# Patient Record
Sex: MALE | Race: WHITE | Employment: FULL TIME | URBAN - METROPOLITAN AREA
[De-identification: names, ages, dates, MRNs, and addresses within clinical notes are randomized per-mention and may not be internally consistent; named-entity substitution may affect disease eponyms.]

---

## 2017-02-16 DIAGNOSIS — F41.9 ANXIETY: Primary | ICD-10-CM

## 2017-07-27 ENCOUNTER — HOSPITAL ENCOUNTER (OUTPATIENT)
Facility: CLINIC | Age: 62
Setting detail: OBSERVATION
Discharge: HOME | End: 2017-07-29
Attending: EMERGENCY MEDICINE | Admitting: HOSPITALIST
Payer: COMMERCIAL

## 2017-07-27 ENCOUNTER — APPOINTMENT (OUTPATIENT)
Dept: CT IMAGING | Facility: CLINIC | Age: 62
End: 2017-07-27
Attending: EMERGENCY MEDICINE
Payer: COMMERCIAL

## 2017-07-27 ENCOUNTER — OFFICE VISIT (OUTPATIENT)
Dept: URGENT CARE | Facility: URGENT CARE | Age: 62
End: 2017-07-27
Payer: COMMERCIAL

## 2017-07-27 VITALS
OXYGEN SATURATION: 97 % | TEMPERATURE: 97.4 F | SYSTOLIC BLOOD PRESSURE: 155 MMHG | DIASTOLIC BLOOD PRESSURE: 105 MMHG | HEART RATE: 74 BPM

## 2017-07-27 DIAGNOSIS — R10.31 RLQ ABDOMINAL PAIN: Primary | ICD-10-CM

## 2017-07-27 DIAGNOSIS — K56.0 PARALYTIC ILEUS (H): ICD-10-CM

## 2017-07-27 PROBLEM — K56.609 SBO (SMALL BOWEL OBSTRUCTION) (H): Status: ACTIVE | Noted: 2017-07-27

## 2017-07-27 LAB
ALBUMIN SERPL-MCNC: 4.1 G/DL (ref 3.4–5)
ALBUMIN UR-MCNC: NEGATIVE MG/DL
ALP SERPL-CCNC: 69 U/L (ref 40–150)
ALT SERPL W P-5'-P-CCNC: 26 U/L (ref 0–70)
ANION GAP SERPL CALCULATED.3IONS-SCNC: 9 MMOL/L (ref 3–14)
APPEARANCE UR: CLEAR
AST SERPL W P-5'-P-CCNC: 19 U/L (ref 0–45)
BASOPHILS # BLD AUTO: 0 10E9/L (ref 0–0.2)
BASOPHILS NFR BLD AUTO: 0.1 %
BILIRUB SERPL-MCNC: 0.5 MG/DL (ref 0.2–1.3)
BILIRUB UR QL STRIP: NEGATIVE
BUN SERPL-MCNC: 16 MG/DL (ref 7–30)
CALCIUM SERPL-MCNC: 9.2 MG/DL (ref 8.5–10.1)
CHLORIDE SERPL-SCNC: 108 MMOL/L (ref 94–109)
CO2 SERPL-SCNC: 25 MMOL/L (ref 20–32)
COLOR UR AUTO: YELLOW
CREAT SERPL-MCNC: 1.01 MG/DL (ref 0.66–1.25)
DIFFERENTIAL METHOD BLD: NORMAL
EOSINOPHIL # BLD AUTO: 0.1 10E9/L (ref 0–0.7)
EOSINOPHIL NFR BLD AUTO: 1.1 %
ERYTHROCYTE [DISTWIDTH] IN BLOOD BY AUTOMATED COUNT: 13 % (ref 10–15)
GFR SERPL CREATININE-BSD FRML MDRD: 75 ML/MIN/1.7M2
GLUCOSE SERPL-MCNC: 104 MG/DL (ref 70–99)
GLUCOSE UR STRIP-MCNC: NEGATIVE MG/DL
HCT VFR BLD AUTO: 43.4 % (ref 40–53)
HGB BLD-MCNC: 15.2 G/DL (ref 13.3–17.7)
HGB UR QL STRIP: NEGATIVE
IMM GRANULOCYTES # BLD: 0 10E9/L (ref 0–0.4)
IMM GRANULOCYTES NFR BLD: 0.1 %
KETONES UR STRIP-MCNC: 15 MG/DL
LEUKOCYTE ESTERASE UR QL STRIP: NEGATIVE
LYMPHOCYTES # BLD AUTO: 1.5 10E9/L (ref 0.8–5.3)
LYMPHOCYTES NFR BLD AUTO: 18.5 %
MCH RBC QN AUTO: 31.8 PG (ref 26.5–33)
MCHC RBC AUTO-ENTMCNC: 35 G/DL (ref 31.5–36.5)
MCV RBC AUTO: 91 FL (ref 78–100)
MONOCYTES # BLD AUTO: 0.5 10E9/L (ref 0–1.3)
MONOCYTES NFR BLD AUTO: 6.1 %
NEUTROPHILS # BLD AUTO: 5.9 10E9/L (ref 1.6–8.3)
NEUTROPHILS NFR BLD AUTO: 74.1 %
NITRATE UR QL: NEGATIVE
NRBC # BLD AUTO: 0 10*3/UL
NRBC BLD AUTO-RTO: 0 /100
PH UR STRIP: 7 PH (ref 5–7)
PLATELET # BLD AUTO: 209 10E9/L (ref 150–450)
POTASSIUM SERPL-SCNC: 4 MMOL/L (ref 3.4–5.3)
PROT SERPL-MCNC: 7.3 G/DL (ref 6.8–8.8)
RBC # BLD AUTO: 4.78 10E12/L (ref 4.4–5.9)
SODIUM SERPL-SCNC: 142 MMOL/L (ref 133–144)
SP GR UR STRIP: 1.02 (ref 1–1.03)
URN SPEC COLLECT METH UR: ABNORMAL
UROBILINOGEN UR STRIP-ACNC: 0.2 EU/DL (ref 0.2–1)
WBC # BLD AUTO: 7.9 10E9/L (ref 4–11)

## 2017-07-27 PROCEDURE — 96375 TX/PRO/DX INJ NEW DRUG ADDON: CPT

## 2017-07-27 PROCEDURE — 99285 EMERGENCY DEPT VISIT HI MDM: CPT | Mod: 25

## 2017-07-27 PROCEDURE — 25000128 H RX IP 250 OP 636: Performed by: EMERGENCY MEDICINE

## 2017-07-27 PROCEDURE — 99207 ZZC OFFICE-HOSPITAL ADMIT: CPT

## 2017-07-27 PROCEDURE — 74176 CT ABD & PELVIS W/O CONTRAST: CPT

## 2017-07-27 PROCEDURE — 99220 ZZC INITIAL OBSERVATION CARE,LEVL III: CPT | Performed by: HOSPITALIST

## 2017-07-27 PROCEDURE — 81003 URINALYSIS AUTO W/O SCOPE: CPT | Performed by: EMERGENCY MEDICINE

## 2017-07-27 PROCEDURE — 83690 ASSAY OF LIPASE: CPT | Performed by: EMERGENCY MEDICINE

## 2017-07-27 PROCEDURE — 96361 HYDRATE IV INFUSION ADD-ON: CPT

## 2017-07-27 PROCEDURE — 80053 COMPREHEN METABOLIC PANEL: CPT | Performed by: EMERGENCY MEDICINE

## 2017-07-27 PROCEDURE — 85025 COMPLETE CBC W/AUTO DIFF WBC: CPT | Performed by: EMERGENCY MEDICINE

## 2017-07-27 PROCEDURE — 96374 THER/PROPH/DIAG INJ IV PUSH: CPT

## 2017-07-27 RX ORDER — PROCHLORPERAZINE 25 MG
25 SUPPOSITORY, RECTAL RECTAL EVERY 12 HOURS PRN
Status: DISCONTINUED | OUTPATIENT
Start: 2017-07-27 | End: 2017-07-29 | Stop reason: HOSPADM

## 2017-07-27 RX ORDER — ONDANSETRON 2 MG/ML
4 INJECTION INTRAMUSCULAR; INTRAVENOUS EVERY 6 HOURS PRN
Status: DISCONTINUED | OUTPATIENT
Start: 2017-07-27 | End: 2017-07-29 | Stop reason: HOSPADM

## 2017-07-27 RX ORDER — SODIUM CHLORIDE, SODIUM LACTATE, POTASSIUM CHLORIDE, CALCIUM CHLORIDE 600; 310; 30; 20 MG/100ML; MG/100ML; MG/100ML; MG/100ML
1000 INJECTION, SOLUTION INTRAVENOUS CONTINUOUS
Status: DISCONTINUED | OUTPATIENT
Start: 2017-07-27 | End: 2017-07-28

## 2017-07-27 RX ORDER — PROCHLORPERAZINE MALEATE 5 MG
5-10 TABLET ORAL EVERY 6 HOURS PRN
Status: DISCONTINUED | OUTPATIENT
Start: 2017-07-27 | End: 2017-07-29 | Stop reason: HOSPADM

## 2017-07-27 RX ORDER — NALOXONE HYDROCHLORIDE 0.4 MG/ML
.1-.4 INJECTION, SOLUTION INTRAMUSCULAR; INTRAVENOUS; SUBCUTANEOUS
Status: DISCONTINUED | OUTPATIENT
Start: 2017-07-27 | End: 2017-07-29 | Stop reason: HOSPADM

## 2017-07-27 RX ORDER — HYDROMORPHONE HYDROCHLORIDE 1 MG/ML
.3-.5 INJECTION, SOLUTION INTRAMUSCULAR; INTRAVENOUS; SUBCUTANEOUS
Status: DISCONTINUED | OUTPATIENT
Start: 2017-07-27 | End: 2017-07-29 | Stop reason: HOSPADM

## 2017-07-27 RX ORDER — ONDANSETRON 2 MG/ML
4 INJECTION INTRAMUSCULAR; INTRAVENOUS ONCE
Status: DISCONTINUED | OUTPATIENT
Start: 2017-07-27 | End: 2017-07-27

## 2017-07-27 RX ORDER — HYDROMORPHONE HYDROCHLORIDE 1 MG/ML
0.5 INJECTION, SOLUTION INTRAMUSCULAR; INTRAVENOUS; SUBCUTANEOUS
Status: DISCONTINUED | OUTPATIENT
Start: 2017-07-27 | End: 2017-07-27

## 2017-07-27 RX ORDER — BISACODYL 10 MG
10 SUPPOSITORY, RECTAL RECTAL DAILY PRN
Status: DISCONTINUED | OUTPATIENT
Start: 2017-07-28 | End: 2017-07-29 | Stop reason: HOSPADM

## 2017-07-27 RX ORDER — LABETALOL HYDROCHLORIDE 5 MG/ML
10 INJECTION, SOLUTION INTRAVENOUS
Status: DISCONTINUED | OUTPATIENT
Start: 2017-07-27 | End: 2017-07-29 | Stop reason: HOSPADM

## 2017-07-27 RX ORDER — ACETAMINOPHEN 650 MG/1
650 SUPPOSITORY RECTAL EVERY 4 HOURS PRN
Status: DISCONTINUED | OUTPATIENT
Start: 2017-07-27 | End: 2017-07-29 | Stop reason: HOSPADM

## 2017-07-27 RX ORDER — ONDANSETRON 2 MG/ML
4 INJECTION INTRAMUSCULAR; INTRAVENOUS ONCE
Status: COMPLETED | OUTPATIENT
Start: 2017-07-27 | End: 2017-07-27

## 2017-07-27 RX ORDER — HYDRALAZINE HYDROCHLORIDE 20 MG/ML
10 INJECTION INTRAMUSCULAR; INTRAVENOUS EVERY 4 HOURS PRN
Status: DISCONTINUED | OUTPATIENT
Start: 2017-07-27 | End: 2017-07-29 | Stop reason: HOSPADM

## 2017-07-27 RX ORDER — ONDANSETRON 4 MG/1
4 TABLET, ORALLY DISINTEGRATING ORAL EVERY 6 HOURS PRN
Status: DISCONTINUED | OUTPATIENT
Start: 2017-07-27 | End: 2017-07-29 | Stop reason: HOSPADM

## 2017-07-27 RX ORDER — ACETAMINOPHEN 325 MG/1
650 TABLET ORAL EVERY 4 HOURS PRN
Status: DISCONTINUED | OUTPATIENT
Start: 2017-07-27 | End: 2017-07-29 | Stop reason: HOSPADM

## 2017-07-27 RX ADMIN — SODIUM CHLORIDE 1000 ML: 9 INJECTION, SOLUTION INTRAVENOUS at 22:06

## 2017-07-27 RX ADMIN — HYDROMORPHONE HYDROCHLORIDE 0.5 MG: 1 INJECTION, SOLUTION INTRAMUSCULAR; INTRAVENOUS; SUBCUTANEOUS at 22:23

## 2017-07-27 RX ADMIN — ONDANSETRON 4 MG: 2 SOLUTION INTRAMUSCULAR; INTRAVENOUS at 22:07

## 2017-07-27 RX ADMIN — HYDROMORPHONE HYDROCHLORIDE 0.5 MG: 1 INJECTION, SOLUTION INTRAMUSCULAR; INTRAVENOUS; SUBCUTANEOUS at 22:05

## 2017-07-27 ASSESSMENT — ENCOUNTER SYMPTOMS
DYSURIA: 0
ABDOMINAL PAIN: 1
FREQUENCY: 1

## 2017-07-27 NOTE — MR AVS SNAPSHOT
After Visit Summary   7/27/2017    [address]    MRN: [id number]           Patient Information     Date Of Birth          1955        Visit Information        Provider Department      7/27/2017 6:45 PM CS URGENT CARE [facility]        Today's Diagnoses     RLQ abdominal pain    -  1       Follow-ups after your visit        Who to contact     If you have questions or need follow up information about today's clinic visit or your schedule please contact [facility] URGENT CARE directly at [phone].  Normal or non-critical lab and imaging results will be communicated to you by [employer], letter or phone within 4 business days after the clinic has received the results. If you do not hear from us within 7 days, please contact the clinic through [employer] or phone. If you have a critical or abnormal lab result, we will notify you by phone as soon as possible.  Submit refill requests through [employer] or call your pharmacy and they will forward the refill request to us. Please allow 3 business days for your refill to be completed.          Additional Information About Your Visit        MyChart Information     [employer] gives you secure access to your electronic health record. If you see a primary care provider, you can also send messages to your care team and make appointments. If you have questions, please call your primary care clinic.  If you do not have a primary care provider, please call [phone] and they will assist you.        Care EveryWhere ID     This is your Care EveryWhere ID. This could be used by other organizations to access your [place] medical records  [id number]        Your Vitals Were     Pulse Temperature Pulse Oximetry             74 97.4  F (36.3  C) 97%          Blood Pressure from Last 3 Encounters:   07/27/17 (!) 155/105   11/29/16 130/80   10/26/16 130/82    Weight from Last 3 Encounters:   11/29/16 171 lb (77.6 kg)   10/26/16 165 lb  6.4 oz (75 kg)   07/21/16 174 lb (78.9 kg)              Today, you had the following     No orders found for display       Primary Care Provider Office Phone # Fax #    Manolo Ambriz [phone]       [facility] [address] [address]        Equal Access to Services     [facility]: Hadii aad ku hadasho Soomaali, waaxda luqadaha, qaybta kaalmada adeegyada, waxay idiin hayaan adeeg kharash la'aan . So [facility] [phone].    ATENCIÓN: Si habla español, tiene a becerra disposición servicios gratuitos de asistencia lingüística. Solitarioame al [phone].    We comply with applicable federal civil rights laws and [place] laws. We do not discriminate on the basis of race, color, national origin, age, disability sex, sexual orientation or gender identity.            Thank you!     Thank you for choosing [facility] URGENT CARE  for your care. Our goal is always to provide you with excellent care. Hearing back from our patients is one way we can continue to improve our services. Please take a few minutes to complete the written survey that you may receive in the mail after your visit with us. Thank you!             Your Updated Medication List - Protect others around you: Learn how to safely use, store and throw away your medicines at www.disposemymeds.org.          This list is accurate as of: 7/27/17  7:17 PM.  Always use your most recent med list.                   Brand Name Dispense Instructions for use Diagnosis    amphetamine-dextroamphetamine 10 MG per 24 hr capsule    ADDERALL XR    30 capsule    Take 1 capsule (10 mg) by mouth daily    Attention-deficit hyperactivity disorder, other type       ASPIRIN      Take 325 mg by mouth as needed On hold preop        Fish Oil 500 MG Caps      Take 1 capsule by mouth daily        magnesium 250 MG tablet     30 tablet    Take 1 tablet by mouth 2 times daily        MULTIVITAMIN TABS   OR      1 TABLET EVETRY DAY         SAW PALMETTO EXTRACT PO           Simethicone 180 MG Caps      Take by mouth as needed        Valerian Root 100 MG Caps     840    2 TABLETS AT BEDTIME AS NEEDED        VITAMIN A PO      Take by mouth daily

## 2017-07-27 NOTE — NURSING NOTE
"Chief Complaint   Patient presents with     Urgent Care     Abdominal Pain     Stomach pain in RLQ that started today at 4pm. Patient states that he feels bloated, nausea and was unable to make a BM. Patient was unable to eat dinner. Pain rating at 5-6/10.        Initial BP (!) 155/105 (BP Location: Right arm, Cuff Size: Adult Regular)  Pulse 74  Temp 97.4  F (36.3  C)  SpO2 97% Estimated body mass index is 24.36 kg/(m^2) as calculated from the following:    Height as of 11/29/16: 5' 10.25\" (1.784 m).    Weight as of 11/29/16: 171 lb (77.6 kg).  Medication Reconciliation: complete.  WINSOME Woodson      "

## 2017-07-27 NOTE — IP AVS SNAPSHOT
MRN:[id number]                      After Visit Summary   7/27/2017    [address]    MRN: [id number]           Thank you!     Thank you for choosing [facility] for your care. Our goal is always to provide you with excellent care. Hearing back from our patients is one way we can continue to improve our services. Please take a few minutes to complete the written survey that you may receive in the mail after you visit with us. Thank you!        Patient Information     Date Of Birth          1955        Designated Caregiver       Most Recent Value    Caregiver    Will someone help with your care after discharge? yes    Name of designated caregiver -- [faisal]    Phone number of caregiver -- [[phone]]      About your hospital stay     You were [facility] on:  July 27, 2017 [facility]    You were discharged on:  July 29, 2017        Reason for your hospital stay       Observation care for small bowel obstruction.                  Who to Call     For medical emergencies, please call 911.  For non-urgent questions about your medical care, please call your primary care provider or clinic, [phone]          Attending Provider     Provider Specialty    Chuy Salmeron MD Emergency Medicine    [facility]       Primary Care Provider Office Phone # Fax #    Jose Luis Slava Nielson [phone]      After Care Instructions     Activity       Your activity upon discharge: activity as tolerated            Diet       Follow this diet upon discharge: Advance to a regular diet as tolerated                  Follow-up Appointments     Follow-up and recommended labs and tests        Follow up with primary care provider within 7 days for hospital follow- up.  Recommend follow up with Urology as well.  [facility] clinic number is [phone].                  Your next 10 appointments already scheduled      "Aug 03, 2017  3:30 PM CDT   Office Visit with Jose Luis Nielson MD   [facility]    [address]  [facility] [address] [phone]           Bring a current list of meds and any records pertaining to this visit. For Physicals, please bring immunization records and any forms needing to be filled out. Please arrive 10 minutes early to complete paperwork.              Pending Results     No orders found from 7/25/2017 to 7/28/2017.            Statement of Approval     Ordered          07/29/17 0901  I have reviewed and agree with all the recommendations and orders detailed in this document.  EFFECTIVE NOW     Approved and electronically signed by:  Zainab Can PA             Admission Information     Date & Time Provider Department Dept. Phone    7/27/2017 Aj Steen MD [facility] [phone]      Your Vitals Were     Blood Pressure Temperature Respirations Height Weight Pulse Oximetry    141/88 (BP Location: Left arm) 96.3  F (35.7  C) (Oral) 16 1.803 m (5' 11\") 78.4 kg (172 lb 12.8 oz) 97%    BMI (Body Mass Index)                   24.1 kg/m2           MyChart Information     [employer] gives you secure access to your electronic health record. If you see a primary care provider, you can also send messages to your care team and make appointments. If you have questions, please call your primary care clinic.  If you do not have a primary care provider, please call [phone] and they will assist you.        Care EveryWhere ID     This is your Care EveryWhere ID. This could be used by other organizations to access your [place] medical records  [id number]        Equal Access to Services     [facility] : Hadii reyes Smith, waeloise pandaadaha, qaybta kaalmada henrry, terri avila. So [facility] [phone].    ATENCIÓN: Si habla español, tiene a becerra disposición servicios gratuitos de " asistencia lingüística. Chu al [phone].    We comply with applicable federal civil rights laws and [place] laws. We do not discriminate on the basis of race, color, national origin, age, disability sex, sexual orientation or gender identity.               Review of your medicines      CONTINUE these medicines which have NOT CHANGED        Dose / Directions    DAILY HERBS PROSTATE PO   Indication:  [employer] prostate formula- saw palmetto blend.   Notes to Patient:  Resume at discharge        Dose:  1 tablet   Take 1 tablet by mouth daily   Refills:  0       Fish Oil 500 MG Caps   Notes to Patient:  Resume at discharge          Dose:  1 capsule   Take 1 capsule by mouth daily   Refills:  0       magnesium 250 MG tablet   Notes to Patient:  Take as needed        Dose:  1 tablet   Take 1 tablet by mouth nightly as needed (sleep)   Refills:  0       MULTIVITAMIN TABS   OR   Notes to Patient:  Resume at discharge          1 TABLET EVETRY DAY   Refills:  0                Protect others around you: Learn how to safely use, store and throw away your medicines at www.disposemymeds.org.             Medication List: This is a list of all your medications and when to take them. Check marks below indicate your daily home schedule. Keep this list as a reference.      Medications           Morning Afternoon Evening Bedtime As Needed    DAILY HERBS PROSTATE PO   Take 1 tablet by mouth daily   Notes to Patient:  Resume at discharge                                   Fish Oil 500 MG Caps   Take 1 capsule by mouth daily   Notes to Patient:  Resume at discharge                                     magnesium 250 MG tablet   Take 1 tablet by mouth nightly as needed (sleep)   Notes to Patient:  Take as needed                                   MULTIVITAMIN TABS   OR   1 TABLET EVETRY DAY   Notes to Patient:  Resume at discharge

## 2017-07-27 NOTE — IP AVS SNAPSHOT
[facility]    [address] [place] [address]    Phone:  [phone]                                       After Visit Summary   7/27/2017    [address]    MRN: [id number]           After Visit Summary Signature Page     I have received my discharge instructions, and my questions have been answered. I have discussed any challenges I see with this plan with the nurse or doctor.    ..........................................................................................................................................  Patient/Patient Representative Signature      ..........................................................................................................................................  Patient Representative Print Name and Relationship to Patient    ..................................................               ................................................  Date                                            Time    ..........................................................................................................................................  Reviewed by Signature/Title    ...................................................              ..............................................  Date                                                            Time

## 2017-07-28 LAB
ALBUMIN SERPL-MCNC: 3.2 G/DL (ref 3.4–5)
ALP SERPL-CCNC: 55 U/L (ref 40–150)
ALT SERPL W P-5'-P-CCNC: 22 U/L (ref 0–70)
ANION GAP SERPL CALCULATED.3IONS-SCNC: 8 MMOL/L (ref 3–14)
AST SERPL W P-5'-P-CCNC: 16 U/L (ref 0–45)
BASOPHILS # BLD AUTO: 0 10E9/L (ref 0–0.2)
BASOPHILS NFR BLD AUTO: 0.1 %
BILIRUB SERPL-MCNC: 0.7 MG/DL (ref 0.2–1.3)
BUN SERPL-MCNC: 11 MG/DL (ref 7–30)
CALCIUM SERPL-MCNC: 8.3 MG/DL (ref 8.5–10.1)
CHLORIDE SERPL-SCNC: 109 MMOL/L (ref 94–109)
CO2 SERPL-SCNC: 27 MMOL/L (ref 20–32)
CREAT SERPL-MCNC: 0.91 MG/DL (ref 0.66–1.25)
DIFFERENTIAL METHOD BLD: NORMAL
EOSINOPHIL # BLD AUTO: 0.1 10E9/L (ref 0–0.7)
EOSINOPHIL NFR BLD AUTO: 1.3 %
ERYTHROCYTE [DISTWIDTH] IN BLOOD BY AUTOMATED COUNT: 13.1 % (ref 10–15)
GFR SERPL CREATININE-BSD FRML MDRD: 85 ML/MIN/1.7M2
GLUCOSE SERPL-MCNC: 88 MG/DL (ref 70–99)
HCT VFR BLD AUTO: 41 % (ref 40–53)
HGB BLD-MCNC: 14.3 G/DL (ref 13.3–17.7)
IMM GRANULOCYTES # BLD: 0 10E9/L (ref 0–0.4)
IMM GRANULOCYTES NFR BLD: 0.1 %
LIPASE SERPL-CCNC: 189 U/L (ref 73–393)
LYMPHOCYTES # BLD AUTO: 1.9 10E9/L (ref 0.8–5.3)
LYMPHOCYTES NFR BLD AUTO: 26.9 %
MCH RBC QN AUTO: 31.9 PG (ref 26.5–33)
MCHC RBC AUTO-ENTMCNC: 34.9 G/DL (ref 31.5–36.5)
MCV RBC AUTO: 92 FL (ref 78–100)
MONOCYTES # BLD AUTO: 0.7 10E9/L (ref 0–1.3)
MONOCYTES NFR BLD AUTO: 9.6 %
NEUTROPHILS # BLD AUTO: 4.3 10E9/L (ref 1.6–8.3)
NEUTROPHILS NFR BLD AUTO: 62 %
NRBC # BLD AUTO: 0 10*3/UL
NRBC BLD AUTO-RTO: 0 /100
PLATELET # BLD AUTO: 190 10E9/L (ref 150–450)
POTASSIUM SERPL-SCNC: 3.9 MMOL/L (ref 3.4–5.3)
PROT SERPL-MCNC: 5.9 G/DL (ref 6.8–8.8)
RBC # BLD AUTO: 4.48 10E12/L (ref 4.4–5.9)
SODIUM SERPL-SCNC: 144 MMOL/L (ref 133–144)
WBC # BLD AUTO: 7 10E9/L (ref 4–11)

## 2017-07-28 PROCEDURE — 36415 COLL VENOUS BLD VENIPUNCTURE: CPT | Performed by: HOSPITALIST

## 2017-07-28 PROCEDURE — 80053 COMPREHEN METABOLIC PANEL: CPT | Performed by: HOSPITALIST

## 2017-07-28 PROCEDURE — 85025 COMPLETE CBC W/AUTO DIFF WBC: CPT | Performed by: HOSPITALIST

## 2017-07-28 PROCEDURE — 96375 TX/PRO/DX INJ NEW DRUG ADDON: CPT

## 2017-07-28 PROCEDURE — 99225 ZZC SUBSEQUENT OBSERVATION CARE,LEVEL II: CPT | Performed by: PHYSICIAN ASSISTANT

## 2017-07-28 PROCEDURE — G0378 HOSPITAL OBSERVATION PER HR: HCPCS

## 2017-07-28 PROCEDURE — 96361 HYDRATE IV INFUSION ADD-ON: CPT

## 2017-07-28 PROCEDURE — 25000128 H RX IP 250 OP 636: Performed by: PHYSICIAN ASSISTANT

## 2017-07-28 PROCEDURE — 25000128 H RX IP 250 OP 636: Performed by: HOSPITALIST

## 2017-07-28 PROCEDURE — 25000125 ZZHC RX 250: Performed by: HOSPITALIST

## 2017-07-28 RX ORDER — MULTIVITAMIN WITH IRON
1 TABLET ORAL
COMMUNITY

## 2017-07-28 RX ORDER — SODIUM CHLORIDE, SODIUM LACTATE, POTASSIUM CHLORIDE, CALCIUM CHLORIDE 600; 310; 30; 20 MG/100ML; MG/100ML; MG/100ML; MG/100ML
1000 INJECTION, SOLUTION INTRAVENOUS CONTINUOUS
Status: DISCONTINUED | OUTPATIENT
Start: 2017-07-28 | End: 2017-07-29 | Stop reason: HOSPADM

## 2017-07-28 RX ADMIN — SODIUM CHLORIDE, POTASSIUM CHLORIDE, SODIUM LACTATE AND CALCIUM CHLORIDE 1000 ML: 600; 310; 30; 20 INJECTION, SOLUTION INTRAVENOUS at 00:12

## 2017-07-28 RX ADMIN — PANTOPRAZOLE SODIUM 40 MG: 40 INJECTION, POWDER, FOR SOLUTION INTRAVENOUS at 06:35

## 2017-07-28 RX ADMIN — SODIUM CHLORIDE, POTASSIUM CHLORIDE, SODIUM LACTATE AND CALCIUM CHLORIDE 1000 ML: 600; 310; 30; 20 INJECTION, SOLUTION INTRAVENOUS at 22:53

## 2017-07-28 RX ADMIN — SODIUM CHLORIDE, POTASSIUM CHLORIDE, SODIUM LACTATE AND CALCIUM CHLORIDE 1000 ML: 600; 310; 30; 20 INJECTION, SOLUTION INTRAVENOUS at 12:47

## 2017-07-28 RX ADMIN — SODIUM CHLORIDE, POTASSIUM CHLORIDE, SODIUM LACTATE AND CALCIUM CHLORIDE 1000 ML: 600; 310; 30; 20 INJECTION, SOLUTION INTRAVENOUS at 06:21

## 2017-07-28 NOTE — PROGRESS NOTES
"[facility] Urgent Care Progress Note        Alanna Quezada MD, MPH  07/27/2017        History:      A pleasant 62 year old year old male is seen for abdominal pain since 4 pm today. He refers to nausea but no vomiting or diarrhea. No melena , hematuria or hematochezia. No flank pain is referred. He describes his abdominal pain like \" post op pain after donating a kidney to my daughter\". No cough,chest pain or dyspnea is referred. No fever or chills.         Assessment and Plan:      RLQ abdominal pain:  The patient is directed to [facility] ER for further evaluation. Patient agrees w this plan. He is stable upon departure from the urgent care.                   Physical Exam:      BP (!) 155/105 (BP Location: Right arm, Cuff Size: Adult Regular)  Pulse 74  Temp 97.4  F (36.3  C)  SpO2 97%     Constitutional: Patient is in mild to moderate distress due to abdominal pain.The patient is pleasant and cooperative.   HEENT: Head:  Head is atraumatic, normocephalic.    Eyes: Pupils are equal, round and reactive to light and accomodation.  Sclera is non-icteric. No conjunctival injection, or exudate noted. Extraocular motion is intact. Visual acuity is intact bilaterally.  Ears:  External acoustic canals are patent and clear.  There is no erythema and bulging( exudate)  of the ( R/L ) tympanic membrane(s ).   Nose:  No Nasal congestion w/o drainage or mucosal ulceration is noted.  Throat:  Oral mucosa is moist.  No oral lesions are noted.  No posterior pharyngeal hyperemia or exudate noted.     Neck Supple.  There is no cervical lymphadenopathy.  No nuchal rigidity noted.  There is no meningismus.     Cardiovascular: Heart is regular to rate and rhythm.  No murmur is noted.     Chest. Chest Symmetrical, no soft tissues, swelling, or tenderness upon palpation   Lungs: Clear in the anterior and posterior pulmonary fields.   Abdomen: Right lower quadrant abdominal pain w guarding. Sluggish bowel sounds.    Back No flank " tenderness is noted.   Extremeties No edema, no calf tenderness.   Neuro: No focal deficit.   Skin No petechiae or purpura is noted.  There is no rash.   Mood Normal              Medications:        PRN Meds:          Data:      All new lab and imaging data was reviewed.   Results for orders placed or performed during the hospital encounter of 12/01/16   US Testicular & Scrotum w Doppler Ltd    Narrative    EXAMINATION: US TESTICULAR AND SCROTAL, 12/1/2016 3:47 PM     COMPARISON: None available    HISTORY: Enlarged left testicle. Urology several years ago, at that  time this was felt to represent a hydrocele although ultrasound was  not performed.    TECHNIQUE: The scrotum was scanned in standard fashion with  specialized ultrasound transducer(s) using both grey scale and limited  color Doppler techniques.    Findings:  Within the superior aspect of the left testicle, there is a large  anechoic cystic focus with posterior acoustic enhancement which  measures up to 2.9 cm. The testes otherwise demonstrate normal and  symmetric echotexture and vascularity. No evidence of a solid lesion.   The right testicle measures 4.7 x 2.7 x 3.2 cm for a volume of 21.3  mL, and the left measures 5.2 x 3.4 x 3.1 cm, for a volume of 28.7 mL.   There is no evidence of torsion.    Small right and moderate left hydrocele. No varicocele appreciated.   Both the right and left epididymis are within normal limits.      Impression    Impression:   1.  Large benign appearing intratesticular cyst on the left.  2.  Moderate left and small right hydroceles.    I have personally reviewed the examination and initial interpretation  and I agree with the findings.    ANDREW FORBES MD

## 2017-07-28 NOTE — H&P
[facility] [place]    History and Physical  Hospitalist       Date of Admission:  7/27/2017  Date of Service (when I saw the patient): 07/27/17    Assessment & Plan   Govind Way is a 62 year old male kidney donor with solitary kidney with PMH significant for hypertension, hyperlipidemia, BPH, history of inguinal hernia repair and ventral hernia repair was sent to ER from urgent care for evaluation of pain abdomen.      Possible early small bowel obstruction: Acute onset pain abdomen, nausea.  Noncontrast CT abdomen and pelvis without obvious obstruction but possibly early obstruction versus ileus.  Patient feels comfortable after getting Dilaudid and Zofran in the ER.  He will be placed in observation.   - N.p.o., IV fluid, antiemetics, pain management with IV Dilaudid, discussed with patient to minimize pain medication.  - Hold off on NG for now, currently feels better, NG/surgery consult if symptoms worsen.  - Dulcolax suppository in am if no worsening pain/vomiting.  - will start diet once pain, n/v improves in am.  - follow am labs, check lipase.     Benign essential hypertension  Hyperlipidemia  - Not on medications  - BP elevated likely due to pain, PRN labetalol/hydralazine ordered   - Monitor.     DVT Prophylaxis: encourage ambulation     Solitary kidney: patient is a kidney donor to his daughter.     GI PPX- PPI     Enlarged prostate with symptoms of BPH: monitor for retention.    DVT Prophylaxis: encourage ambulation    Code Status: Full Code    Disposition: Expected discharge: once bowel function resumes, likely 1-2 days. Registered to observation status, if bowel symptoms, will change to inpatient.  Above plan was discussed with patient.    Aj Steen MD  Hospitalist  Pager: [phone]    Primary Care Physician   Manolo Ambriz    Chief Complaint   Abdominal pain- started earlier today     History is obtained from the patient, discussion with ED physician and chart  review.       History of Present Illness   Govind Way is a 62 year old male kidney donor with solitary kidney with PMH significant for hypertension, hyperlipidemia, BPH, history of inguinal hernia repair and ventral hernia repair was sent to ER from urgent care for evaluation of pain abdomen.     According to the patient started around 4 PM today after work.  Patient had snacks just before that while he was at work and also had a bowel movement, but then while he was driving from work, he started feeling gassy and felt abdominal cramps which was generalized but mostly in the lower abdomen. It progressed to become waxing and waning more severe abdominal pain worse over the right lower quadrant, 5 to 9 /10.  There was associated nausea.  Vomited once in the ER after getting hydromorphone IV.     He was expecting it to get better, so went to his parents, mowed the lawn, but since the pain was worsening, went to urgent care clinic from where he was sent to ER for further evaluation.     In the ER, patient was evaluated by Dr. Salmeron, he was uncomfortable due to pain, was nauseous and after getting IV Dilaudid vomited×1.  Then he received zofran IV and is feeling better now. Routine lab workup including CBC, CMP was unremarkable.  CT abdomen and pelvis without contrast was done,  revealed few mildly prominent fluid-filled loops of small bowel in the lower abdomen but no definite transition point, findings suggestive of ileus versus early small bowel obstruction.  Patient is being placed in observation for further management.      Past Medical History    I have reviewed this patient's medical history and updated it with pertinent information as needed.  Past Medical History:   Diagnosis Date     Adjustment disorder      Dysthymic disorder      Enlarged prostate      Hepatitis      Irregular heart beat     intermittant palpitations     Palpitations     intermittent     PONV (postoperative nausea and vomiting)       Positive PPD      Pure hypercholesterolemia      Renal disease     single R kidney;donated left     Screening examination for pulmonary tuberculosis     positive mantoux     Unspecified essential hypertension        Past Surgical History   I have reviewed this patient's surgical history and updated it with pertinent information as needed.  Past Surgical History:   Procedure Laterality Date     C ECG MONITOR/ 24 HRS, ORIG ECG, W VIS SUPERIMPOS SCAN, COMPLETE      um holter monitor     C REMV KIDNEY/URETER,SAME INCIS  05    Nephrouretectomy/LEFT KIDNEY DONATED TO DAUGHTER/[facility]     HC REMOVAL OF TONSILS,<13 Y/O  age 7     HC VASECTOMY UNILAT/BILAT W POSTOP SEMEN  age 41     HERNIORRHAPHY INGUINAL  2011    Procedure:HERNIORRHAPHY INGUINAL; Surgeon:JULIA SIERRA; Location: OR     LAPAROSCOPIC HERNIORRHAPHY INGUINAL Right 2015    Procedure: LAPAROSCOPIC HERNIORRHAPHY INGUINAL;  Surgeon: García Ibarra MD;  Location:  OR     LAPAROSCOPIC HERNIORRHAPHY VENTRAL  2011    Procedure:LAPAROSCOPIC HERNIORRHAPHY VENTRAL; Laparoscopic Ventral Hernia Repair with Mesh, Open Left Inguinal Hernia Repair with Mesh; Surgeon:JULIA SIERRA; Location: OR     LASIK CUSTOMVUE BILATERAL  2011    Procedure:LASIK CUSTOMVUE BILATERAL; BILATERAL CUSTOMVUE LASIK WITH INTRALASE; Surgeon:POP SIMON; Location:[facility]       Prior to Admission Medications   Prior to Admission Medications   Prescriptions Last Dose Informant Patient Reported? Taking?   ASPIRIN   Yes No   Sig: Take 325 mg by mouth as needed On hold preop   MULTIVITAMIN TABS   OR   No No   Si TABLET EV DAY   Omega-3 Fatty Acids (FISH OIL) 500 MG CAPS   Yes No   Sig: Take 1 capsule by mouth daily    Saw Palmetto, Serenoa repens, (SAW PALMETTO EXTRACT PO)   Yes No   Simethicone 180 MG CAPS   Yes No   Sig: Take by mouth as needed   VALERIAN ROOT 100 MG OR CAPS   No No   Si TABLETS AT  BEDTIME AS NEEDED   Patient not taking: Reported on 7/27/2017   VITAMIN A PO   Yes No   Sig: Take by mouth daily   amphetamine-dextroamphetamine (ADDERALL XR) 10 MG per capsule   No No   Sig: Take 1 capsule (10 mg) by mouth daily   Patient not taking: Reported on 7/27/2017   magnesium 250 MG tablet   Yes No   Sig: Take 1 tablet by mouth 2 times daily      Facility-Administered Medications: None     Allergies   Allergies   Allergen Reactions     Ambien [Zolpidem Tartrate]      irritability     Codeine Sulfate Itching       Social History   I have reviewed this patient's social history and updated it with pertinent information as needed. Govind Way  reports that he quit smoking about 35 years ago. He has never used smokeless tobacco. He reports that he drinks alcohol. He reports that he does not use illicit drugs.    Family History   I have reviewed this patient's family history and updated it with pertinent information if needed.   Family History   Problem Relation Age of Onset     DIABETES Mother      ADULT ONSET     Hypertension Mother      Coronary Artery Disease Mother      Also father and grandparent.  Father had coronary bypass and aortic valve replacement surgery     Heart Surgery Mother      Aortic valve     CANCER Other      prostate cancer     Thyroid Disease Other      Hypertension Father      Eye Disorder No family hx of      NONE KNOWN     Hyperlipidemia No family hx of      CEREBROVASCULAR DISEASE No family hx of      Breast Cancer No family hx of      Colon Cancer No family hx of      Other Cancer No family hx of      Depression No family hx of      Anxiety Disorder No family hx of      MENTAL ILLNESS No family hx of      Substance Abuse No family hx of      Anesthesia Reaction No family hx of      Asthma No family hx of      OSTEOPOROSIS No family hx of      Genetic Disorder No family hx of      Obesity No family hx of      Unknown/Adopted No family hx of      Skin Cancer       Mother and  father, grandfather     Arthritis       Mom, daughter, grandparents     Prostate Cancer Other      Grandfather     Other - See Comments       Grandfather with kidney stone     KIDNEY DISEASE Daughter      Lupus Daughter      Causing end-stage renal disease       Review of Systems   All 10 point systems reviewed and is negative other than noted in the HPI or here.    Physical Exam   Temp: 98.4  F (36.9  C) Temp src: Oral BP: 139/74   Heart Rate: 70 Resp: 14 SpO2: 95 % O2 Device: None (Room air)    Vital Signs with Ranges  Temp:  [97.4  F (36.3  C)-98.4  F (36.9  C)] 98.4  F (36.9  C)  Pulse:  [74] 74  Heart Rate:  [70-71] 70  Resp:  [14-16] 14  BP: (139-155)/() 139/74  SpO2:  [95 %-98 %] 95 %  0 lbs 0 oz    Constitutional: Alert, awake, he appears comfortable now.  HEENT: PERRLA, EOMI. Mucosa is moist.  Respiratory: Bilateral equal air entry, No wheezing or crackles. Normal work of breathing. On room air.  Cardiovascular: S1S2 regular, No murmur, no tachycardia.  GI: Abdomen is non distended, soft in upper quadrants but firm/garding in lower quadrants, no peritonism. Has upper midline small scar from ventral hernia repair.  Bowel tones present but sluggish.  Lymph/Hematologic: No lymphadenopathy.  Skin: No rash.   Musculoskeletal: No edema  Neurologic: Alert and awake, follows verbal commands appropriately, CN 2-12 intact, motor strength symmetrical.   Psychiatric: Calm, co-operative, appropriate.    Data   Data reviewed today:  I personally reviewed  I personally reviewed CT abdomen pelvis report- possible early SBO vs ileus, report attached.    Recent Labs  Lab 07/27/17 1940   WBC 7.9   HGB 15.2   MCV 91         POTASSIUM 4.0   CHLORIDE 108   CO2 25   BUN 16   CR 1.01   ANIONGAP 9   BINDU 9.2   *   ALBUMIN 4.1   PROTTOTAL 7.3   BILITOTAL 0.5   ALKPHOS 69   ALT 26   AST 19       Recent Results (from the past 24 hour(s))   Abd/pelvis CT no contrast - Stone Protocol    Narrative    CT ABDOMEN  PELVIS WITHOUT CONTRAST   7/27/2017 9:49 PM     HISTORY: Abdominal pain, greater on the right.     TECHNIQUE: Volumetric helical sections were acquired from the lung  bases through the ischial tuberosities without IV contrast. Coronal  images were also reconstructed. Radiation dose for this scan was  reduced using automated exposure control, adjustment of the mA and/or  kV according to patient size, or iterative reconstruction technique.    COMPARISON: CT of the abdomen and pelvis performed 3/14/2005.    FINDINGS: Solitary right kidney. Few tiny pancreatic calcifications  could be related to chronic pancreatitis. The liver, gallbladder,  spleen, adrenal glands, pancreas, and right kidney have otherwise  unremarkable noncontrast appearances. No urinary calculi or  hydronephrosis. There are several mildly dilated loops of distal small  bowel in the lower abdomen, with no definite transition point  identified. Unremarkable appendix. No free fluid in the pelvis. The  lung bases are clear. Mild degenerative changes in the lower lumbar  spine.      Impression    IMPRESSION:   1. A few mildly prominent fluid-filled loops of small bowel in the  lower abdomen, with no definite transition point. Findings suggest  ileus, however an early small bowel obstruction cannot entirely be  excluded. Clinical correlation and follow-up are recommended.  2. Solitary right kidney.  3. A few tiny pancreatic calcifications may be related to chronic  pancreatitis.    MARK ÁLVAREZ MD

## 2017-07-28 NOTE — PLAN OF CARE
"Problem: Discharge Planning  Goal: Discharge Planning (Adult, OB, Behavioral, Peds)  Outcome: Improving  PRIMARY DIAGNOSIS: \"GENERIC\" NURSING  OUTPATIENT/OBSERVATION GOALS TO BE MET BEFORE DISCHARGE:  1. ADLs back to baseline: Yes      2. Activity and level of assistance: Ambulating independently.      3. Pain status: Only some discomfort, has started to pass flatus      4. Return to near baseline physical activity: Yes          Discharge Planner Nurse   Safe discharge environment identified: Yes  Barriers to discharge: No       Entered by: Margarita Bolivar 07/28/2017 9:58 AM     Please review provider order for any additional goals.   Nurse to notify provider when observation goals have been met and patient is ready for discharge.      "

## 2017-07-28 NOTE — PHARMACY-ADMISSION MEDICATION HISTORY
Admission medication history interview status for the 7/27/2017  admission is complete. See EPIC admission navigator for prior to admission medications     Medication history source reliability:Good    Actions taken by pharmacist (provider contacted, etc): Face to face interview with patient.      Additional medication history information not noted on PTA med list :None    Medication reconciliation/reorder completed by provider prior to medication history? No    Time spent in this activity: 10 minutes    Prior to Admission medications    Medication Sig Last Dose Taking? Auth Provider   magnesium 250 MG tablet Take 1 tablet by mouth nightly as needed (sleep) Past Week at Unknown time Yes Unknown, Entered By History   Misc Natural Products (DAILY HERBS PROSTATE PO) Take 1 tablet by mouth daily 7/27/2017 at am Yes Unknown, Entered By History   Omega-3 Fatty Acids (FISH OIL) 500 MG CAPS Take 1 capsule by mouth daily  7/27/2017 at am Yes Reported, Patient   MULTIVITAMIN TABS   OR 1 TABLET EVETRY DAY 7/27/2017 at am Yes

## 2017-07-28 NOTE — PLAN OF CARE
Problem: Goal Outcome Summary  Goal: Goal Outcome Summary  Outcome: No Change  A&O vss.  Independent .NPO at this time  no c/o pain eyes close resting comfortably through out the shift.  plan to have diagnostic tests in this morning . D/c ending at this time

## 2017-07-28 NOTE — PROGRESS NOTES
"[facility]  ED Nurse Handoff Report    ED Chief complaint: Abdominal Pain (seen at , RLQ pain sudden onset, started at 1600)      ED Diagnosis:   Final diagnoses:   Paralytic ileus (H)       Code Status: Full Code    Allergies:   Allergies   Allergen Reactions     Ambien [Zolpidem Tartrate]      irritability     Codeine Sulfate Itching       Activity level - Baseline/Home:  Independent    Activity Level - Current:   Independent     Needed?: No    Isolation: No  Infection: Not Applicable    Bariatric?: No    Vital Signs:   Vitals:    07/27/17 1933 07/27/17 2215 07/27/17 2220   BP: 153/88  (!) 149/91   Resp: 16  14   Temp: 98.4  F (36.9  C)     TempSrc: Oral     SpO2: 97% 98% 98%   Height: 1.803 m (5' 11\")         Cardiac Rhythm: ,        Pain level: 0-10 Pain Scale: 3    Is this patient confused?: No    Patient Report: Initial Complaint: Right lower quadrant abdominal pain  Focused Assessment: Acute onset RLQ abdominal pain at 1600.  CT shows paralytic ileus.  Plan to admit overnight.  Pain controlled after 0.5 mg dilaudid x2.    Tests Performed: CT, blood work, UA  Abnormal Results: CT, blood work and UA unremarkable  Treatments provided: 1 L NS bolus, dialudid x2, zofran x2    Family Comments: Not present    OBS brochure/video discussed/provided to patient: Yes    ED Medications:   Medications   ondansetron (ZOFRAN) injection 4 mg (not administered)   HYDROmorphone (PF) (DILAUDID) injection 0.5 mg (0.5 mg Intravenous Given 7/27/17 2223)   ondansetron (ZOFRAN) injection 4 mg (4 mg Intravenous Given 7/27/17 2207)   0.9% sodium chloride BOLUS (0 mLs Intravenous Stopped 7/27/17 2248)       Drips infusing?:  No      ED NURSE PHONE NUMBER: [phone]         "

## 2017-07-28 NOTE — PLAN OF CARE
Problem: Goal Outcome Summary  Goal: Goal Outcome Summary  Outcome: Improving  VSS. A/O. Up independently. Tolerating full liquids without increased pain/nausea. Passing flatus. Has not needed any pain medication since last night.

## 2017-07-28 NOTE — PROGRESS NOTES
[facility]    Hospitalist Progress Note    Date of Service (when I saw the patient): 07/28/2017    Assessment & Plan   Govind Way is a 62 year old male kidney donor with solitary kidney with PMH significant for hypertension, hyperlipidemia, BPH, history of inguinal hernia repair and ventral hernia repair was sent to ER from urgent care for evaluation of pain abdomen.      Likely Small bowel obstruction: Acute onset pain abdomen, nausea.  Noncontrast CT abdomen and pelvis shows evidence for possible early obstruction versus ileus.  Patient feels improved after getting Dilaudid and Zofran in the ER.  He was placed in observation.   - Patient seems to be improving.  Abdominal pain is nearly gone.  No further nausea or vomiting.  - Advance diet as tolerated.  He had full liquids for lunch and tolerated.  - Repeat lab work this morning is unremarkable.  - Continue IV fluids   - Hold off on NG for now, currently feels better, NG/surgery consult if symptoms worsen.  - If patient continues to improve and he is tolerating a diet without any further symptoms he can likely discharge later today.  The patient continues to have symptoms then he will maintain an observation overnight.     Benign essential hypertension  Hyperlipidemia  - Not on medications  - BP elevated likely due to pain, PRN labetalol/hydralazine ordered   - Monitor.     Solitary kidney: patient is a kidney donor to his daughter.  - Renal function stable.     GI PPX- PPI     Enlarged prostate with symptoms of BPH:   - Monitor for retention.  - Patient was encouraged to follow up closely with a urologist.  The number for the clinic, [facility], was provided.     Code Status: Full Code     Disposition: Expected discharge: Later today versus tomorrow pending return of bowel function and resolution of symptoms.    Robin Winchester    Interval History   Patient is feeling improved overall.  His abdominal pain seems to be getting  better.  He denies any nausea or vomiting currently.  No fever, chills, chest pain, shortness of breath.  He describes feeling like he needs to have a BM but is unable to.  He is passing gas.  He is tolerating full liquid diet.    -Data reviewed today: I reviewed all new labs and imaging results over the last 24 hours.     Physical Exam   Temp: 95.1  F (35.1  C) Temp src: Oral BP: 167/90   Heart Rate: 64 Resp: 16 SpO2: 98 % O2 Device: None (Room air)    Vitals:    07/27/17 2344   Weight: 79 kg (174 lb 1.6 oz)     Vital Signs with Ranges  Temp:  [95.1  F (35.1  C)-98.4  F (36.9  C)] 95.1  F (35.1  C)  Pulse:  [74] 74  Heart Rate:  [62-71] 64  Resp:  [14-16] 16  BP: (139-167)/() 167/90  SpO2:  [95 %-98 %] 98 %  I/O last 3 completed shifts:  In: -   Out: 825 [Urine:825]    Constitutional: Alert, oriented to person, place, date, situation.  Cooperative, lying in bed in NAD.   Respiratory:  Lungs CTAB.  No crackles, wheezes, or rhonchi, no labored breathing.  Cardiovascular:  Heart RRR, no MRG, no edema.  GI:  Abdomen soft, mildly tender, mildly distended.  Normoactive BS  Skin/Integumen:  Warm, dry, non-diaphoretic.  MSK: CMS x4 intact.    Medications     lactated ringers 1,000 mL (07/28/17 0621)       pantoprazole  40 mg Intravenous QAM AC       Data     Recent Labs  Lab 07/28/17  0617 07/27/17  1940   WBC 7.0 7.9   HGB 14.3 15.2   MCV 92 91    209    142   POTASSIUM 3.9 4.0   CHLORIDE 109 108   CO2 27 25   BUN 11 16   CR 0.91 1.01   ANIONGAP 8 9   BINDU 8.3* 9.2   GLC 88 104*   ALBUMIN 3.2* 4.1   PROTTOTAL 5.9* 7.3   BILITOTAL 0.7 0.5   ALKPHOS 55 69   ALT 22 26   AST 16 19   LIPASE  --  189       Recent Results (from the past 24 hour(s))   Abd/pelvis CT no contrast - Stone Protocol    Narrative    CT ABDOMEN PELVIS WITHOUT CONTRAST   7/27/2017 9:49 PM     HISTORY: Abdominal pain, greater on the right.     TECHNIQUE: Volumetric helical sections were acquired from the lung  bases through the ischial  tuberosities without IV contrast. Coronal  images were also reconstructed. Radiation dose for this scan was  reduced using automated exposure control, adjustment of the mA and/or  kV according to patient size, or iterative reconstruction technique.    COMPARISON: CT of the abdomen and pelvis performed 3/14/2005.    FINDINGS: Solitary right kidney. Few tiny pancreatic calcifications  could be related to chronic pancreatitis. The liver, gallbladder,  spleen, adrenal glands, pancreas, and right kidney have otherwise  unremarkable noncontrast appearances. No urinary calculi or  hydronephrosis. There are several mildly dilated loops of distal small  bowel in the lower abdomen, with no definite transition point  identified. Unremarkable appendix. No free fluid in the pelvis. The  lung bases are clear. Mild degenerative changes in the lower lumbar  spine.      Impression    IMPRESSION:   1. A few mildly prominent fluid-filled loops of small bowel in the  lower abdomen, with no definite transition point. Findings suggest  ileus, however an early small bowel obstruction cannot entirely be  excluded. Clinical correlation and follow-up are recommended.  2. Solitary right kidney.  3. A few tiny pancreatic calcifications may be related to chronic  pancreatitis.    MARK ÁLVAREZ MD

## 2017-07-28 NOTE — PLAN OF CARE
"Problem: Discharge Planning  Goal: Discharge Planning (Adult, OB, Behavioral, Peds)  Outcome: No Change  PRIMARY DIAGNOSIS: \"GENERIC\" NURSING  OUTPATIENT/OBSERVATION GOALS TO BE MET BEFORE DISCHARGE:  1. ADLs back to baseline: Yes      2. Activity and level of assistance: Ambulating independently.yes      3. Pain status: Pain free.      4. Return to near baseline physical activity: Yes          Discharge Planner Nurse   Safe discharge environment identified: Yes  Barriers to discharge: No       Entered by: Karoline Orourke 07/28/2017 1:49 AM   Pt NPO at this time VSS no c/o pain eyes close resting comfortably.     Please review provider order for any additional goals.   Nurse to notify provider when observation goals have been met and patient is ready for discharge.      "

## 2017-07-28 NOTE — ED PROVIDER NOTES
History     Chief Complaint:  Abdominal Pain    HPI   Govind Way is a 62 year old male who presents to the emergency department today for evaluation of abdominal pain. The patient states that he ate some shrimp fajitas last night, 07/26/2017, and brought the rest for lunch today which wasn't part of his normal diet. The patient  states that he had a normal bowel movement before leaving work. The patient states that he was at [employer] when he began to feel gas pain but could not pass gas at 1600 when he sat down to have a bowel movement and he has not passed gas since. The patient states that he has a bloated feeling, pain that comes and goes, and pressure towards his right flank. He states the pain makes him sick to his stomach and that he has had normal urination since coming here to the emergency department. He states that he has an enlarged prostate causing him to have urgency. The patient states that he donated a kidney and had an incisional hernia postoperatively.     Allergies:  Ambien  Codeine Sulfate     Medications:    Saw Palmetto extract  Adderall  Simethicone  Aspirin  Valerian root    Past Medical History:    Adjustment disorder  Dysrhythmic disorder  Enlarge prostate  Hepatitis  Irregular heart beat  Palpitations  PONV  Positive ppd  Pure hypercholesteremia  Renal disease  Screening Examination for pulmonary tuberculosis  Unspecified essential hypertension    Past Surgical History:    C ECG monitor/24 hours,orig ECG, w vis superimpose scan  C remv kidney/ureter, same incis  Removal of tonsils  Vasectomy bilateral  laparoscopic herniorrhaphy inguinal  laparoscopic herniorrhaphy ventral  lasik custom tan bilateral    Family History:    Mother- diabetes, hypertension, CAD, heart surgery  Father-hypertension  Daughter- kidney disease, lupus    Social History:  Smoking Status: Former smoker  Smokeless Tobacco: Never Use  Alcohol Use: Positive  Marital Status:  Legally  [3]    Review of  "Systems   Gastrointestinal: Positive for abdominal pain.   Genitourinary: Positive for frequency. Negative for dysuria. Flank pain: Right.   All other systems reviewed and are negative.    Physical Exam     Vitals:  Patient Vitals for the past 24 hrs:   BP Temp Temp src Heart Rate Resp SpO2 Height Weight   07/27/17 2344 167/90 97  F (36.1  C) Oral 62 16 96 % 1.803 m (5' 11\") 79 kg (174 lb 1.6 oz)   07/27/17 2320 139/74 - - - - 95 % - -   07/27/17 2220 (!) 149/91 - - 70 14 98 % - -   07/27/17 2215 - - - - - 98 % - -   07/27/17 1933 153/88 98.4  F (36.9  C) Oral 71 16 97 % 1.803 m (5' 11\") -         Physical Exam   GENERAL: well developed, pleasant  HEAD: atraumatic  EYES: pupils reactive, extraocular muscles intact, conjunctivae normal  ENT:  mucus membranes moist  NECK:  trachea midline, normal range of motion  RESPIRATORY: no tachypnea, breath sounds clear to auscultation   CVS: normal S1/S2, no murmurs, intact distal pulses  ABDOMEN: soft, nontender, nondistention, generalized abdominal slightly in right lower quadrant, no CVA tenderness  MUSCULOSKELETAL: no deformities  SKIN: warm and dry, no acute rashes or ulceration  NEURO: GCS 15, cranial nerves intact, alert and oriented x3  PSYCH:  Mood/affect normal    Emergency Department Course   Imaging:  Radiology findings were communicated with the patient who voiced understanding of the findings.    Abd/pelvis CT no contrast - Stone Protocol  1. A few mildly prominent fluid-filled loops of small bowel in the  lower abdomen, with no definite transition point. Findings suggest  ileus, however an early small bowel obstruction cannot entirely be  excluded. Clinical correlation and follow-up are recommended.  2. Solitary right kidney.  3. A few tiny pancreatic calcifications may be related to chronic  pancreatitis.  MARK ÁLVAREZ MD  Reading per radiology    Laboratory:  Laboratory findings were communicated with the patient who voiced understanding of the " findings.    UA reflex to microscopic: Ketone 15(A)  CBC: WBC 7.9, HGB 15.2,   CMP: Glucose 104 (H),  o/w WNL (Creatinine 1.01)  Lipase: 189     Interventions:  2223 Dilaudid IV 0.5 mg  2207 Zofran IV 4 mg  2205 Dilaudid 0.5 mg IV   2223 Dilaudid 0.5 mg IV     Emergency Department Course:  Nursing notes and vitals reviewed.   performed an exam of the patient as documented above.   IV was inserted and blood was drawn for laboratory testing, results above.  The patient was sent for a Abd/pelvis CT no contrast while in the emergency department, results above. I discussed the treatment plan with the patient. They expressed understanding of this plan and consented to admission. I discussed the patient with Dr. Steen, who will admit the patient to a monitored bed for further evaluation and treatment.  I personally reviewed the lab and imaging results with the patient and answered all related questions prior to admission for observation.  Impression & Plan      Medical Decision Making:  Govind Way is a 62 year old male who presents to the emergency department today for evaluation of abdominal pain. I considered differential including ileus, bowel obstruction, hernia, appendicitis, amongst others. CT shows findings as above. Patient feels improved now. I discussed treatment options and settled on admission given not knowing if this is going to improve or worsen and him noting that it was pretty terrible when it was at its max.     Diagnosis:    ICD-10-CM    1. Paralytic ileus (H) K56.0 Lipase     Lipase     CANCELED: Lipase     Disposition:   Admission for observation    Scribe Disclosure:  I, Homer Brown, am serving as a scribe at 10:41 PM on 7/27/2017 to document services personally performed by Chuy Salmeron MD, based on my observations and the provider's statements to me.        EMERGENCY DEPARTMENT       Chuy Salmeron MD  07/28/17 0106

## 2017-07-28 NOTE — PROGRESS NOTES
[facility]    History and Physical  Hospitalist       Date of Admission:  7/27/2017  Date of Service (when I saw the patient): 07/27/17    Assessment & Plan   Govind Way is a 62 year old male kidney donor with solitary kidney with PMH significant for hypertension, hyperlipidemia, BPH, history of inguinal hernia repair and ventral hernia repair was sent to ER from urgent care for evaluation of pain abdomen.     Possible early small bowel obstruction: Acute onset pain abdomen, nausea.  Noncontrast CT abdomen and pelvis without obvious obstruction but possibly early obstruction versus ileus.  Patient feels comfortable after getting Dilaudid and Zofran in the ER.  He will be placed in observation.   - N.p.o., IV fluid, antiemetics, pain management with IV Dilaudid, discussed with patient to minimize pain medication.  - Hold off on NG for now, currently feels better, NG/surgery consult if symptoms worsen.  - Dulcolax suppository in am if no worsening pain/vomiting.  - will start diet once pain, n/v improves.  - follow am labs, check lipase.    Benign essential hypertension  Hyperlipidemia  - Not on medications  - BP elevated likely due to pain, PRN labetalol/hydralazine ordered   - Monitor.    DVT Prophylaxis: encourage ambulation    Solitary kidney: patient is a kidney donor to his daughter.    GI PPX- PPI    Enlarged prostate with symptoms of BPH: monitor for retention.    Code Status: Full Code    Disposition: Expected discharge: once bowel function resumes, likely 1-2 days. Registered to observation status, if bowel symptoms, will change to inpatient.  Above plan was discussed with patient.    Aj Steen MD  Hospitalist  Pager: [phone]    Primary Care Physician   Manolo Ambriz    Chief Complaint   Abdominal pain- started earlier today    History is obtained from the patient, discussion with ED physician and chart review.    History of Present Illness   Govind Way is a  62 year old male kidney donor with solitary kidney with PMH significant for hypertension, hyperlipidemia, BPH, history of inguinal hernia repair and ventral hernia repair was sent to ER from urgent care for evaluation of pain abdomen.    According to the patient started around 4 PM today after work.  Patient had snacks just before that while he was at work and also had a bowel movement, but then while he was driving from work, he started feeling gassy and felt abdominal cramps which was generalized but mostly in the lower abdomen. It progressed to become waxing and waning more severe abdominal pain worse over the right lower quadrant, 5 to 9 /10.  There was associated nausea.  Vomited once in the ER after getting hydromorphone IV.    He was expecting it to get better, so went to his parents, mowed the lawn, but since the pain was worsening, went to urgent care clinic from where he was sent to ER for further evaluation.    In the ER, patient was evaluated by Dr. Salmeron, he was uncomfortable due to pain, was nauseous and after getting IV Dilaudid vomited×1.  Then he received zofran IV and is feeling better now. Routine lab workup including CBC, CMP was unremarkable.  CT abdomen and pelvis without contrast was done,  revealed few mildly prominent fluid-filled loops of small bowel in the lower abdomen but no definite transition point, findings suggestive of ileus versus early small bowel obstruction.  Patient is being placed in observation for further management.    Past Medical History    I have reviewed this patient's medical history and updated it with pertinent information as needed.  Past Medical History:   Diagnosis Date     Adjustment disorder      Dysthymic disorder      Enlarged prostate      Hepatitis      Irregular heart beat     intermittant palpitations     Palpitations     intermittent     PONV (postoperative nausea and vomiting)      Positive PPD      Pure hypercholesterolemia 1/00     Renal disease      single R kidney;donated left     Screening examination for pulmonary tuberculosis     positive mantoux     Unspecified essential hypertension        Past Surgical History   I have reviewed this patient's surgical history and updated it with pertinent information as needed.  Past Surgical History:   Procedure Laterality Date     C ECG MONITOR/ 24 HRS, ORIG ECG, W VIS SUPERIMPOS SCAN, COMPLETE      um holter monitor     C REMV KIDNEY/URETER,SAME INCIS  05    Nephrouretectomy/LEFT KIDNEY DONATED TO DAUGHTER/[facility]     HC REMOVAL OF TONSILS,<13 Y/O  age 7     HC VASECTOMY UNILAT/BILAT W POSTOP SEMEN  age 41     HERNIORRHAPHY INGUINAL  2011    Procedure:HERNIORRHAPHY INGUINAL; Surgeon:JULIA SIERRA; Location: OR     LAPAROSCOPIC HERNIORRHAPHY INGUINAL Right 2015    Procedure: LAPAROSCOPIC HERNIORRHAPHY INGUINAL;  Surgeon: García Ibarra MD;  Location:  OR     LAPAROSCOPIC HERNIORRHAPHY VENTRAL  2011    Procedure:LAPAROSCOPIC HERNIORRHAPHY VENTRAL; Laparoscopic Ventral Hernia Repair with Mesh, Open Left Inguinal Hernia Repair with Mesh; Surgeon:JULIA SIERRA; Location: OR     LASIK CUSTOMVUE BILATERAL  2011    Procedure:LASIK CUSTOMVUE BILATERAL; BILATERAL CUSTOMVUE LASIK WITH INTRALASE; Surgeon:POP SIMON; Location:[facility]       Prior to Admission Medications   Prior to Admission Medications   Prescriptions Last Dose Informant Patient Reported? Taking?   ASPIRIN   Yes No   Sig: Take 325 mg by mouth as needed On hold preop   MULTIVITAMIN TABS   OR   No No   Si TABLET EVETRY DAY   Omega-3 Fatty Acids (FISH OIL) 500 MG CAPS   Yes No   Sig: Take 1 capsule by mouth daily    Saw Palmetto, Serenoa repens, (SAW PALMETTO EXTRACT PO)   Yes No   Simethicone 180 MG CAPS   Yes No   Sig: Take by mouth as needed   VALERIAN ROOT 100 MG OR CAPS   No No   Si TABLETS AT BEDTIME AS NEEDED   Patient not taking: Reported on 2017   VITAMIN A  PO   Yes No   Sig: Take by mouth daily   amphetamine-dextroamphetamine (ADDERALL XR) 10 MG per capsule   No No   Sig: Take 1 capsule (10 mg) by mouth daily   Patient not taking: Reported on 7/27/2017   magnesium 250 MG tablet   Yes No   Sig: Take 1 tablet by mouth 2 times daily      Facility-Administered Medications: None     Allergies   Allergies   Allergen Reactions     Ambien [Zolpidem Tartrate]      irritability     Codeine Sulfate Itching       Social History   I have reviewed this patient's social history and updated it with pertinent information as needed. Govind OSWALD Seamus  reports that he quit smoking about 35 years ago. He has never used smokeless tobacco. He reports that he drinks alcohol. He reports that he does not use illicit drugs.    Family History   I have reviewed this patient's family history and updated it with pertinent information if needed.   Family History   Problem Relation Age of Onset     DIABETES Mother      ADULT ONSET     Hypertension Mother      Coronary Artery Disease Mother      Also father and grandparent.  Father had coronary bypass and aortic valve replacement surgery     Heart Surgery Mother      Aortic valve     CANCER Other      prostate cancer     Thyroid Disease Other      Hypertension Father      Eye Disorder No family hx of      NONE KNOWN     Hyperlipidemia No family hx of      CEREBROVASCULAR DISEASE No family hx of      Breast Cancer No family hx of      Colon Cancer No family hx of      Other Cancer No family hx of      Depression No family hx of      Anxiety Disorder No family hx of      MENTAL ILLNESS No family hx of      Substance Abuse No family hx of      Anesthesia Reaction No family hx of      Asthma No family hx of      OSTEOPOROSIS No family hx of      Genetic Disorder No family hx of      Obesity No family hx of      Unknown/Adopted No family hx of      Skin Cancer       Mother and father, grandfather     Arthritis       Mom, daughter, grandparents      Prostate Cancer Other      Grandfather     Other - See Comments       Grandfather with kidney stone     KIDNEY DISEASE Daughter      Lupus Daughter      Causing end-stage renal disease       Review of Systems   All 10 point systems reviewed and is negative other than noted in the HPI or here.    Physical Exam   Temp: 98.4  F (36.9  C) Temp src: Oral BP: (!) 149/91   Heart Rate: 70 Resp: 14 SpO2: 98 % O2 Device: None (Room air)    Vital Signs with Ranges  Temp:  [97.4  F (36.3  C)-98.4  F (36.9  C)] 98.4  F (36.9  C)  Pulse:  [74] 74  Heart Rate:  [70-71] 70  Resp:  [14-16] 14  BP: (149-155)/() 149/91  SpO2:  [97 %-98 %] 98 %  0 lbs 0 oz    Constitutional: Alert, awake, he appears comfortable now.  HEENT: PERRLA, EOMI. Mucosa is moist.  Respiratory: Bilateral equal air entry, No wheezing or crackles. Normal work of breathing. On room air.  Cardiovascular: S1S2 regular, No murmur, no tachycardia.  GI: Abdomen is non distended, soft in upper quadrants but firm/garding in lower quadrants, no peritonism. Has upper midline small scar from ventral hernia repair.  Bowel tones present but sluggish.  Lymph/Hematologic: No lymphadenopathy.  Skin: No rash.   Musculoskeletal: No edema  Neurologic: Alert and awake, follows verbal commands appropriately, CN 2-12 intact, motor strength symmetrical.   Psychiatric: Calm, co-operative, appropriate.    Data   Data reviewed today:  I personally reviewed CT abdomen pelvis report- possible early SBO vs ileus, report attached.    Recent Labs  Lab 07/27/17  1940   WBC 7.9   HGB 15.2   MCV 91         POTASSIUM 4.0   CHLORIDE 108   CO2 25   BUN 16   CR 1.01   ANIONGAP 9   BINDU 9.2   *   ALBUMIN 4.1   PROTTOTAL 7.3   BILITOTAL 0.5   ALKPHOS 69   ALT 26   AST 19       Recent Results (from the past 24 hour(s))   Abd/pelvis CT no contrast - Stone Protocol    Narrative    CT ABDOMEN PELVIS WITHOUT CONTRAST   7/27/2017 9:49 PM     HISTORY: Abdominal pain, greater on the  right.     TECHNIQUE: Volumetric helical sections were acquired from the lung  bases through the ischial tuberosities without IV contrast. Coronal  images were also reconstructed. Radiation dose for this scan was  reduced using automated exposure control, adjustment of the mA and/or  kV according to patient size, or iterative reconstruction technique.    COMPARISON: CT of the abdomen and pelvis performed 3/14/2005.    FINDINGS: Solitary right kidney. Few tiny pancreatic calcifications  could be related to chronic pancreatitis. The liver, gallbladder,  spleen, adrenal glands, pancreas, and right kidney have otherwise  unremarkable noncontrast appearances. No urinary calculi or  hydronephrosis. There are several mildly dilated loops of distal small  bowel in the lower abdomen, with no definite transition point  identified. Unremarkable appendix. No free fluid in the pelvis. The  lung bases are clear. Mild degenerative changes in the lower lumbar  spine.      Impression    IMPRESSION:   1. A few mildly prominent fluid-filled loops of small bowel in the  lower abdomen, with no definite transition point. Findings suggest  ileus, however an early small bowel obstruction cannot entirely be  excluded. Clinical correlation and follow-up are recommended.  2. Solitary right kidney.  3. A few tiny pancreatic calcifications may be related to chronic  pancreatitis.    MARK ÁLVAREZ MD

## 2017-07-28 NOTE — PROGRESS NOTES
D: Pt's PCP retired and pt has not seen  provider since 2/17.  Pt's PCP clinic is  [facility]. Pt lives in [place], but Works at [employer].  [facility] clinic kept as Primary clinic. Post hosp PCP appt made on pt behalf for next Thrusday afternoon.  Appt reflected on AVS.   A: Assist w/ post hosp OP clinic f/u and establish w/ PCP.     Amber Espinosan Care Transitions Nurse,  RN, BSN, [facility]  Cell Phone # [phone]

## 2017-07-28 NOTE — PLAN OF CARE
"Problem: Discharge Planning  Goal: Discharge Planning (Adult, OB, Behavioral, Peds)  PRIMARY DIAGNOSIS: \"GENERIC\" NURSING  OUTPATIENT/OBSERVATION GOALS TO BE MET BEFORE DISCHARGE:  1. ADLs back to baseline: Yes      2. Activity and level of assistance: Ambulating independently.      3. Pain status: Improved-controlled with oral pain medications.      4. Return to near baseline physical activity: Yes          Discharge Planner Nurse   Safe discharge environment identified: Yes  Barriers to discharge: no        Entered by: Karoline Orourke 07/28/2017 4:39 AM     Please review provider order for any additional goals.   Nurse to notify provider when observation goals have been met and patient is ready for discharge.      "

## 2017-07-28 NOTE — PLAN OF CARE
"Problem: Discharge Planning  Goal: Discharge Planning (Adult, OB, Behavioral, Peds)  Outcome: Improving  PRIMARY DIAGNOSIS: \"GENERIC\" NURSING  OUTPATIENT/OBSERVATION GOALS TO BE MET BEFORE DISCHARGE:  1. ADLs back to baseline: Yes      2. Activity and level of assistance: Ambulating independently.      3. Pain status: Only some discomfort, has started to pass flatus      4. Return to near baseline physical activity: Yes          Discharge Planner Nurse   Safe discharge environment identified: Yes  Barriers to discharge: No       Entered by: Margarita Bolivar 07/28/2017 12:21 PM     Please review provider order for any additional goals.   Nurse to notify provider when observation goals have been met and patient is ready for discharge.      "

## 2017-07-29 VITALS
WEIGHT: 172.8 LBS | DIASTOLIC BLOOD PRESSURE: 88 MMHG | HEIGHT: 71 IN | BODY MASS INDEX: 24.19 KG/M2 | SYSTOLIC BLOOD PRESSURE: 141 MMHG | TEMPERATURE: 96.3 F | OXYGEN SATURATION: 97 % | RESPIRATION RATE: 16 BRPM

## 2017-07-29 PROCEDURE — 96361 HYDRATE IV INFUSION ADD-ON: CPT

## 2017-07-29 PROCEDURE — 25000125 ZZHC RX 250: Performed by: HOSPITALIST

## 2017-07-29 PROCEDURE — G0378 HOSPITAL OBSERVATION PER HR: HCPCS

## 2017-07-29 PROCEDURE — 99217 ZZC OBSERVATION CARE DISCHARGE: CPT | Performed by: PHYSICIAN ASSISTANT

## 2017-07-29 PROCEDURE — 99207 ZZC MOONLIGHTING INDICATOR: CPT | Performed by: PHYSICIAN ASSISTANT

## 2017-07-29 PROCEDURE — 96376 TX/PRO/DX INJ SAME DRUG ADON: CPT

## 2017-07-29 RX ADMIN — PANTOPRAZOLE SODIUM 40 MG: 40 INJECTION, POWDER, FOR SOLUTION INTRAVENOUS at 06:48

## 2017-07-29 NOTE — PLAN OF CARE
Problem: Discharge Planning  Goal: Discharge Planning (Adult, OB, Behavioral, Peds)  Outcome: Adequate for Discharge Date Met:  07/29/17  Pt a/o x 4. VSS. Denies pain or nausea. Tolerated regular diet. Discharge instructions given, all questions answered. All belongings sent with pt. Pt walked self to ER entrance for ride.

## 2017-07-29 NOTE — PLAN OF CARE
Problem: Discharge Planning  Goal: Discharge Planning (Adult, OB, Behavioral, Peds)  Outcome: No Change  Discharge goals:  diagnostic tests and consults completed and resulted - goal met  vital signs normal or at patient baseline - goal met  bowel obstruction resolves and able to tolerate diet. - goal met

## 2017-07-29 NOTE — PLAN OF CARE
Problem: Discharge Planning  Goal: Discharge Planning (Adult, OB, Behavioral, Peds)  Outcome: Improving    Observation goals     - diagnostic tests and consults completed and resulted - Met  - vital signs normal or at patient baseline - Met  - bowel obstruction resolves and able to tolerate diet- Partially met  Nurse to notify provider when observation goals have been met and patient is ready for discharge

## 2017-07-29 NOTE — PLAN OF CARE
Problem: Goal Outcome Summary  Goal: Goal Outcome Summary  Outcome: Improving  Care Plan Summary Note: Pt A&Ox4 , VSS, afebrile. Denies pain, nausea or vomiting. Occasional/ minimal ABD pain. No Bm this shift.  Ducolax supp in Am if no pain or vomiting. Regular diet, IVF infusing, indep amb hallway x2. Continue to monitor.

## 2017-07-29 NOTE — PLAN OF CARE
Problem: Discharge Planning  Goal: Discharge Planning (Adult, OB, Behavioral, Peds)  Outcome: No Change  PRIOR TO DISCHARGE     Comments:   - diagnostic tests and consults completed and resulted: Goal met   - vital signs normal or at patient baseline: Goal met   - bowel obstruction resolves and able to tolerate diet.: eloy reg diet Goal met

## 2017-07-29 NOTE — DISCHARGE SUMMARY
[facility] Medicine  Observation Discharge Summary    Govind Way MRN# [id number]   YOB: 1955 Age: 62 year old     Date of Admission:  7/27/2017  Date of Discharge:  7/29/2017  Admitting Physician:  Aj Steen MD  Discharge Physician:  Zainab Can PA-C;  Annmarie Jim MD  Discharging Service:  Hospitalist     Primary Provider: Jose Luis Nielson          Admission Diagnosis:   Abdominal pain          Discharge Diagnoses:   1. Probable small bowel obstruction, responded to conservative management.   2. Solitary kidney (kidney donor).  3. Hypertension.  4. Dyslipidemia.   5. Urinary retention.              Hospital Course:   Govind Way is a delightful 62 year old male with history of abdominal surgeries, hypertension, single kidney (donor to daughter) and recent issues with urinary retention who presented to [facility] on 7/27/2017 with abdominal pain.  Admitted to the Observation Unit for further monitoring.  Symptoms responded to slow diet advancement and ambulation.  Patient required no pain medications for > 24 hours prior to discharge and was able to tolerate a full diet.  Recommended he follow-up with Urology regarding is ongoing issues with urinary retention and urgency, particularly given family history of prostate cancer.           Condition on Discharge:   Discharge condition: Fair   Discharge to: Discharged to home   Code status on discharge: Full Code          Discharge Medications:      Review of your medicines      CONTINUE these medicines which have NOT CHANGED       Dose / Directions    DAILY HERBS PROSTATE PO   Indication:  [employer] prostate formula- saw palmetto blend.   Notes to Patient:  Resume at discharge        Dose:  1 tablet   Take 1 tablet by mouth daily   Refills:  0       Fish Oil 500 MG Caps   Notes to Patient:  Resume at discharge          Dose:  1 capsule   Take 1 capsule by mouth daily   Refills:  0       magnesium 250 MG tablet    Notes to Patient:  Take as needed        Dose:  1 tablet   Take 1 tablet by mouth nightly as needed (sleep)   Refills:  0       MULTIVITAMIN TABS   OR   Notes to Patient:  Resume at discharge          1 TABLET EVETRY DAY   Refills:  0                   Physical Exam:   Temp:  [96.3  F (35.7  C)-96.6  F (35.9  C)] 96.3  F (35.7  C)  Heart Rate:  [54-61] 54  Resp:  [16-18] 16  BP: (111-141)/(74-89) 141/88  SpO2:  [97 %] 97 %    General Appearance:  Pleasant, cooperative, alert. Well developed, well nourished.   HEENT: Normocephalic, atraumatic.  Extra occular mm intact.  Sclera clear.  Mucous membranes moist.    Lungs: Clear to auscultation bilaterally with no wheeze or crackle. Good air exchange.   Heart: Regular rate and rhythm.  No appreciated murmur.     Abdomen: +bowel tones, Soft, nontender non distended.   Musculoskeletal:  Moving x 4 spontaneously with CMS intact x4.    Neuro: Alert and oriented x3.  CN II-XII grossly intact and symmetric. Nonfocal exam.          Imaging:   No results found for this or any previous visit (from the past 24 hour(s)).             Labs:   Recent Labs   Lab Test  07/28/17   0617  07/27/17   1940  06/25/15   1724  07/31/14 1746 06/18/13   0750   NA  144  142  139  141  141   POTASSIUM  3.9  4.0  3.9  3.8  4.0   CHLORIDE  109  108  107  107  105   CO2  27  25  24  25  26   BUN  11  16  21  19  24   CR  0.91  1.01  1.10  1.13  1.10     Recent Labs   Lab Test  07/28/17 0617 07/27/17 1940 07/31/14   1746 06/18/13   0750  11/06/12   1649   WBC  7.0  7.9  6.4  6.5  8.1   HGB  14.3  15.2  14.0  14.5  15.0   PLT  190  209  228  229  251             Procedures:   None.          Pending Results:   None           Discharge Instructions and Follow-Up:   Discharge diet: Regular   Discharge activity: Activity as tolerated   Discharge follow-up: Follow up with primary care provider in 7-10 days     This case was discussed with Dr Jim of the hospitalist service who agrees with  discharge at this time.      Total discharge planning time > 30 minutes.    As dictated by MANA GIORDANO MS, PA-C

## 2017-07-29 NOTE — PLAN OF CARE
Problem: Goal Outcome Summary  Goal: Goal Outcome Summary  Outcome: No Change  VSS. Denies abd pain. No N/V. BS present. Passing gas. Balbina reg diet. Declined suppository this am. Voiding and ambulating

## 2017-07-31 ENCOUNTER — TELEPHONE (OUTPATIENT)
Dept: FAMILY MEDICINE | Facility: CLINIC | Age: 62
End: 2017-07-31

## 2017-07-31 NOTE — TELEPHONE ENCOUNTER
ED / Discharge Outreach Protocol    Patient Contact    Attempt # 1    Was call answered?  No.  Left message on voicemail with information to call me back.    Ashli CHRISTOPHER RN     Discharge Instructions and Follow-Up:   Discharge diet: Regular   Discharge activity: Activity as tolerated   Discharge follow-up: Follow up with primary care provider in 7-10 days

## 2017-07-31 NOTE — TELEPHONE ENCOUNTER
[facility] F/U 0729/2017 DX:Paralytic Ileus ED/IP 1/0    [phone] (home) [phone] (work)

## 2017-08-01 NOTE — TELEPHONE ENCOUNTER
The patient called back but he is at work and he will call back around noon  He said he did try to return the call yesterday but the wait time was to long

## 2017-08-01 NOTE — TELEPHONE ENCOUNTER
Reason for call:  Other   Patient called regarding (reason for call): returning a call to Triage but hasn't been able to speak with anyone and it's too hard to get a hold of him at work so he'll just see Dr. Nielson on Thurs 8/3.   Additional comments: N/A      Phone number to reach patient:  Cell number on file:    Telephone Information:   Mobile [phone]       Best Time:  With any further questions.     Can we leave a detailed message on this number?  YES

## 2017-08-01 NOTE — TELEPHONE ENCOUNTER
ED/Discharge Protocol  Patient has upcoming appt with JHONATAN Arciniega f/u then as noted below.  Ashli CHRISTOPHER RN

## 2017-08-17 ENCOUNTER — PRE VISIT (OUTPATIENT)
Dept: UROLOGY | Facility: CLINIC | Age: 62
End: 2017-08-17

## 2017-08-21 ENCOUNTER — OFFICE VISIT (OUTPATIENT)
Dept: FAMILY MEDICINE | Facility: CLINIC | Age: 62
End: 2017-08-21
Payer: COMMERCIAL

## 2017-08-21 VITALS
BODY MASS INDEX: 23.94 KG/M2 | DIASTOLIC BLOOD PRESSURE: 80 MMHG | HEIGHT: 71 IN | RESPIRATION RATE: 14 BRPM | OXYGEN SATURATION: 97 % | SYSTOLIC BLOOD PRESSURE: 126 MMHG | WEIGHT: 171 LBS | HEART RATE: 88 BPM | TEMPERATURE: 98.2 F

## 2017-08-21 DIAGNOSIS — K56.609 SMALL BOWEL OBSTRUCTION (H): Primary | ICD-10-CM

## 2017-08-21 DIAGNOSIS — F43.23 ADJUSTMENT DISORDER WITH MIXED ANXIETY AND DEPRESSED MOOD: ICD-10-CM

## 2017-08-21 DIAGNOSIS — N40.1 BENIGN NON-NODULAR PROSTATIC HYPERPLASIA WITH LOWER URINARY TRACT SYMPTOMS: ICD-10-CM

## 2017-08-21 PROCEDURE — 99214 OFFICE O/P EST MOD 30 MIN: CPT | Performed by: FAMILY MEDICINE

## 2017-08-21 ASSESSMENT — PATIENT HEALTH QUESTIONNAIRE - PHQ9: SUM OF ALL RESPONSES TO PHQ QUESTIONS 1-9: 4

## 2017-08-21 NOTE — PROGRESS NOTES
SUBJECTIVE:   Govind Way is a 62 year old male who presents to clinic today for the following health issues:    Hospital Follow-up Visit:    Hospital/Nursing Home/IP Rehab Facility: [facility]  Date of Admission: 7-27-17  Date of Discharge: 7-29-17  Reason(s) for Admission: abdominal pain            Problems taking medications regularly:  None       Medication changes since discharge: None       Problems adhering to non-medication therapy:  None    Summary of hospitalization:  [facility] discharge summary reviewed  Diagnostic Tests/Treatments reviewed.  Follow up needed: here    Other Healthcare Providers Involved in Patient s Care:         None  Update since discharge: improved.     Post Discharge Medication Reconciliation: discharge medications reconciled, continue medications without change.  Plan of care communicated with patient     Coding guidelines for this visit:  Type of Medical   Decision Making Face-to-Face Visit       within 7 Days of discharge Face-to-Face Visit        within 14 days of discharge   Moderate Complexity [id number]   High Complexity [phone] [id number]          Overall patient is feeling back to normal after his hospitalization for small bowel obstruction  However he has been having more problems with urination and has a follow-up appointment with urology  Also has noted more stress recently he has a disabled daughter and some other things going on plus these medical issues  He be interested in going back to see a therapist again but is not sure if he is going to do that now  Also some concerns about social alcohol use and maybe that is excessive  His partner and friends drink frequently and sometimes heavily  He feels some social pressure and pain often pour more alcohol for him and he really can handle    ROS: As per HPI.  Constitutional: + fatigue  Psych: + high stress level  Neuro: no new headaches, dizziness, numbness, or tingling    I have reviewed and  updated the patient's past medical history in the EMR. Current problems are:    Patient Active Problem List   Diagnosis     Adjustment disorder with mixed anxiety and depressed mood     Abdominal hernia     Hypertension goal BP (blood pressure) < 140/90     CARDIOVASCULAR SCREENING; LDL GOAL LESS THAN 160     History of hyperthyroidism     Right inguinal hernia     Irritable bowel syndrome     Donor of kidney for transplant     Hernia     Testis disorder     Screening for prostate cancer     Other hydrocele     Hallux rigidus, right foot     Advance care planning     Tendinopathy of right gluteus medius     Attention-deficit hyperactivity disorder, other type     Benign non-nodular prostatic hyperplasia with lower urinary tract symptoms     Anxiety     SBO (small bowel obstruction) (H)     Past Surgical History:   Procedure Laterality Date     C ECG MONITOR/ 24 HRS, ORIG ECG, W VIS SUPERIMPOS SCAN, COMPLETE  1/00    um holter monitor     C REMV KIDNEY/URETER,SAME INCIS  6/30/05    Nephrouretectomy/LEFT KIDNEY DONATED TO DAUGHTER/[facility]     HC REMOVAL OF TONSILS,<11 Y/O  age 7     HC VASECTOMY UNILAT/BILAT W POSTOP SEMEN  age 41     HERNIORRHAPHY INGUINAL  12/23/2011    Procedure:HERNIORRHAPHY INGUINAL; Surgeon:JULIA SIERRA; Location: OR     LAPAROSCOPIC HERNIORRHAPHY INGUINAL Right 6/29/2015    Procedure: LAPAROSCOPIC HERNIORRHAPHY INGUINAL;  Surgeon: García Ibarra MD;  Location:  OR     LAPAROSCOPIC HERNIORRHAPHY VENTRAL  12/23/2011    Procedure:LAPAROSCOPIC HERNIORRHAPHY VENTRAL; Laparoscopic Ventral Hernia Repair with Mesh, Open Left Inguinal Hernia Repair with Mesh; Surgeon:JULIA SIERRA; Location: OR     LASIK CUSTOMVUE BILATERAL  11/11/2011    Procedure:LASIK CUSTOMVUE BILATERAL; BILATERAL CUSTOMVUE LASIK WITH INTRALASE; Surgeon:POP SIMON; Location[facility]       Social History   Substance Use Topics     Smoking status: Former Smoker     Quit date:  1/1/1982     Smokeless tobacco: Never Used      Comment: NO 2ND HAND SMOKE     Alcohol use 0.0 oz/week     0 Standard drinks or equivalent per week      Comment: 5/wk     Family History   Problem Relation Age of Onset     DIABETES Mother      ADULT ONSET     Hypertension Mother      Coronary Artery Disease Mother      Also father and grandparent.  Father had coronary bypass and aortic valve replacement surgery     Heart Surgery Mother      Aortic valve     CANCER Other      prostate cancer     Thyroid Disease Other      Hypertension Father      Eye Disorder No family hx of      NONE KNOWN     Hyperlipidemia No family hx of      CEREBROVASCULAR DISEASE No family hx of      Breast Cancer No family hx of      Colon Cancer No family hx of      Other Cancer No family hx of      Depression No family hx of      Anxiety Disorder No family hx of      MENTAL ILLNESS No family hx of      Substance Abuse No family hx of      Anesthesia Reaction No family hx of      Asthma No family hx of      OSTEOPOROSIS No family hx of      Genetic Disorder No family hx of      Obesity No family hx of      Unknown/Adopted No family hx of      Skin Cancer       Mother and father, grandfather     Arthritis       Mom, daughter, grandparents     Prostate Cancer Other      Grandfather     Other - See Comments       Grandfather with kidney stone     KIDNEY DISEASE Daughter      Lupus Daughter      Causing end-stage renal disease         Allergies, reviewed:     Allergies   Allergen Reactions     Ambien [Zolpidem Tartrate]      irritability     Codeine Sulfate Itching       Current Outpatient Prescriptions   Medication Sig Dispense Refill     magnesium 250 MG tablet Take 1 tablet by mouth nightly as needed (sleep)       Misc Natural Products (DAILY HERBS PROSTATE PO) Take 1 tablet by mouth daily       Omega-3 Fatty Acids (FISH OIL) 500 MG CAPS Take 1 capsule by mouth daily        MULTIVITAMIN TABS   OR 1 TABLET EVETRY DAY  0       Objective:  BP  "126/80  Pulse 88  Temp 98.2  F (36.8  C) (Oral)  Resp 14  Ht 5' 11\" (1.803 m)  Wt 171 lb (77.6 kg)  SpO2 97%  BMI 23.85 kg/m2  General Appearance: Pleasant, alert, WN/WD in no acute respiratory distress..  Psychiatric Exam: Alert - appropriate, normal affect    ASSESSMENT/PLAN:    ICD-10-CM    1. Small bowel obstruction (H) K56.69    2. Adjustment disorder with mixed anxiety and depressed mood F43.23 MENTAL HEALTH REFERRAL   3. Benign non-nodular prostatic hyperplasia with lower urinary tract symptoms N40.1      Small bowel obstruction resolved  Recent exacerbation of adjustment disorder with anxiety and depressed mood  Long-term issues with anxiety and attention deficit disorder  Patient's no longer taking medication for ADD because he didn't like the side effects  We talked about moderation and drinking and I recommend that he uses the excuse of a medical reason to not drink excessively if he has trouble with peer pressure  Follow-up with urology for BPH    Follow up: Return as needed if symptoms fail to improve    Jose Luis Nielson MD MPH    "

## 2017-08-21 NOTE — MR AVS SNAPSHOT
After Visit Summary   8/21/2017    Govind Way    MRN: [id number]           Patient Information     Date Of Birth          1955        Visit Information        Provider Department      8/21/2017 4:00 PM Jose Luis Nielson MD [facility]        Today's Diagnoses     Small bowel obstruction (H)    -  1    Adjustment disorder with mixed anxiety and depressed mood        Benign non-nodular prostatic hyperplasia with lower urinary tract symptoms           Follow-ups after your visit        Additional Services     MENTAL HEALTH REFERRAL       Your provider has referred you to: [facility]: [facility] - Counseling (Individual/Couples/Family) - [facility] [phone]   [email or web address]   *Patient will be contacted by [facility]'s scheduling partner, [facility]), to schedule an appointment.  Patients may also call [facility] to schedule.    All scheduling is subject to the client's specific insurance plan & benefits, provider/location availability, and provider clinical specialities.  Please arrive 15 minutes early for your first appointment and bring your completed paperwork.    Please be aware that coverage of these services is subject to the terms and limitations of your health insurance plan.  Call member services at your health plan with any benefit or coverage questions.                  Follow-up notes from your care team     Return if symptoms worsen or fail to improve.      Your next 10 appointments already scheduled     Aug 29, 2017  3:00 PM CDT   (Arrive by 2:45 PM)   Return Visit with Manolo Cardenas MD   [facility] ([facility])    [address] [place] [id number] [phone]              Who to contact     If you have questions or need follow up information about today's  "clinic visit or your schedule please contact [facility] directly at [phone].  Normal or non-critical lab and imaging results will be communicated to you by MyChart, letter or phone within 4 business days after the clinic has received the results. If you do not hear from us within 7 days, please contact the clinic through [employer] or phone. If you have a critical or abnormal lab result, we will notify you by phone as soon as possible.  Submit refill requests through [employer] or call your pharmacy and they will forward the refill request to us. Please allow 3 business days for your refill to be completed.          Additional Information About Your Visit        [employer] Information     [employer] gives you secure access to your electronic health record. If you see a primary care provider, you can also send messages to your care team and make appointments. If you have questions, please call your primary care clinic.  If you do not have a primary care provider, please call [phone] and they will assist you.        Care EveryWhere ID     This is your Care EveryWhere ID. This could be used by other organizations to access your [place] medical records  [id number]        Your Vitals Were     Pulse Temperature Respirations Height Pulse Oximetry BMI (Body Mass Index)    88 98.2  F (36.8  C) (Oral) 14 5' 11\" (1.803 m) 97% 23.85 kg/m2       Blood Pressure from Last 3 Encounters:   08/21/17 126/80   07/29/17 141/88   07/27/17 (!) 155/105    Weight from Last 3 Encounters:   08/21/17 171 lb (77.6 kg)   07/28/17 172 lb 12.8 oz (78.4 kg)   11/29/16 171 lb (77.6 kg)              We Performed the Following     MENTAL HEALTH REFERRAL        Primary Care Provider Office Phone # Fax #    Jose Luis Slava Nielson [phone] [address] [facility] [address]        Equal Access to Services     SHIVA VELA AH: Hadii reyes perezo Sokaya, waaxda luqadaha, qaybta kaalterri tafoya " joni hancockkatey dennison'aan ah. So [facility] [phone].    ATENCIÓN: Si habla faustina, tiene a becerra disposición servicios gratuitos de asistencia lingüística. Chu al [phone].    We comply with applicable federal civil rights laws and [place] laws. We do not discriminate on the basis of race, color, national origin, age, disability sex, sexual orientation or gender identity.            Thank you!     Thank you for choosing [facility]  for your care. Our goal is always to provide you with excellent care. Hearing back from our patients is one way we can continue to improve our services. Please take a few minutes to complete the written survey that you may receive in the mail after your visit with us. Thank you!             Your Updated Medication List - Protect others around you: Learn how to safely use, store and throw away your medicines at www.disposemymeds.org.          This list is accurate as of: 8/21/17  4:28 PM.  Always use your most recent med list.                   Brand Name Dispense Instructions for use Diagnosis    DAILY HERBS PROSTATE PO      Take 1 tablet by mouth daily        Fish Oil 500 MG Caps      Take 1 capsule by mouth daily        magnesium 250 MG tablet      Take 1 tablet by mouth nightly as needed (sleep)        MULTIVITAMIN TABS   OR      1 TABLET EVETRY DAY

## 2017-08-21 NOTE — NURSING NOTE
"Chief Complaint   Patient presents with     Hospital F/U     [facility] 7/27 to 7/29 for abdominal pain-poss intest. blockage       Initial /80  Pulse 88  Temp 98.2  F (36.8  C) (Oral)  Resp 14  Ht 5' 11\" (1.803 m)  Wt 171 lb (77.6 kg)  SpO2 97%  BMI 23.85 kg/m2 Estimated body mass index is 23.85 kg/(m^2) as calculated from the following:    Height as of this encounter: 5' 11\" (1.803 m).    Weight as of this encounter: 171 lb (77.6 kg).  BP completed using cuff size: regular    Health Maintenance that is potentially due pending provider review:  Health Maintenance Due   Topic Date Due     PHQ-9 Q6 MONTHS  06/14/2017     DEPRESSION ACTION PLAN Q1 YR  07/20/2017         PHQ/ACT/WAGNER--Gave pt questionnare and  DAP ordered  "

## 2017-08-28 ENCOUNTER — FCC EXTENDED DOCUMENTATION (OUTPATIENT)
Dept: PSYCHOLOGY | Facility: CLINIC | Age: 62
End: 2017-08-28

## 2017-08-28 ENCOUNTER — OFFICE VISIT (OUTPATIENT)
Dept: PSYCHOLOGY | Facility: CLINIC | Age: 62
End: 2017-08-28
Attending: FAMILY MEDICINE
Payer: COMMERCIAL

## 2017-08-28 DIAGNOSIS — F90.8 ATTENTION-DEFICIT HYPERACTIVITY DISORDER, OTHER TYPE: ICD-10-CM

## 2017-08-28 DIAGNOSIS — F43.23 ADJUSTMENT DISORDER WITH MIXED ANXIETY AND DEPRESSED MOOD: Primary | ICD-10-CM

## 2017-08-28 PROCEDURE — 90834 PSYTX W PT 45 MINUTES: CPT | Performed by: SOCIAL WORKER

## 2017-08-28 ASSESSMENT — PATIENT HEALTH QUESTIONNAIRE - PHQ9
5. POOR APPETITE OR OVEREATING: SEVERAL DAYS
SUM OF ALL RESPONSES TO PHQ QUESTIONS 1-9: 7

## 2017-08-28 ASSESSMENT — ANXIETY QUESTIONNAIRES
2. NOT BEING ABLE TO STOP OR CONTROL WORRYING: NEARLY EVERY DAY
6. BECOMING EASILY ANNOYED OR IRRITABLE: NEARLY EVERY DAY
5. BEING SO RESTLESS THAT IT IS HARD TO SIT STILL: SEVERAL DAYS
1. FEELING NERVOUS, ANXIOUS, OR ON EDGE: SEVERAL DAYS
3. WORRYING TOO MUCH ABOUT DIFFERENT THINGS: NEARLY EVERY DAY
7. FEELING AFRAID AS IF SOMETHING AWFUL MIGHT HAPPEN: SEVERAL DAYS
GAD7 TOTAL SCORE: 13

## 2017-08-28 NOTE — PROGRESS NOTES
"                 Progress Note - Initial Session    Client Name:  Govind Way Date: 8/28/17         Service Type: Individual      Session Start Time: 9 am  Session End Time: 9:52 am      Session Length: 38 - 52      Session #: 1     Attendees: Client attended alone         Diagnostic Assessment in progress.  Unable to complete documentation at the conclusion of the first session due to partial completion of structured diagnostic interview.      Mental Status Assessment:  Appearance:   Appropriate   Eye Contact:   Good   Psychomotor Behavior: Hyperactive   Attitude:   Cooperative   Orientation:   All  Speech   Rate / Production: Hyperverbal    Volume:  Normal   Mood:    Anxious  Elevated   Affect:    Appropriate   Thought Content:  Clear   Thought Form:  Coherent  Logical   Insight:    Fair       Safety Issues and Plan for Safety and Risk Management:  Client denies current fears or concerns for personal safety.  Client denies current or recent suicidal ideation or behaviors.  Client denies current or recent homicidal ideation or behaviors.  Client denies current or recent self injurious behavior or ideation.  Client denies other safety concerns.  A safety and risk management plan has not been developed at this time, however client was given the after-hours number / 911 should there be a change in any of these risk factors.  Client reports there are no firearms in the house.      Diagnostic Criteria:  A) A persistent pattern of inattention and/or hyperactivity-impulsivity that interferes with functioning or development, as characterized by (1) Inattention and/or (2) Hyperactivity and Impulsivity  - Often fidgets with or taps hands or feet or squirms in seat  - Often leaves seat in situations when remaining seated is expected  - Is often \"on the go,\" acting as if \"driven by a motor\"  - Often talks excessively  - Often has difficulty waiting his or her turn  - Often interrupts or intrudes on others  C) Several " inattentive or hyperactive-impulsive symptoms are present in two or more settings  D) There is clear evidence that the symptoms interfere with, or reduce the quality of, social academic, or occupational functioning  A. The development of emotional or behavioral symptoms in response to an identifiable stressor(s) occurring within 3 months of the onset of the stressor(s)  B. These symptoms or behaviors are clinically significant, as evidenced by one or both of the following:       - Marked distress that is out of proportion to the severity/intensity of the stressor (with consideration for external context & culture)       - Significant impairment in social, occupational, or other important areas of functioning  C. The stress-related disturbance does not meet criteria for another disorder & is not not an exacerbation of another mental disorder  D. The symptoms do not represent normal bereavement  E. Once the stressor or its consequences have terminated, the symptoms do not persist for more than an additional 6 months       * Adjustment Disorder with Mixed Anxiety and Depressed Mood: The predominant manifestation is a combination of depression and anxiety        DSM5 Diagnoses: (Sustained by DSM5 Criteria Listed Above)  Diagnoses: Attention-Deficit/Hyperactivity Disorder  314.01 (F90.1) Predominantly hyperactive / impulsive presentation Severity: Mild  Adjustment Disorders  309.24 (F43.22) With anxiety  Psychosocial & Contextual Factors: Client is working full-time and splitting his time between taking care of his aging parents and disabled daughter, managing his relationship with his partner and taking care of his medical needs. He reports experiencing stress around work with the number of changes in management, current renovations and types of personalities.  WHODAS 2.0 (12 item)            This questionnaire asks about difficulties due to health conditions. Health conditions  include  disease or illnesses, other  health problems that may be short or long lasting,  injuries, mental health or emotional problems, and problems with alcohol or drugs.                     Think back over the past 30 days and answer these questions, thinking about how much  difficulty you had doing the following activities. For each question, please [place] only  one response.    S1 Standing for long periods such as 30 minutes? None =         1   S2 Taking care of household responsibilities? Mild =           2   S3 Learning a new task, for example, learning how to get to a new place? Mild =           2   S4 How much of a problem do you have joining community activities (for example, festivals, Worship or other activities) in the same way as anyone else can? Mild =           2   S5 How much have you been emotionally affected by your health problems? Moderate =   3     In the past 30 days, how much difficulty did you have in:   S6 Concentrating on doing something for ten minutes? Mild =           2   S7 Walking a long distance such as a kilometer (or equivalent)? None =         1   S8 Washing your whole body? None =         1   S9 Getting dressed? None =         1   S10 Dealing with people you do not know? Mild =           2   S11 Maintaining a friendship? Mild =           2   S12 Your day to day work? Moderate =   3     H1 Overall, in the past 30 days, how many days were these difficulties present? Record number of days 4   H2 In the past 30 days, for how many days were you totally unable to carry out your usual activities or work because of any health condition? Record number of days  0   H3 In the past 30 days, not counting the days that you were totally unable, for how many days did you cut back or reduce your usual activities or work because of any health condition? Record number of days 2       Collateral Reports Completed:  Not Applicable      PLAN: (Homework, other):  Client stated that he may follow up for ongoing services with [facility].  Second DA appointment scheduled for client on 9/7/17.      MIKE Bolton [facility], 8/28/17    Note reviewed and clinical supervision by MIKE Snowden [facility] 8/29/2017

## 2017-08-28 NOTE — MR AVS SNAPSHOT
MRN:[id number]                      After Visit Summary   8/28/2017    Govind Way    MRN: [id number]           Visit Information        Provider Department      8/28/2017 9:00 AM Xander Salas LGSW [facility]      Your next 10 appointments already scheduled     Sep 07, 2017  3:30 PM CDT   Return Visit with PAULINA Bolton   [facility]    [facility]  [address]  [facility] [id number] [phone]              MyChart Information     [employer] gives you secure access to your electronic health record. If you see a primary care provider, you can also send messages to your care team and make appointments. If you have questions, please call your primary care clinic.  If you do not have a primary care provider, please call [phone] and they will assist you.        Care EveryWhere ID     This is your Care EveryWhere ID. This could be used by other organizations to access your [place] medical records  [id number]        Equal Access to Services     SHIVA VELA : Hadii reyes andersen hadasho Soomaali, waaxda luqadaha, qaybta kaalmada adeegyada, terri avila. So [facility] [phone].    ATENCIÓN: Si habla español, tiene a becerra disposición servicios gratuitos de asistencia lingüística. Llame al [phone].    We comply with applicable federal civil rights laws and [place] laws. We do not discriminate on the basis of race, color, national origin, age, disability sex, sexual orientation or gender identity.

## 2017-08-29 ENCOUNTER — OFFICE VISIT (OUTPATIENT)
Dept: UROLOGY | Facility: CLINIC | Age: 62
End: 2017-08-29

## 2017-08-29 ENCOUNTER — ALLIED HEALTH/NURSE VISIT (OUTPATIENT)
Dept: UROLOGY | Facility: CLINIC | Age: 62
End: 2017-08-29

## 2017-08-29 VITALS
SYSTOLIC BLOOD PRESSURE: 128 MMHG | DIASTOLIC BLOOD PRESSURE: 102 MMHG | HEART RATE: 89 BPM | HEIGHT: 71 IN | BODY MASS INDEX: 23.94 KG/M2 | WEIGHT: 171 LBS

## 2017-08-29 DIAGNOSIS — N40.1 BENIGN PROSTATIC HYPERPLASIA WITH INCOMPLETE BLADDER EMPTYING: Primary | ICD-10-CM

## 2017-08-29 DIAGNOSIS — R39.14 BENIGN PROSTATIC HYPERPLASIA WITH INCOMPLETE BLADDER EMPTYING: Primary | ICD-10-CM

## 2017-08-29 DIAGNOSIS — N40.0 BPH (BENIGN PROSTATIC HYPERPLASIA): Primary | ICD-10-CM

## 2017-08-29 ASSESSMENT — ANXIETY QUESTIONNAIRES: GAD7 TOTAL SCORE: 13

## 2017-08-29 ASSESSMENT — PAIN SCALES - GENERAL: PAINLEVEL: NO PAIN (0)
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
## 2017-08-29 NOTE — NURSING NOTE
Pre Op Teaching Flowsheet       Pre and Post op Patient Education  Relevant Diagnosis:  stone  Teaching Topic:  Pre and post op teaching  Person Involved in teaching: Govind Way    Motivation Level:  Asks Questions: Yes  Eager to Learn:  Yes  Cooperative: Yes  Receptive (willing/able to accept information):  Yes  Patient demonstrates understanding of the following:  Date and time of surgery:  TBD  Location of surgery:  TBD  History and Physical and any other testing necessary prior to surgery: Yes  Required time line for completion of History and Physical and any pre-op testing: Yes    NPO Guidelines: Nothing to eat 8hrs prior, Nothing to drink 2hrs prior    Patient demonstrates understanding of the following:  Pre-op bowel prep:  Yes  Pre-op showering/scrub information with PCMX Soap: Yes  Medications to take the day of surgery:  Per PCP  Blood thinner medications discussed and when to stop (if applicable):  Yes  Diabetes medication management (if applicable):  N/A  Discussed pain control after surgery: pain medications  Infection Prevention: Patient demonstrates understanding of the following:  Patient instructed on hand hygiene:  Yes  Surgical procedure site care taught: NA  Signs and symptoms of infection taught:  Yes  Wound care will be taught at the time of discharge.  Central venous catheter care will be taught at the time of discharge (if applicable).    Post-op follow-up:  Discussed how to contact the hospital, nurse, and clinic scheduling staff if necessary.    Instructional materials used/given/mailed:  [facility] Booklet, post op teaching sheet, Map, Soap, and arrival/location information.    Surgical instructions packet given to patient in office:  Yes.

## 2017-08-29 NOTE — MR AVS SNAPSHOT
After Visit Summary   8/29/2017    Govind Way    MRN: [id number]           Patient Information     Date Of Birth          1955        Visit Information        Provider Department      8/29/2017 4:00 PM Nurse, [facility] and [facility]        Today's Diagnoses     BPH (benign prostatic hyperplasia)    -  1       Follow-ups after your visit        Your next 10 appointments already scheduled     Sep 07, 2017  3:30 PM CDT   Return Visit with PAULINA Bolton   [facility] ([facility]    [facility]  [address]  [facility] [id number] [phone]              Future tests that were ordered for you today     Open Future Orders        Priority Expected Expires Ordered    Routine UA with micro reflex to culture Routine  8/29/2018 8/29/2017            Who to contact     Please call your clinic at [phone] to:    Ask questions about your health    Make or cancel appointments    Discuss your medicines    Learn about your test results    Speak to your doctor   If you have compliments or concerns about an experience at your clinic, or if you wish to file a complaint, please contact [facility] Patient Relations at [phone] or email us at [email or web address]         Additional Information About Your Visit        My[employer]-Origin Information     [employer] gives you secure access to your electronic health record. If you see a primary care provider, you can also send messages to your care team and make appointments. If you have questions, please call your primary care clinic.  If you do not have a primary care provider, please call [phone] and they will assist you.      Ne[employer] is an electronic gateway that provides easy, online access to your medical records. With [employer], you can request a clinic appointment, read your test results, renew a  prescription or communicate with your care team.     To access your existing account, please contact your [facility] or call [phone] for assistance.        Care EveryWhere ID     This is your Care EveryWhere ID. This could be used by other organizations to access your [place] medical records  [id number]         Blood Pressure from Last 3 Encounters:   08/29/17 (!) 128/102   08/21/17 126/80   07/29/17 141/88    Weight from Last 3 Encounters:   08/29/17 77.6 kg (171 lb)   08/21/17 77.6 kg (171 lb)   07/28/17 78.4 kg (172 lb 12.8 oz)              Today, you had the following     No orders found for display       Primary Care Provider Office Phone # Fax #    Jose Luis Slava Nielson [phone] [address] [facility] [address]        Equal Access to Services     MIKIE VELA : Hadii aad ku hadasho Soomaali, waaxda luqadaha, qaybta kaalmada adeegyada, waxay idiin hayaan olivia ace . So [facility] [phone].    ATENCIÓN: Si habla español, tiene a becerra disposición servicios gratuitos de asistencia lingüística. Chu al [phone].    We comply with applicable federal civil rights laws and [place] laws. We do not discriminate on the basis of race, color, national origin, age, disability sex, sexual orientation or gender identity.            Thank you!     Thank you for choosing [facility] AND [facility] PROSTATE [facility] UROLOGIC CANCERS  for your care. Our goal is always to provide you with excellent care. Hearing back from our patients is one way we can continue to improve our services. Please take a few minutes to complete the written survey that you may receive in the mail after your visit with us. Thank you!             Your Updated Medication List - Protect others around you: Learn how to safely use, store and throw away your medicines at www.disposemymeds.org.          This list is accurate as of: 8/29/17  4:09 PM.  Always use your most  recent med list.                   Brand Name Dispense Instructions for use Diagnosis    DAILY HERBS PROSTATE PO      Take 1 tablet by mouth daily        Fish Oil 500 MG Caps      Take 1 capsule by mouth daily        magnesium 250 MG tablet      Take 1 tablet by mouth nightly as needed (sleep)        MULTIVITAMIN TABS   OR      1 TABLET EVETRY DAY

## 2017-08-29 NOTE — LETTER
8/29/2017       RE: Govind [address]  [address]    [facility] [address]     Dear Colleague,    Thank you for referring your patient, Govind Way, to the [facility] UROLOGY AND INST FOR PROSTATE AND UROLOGIC CANCERS at [facility]. Please see a copy of my visit note below.          Chief Complaint:   BPH/Urinary retention          Consult or Referral:   Mr. Govind Way is a 62 year old male seen in consultation from Dr. Steen.         History of Present Illness:   Govind Way is a very pleasant 62 year old male who presents with a history of bothersome lower urinary tract symptoms. He was recently seen at [facility] on 7/27/2017 with abdominal pain found to be related to ileus. He also endorsed bothersome LUTS at the time.  He states his urinary symptoms have been bothering him since his kidney surgery in 2005 after which he went into urinary retention. He has tried several medications including flomax and saw palmetta but did not find relief with any of them. Notes that his stream is sluggish. Feels pain along with urgency when vioding. Frequency (voids every 2 hours), nocturia every 2-3hrs per night. Does endorse occasional urinary leakage though this is currently not very bothersome, small volume.  He does have a family hx of prostate cancer on both mother's side and father's side.     From a medical perspective he has had several abdominal surgeries, hypertension, single kidney (donor to daughter).. He has a history of vasectomy 20+ years ago and has had painless left testicular swellin [facility] epast 3+ years.  He saw Dr. Haji for this 3 years ago.  Recent US by his PMD revealed a large cyst in left testicle. It appeared benign.         Past Medical History:     Past Medical History:   Diagnosis Date     Adjustment disorder      Dysthymic disorder      Enlarged prostate      Hepatitis      Irregular heart beat     intermittant palpitations      Palpitations     intermittent     PONV (postoperative nausea and vomiting)      Positive PPD      Pure hypercholesterolemia 1/00     Renal disease     single R kidney;donated left     Screening examination for pulmonary tuberculosis     positive mantoux     Unspecified essential hypertension 1/00            Past Surgical History:     Past Surgical History:   Procedure Laterality Date     C ECG MONITOR/ 24 HRS, ORIG ECG, W VIS SUPERIMPOS SCAN, COMPLETE  1/00    um holter monitor     C REMV KIDNEY/URETER,SAME INCIS  6/30/05    Nephrouretectomy/LEFT KIDNEY DONATED TO DAUGHTER/[facility]     HC REMOVAL OF TONSILS,<13 Y/O  age 7     HC VASECTOMY UNILAT/BILAT W POSTOP SEMEN  age 41     HERNIORRHAPHY INGUINAL  12/23/2011    Procedure:HERNIORRHAPHY INGUINAL; Surgeon:JULIA SIERRA; Location: OR     LAPAROSCOPIC HERNIORRHAPHY INGUINAL Right 6/29/2015    Procedure: LAPAROSCOPIC HERNIORRHAPHY INGUINAL;  Surgeon: García Ibarra MD;  Location:  OR     LAPAROSCOPIC HERNIORRHAPHY VENTRAL  12/23/2011    Procedure:LAPAROSCOPIC HERNIORRHAPHY VENTRAL; Laparoscopic Ventral Hernia Repair with Mesh, Open Left Inguinal Hernia Repair with Mesh; Surgeon:JULIA SIERRA; Location: OR     LASIK CUSTOMVUE BILATERAL  11/11/2011    Procedure:LASIK CUSTOMVUE BILATERAL; BILATERAL CUSTOMVUE LASIK WITH INTRALASE; Surgeon:POP SIMON; Location:[facility]          Medications     Current Outpatient Prescriptions   Medication     magnesium 250 MG tablet     Misc Natural Products (DAILY HERBS PROSTATE PO)     Omega-3 Fatty Acids (FISH OIL) 500 MG CAPS     MULTIVITAMIN TABS   OR     No current facility-administered medications for this visit.             Family History:     Family History   Problem Relation Age of Onset     DIABETES Mother      ADULT ONSET     Hypertension Mother      Coronary Artery Disease Mother      Also father and grandparent.  Father had coronary bypass and aortic valve replacement surgery      Heart Surgery Mother      Aortic valve     CANCER Other      prostate cancer     Thyroid Disease Other      Hypertension Father      Eye Disorder No family hx of      NONE KNOWN     Hyperlipidemia No family hx of      CEREBROVASCULAR DISEASE No family hx of      Breast Cancer No family hx of      Colon Cancer No family hx of      Other Cancer No family hx of      Depression No family hx of      Anxiety Disorder No family hx of      MENTAL ILLNESS No family hx of      Substance Abuse No family hx of      Anesthesia Reaction No family hx of      Asthma No family hx of      OSTEOPOROSIS No family hx of      Genetic Disorder No family hx of      Obesity No family hx of      Unknown/Adopted No family hx of      Skin Cancer       Mother and father, grandfather     Arthritis       Mom, daughter, grandparents     Prostate Cancer Other      Grandfather     Other - See Comments       Grandfather with kidney stone     KIDNEY DISEASE Daughter      Lupus Daughter      Causing end-stage renal disease            Social History:     Social History     Social History     Marital status: Legally      Spouse name: N/A     Number of children: N/A     Years of education: N/A     Occupational History     Not on file.     Social History Main Topics     Smoking status: Former Smoker     Quit date: 1/1/1982     Smokeless tobacco: Never Used      Comment: NO 2ND HAND SMOKE     Alcohol use 0.0 oz/week     0 Standard drinks or equivalent per week      Comment: 5/wk     Drug use: No     Sexual activity: Yes     Partners: Male     Other Topics Concern     Parent/Sibling W/ Cabg, Mi Or Angioplasty Before 65f 55m? No     Social History Narrative    , two children.          Allergies:   Ambien [zolpidem tartrate] and Codeine sulfate       Review of Systems:  From intake questionnaire   Negative 14 system review except as noted on HPI, nurse's note.         Physical Exam:   Patient is a 62 year old  male   Vitals: Blood pressure  "(!) 128/102, pulse 89, height 1.803 m (5' 11\"), weight 77.6 kg (171 lb).  General Appearance Adult: Alert, no acute distress, oriented  HENT: throat/mouth:normal, good dentition  Neck: No adenopathy,masses or thyromegaly  Lungs: no respiratory distress, or pursed lip breathing  Heart: No obvious jugular venous distension present  Abdomen: soft, nontender, no organomegaly or masses, Body mass index is 23.85 kg/(m^2).  Lymphatics: No cervical or supraclavicular adenopathy  Musculoskeltal: extremities normal, no peripheral edema  Skin: no suspicious lesions or rashes  Neuro: Alert, oriented, speech and mentation normal  Psych: affect and mood normal  Gait: Normal  : Bilateral descended testicles. Left testicle is tense and fluid-filled. Prostate feels smooth and normal and is slightly enlarged (approx 50g)      CYSTOSCOPY  After obtaining informed consent, the patient was prepped and draped in the standard sterile fashion.  The 15 Greek flexible cystoscope was inserted through the urethral meatus.      The anterior urethra was: normal without stricture.    The external sphincter was  appropriately coapted.   The prostatic urethra demonstrated trilobar hypertrophy.    The bladder neck was slightly occlusive.  The bladder was unremarkable for tumors, erythema or stones.    The ureteral orifices  were identified on each side in orthotopic position with efflux of clear urine.   There were moderate trabeculations.    On retroflexion there was the usual bladder neck hyperemia.    There was a medium sized intravesical lobe of the prostate.      The patient tolerated the procedure well without complication.       Uroflow and Post-Void Residual by Bladder Scan     Peak Flow 8.2 mL/s  by Robin Williamson LPN  at 08/29/17 1500      Average Flow 4.8 mL/s  by Robin Williamson LPN  at 08/29/17 1500      Voided (mL) 265 mL  by Robin Williamson LPN  at 08/29/17 1500      Residual Volume by Ultrasound 348 mL        Repeat PVR " after post cysto void was 180cc        Labs and Pathology:    I personally reviewed all applicable laboratory data and went over findings with patient  Significant for:    CBC RESULTS:  Recent Labs   Lab Test  07/28/17   0617 07/27/17   1940  07/31/14   1746  06/18/13   0750   WBC  7.0  7.9  6.4  6.5   HGB  14.3  15.2  14.0  14.5   PLT  190  209  228  229        BMP RESULTS:  Recent Labs   Lab Test  07/28/17   0617 07/27/17   1940  06/25/15   1724  07/31/14   1746   NA  144  142  139  141   POTASSIUM  3.9  4.0  3.9  3.8   CHLORIDE  109  108  107  107   CO2  27  25  24  25   ANIONGAP  8  9  8  9   GLC  88  104*  71  77   BUN  11  16  21  19   CR  0.91  1.01  1.10  1.13   GFRESTIMATED  85  75  69  67   GFRESTBLACK  >90   GFR Calc    >90   GFR Calc    83  81   BINDU  8.3*  9.2  9.1  9.0       UA RESULTS:   Recent Labs   Lab Test  07/27/17   2200  07/31/14   1844  01/10/12   1621  12/15/11   1734  12/02/10   1609   SG  1.020  1.015  1.013  1.007  1.007   URINEPH  7.0  5.5  5.0  5.0  6.5   NITRITE  Negative  Negative  Negative  Negative  Negative   RBCU   --    --   0  <1  1   WBCU   --    --   <1  <1  <1       CALCIUM RESULTS  Lab Results   Component Value Date    BINDU 8.3 07/28/2017    BINDU 9.2 07/27/2017    BINDU 9.1 06/25/2015       PSA RESULTS  PSA   Date Value Ref Range Status   11/29/2016 3.29 0 - 4 ug/L Final     Comment:     Assay Method:  Chemiluminescence using Siemens Vista analyzer   06/18/2013 2.54 0 - 4 ug/L Final     Comment:     PSA results are about 7% lower than our prior method due to a methodology   change   on August 30, 2011.   02/14/2012 1.98 0 - 4 ug/L Final     Comment:     PSA results are about 7% lower than our prior method due to a methodology   change   on August 30, 2011.   04/26/2011 2.42 0 - 4 ug/L Final   05/14/2010 1.25 0 - 4 ug/L Final   06/03/2009 1.49 0 - 4 ug/L Final   11/14/2007 0.85 0 - 4 ug/L Final   10/20/2006 0.68 0 - 4 ug/L Final   03/14/2005 0.47 0  - 4 ug/L Final   03/09/2005 0.47 0 - 4 ug/L Final         Imaging:    I personally reviewed all applicable imaging and went over findings with patient.  Significant for US 12/1/16        COMPARISON: None available     HISTORY: Enlarged left testicle. Urology several years ago, at that  time this was felt to represent a hydrocele although ultrasound was  not performed.     TECHNIQUE: The scrotum was scanned in standard fashion with  specialized ultrasound transducer(s) using both grey scale and limited  color Doppler techniques.     Findings:  Within the superior aspect of the left testicle, there is a large  anechoic cystic focus with posterior acoustic enhancement which  measures up to 2.9 cm. The testes otherwise demonstrate normal and  symmetric echotexture and vascularity. No evidence of a solid lesion.   The right testicle measures 4.7 x 2.7 x 3.2 cm for a volume of 21.3  mL, and the left measures 5.2 x 3.4 x 3.1 cm, for a volume of 28.7 mL.   There is no evidence of torsion.     Small right and moderate left hydrocele. No varicocele appreciated.   Both the right and left epididymis are within normal limits.         Impression:   1.  Large benign appearing intratesticular cyst on the left.  2.  Moderate left and small right hydroceles.     I have personally reviewed the examination and initial interpretation  and I agree with the findings.     Standardized Questionnaire:      [employer] SYMPTOM SCORE  SUM 21/35  QOL 5/6           Assessment and Plan:     Assessment: 61 yo M with BPH and severe LUTS, left testicular cyst    Plan:  After discussion of medical and surgical treatments for BPH including alpha blockers, anticholinergics, transurethral resection, laser ablation, enucleation and simple prostatectomy, Mr. Way has decided to proceed with Holmium Laser Enucleation of the Prostate (HoLEP).    We discussed the associated risks of this procedure included but not limited to the  following:  -Bleeding, potentially significant enough to require clot evacuation and blood transfusion  -Infection, for which we will plan to treat preoperatively based on targeted antibiotic therapy  -Damage to the bladder, urethra and penis including the risk of urethral stricture and bladder neck contracture  -Risk of incidentally discovered prostate cancer.  We discussed that this would not preclude him from further therapy though it could prolong recovery and potentially increase risk of complications associated with cancer treatment.  Further preoperative workup to assess for prostate cancer prior to surgery was offered but patient deferred.  -Risk of retrograde ejaculation which would be expected to occur in the majority if not all men after HoLEP  -Risk of urinary incontinence.  We discussed that in the majority of men this is a transient process that is generally self limited to the first 6-12 weeks after surgery though could take longer to resolve depending on baseline bladder instability.  -Risk of postperative urinary retention though in published series HoLEP has been found to be associated with high success rates of achieving spontaneous voiding even in men with underactive and atonic bladders.  -We will proceed with preoperative clearance with preference to minimize all anticoagulation as deemed acceptable by his primary care provider.     We discussed consider a median lobe obly approach in light of his history of urinary leakage  We will also send tumor markers to make sure no concern testicular cancer, though US appears to be benign in nature    Orders  Orders Placed This Encounter   Procedures     POST-VOID RESIDUAL BLADDER SCAN       Scribe Disclosure:   ILynn, am serving as a scribe; to document services personally performed by Manolo Cardenas MD based on data collection and the provider's statements to me.     IManolo saw and evaluated this patient and agree  with the plan as stated above     CC:  Jose Luis Nielson      Again, thank you for allowing me to participate in the care of your patient.      Sincerely,    Manolo Cardenas MD

## 2017-08-29 NOTE — NURSING NOTE
Chief Complaint   Patient presents with     RECHECK     BPH     Yanna Kang CMA    Invasive Procedure Safety Checklist:    Procedure:     Action: Complete sections and checkboxes as appropriate.    Pre-procedure:  1. Patient ID Verified with 2 identifiers (Makayla and  or MRN) : YES    2. Procedure and site verified with patient/designee (when able) : YES    3. Accurate consent documentation in medical record : YES    4. H&P (or appropriate assessment) documented in medical record : YES  H&P must be up to 30 days prior to procedure an updated within 24 hours of                 Procedure as applicable.     5. Relevant diagnostic and radiology test results appropriately labeled and displayed as applicable : YES    6. Blood products, implants, devices, and/or special equipment available for the procedure as applicable : YES    7. Procedure site(s) marked with provider initials [Exclusions: ] : YES    8. Marking not required. Reason : Yes  Procedure does not require site marking    Time Out:     Time-Out performed immediately prior to starting procedure, including verbal and active participation of all team members addressing: YES    1. Correct patient identity.  2. Confirmed that the correct side and site are marked.  3. An accurate procedure to be done.  4. Agreement on the procedure to be done.  5. Correct patient position.  6. Relevant images and results are properly labeled and appropriately displayed.  7. The need to administer antibiotics or fluids for irrigation purposes during the procedure as applicable.  8. Safety precautions based on patient history or medication use.    During Procedure: Verification of correct person, site, and procedure occurs any time the responsibility for care of the patient is transferred to another member of the care team.

## 2017-08-29 NOTE — MR AVS SNAPSHOT
After Visit Summary   8/29/2017    Govind Way    MRN: [id number]           Patient Information     Date Of Birth          1955        Visit Information        Provider Department      8/29/2017 3:00 PM Manolo Cardenas MD [facility] and [facility] for Prostate and Urologic Cancers        Today's Diagnoses     BPH (benign prostatic hyperplasia)    -  1      Care Instructions    Stop at the lab today to give urine sample.    Christina will discuss surgery with you.    It was a pleasure meeting with you today.  Thank you for allowing me and my team the privilege of caring for you today.  YOU are the reason we are here, and I truly hope we provided you with the excellent service you deserve.  Please let us know if there is anything else we can do for you so that we can be sure you are leaving completely satisfied with your care experience.       Robin Williamson LPN          Follow-ups after your visit        Your next 10 appointments already scheduled     Sep 07, 2017  3:30 PM CDT   Return Visit with PAULINA Bolton   [facility] ([facility])    [place] [facility]  [address]  [facility] [place] [facility] [id number] [phone]              Future tests that were ordered for you today     Open Future Orders        Priority Expected Expires Ordered    Routine UA with micro reflex to culture Routine  8/29/2018 8/29/2017            Who to contact     Please call your clinic at [phone] to:    Ask questions about your health    Make or cancel appointments    Discuss your medicines    Learn about your test results    Speak to your doctor   If you have compliments or concerns about an experience at your clinic, or if you wish to file a complaint, please contact [facility] Patient Relations at [phone] or email us at [email or web address]         Additional Information About Your Visit        MyChart Information      "[employer] gives you secure access to your electronic health record. If you see a primary care provider, you can also send messages to your care team and make appointments. If you have questions, please call your primary care clinic.  If you do not have a primary care provider, please call [phone] and they will assist you.      [employer] is an electronic gateway that provides easy, online access to your medical records. With [employer], you can request a clinic appointment, read your test results, renew a prescription or communicate with your care team.     To access your existing account, please contact your [facility] or call [phone] for assistance.        Care EveryWhere ID     This is your Care EveryWhere ID. This could be used by other organizations to access your [employer] medical records  [id number]        Your Vitals Were     Pulse Height BMI (Body Mass Index)             89 1.803 m (5' 11\") 23.85 kg/m2          Blood Pressure from Last 3 Encounters:   08/29/17 (!) 128/102   08/21/17 126/80   07/29/17 141/88    Weight from Last 3 Encounters:   08/29/17 77.6 kg (171 lb)   08/21/17 77.6 kg (171 lb)   07/28/17 78.4 kg (172 lb 12.8 oz)              We Performed the Following     POST-VOID RESIDUAL BLADDER SCAN        Primary Care Provider Office Phone # Fax #    Jose Luis Slava Nielson [phone] [address] [facility] [address]        Equal Access to Services     SHIVA VELA : Hadii aad ku hadasho Soomaali, waaxda luqadaha, qaybta kaalmada adeegyada, terri ace . So [facility] [phone].    ATENCIÓN: Si habla español, tiene a becerra disposición servicios gratuitos de asistencia lingüística. Llame al [phone].    We comply with applicable federal civil rights laws and [place] laws. We do not discriminate on the basis of race, color, national origin, age, disability sex, sexual orientation or gender identity.          "   Thank you!     Thank you for choosing [facility]Y AND [facility] FOR PROSTATE AND UROLOGIC CANCERS  for your care. Our goal is always to provide you with excellent care. Hearing back from our patients is one way we can continue to improve our services. Please take a few minutes to complete the written survey that you may receive in the mail after your visit with us. Thank you!             Your Updated Medication List - Protect others around you: Learn how to safely use, store and throw away your medicines at www.disposemymeds.org.          This list is accurate as of: 8/29/17  3:50 PM.  Always use your most recent med list.                   Brand Name Dispense Instructions for use Diagnosis    DAILY HERBS PROSTATE PO      Take 1 tablet by mouth daily        Fish Oil 500 MG Caps      Take 1 capsule by mouth daily        magnesium 250 MG tablet      Take 1 tablet by mouth nightly as needed (sleep)        MULTIVITAMIN TABS   OR      1 TABLET EVETRY DAY

## 2017-08-29 NOTE — PROGRESS NOTES
Chief Complaint:   BPH/Urinary retention          Consult or Referral:   Mr. Govind Way is a 62 year old male seen in consultation from Dr. Steen.         History of Present Illness:   Govind Way is a very pleasant 62 year old male who presents with a history of bothersome lower urinary tract symptoms. He was recently seen at [facility] on 7/27/2017 with abdominal pain found to be related to ileus. He also endorsed bothersome LUTS at the time.  He states his urinary symptoms have been bothering him since his kidney surgery in 2005 after which he went into urinary retention. He has tried several medications including flomax and saw palmetta but did not find relief with any of them. Notes that his stream is sluggish. Feels pain along with urgency when vioding. Frequency (voids every 2 hours), nocturia every 2-3hrs per night. Does endorse occasional urinary leakage though this is currently not very bothersome, small volume.  He does have a family hx of prostate cancer on both mother's side and father's side.     From a medical perspective he has had several abdominal surgeries, hypertension, single kidney (donor to daughter).. He has a history of vasectomy 20+ years ago and has had painless left testicular swellin [facility] epast 3+ years.  He saw Dr. Haji for this 3 years ago.  Recent US by his PMD revealed a large cyst in left testicle. It appeared benign.         Past Medical History:     Past Medical History:   Diagnosis Date     Adjustment disorder      Dysthymic disorder      Enlarged prostate      Hepatitis      Irregular heart beat     intermittant palpitations     Palpitations     intermittent     PONV (postoperative nausea and vomiting)      Positive PPD      Pure hypercholesterolemia 1/00     Renal disease     single R kidney;donated left     Screening examination for pulmonary tuberculosis     positive mantoux     Unspecified essential hypertension 1/00            Past Surgical History:      Past Surgical History:   Procedure Laterality Date     C ECG MONITOR/ 24 HRS, ORIG ECG, W VIS SUPERIMPOS SCAN, COMPLETE  1/00    um holter monitor     C REMV KIDNEY/URETER,SAME INCIS  6/30/05    Nephrouretectomy/LEFT KIDNEY DONATED TO DAUGHTER/[facility]     HC REMOVAL OF TONSILS,<13 Y/O  age 7     HC VASECTOMY UNILAT/BILAT W POSTOP SEMEN  age 41     HERNIORRHAPHY INGUINAL  12/23/2011    Procedure:HERNIORRHAPHY INGUINAL; Surgeon:JULIA SIERRA; Location: OR     LAPAROSCOPIC HERNIORRHAPHY INGUINAL Right 6/29/2015    Procedure: LAPAROSCOPIC HERNIORRHAPHY INGUINAL;  Surgeon: García Ibarra MD;  Location:  OR     LAPAROSCOPIC HERNIORRHAPHY VENTRAL  12/23/2011    Procedure:LAPAROSCOPIC HERNIORRHAPHY VENTRAL; Laparoscopic Ventral Hernia Repair with Mesh, Open Left Inguinal Hernia Repair with Mesh; Surgeon:JULIA SIERRA; Location: OR     LASIK CUSTOMVUE BILATERAL  11/11/2011    Procedure:LASIK CUSTOMVUE BILATERAL; BILATERAL CUSTOMVUE LASIK WITH INTRALASE; Surgeon:POP SIMON; Location:[facility]            Medications     Current Outpatient Prescriptions   Medication     magnesium 250 MG tablet     Misc Natural Products (DAILY HERBS PROSTATE PO)     Omega-3 Fatty Acids (FISH OIL) 500 MG CAPS     MULTIVITAMIN TABS   OR     No current facility-administered medications for this visit.             Family History:     Family History   Problem Relation Age of Onset     DIABETES Mother      ADULT ONSET     Hypertension Mother      Coronary Artery Disease Mother      Also father and grandparent.  Father had coronary bypass and aortic valve replacement surgery     Heart Surgery Mother      Aortic valve     CANCER Other      prostate cancer     Thyroid Disease Other      Hypertension Father      Eye Disorder No family hx of      NONE KNOWN     Hyperlipidemia No family hx of      CEREBROVASCULAR DISEASE No family hx of      Breast Cancer No family hx of      Colon Cancer No family hx  "of      Other Cancer No family hx of      Depression No family hx of      Anxiety Disorder No family hx of      MENTAL ILLNESS No family hx of      Substance Abuse No family hx of      Anesthesia Reaction No family hx of      Asthma No family hx of      OSTEOPOROSIS No family hx of      Genetic Disorder No family hx of      Obesity No family hx of      Unknown/Adopted No family hx of      Skin Cancer       Mother and father, grandfather     Arthritis       Mom, daughter, grandparents     Prostate Cancer Other      Grandfather     Other - See Comments       Grandfather with kidney stone     KIDNEY DISEASE Daughter      Lupus Daughter      Causing end-stage renal disease            Social History:     Social History     Social History     Marital status: Legally      Spouse name: N/A     Number of children: N/A     Years of education: N/A     Occupational History     Not on file.     Social History Main Topics     Smoking status: Former Smoker     Quit date: 1/1/1982     Smokeless tobacco: Never Used      Comment: NO 2ND HAND SMOKE     Alcohol use 0.0 oz/week     0 Standard drinks or equivalent per week      Comment: 5/wk     Drug use: No     Sexual activity: Yes     Partners: Male     Other Topics Concern     Parent/Sibling W/ Cabg, Mi Or Angioplasty Before 65f 55m? No     Social History Narrative    , two children.            Allergies:   Ambien [zolpidem tartrate] and Codeine sulfate         Review of Systems:  From intake questionnaire   Negative 14 system review except as noted on HPI, nurse's note.         Physical Exam:   Patient is a 62 year old  male   Vitals: Blood pressure (!) 128/102, pulse 89, height 1.803 m (5' 11\"), weight 77.6 kg (171 lb).  General Appearance Adult: Alert, no acute distress, oriented  HENT: throat/mouth:normal, good dentition  Neck: No adenopathy,masses or thyromegaly  Lungs: no respiratory distress, or pursed lip breathing  Heart: No obvious jugular venous distension " present  Abdomen: soft, nontender, no organomegaly or masses, Body mass index is 23.85 kg/(m^2).  Lymphatics: No cervical or supraclavicular adenopathy  Musculoskeltal: extremities normal, no peripheral edema  Skin: no suspicious lesions or rashes  Neuro: Alert, oriented, speech and mentation normal  Psych: affect and mood normal  Gait: Normal  : Bilateral descended testicles. Left testicle is tense and fluid-filled. Prostate feels smooth and normal and is slightly enlarged (approx 50g)      CYSTOSCOPY  After obtaining informed consent, the patient was prepped and draped in the standard sterile fashion.  The 15 Serbian flexible cystoscope was inserted through the urethral meatus.      The anterior urethra was: normal without stricture.    The external sphincter was  appropriately coapted.   The prostatic urethra demonstrated trilobar hypertrophy.    The bladder neck was slightly occlusive.  The bladder was unremarkable for tumors, erythema or stones.    The ureteral orifices  were identified on each side in orthotopic position with efflux of clear urine.   There were moderate trabeculations.    On retroflexion there was the usual bladder neck hyperemia.    There was a medium sized intravesical lobe of the prostate.      The patient tolerated the procedure well without complication.       Uroflow and Post-Void Residual by Bladder Scan     Peak Flow 8.2 mL/s  by Robin Williamson LPN  at 08/29/17 1500      Average Flow 4.8 mL/s  by Robin Williamson LPN  at 08/29/17 1500      Voided (mL) 265 mL  by Robin Williamson LPN  at 08/29/17 1500      Residual Volume by Ultrasound 348 mL        Repeat PVR after post cysto void was 180cc        Labs and Pathology:    I personally reviewed all applicable laboratory data and went over findings with patient  Significant for:    CBC RESULTS:  Recent Labs   Lab Test  07/28/17   0617  07/27/17   1940  07/31/14   1746  06/18/13   0750   WBC  7.0  7.9  6.4  6.5   HGB  14.3  15.2   14.0  14.5   PLT  190  209  228  229        BMP RESULTS:  Recent Labs   Lab Test  07/28/17   0617  07/27/17   1940  06/25/15   1724  07/31/14   1746   NA  144  142  139  141   POTASSIUM  3.9  4.0  3.9  3.8   CHLORIDE  109  108  107  107   CO2  27  25  24  25   ANIONGAP  8  9  8  9   GLC  88  104*  71  77   BUN  11  16  21  19   CR  0.91  1.01  1.10  1.13   GFRESTIMATED  85  75  69  67   GFRESTBLACK  >90   GFR Calc    >90   GFR Calc    83  81   BINDU  8.3*  9.2  9.1  9.0       UA RESULTS:   Recent Labs   Lab Test  07/27/17   2200  07/31/14   1844  01/10/12   1621  12/15/11   1734  12/02/10   1609   SG  1.020  1.015  1.013  1.007  1.007   URINEPH  7.0  5.5  5.0  5.0  6.5   NITRITE  Negative  Negative  Negative  Negative  Negative   RBCU   --    --   0  <1  1   WBCU   --    --   <1  <1  <1       CALCIUM RESULTS  Lab Results   Component Value Date    BINDU 8.3 07/28/2017    BINDU 9.2 07/27/2017    BINDU 9.1 06/25/2015       PSA RESULTS  PSA   Date Value Ref Range Status   11/29/2016 3.29 0 - 4 ug/L Final     Comment:     Assay Method:  Chemiluminescence using Siemens Vista analyzer   06/18/2013 2.54 0 - 4 ug/L Final     Comment:     PSA results are about 7% lower than our prior method due to a methodology   change   on August 30, 2011.   02/14/2012 1.98 0 - 4 ug/L Final     Comment:     PSA results are about 7% lower than our prior method due to a methodology   change   on August 30, 2011.   04/26/2011 2.42 0 - 4 ug/L Final   05/14/2010 1.25 0 - 4 ug/L Final   06/03/2009 1.49 0 - 4 ug/L Final   11/14/2007 0.85 0 - 4 ug/L Final   10/20/2006 0.68 0 - 4 ug/L Final   03/14/2005 0.47 0 - 4 ug/L Final   03/09/2005 0.47 0 - 4 ug/L Final         Imaging:    I personally reviewed all applicable imaging and went over findings with patient.  Significant for US 12/1/16        COMPARISON: None available     HISTORY: Enlarged left testicle. Urology several years ago, at that  time this was felt to represent a  hydrocele although ultrasound was  not performed.     TECHNIQUE: The scrotum was scanned in standard fashion with  specialized ultrasound transducer(s) using both grey scale and limited  color Doppler techniques.     Findings:  Within the superior aspect of the left testicle, there is a large  anechoic cystic focus with posterior acoustic enhancement which  measures up to 2.9 cm. The testes otherwise demonstrate normal and  symmetric echotexture and vascularity. No evidence of a solid lesion.   The right testicle measures 4.7 x 2.7 x 3.2 cm for a volume of 21.3  mL, and the left measures 5.2 x 3.4 x 3.1 cm, for a volume of 28.7 mL.   There is no evidence of torsion.     Small right and moderate left hydrocele. No varicocele appreciated.   Both the right and left epididymis are within normal limits.         Impression:   1.  Large benign appearing intratesticular cyst on the left.  2.  Moderate left and small right hydroceles.     I have personally reviewed the examination and initial interpretation  and I agree with the findings.     Standardized Questionnaire:      [employer] SYMPTOM SCORE  SUM 21/35  QOL 5/6           Assessment and Plan:     Assessment: 63 yo M with BPH and severe LUTS, left testicular cyst    Plan:  After discussion of medical and surgical treatments for BPH including alpha blockers, anticholinergics, transurethral resection, laser ablation, enucleation and simple prostatectomy, Mr. Way has decided to proceed with Holmium Laser Enucleation of the Prostate (HoLEP).    We discussed the associated risks of this procedure included but not limited to the following:  -Bleeding, potentially significant enough to require clot evacuation and blood transfusion  -Infection, for which we will plan to treat preoperatively based on targeted antibiotic therapy  -Damage to the bladder, urethra and penis including the risk of urethral stricture and bladder neck contracture  -Risk of  incidentally discovered prostate cancer.  We discussed that this would not preclude him from further therapy though it could prolong recovery and potentially increase risk of complications associated with cancer treatment.  Further preoperative workup to assess for prostate cancer prior to surgery was offered but patient deferred.  -Risk of retrograde ejaculation which would be expected to occur in the majority if not all men after HoLEP  -Risk of urinary incontinence.  We discussed that in the majority of men this is a transient process that is generally self limited to the first 6-12 weeks after surgery though could take longer to resolve depending on baseline bladder instability.  -Risk of postperative urinary retention though in published series HoLEP has been found to be associated with high success rates of achieving spontaneous voiding even in men with underactive and atonic bladders.  -We will proceed with preoperative clearance with preference to minimize all anticoagulation as deemed acceptable by his primary care provider.     We discussed consider a median lobe obly approach in light of his history of urinary leakage  We will also send tumor markers to make sure no concern testicular cancer, though US appears to be benign in nature    Orders  Orders Placed This Encounter   Procedures     POST-VOID RESIDUAL BLADDER SCAN       Scribe Disclosure:   ILynn, am serving as a scribe; to document services personally performed by Manolo Cardenas MD based on data collection and the provider's statements to me.     IManolo saw and evaluated this patient and agree with the plan as stated above     CC:  Jose Luis Nielson

## 2017-08-29 NOTE — PATIENT INSTRUCTIONS
Stop at the lab today to give urine sample.    Christina will discuss surgery with you.    It was a pleasure meeting with you today.  Thank you for allowing me and my team the privilege of caring for you today.  YOU are the reason we are here, and I truly hope we provided you with the excellent service you deserve.  Please let us know if there is anything else we can do for you so that we can be sure you are leaving completely satisfied with your care experience.       Robin Williamson LPN

## 2017-08-30 ENCOUNTER — TELEPHONE (OUTPATIENT)
Dept: UROLOGY | Facility: CLINIC | Age: 62
End: 2017-08-30

## 2017-08-30 LAB
BACTERIA SPEC CULT: NO GROWTH
Lab: NORMAL
SPECIMEN SOURCE: NORMAL

## 2017-08-30 NOTE — TELEPHONE ENCOUNTER
Patient called back. Surgery scheduled for 10/19/17 at 7:15am with a 5:45am check in. Patient is aware he will need a pre-op done. Surgery packet was given in clinic.

## 2017-08-30 NOTE — TELEPHONE ENCOUNTER
Spoke to the patients wife he is at work and wont be back till night. So she would have him call us tomorrow. Left our direct number.

## 2017-09-07 ENCOUNTER — OFFICE VISIT (OUTPATIENT)
Dept: PSYCHOLOGY | Facility: CLINIC | Age: 62
End: 2017-09-07
Payer: COMMERCIAL

## 2017-09-07 DIAGNOSIS — F90.8 ATTENTION-DEFICIT HYPERACTIVITY DISORDER, OTHER TYPE: Primary | ICD-10-CM

## 2017-09-07 DIAGNOSIS — F33.0 MAJOR DEPRESSIVE DISORDER, RECURRENT EPISODE, MILD (H): ICD-10-CM

## 2017-09-07 DIAGNOSIS — F10.10 ALCOHOL USE DISORDER, MILD, IN CONTROLLED ENVIRONMENT: ICD-10-CM

## 2017-09-07 PROCEDURE — 90791 PSYCH DIAGNOSTIC EVALUATION: CPT | Performed by: SOCIAL WORKER

## 2017-09-07 NOTE — MR AVS SNAPSHOT
MRN:[id number]                      After Visit Summary   9/7/2017    Govind Way    MRN: [id number]           Visit Information        Provider Department      9/7/2017 3:30 PM Xander Salas LGSW [facility]      Your next 10 appointments already scheduled     Sep 26, 2017 12:30 PM CDT   (Arrive by 12:15 PM)   Return Visit with Manolo Cardenas MD   [facility] and [facility] for Prostate and Urologic Cancers ([facility])    [address] [place] [address]  [address] [facility] [id number]   [phone]            Oct 03, 2017  3:30 PM CDT   Return Visit with PAULINA Bolton   [facility] ([facility])    [place] [facility]ldg  [address]  [facility] [id number] [phone]            Oct 19, 2017   Procedure with Manolo Cardenas MD   [facility] ([facility])    [address] [place] [address]  [address] [facility] [id number] [phone]           Located in the Clinics and Surgery Center at [address] [facility] [address].   parking is very convenient and highly recommended.  is a $6 flat rate fee.  Both  and self parkers should enter the main arrival plaza from [facility]; parking attendants will direct you based on your parking preference.            Nov 21, 2017  3:00 PM CST   (Arrive by 2:45 PM)   Post-Op with Manolo Cardenas MD   [facility] and [facility] for Prostate and Urologic Cancers ([facility])    [address] [place] [address]  [address] [facility] [id number] [phone]              MyChart Information     [employer] gives you secure access to your electronic health record. If you see a primary care provider, you can also send messages to your care team and make appointments. If you have questions, please call your primary care clinic.  If you do not have a primary  care provider, please call [phone] and they will assist you.        Care EveryWhere ID     This is your Care EveryWhere ID. This could be used by other organizations to access your [place] medical records  [id number]        Equal Access to Services     SHIVA AVILA: David Smith, jna pierce, lavern kaalmaria luisa sales, terri avila. So [facility] [phone].    ATENCIÓN: Si habla español, tiene a becerra disposición servicios gratuitos de asistencia lingüística. Llame al [phone].    We comply with applicable federal civil rights laws and [place] laws. We do not discriminate on the basis of race, color, national origin, age, disability sex, sexual orientation or gender identity.

## 2017-09-11 ENCOUNTER — TELEPHONE (OUTPATIENT)
Dept: UROLOGY | Facility: CLINIC | Age: 62
End: 2017-09-11

## 2017-09-11 NOTE — TELEPHONE ENCOUNTER
Patient called and has questions about his procedure and wants to speak to elin about side effects. Message left with md. Flora Horowitz LPN Staff Nurse

## 2017-09-18 ENCOUNTER — OFFICE VISIT (OUTPATIENT)
Dept: PSYCHOLOGY | Facility: CLINIC | Age: 62
End: 2017-09-18
Payer: COMMERCIAL

## 2017-09-18 DIAGNOSIS — F90.8 ATTENTION DEFICIT HYPERACTIVITY DISORDER (ADHD), OTHER TYPE: ICD-10-CM

## 2017-09-18 DIAGNOSIS — F33.0 MAJOR DEPRESSIVE DISORDER, RECURRENT EPISODE, MILD (H): Primary | ICD-10-CM

## 2017-09-18 DIAGNOSIS — F10.20 ALCOHOL USE DISORDER, MODERATE, IN CONTROLLED ENVIRONMENT (H): ICD-10-CM

## 2017-09-18 PROBLEM — F10.10 ALCOHOL USE DISORDER, MILD, IN CONTROLLED ENVIRONMENT: Status: ACTIVE | Noted: 2017-09-18

## 2017-09-18 PROCEDURE — 90834 PSYTX W PT 45 MINUTES: CPT | Performed by: SOCIAL WORKER

## 2017-09-18 ASSESSMENT — ANXIETY QUESTIONNAIRES
1. FEELING NERVOUS, ANXIOUS, OR ON EDGE: SEVERAL DAYS
GAD7 TOTAL SCORE: 8
6. BECOMING EASILY ANNOYED OR IRRITABLE: MORE THAN HALF THE DAYS
5. BEING SO RESTLESS THAT IT IS HARD TO SIT STILL: NOT AT ALL
7. FEELING AFRAID AS IF SOMETHING AWFUL MIGHT HAPPEN: NOT AT ALL
2. NOT BEING ABLE TO STOP OR CONTROL WORRYING: MORE THAN HALF THE DAYS
3. WORRYING TOO MUCH ABOUT DIFFERENT THINGS: MORE THAN HALF THE DAYS

## 2017-09-18 ASSESSMENT — PATIENT HEALTH QUESTIONNAIRE - PHQ9
5. POOR APPETITE OR OVEREATING: SEVERAL DAYS
SUM OF ALL RESPONSES TO PHQ QUESTIONS 1-9: 5
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
## 2017-09-18 NOTE — MR AVS SNAPSHOT
MRN:[id number]                      After Visit Summary   9/18/2017    Govind Way    MRN: [id number]           Visit Information        Provider Department      9/18/2017 3:30 PM Xander Salas LGSW [facility] Generic      Your next 10 appointments already scheduled     Oct 19, 2017   Procedure with Manolo Cardenas MD   [facility] ([facility])    [address] [place] [address]  [address]th [facility] [id number] [phone]           Located in the Clinics and Surgery Center at [address] [place] [address].   parking is very convenient and highly recommended.  is a $6 flat rate fee.  Both  and self parkers should enter the main arrival plaza from [facility]; parking attendants will direct you based on your parking preference.            Oct 24, 2017  3:30 PM CDT   Return Visit with PAULINA Bolton   [facility] ([facility])    [facility]  [address]  [facility] [id number] [phone]            Nov 21, 2017  3:00 PM CST   (Arrive by 2:45 PM)   Post-Op with Manolo Cardenas MD   [facility] ([facility])    [address] [place] [address]  [address] [facility] [id number] [phone]              MyChart Information     [facility] gives you secure access to your electronic health record. If you see a primary care provider, you can also send messages to your care team and make appointments. If you have questions, please call your primary care clinic.  If you do not have a primary care provider, please call [phone] and they will assist you.        Care EveryWhere ID     This is your Care EveryWhere ID. This could be used by other organizations to access your [facility] medical records  [id number]        Equal Access to Services     SHIVA INFANTE: David  reyes Smith, jan pierce, lavern sales, terri avila. So [facility] [phone].    ATENCIÓN: Si habla español, tiene a becerra disposición servicios gratuitos de asistencia lingüística. Llame al [phone].    We comply with applicable federal civil rights laws and [place] laws. We do not discriminate on the basis of race, color, national origin, age, disability, sex, sexual orientation, or gender identity.

## 2017-09-19 ENCOUNTER — PRE VISIT (OUTPATIENT)
Dept: UROLOGY | Facility: CLINIC | Age: 62
End: 2017-09-19

## 2017-09-19 ENCOUNTER — OFFICE VISIT (OUTPATIENT)
Dept: FAMILY MEDICINE | Facility: CLINIC | Age: 62
End: 2017-09-19
Payer: COMMERCIAL

## 2017-09-19 VITALS
HEIGHT: 71 IN | BODY MASS INDEX: 23.87 KG/M2 | SYSTOLIC BLOOD PRESSURE: 138 MMHG | TEMPERATURE: 97.2 F | OXYGEN SATURATION: 96 % | HEART RATE: 86 BPM | DIASTOLIC BLOOD PRESSURE: 76 MMHG | WEIGHT: 170.5 LBS

## 2017-09-19 DIAGNOSIS — R39.9 LOWER URINARY TRACT SYMPTOMS (LUTS): ICD-10-CM

## 2017-09-19 DIAGNOSIS — Z01.818 PREOP GENERAL PHYSICAL EXAM: Primary | ICD-10-CM

## 2017-09-19 PROCEDURE — 99214 OFFICE O/P EST MOD 30 MIN: CPT | Performed by: FAMILY MEDICINE

## 2017-09-19 ASSESSMENT — ANXIETY QUESTIONNAIRES: GAD7 TOTAL SCORE: 8

## 2017-09-19 NOTE — MR AVS SNAPSHOT
After Visit Summary   9/19/2017    Govind Way    MRN: [id number]           Patient Information     Date Of Birth          1955        Visit Information        Provider Department      9/19/2017 4:00 PM Jose Luis Nielson MD [facility]        Today's Diagnoses     Preop general physical exam    -  1    Lower urinary tract symptoms (LUTS)          Care Instructions      Before Your Surgery      Call your surgeon if there is any change in your health. This includes signs of a cold or flu (such as a sore throat, runny nose, cough, rash or fever).    Do not smoke, drink alcohol or take over the counter medicine (unless your surgeon or primary care doctor tells you to) for the 24 hours before and after surgery.    If you take prescribed drugs: Follow your doctor s orders about which medicines to take and which to stop until after surgery.    Eating and drinking prior to surgery: follow the instructions from your surgeon    Take a shower or bath the night before surgery. Use the soap your surgeon gave you to gently clean your skin. If you do not have soap from your surgeon, use your regular soap. Do not shave or scrub the surgery site.  Wear clean pajamas and have clean sheets on your bed.   Before Your Surgery    Call your surgeon if there is any change in your health. This includes signs of a cold or flu (such as a sore throat, runny nose, cough, rash or fever).  Do not smoke, drink alcohol or take over the counter medicine (unless your surgeon or primary care doctor tells you to) for the 24 hours before and after surgery.  If you take prescribed drugs: Follow your doctor s orders about which medicines to take and which to stop until after surgery.  Eating and drinking prior to surgery: follow the instructions from your surgeon  Take a shower or bath the night before surgery. Use the soap your surgeon gave you to gently clean your skin. If you do not have soap from your  surgeon, use your regular soap. Do not shave or scrub the surgery site.  Wear clean pajamas and have clean sheets on your bed.           Follow-ups after your visit        Additional Services     UROLOGY ADULT REFERRAL       Your provider has referred you to: [facility]: [facility] Amanda Tisha [phone]   [email or web address]    Please be aware that coverage of these services is subject to the terms and limitations of your health insurance plan.  Call member services at your health plan with any benefit or coverage questions.      Please bring the following with you to your appointment:    (1) Any X-Rays, CTs or MRIs which have been performed.  Contact the facility where they were done to arrange for  prior to your scheduled appointment.    (2) List of current medications  (3) This referral request   (4) Any documents/labs given to you for this referral                  Your next 10 appointments already scheduled     Sep 26, 2017 12:30 PM CDT   (Arrive by 12:15 PM)   Return Visit with Manolo Cardenas MD   [facility] ([facility])    [address] [place] [address]  4th [facility] [id number] [phone]            Oct 03, 2017  3:30 PM CDT   Return Visit with PAULINA Bolton   [facility] ([facility]  [address] [facility]  [facility] [id number] [phone]            Oct 19, 2017   Procedure with Manolo Cardenas MD   [facility] ([facility])    [address] [place] [address]  5th Floor  [facility] [id number] [phone]           Located in the Clinics and Surgery Center at [address] [place] [address].   parking is very convenient and highly recommended.  is a $6 flat rate fee.  Both  and self parkers should enter the main arrival plaza from [facility]; parking attendants  "will direct you based on your parking preference.            Nov 21, 2017  3:00 PM CST   (Arrive by 2:45 PM)   Post-Op with Manolo Cardenas MD   [facility] ([facility] [place])    [address] [place] [id number] [phone]              Who to contact     If you have questions or need follow up information about today's clinic visit or your schedule please contact [facility] directly at [phone].  Normal or non-critical lab and imaging results will be communicated to you by [employer], letter or phone within 4 business days after the clinic has received the results. If you do not hear from us within 7 days, please contact the clinic through [employer] or phone. If you have a critical or abnormal lab result, we will notify you by phone as soon as possible.  Submit refill requests through [employer] or call your pharmacy and they will forward the refill request to us. Please allow 3 business days for your refill to be completed.          Additional Information About Your Visit        MyChart Information     [employer] gives you secure access to your electronic health record. If you see a primary care provider, you can also send messages to your care team and make appointments. If you have questions, please call your primary care clinic.  If you do not have a primary care provider, please call [phone] and they will assist you.        Care EveryWhere ID     This is your Care EveryWhere ID. This could be used by other organizations to access your [facility] medical records  [id number]        Your Vitals Were     Pulse Temperature Height Pulse Oximetry BMI (Body Mass Index)       86 97.2  F (36.2  C) (Oral) 5' 11\" (1.803 m) 96% 23.78 kg/m2        Blood Pressure from Last 3 Encounters:   09/19/17 138/76   08/29/17 (!) 128/102   08/21/17 126/80    Weight from Last 3 Encounters:   09/19/17 170 lb 8 oz (77.3 kg) "   08/29/17 171 lb (77.6 kg)   08/21/17 171 lb (77.6 kg)              We Performed the Following     UROLOGY ADULT REFERRAL        Primary Care Provider Office Phone # Fax #    Jose Luis Slava Nielson [phone] [address] [facility] [address]        Equal Access to Services     [facility]: Hadii aad ku hadasho Soomaali, waaxda luqadaha, qaybta kaalmada adeegyada, waxay idiin hayaan adeeg kharash laSanchezaan . So [facility] [phone].    ATENCIÓN: Si habla español, tiene a becerra disposición servicios gratuitos de asistencia lingüística. Solitarioame al [phone].    We comply with applicable federal civil rights laws and [place] laws. We do not discriminate on the basis of race, color, national origin, age, disability sex, sexual orientation or gender identity.            Thank you!     Thank you for choosing [facility]  for your care. Our goal is always to provide you with excellent care. Hearing back from our patients is one way we can continue to improve our services. Please take a few minutes to complete the written survey that you may receive in the mail after your visit with us. Thank you!             Your Updated Medication List - Protect others around you: Learn how to safely use, store and throw away your medicines at www.disposemymeds.org.          This list is accurate as of: 9/19/17  4:20 PM.  Always use your most recent med list.                   Brand Name Dispense Instructions for use Diagnosis    DAILY HERBS PROSTATE PO      Take 1 tablet by mouth daily        Fish Oil 500 MG Caps      Take 1 capsule by mouth daily        magnesium 250 MG tablet      Take 1 tablet by mouth nightly as needed (sleep)        MULTIVITAMIN TABS   OR      1 TABLET EVETRY DAY

## 2017-09-19 NOTE — NURSING NOTE
"Chief Complaint   Patient presents with     Pre-Op Exam       Initial /76  Pulse 86  Temp 97.2  F (36.2  C) (Oral)  Ht 5' 11\" (1.803 m)  Wt 170 lb 8 oz (77.3 kg)  SpO2 96%  BMI 23.78 kg/m2 Estimated body mass index is 23.78 kg/(m^2) as calculated from the following:    Height as of this encounter: 5' 11\" (1.803 m).    Weight as of this encounter: 170 lb 8 oz (77.3 kg).  Medication Reconciliation: complete      Health Maintenance that is potentially due pending provider review:  NONE    n/a    LAYTON Hidalgo  "

## 2017-09-19 NOTE — PROGRESS NOTES
[facility]  [address] [place]  [facility] [address] [phone]  Dept: [phone]    PRE-OP EVALUATION:  Today's date: 2017    Govind Way (: 1955) presents for pre-operative evaluation assessment as requested by Dr. Manolo Cardenas.  He requires evaluation and anesthesia risk assessment prior to undergoing surgery/procedure for treatment of Holmium Laser Enculeation of the Prostate.  Proposed procedure: LASER HOLMIUM ENUCLEATION PROSTATE.    Date of Surgery/ Procedure: 10/19/2017  Time of Surgery/ Procedure: 7:15 AM  Hospital/Surgical Facility:  OR  Primary Physician: Jose Luis Nielson  Type of Anesthesia Anticipated: to be determined    Patient has a Health Care Directive or Living Will:  YES     1. NO - Do you have a history of heart attack, stroke, stent, bypass or surgery on an artery in the head, neck, heart or legs?  2. NO - Do you ever have any pain or discomfort in your chest?  3. NO - Do you have a history of  Heart Failure?  4. NO - Are you troubled by shortness of breath when: walking on the level, up a slight hill or at night?  5. NO - Do you currently have a cold, bronchitis or other respiratory infection?  6. NO - Do you have a cough, shortness of breath or wheezing?  7. NO - Do you sometimes get pains in the calves of your legs when you walk?  8. YES - Do you or anyone in your family have previous history of blood clots?  Daughter, not a heritable kind  9. NO - Do you or does anyone in your family have a serious bleeding problem such as prolonged bleeding following surgeries or cuts?  10. NO - Have you ever had problems with anemia or been told to take iron pills?  11. NO - Have you had any abnormal blood loss such as black, tarry or bloody stools, or abnormal vaginal bleeding?  12. NO - Have you ever had a blood transfusion?  13. NO - Have you or any of your relatives ever had problems with anesthesia?  14. NO - Do you have sleep apnea,  excessive snoring or daytime drowsiness?  15. NO - Do you have any prosthetic heart valves?  16. NO - Do you have prosthetic joints?  17. NO - Is there any chance that you may be pregnant?        HPI:                                                      Brief HPI related to upcoming procedure: Long Hx of LUTS symptoms, worsening  Discussed options with his urologist  He is thinking of getting a second opinion as well      See problem list for active medical problems.  Problems all longstanding and stable, except as noted/documented.  See ROS for pertinent symptoms related to these conditions.                                                                                                  .    MEDICAL HISTORY:                                                    Patient Active Problem List    Diagnosis Date Noted     Major depressive disorder, recurrent episode, mild (H) 09/18/2017     Priority: Medium     Alcohol use disorder, mild, in controlled environment 09/18/2017     Priority: Medium     SBO (small bowel obstruction) (H) 07/27/2017     Priority: Medium     Anxiety 02/16/2017     Priority: Medium     Benign non-nodular prostatic hyperplasia with lower urinary tract symptoms 11/29/2016     Priority: Medium     Attention-deficit hyperactivity disorder, other type 07/21/2016     Priority: Medium     Advance care planning 04/26/2016     Priority: Medium     Advance Care Planning 4/26/2016: Receipt of ACP document:  Received: Health Care Directive which was witnessed or notarized on 10/21/2015.  Document not previously scanned.  Validation form completed and sent with document to be scanned.  Code Status reflects choices in most recent ACP document.  Confirmed/documented designated decision maker(s).  Added by Barbra Sivla RN  Advance Care Planning 4/4/2016: Receipt of ACP document:  Received: Health Care Directive which was witnessed or notarized on 10/21/2015.  Document not previously scanned. Validation form  completed indicating invalid document. Copy sent to client with information and facilitation resources. Validation form sent to be scanned as notation of invalid document received. .  Confirmed/documented designated decision maker(s).  Added by Barbra Silva RN                               Tendinopathy of right gluteus medius 03/31/2016     Priority: Medium     Hallux rigidus, right foot 02/22/2016     Priority: Medium     Testis disorder 08/03/2015     Priority: Medium     Screening for prostate cancer 08/03/2015     Priority: Medium     Other hydrocele 08/03/2015     Priority: Medium     Hernia 02/23/2015     Priority: Medium     Donor of kidney for transplant 07/31/2014     Priority: Medium     Irritable bowel syndrome 10/10/2013     Priority: Medium     History of hyperthyroidism 06/17/2013     Priority: Medium     Right inguinal hernia 06/17/2013     Priority: Medium     CARDIOVASCULAR SCREENING; LDL GOAL LESS THAN 160 05/14/2013     Priority: Medium     Hypertension goal BP (blood pressure) < 140/90 06/14/2012     Priority: Medium     Abdominal hernia 01/25/2012     Priority: Medium     Surgically fixed  Problem list name updated by automated process. Provider to review       Adjustment disorder with mixed anxiety and depressed mood 04/17/2006     Priority: Medium      Past Medical History:   Diagnosis Date     Adjustment disorder      Dysthymic disorder      Enlarged prostate      Hepatitis      Irregular heart beat     intermittant palpitations     Palpitations     intermittent     PONV (postoperative nausea and vomiting)      Positive PPD      Pure hypercholesterolemia 1/00     Renal disease     single R kidney;donated left     Screening examination for pulmonary tuberculosis     positive mantoux     Unspecified essential hypertension 1/00     Past Surgical History:   Procedure Laterality Date     C ECG MONITOR/ 24 HRS, ORIG ECG, W VIS SUPERIMPOS SCAN, COMPLETE  1/00    um holter monitor     C REMV  KIDNEY/URETER,SAME INCIS  6/30/05    Nephrouretectomy/LEFT KIDNEY DONATED TO DAUGHTER/[facility]     HC REMOVAL OF TONSILS,<11 Y/O  age 7     HC VASECTOMY UNILAT/BILAT W POSTOP SEMEN  age 41     HERNIORRHAPHY INGUINAL  12/23/2011    Procedure:HERNIORRHAPHY INGUINAL; Surgeon:JULIA SIERRA; [facility]     LAPAROSCOPIC HERNIORRHAPHY INGUINAL Right 6/29/2015    Procedure: LAPAROSCOPIC HERNIORRHAPHY INGUINAL;  Surgeon: García Ibarra MD;  Location: [facility]     LAPAROSCOPIC HERNIORRHAPHY VENTRAL  12/23/2011    Procedure:LAPAROSCOPIC HERNIORRHAPHY VENTRAL; Laparoscopic Ventral Hernia Repair with Mesh, Open Left Inguinal Hernia Repair with Mesh; Surgeon:JULIA SIERRA; Location:[facility]     LASIK CUSTOMVUE BILATERAL  11/11/2011    Procedure:LASIK CUSTOMVUE BILATERAL; BILATERAL CUSTOMVUE LASIK WITH INTRALASE; Surgeon:POP SIMON; Location:[facility]     Current Outpatient Prescriptions   Medication Sig Dispense Refill     magnesium 250 MG tablet Take 1 tablet by mouth nightly as needed (sleep)       Misc Natural Products (DAILY HERBS PROSTATE PO) Take 1 tablet by mouth daily       Omega-3 Fatty Acids (FISH OIL) 500 MG CAPS Take 1 capsule by mouth daily        MULTIVITAMIN TABS   OR 1 TABLET EVETRY DAY  0     OTC products: None, except as noted above    Allergies   Allergen Reactions     Ambien [Zolpidem Tartrate]      irritability     Codeine Sulfate Itching      Latex Allergy: NO    Social History   Substance Use Topics     Smoking status: Former Smoker     Quit date: 1/1/1982     Smokeless tobacco: Never Used      Comment: NO 2ND HAND SMOKE     Alcohol use 0.0 oz/week     0 Standard drinks or equivalent per week      Comment: 5/wk     History   Drug Use No       REVIEW OF SYSTEMS:                                                    C: NEGATIVE for fever, chills, change in weight  I: NEGATIVE for worrisome rashes, moles or lesions  E: NEGATIVE for vision changes or irritation  E/M:  "NEGATIVE for ear, mouth and throat problems  R: NEGATIVE for significant cough or SOB  B: NEGATIVE for masses, tenderness or discharge  CV: NEGATIVE for chest pain, palpitations or peripheral edema  GI: NEGATIVE for nausea, abdominal pain, heartburn, or change in bowel habits  : NEGATIVE for frequency, dysuria, or hematuria  M: NEGATIVE for significant arthralgias or myalgia  N: NEGATIVE for weakness, dizziness or paresthesias  E: NEGATIVE for temperature intolerance, skin/hair changes  H: NEGATIVE for bleeding problems  P: NEGATIVE for changes in mood or affect    EXAM:                                                    Objective:  /76  Pulse 86  Temp 97.2  F (36.2  C) (Oral)  Ht 5' 11\" (1.803 m)  Wt 170 lb 8 oz (77.3 kg)  SpO2 96%  BMI 23.78 kg/m2  General Appearance: Pleasant, alert, in no acute respiratory distress.  Head Exam: Normal. Normocephalic, atraumatic. No sinus tenderness.  Eye Exam: Normal external eye, conjunctiva, lids. LYNDON.  Ear Exam: Normal auditory canals and external ears. Non-tender.  Left TM-normal. Right TM-normal.  OroPharynx Exam: Dental hygiene adequate. Normal buccal mucosa. Normal pharynx.  Neck Exam: Supple, no masses or enlarged, tender nodes.  Thyroid Exam: No nodules or enlargement or tenderness.  Chest/Respiratory Exam: Normal, comfortable, easy respirations. Chest wall normal. Lungs are clear to auscultation. No wheezing, crackles, or rhonchi.  Cardiovascular Exam: Regular rate and rhythm. No murmur, gallop, or rubs. No pedal edema.  Gastrointestinal Exam: Soft, non-tender, no masses or organomegaly.  Musculoskeletal Exam: Back is non-tender, full ROM of upper and lower extremities.  Skin: no rash, warm and dry.  Neurologic Exam: Nonfocal, no tremor. Normal gait.  Psychiatric Exam: Alert - appropriate, normal affect      DIAGNOSTICS:                                                    No labs or EKG required for low risk surgery    Recent Labs   Lab Test  07/28/17   " 0617  07/27/17   1940   HGB  14.3  15.2   PLT  190  209   NA  144  142   POTASSIUM  3.9  4.0   CR  0.91  1.01        IMPRESSION:                                                    Reason for surgery/procedure: LUTS  Diagnosis/reason for consult: Routine    The proposed surgical procedure is considered LOW risk.    REVISED CARDIAC RISK INDEX  The patient has the following serious cardiovascular risks for perioperative complications such as (MI, PE, VFib and 3  AV Block):  No serious cardiac risks  INTERPRETATION: 0 risks: Class I (very low risk - 0.4% complication rate)    The patient has the following additional risks for perioperative complications:  No identified additional risks      ICD-10-CM    1. Preop general physical exam Z01.818    2. Lower urinary tract symptoms (LUTS) R39.9 UROLOGY ADULT REFERRAL       RECOMMENDATIONS:                                                          APPROVAL GIVEN to proceed with proposed procedure, without further diagnostic evaluation       Signed Electronically by: Jose Luis Nielson MD    Copy of this evaluation report is provided to requesting physician.    [facility] Preop Guidelines

## 2017-09-28 ENCOUNTER — TELEPHONE (OUTPATIENT)
Dept: FAMILY MEDICINE | Facility: CLINIC | Age: 62
End: 2017-09-28

## 2017-09-28 DIAGNOSIS — F41.9 ANXIETY: Primary | ICD-10-CM

## 2017-09-28 DIAGNOSIS — F39 EPISODIC MOOD DISORDER (H): ICD-10-CM

## 2017-09-28 NOTE — TELEPHONE ENCOUNTER
Reason for call:  Patient reporting a symptom    Symptom or request: no sleep/urinary issues adhd    Duration (how long have symptoms been present): ongoing    Have you been treated for this before? Yes    Additional comments: pt seeking sleep rx and anxiety pt also having personal issues.    Phone Number patient can be reached at:  Home number on file [phone] (home)    Best Time:  any    Can we leave a detailed message on this number:  YES    Call taken on 9/28/2017 at 10:20 AM by Jacky Gomez

## 2017-09-28 NOTE — TELEPHONE ENCOUNTER
"JF,  Patient is having insomnia and anxiety issues.  This is causing anger issues at work - pt was told to get some help as he seems different  Will be seeing therapist Monday  Was on Buspar, Seroquel, and a couple other meds in the past.  Not on any anxiety meds within past year.  Denies suicidal thoughts - does admit though \"I could just cry\" and \"I'm shutting down\"  Doesn't want to look at or talk to anybody - works at [employer]  Stressed from work d/t remodel they are doing  Is scheduled for BPH surgery soon (10/19) - having insomnia d/t this urinary issues    Is off from work today and tomorrow - is busy, but could come in if you needed him to  No openings today, do you want to DB or trial medication?  Please advise.  Ashli CHRISTOPHER RN      "

## 2017-09-29 NOTE — TELEPHONE ENCOUNTER
"JF  Patient called back.  Routed a message to you 9/27. See message below.  States he is seeing his therapist on Tuesday 10/3  Stress/anxiety is getting worse.  He has Buspar at home that his therapist gave him in past and Seroquel he received from another provider names Dr. Lan.  Since he didn't hear from us he started to take them: Buspar 15 mg bid and Seroquel 25 mg take 1/2 to 2 tablets prn.    \"I need to sleep\"  Offered appointment with JS today.  Patient declined.  I don't feel like telling another provider my entire history.  Ok with waiting for you to address when you return next week.  Do you want to see him to discuss?  Please advise.  Thanks, Natalia Silva RN      "

## 2017-09-30 ENCOUNTER — NURSE TRIAGE (OUTPATIENT)
Dept: NURSING | Facility: CLINIC | Age: 62
End: 2017-09-30

## 2017-09-30 NOTE — TELEPHONE ENCOUNTER
Clinic Action Needed:Yes, follow up with Govind  Reason for Call: Govind returned call from clinic to discuss medication request for anxiety.  I gave him message from Dr. Nielson (see notes below) that seroquel at night is a good idea. He also is taking Buspar BID. He sees his therapist on Tuesday.  Today he planned to go for a long bike ride, states physical activity makes him feel better,  he had mentioned that he was hoping to get Valium or Ativan, something faster acting that would allow his to sleep better. He has a prostate issue that keeps him up every two hours at night, having Surgery on October 19th to correct that. He was advised to be seen in ER today if his Anxiety symptoms are not being controlled, to discuss further with a provider. Govind appears to understand directives and agrees with plan. Please return call to discuss request for Ativan or Valium, thank you.    Routed to: [facility]    Madhuri Ling, ANI  [facility]

## 2017-10-02 ENCOUNTER — TELEPHONE (OUTPATIENT)
Dept: PSYCHOLOGY | Facility: CLINIC | Age: 62
End: 2017-10-02

## 2017-10-02 PROBLEM — F10.20 ALCOHOL USE DISORDER, MODERATE, IN CONTROLLED ENVIRONMENT (H): Status: ACTIVE | Noted: 2017-09-18

## 2017-10-02 NOTE — PROGRESS NOTES
"                                             Progress Note    Client Name: Govind Way  Date: 9/18/17         Service Type: Individual      Session Start Time: 3:30 pm  Session End Time: 4:15 pm      Session Length: 45 mins     Session #: 3     Attendees: Client attended alone    Treatment Plan Last Reviewed: 9/18/17  PHQ-9 / WAGNER-7 : 5/8     DATA      Progress Since Last Session (Related to Symptoms / Goals / Homework):   Symptoms: Worsening    Homework: Completed in session - completion of goals      Episode of Care Goals: No improvement - PRECONTEMPLATION (Not seeing need for change); Intervened by educating the patient about the effects of current behavior on health.  Evoked information about reasons to continue behavior, express concern / recommendations, and explored any change talk     Current / Ongoing Stressors and Concerns:   Client expressed concerns around his upcoming surgery in October. He reports feeling overwhelmed with questions around procedure and side effects saying he's \"unable to enjoy life anymore.\" Client reports being aware that it will help with fixing his prostate, hopefully allow him to control urges to urinate, however surgery could result in dribbling for a few months along with trouble with erection--this is a big piece in his relationship and his identity. Client has appointment with urology on 9/26/17 where he hopes he could get some questions answered. Writer suggested for client to write out his thoughts/concerns along with questions prior to his urology appointment. Writer also suggested completed a 4-section pros/cons for surgery so he could conceptualize what decisions make more sense     Treatment Objective(s) Addressed in This Session:   Limit the amount of time used for thinking about upcoming surgery  Identify negative self-talk and behaviors: challenge core beliefs, myths, and actions  Improve concentration, focus, and mindfulness in daily activities "        Intervention:   Motivational Interviewing: Completing pros/cons about proceeding with surgery        ASSESSMENT: Current Emotional / Mental Status (status of significant symptoms):   Risk status (Self / Other harm or suicidal ideation)   Client denies current fears or concerns for personal safety.   Client denies current or recent suicidal ideation or behaviors.   Client denies current or recent homicidal ideation or behaviors.   Client denies current or recent self injurious behavior or ideation.   Client denies other safety concerns.   A safety and risk management plan has not been developed at this time, however client was given the after-hours number / 911 should there be a change in any of these risk factors.     Appearance:   Appropriate    Eye Contact:   Good    Psychomotor Behavior: Hyperactive    Attitude:   Cooperative    Orientation:   All   Speech    Rate / Production: Hyperverbal     Volume:  Normal    Mood:    Anxious  Depressed  Sad    Affect:    Subdued  Worrisome    Thought Content:  Perservative    Thought Form:  Coherent  Goal Directed  Logical    Insight:    Fair      Medication Review:   No current psychiatric medications prescribed     Medication Compliance:   NA     Changes in Health Issues:   Yes: fixing prostate, Associated Psychological Distress     Chemical Use Review:   Substance Use: Problem use continues with no change since last session, Patient declined discussion at this time        Tobacco Use: No current tobacco use.       Collateral Reports Completed:   Not Applicable    PLAN: (Client Tasks / Therapist Tasks / Other)  Client: Designate certain time of the week to think about side effects of surgery and also finish completing/adding to the pros/cons list of going through and not going through surgery.        MIKE Bolton [facility]                     September 18, 2017    Note reviewed and clinical supervision by MIKE Snowden [facility] 10/6/2017        ________________________________________________________________________    Treatment Plan    Client's Name: Govind Way  YOB: 1955    Date: 9/18/17    DSM-V Diagnoses: Attention-Deficit/Hyperactivity Disorder  314.01 (F90.2) Combined presentation, 296.31   (F33.0) Major Depressive Disorder, Recurrent Episode, Mild _ and With anxious distress  Substance-Related & Addictive Disorders Alcohol Use Disorder   303.90 (F10.20) Moderate In a controlled environment  Psychosocial / Contextual Factors: Client is working full-time and splitting his time between taking care of his aging parents and disabled daughter, managing his relationship with his partner and taking care of his medical needs. He reports experiencing stress around work with the number of changes in management, current renovations and different types of personalities. His first partner back in high school completed suicide--client was the last one to see him. His daughter has a disability and was in and out of the hospital for 2 years; he gave her one of his kidneys in order to save her life.   WHODAS: 22    Referral / Collaboration:  Referral to another professional/service is not indicated at this time.    Anticipated number of session or this episode of care: 12      MeasurableTreatment Goal(s) related to diagnosis / functional impairment(s)  Goal 1: Client will develop coping skills to effectively manage attention issues.    I will know I've met my goal when I am not overwhelmed with making time with everyone.      Objective #A (Client Action)    Client will identify and use 3 strategies to improve organization.  Status: New - Date: 9/18/17     Intervention(s)  Therapist will teach the client how to complete a 4-part pros and cons. In decision making.    Goal 2: Client will decrease my depressed mood.    I will know I've met my goal when I can feel happy and at ease with where I am at.      Objective #A (Client  Action)    Status: New - Date: 9/18/17     Client will Increase interest, engagement, and pleasure in doing things  Decrease frequency and intensity of feeling down, depressed, hopeless.    Intervention(s)  Therapist will teach the client how to perform a behavioral chain analysis. Identifying and understanding negative thoughts and changing route of those thoughts into positives.    Objective #B  Client will Feel less tired and more energy during the day   Improve diet, appetite, mindful eating, and / or meal planning.    Status: New - Date: 9/18/17     Intervention(s)  Therapist will assign homework Participate in mindful activity daily for at least 10 mins/day  teach distraction skills. stopping negative thoughts with mindful activity .    Objective #C  Client will improve communication with partner and family members.  Status: New - Date: 9/18/17     Intervention(s)  Therapist will role-play effective communication skills  teach about healthy boundaries. Setting rules and expectations for self and others around you.      Goal 3: Client will develop skills to manage my drinking habits.    I will know I've met my goal when I no longer feel guilty about my drinking.      Objective #A (Client Action)    Status: New - Date: 9/18/17     Client will increase understanding on the impacts of alcohol on mental and physical health.    Intervention(s)  Therapist will provide educational materials on alcohol on mental and physical health.    Objective #B  Client will develop problem-solving skills to limit number of drinks consumed..    Status: New - Date: 9/18/17     Intervention(s)  Therapist will role-play conflict management  teach rules for taking a timeout. Understanding the consequences for drinking and setting limits with self and others around drinking.      Client have not reviewed nor agreed to the above plan. Goals were set, however still need to review the objectives.      MIKE Bolton [facility]  September 18,  2017  Note reviewed and clinical supervision by MIKE Snowden [facility] 10/6/2017

## 2017-10-02 NOTE — TELEPHONE ENCOUNTER
Client stated on VM that he was having a bad week and wanted to talk. Tried getting in an earlier appointment with writer, however will see writer tomorrow, Tues 10/3/17. Writer returned call and left message.

## 2017-10-03 ENCOUNTER — OFFICE VISIT (OUTPATIENT)
Dept: PSYCHOLOGY | Facility: CLINIC | Age: 62
End: 2017-10-03
Payer: COMMERCIAL

## 2017-10-03 DIAGNOSIS — F10.20 ALCOHOL USE DISORDER, MODERATE, IN CONTROLLED ENVIRONMENT (H): ICD-10-CM

## 2017-10-03 DIAGNOSIS — F90.8 ATTENTION DEFICIT HYPERACTIVITY DISORDER (ADHD), OTHER TYPE: ICD-10-CM

## 2017-10-03 DIAGNOSIS — F33.0 MAJOR DEPRESSIVE DISORDER, RECURRENT EPISODE, MILD (H): Primary | ICD-10-CM

## 2017-10-03 DIAGNOSIS — N40.1 BENIGN NON-NODULAR PROSTATIC HYPERPLASIA WITH LOWER URINARY TRACT SYMPTOMS: Primary | ICD-10-CM

## 2017-10-03 PROCEDURE — 90834 PSYTX W PT 45 MINUTES: CPT | Performed by: SOCIAL WORKER

## 2017-10-03 RX ORDER — LORAZEPAM 1 MG/1
1 TABLET ORAL EVERY 8 HOURS PRN
Qty: 10 TABLET | Refills: 0 | Status: SHIPPED | OUTPATIENT
Start: 2017-10-03 | End: 2018-05-07

## 2017-10-03 RX ORDER — QUETIAPINE FUMARATE 50 MG/1
50 TABLET, FILM COATED ORAL
Qty: 30 TABLET | Refills: 1 | Status: SHIPPED | OUTPATIENT
Start: 2017-10-03 | End: 2018-06-08

## 2017-10-03 ASSESSMENT — ANXIETY QUESTIONNAIRES
1. FEELING NERVOUS, ANXIOUS, OR ON EDGE: MORE THAN HALF THE DAYS
2. NOT BEING ABLE TO STOP OR CONTROL WORRYING: NEARLY EVERY DAY
5. BEING SO RESTLESS THAT IT IS HARD TO SIT STILL: MORE THAN HALF THE DAYS
6. BECOMING EASILY ANNOYED OR IRRITABLE: NEARLY EVERY DAY
3. WORRYING TOO MUCH ABOUT DIFFERENT THINGS: NEARLY EVERY DAY
GAD7 TOTAL SCORE: 15
7. FEELING AFRAID AS IF SOMETHING AWFUL MIGHT HAPPEN: SEVERAL DAYS

## 2017-10-03 ASSESSMENT — PATIENT HEALTH QUESTIONNAIRE - PHQ9
SUM OF ALL RESPONSES TO PHQ QUESTIONS 1-9: 11
5. POOR APPETITE OR OVEREATING: SEVERAL DAYS

## 2017-10-03 NOTE — MR AVS SNAPSHOT
MRN:[id number]                      After Visit Summary   10/3/2017    Govind Way    MRN: [id number]           Visit Information        Provider Department      10/3/2017 3:30 PM Xander Salas LGSW [facility]      Your next 10 appointments already scheduled     Oct 19, 2017   Procedure with Manolo Cardenas MD   [facility] ([facility])    [address] [place] [address]  5th [facility] [id number] [phone]           Located in the Clinics and Surgery Center at [address] [place] [address].   parking is very convenient and highly recommended.  is a $6 flat rate fee.  Both  and self parkers should enter the main arrival plaza from [facility]; parking attendants will direct you based on your parking preference.            Oct 24, 2017  3:30 PM CDT   Return Visit with PAULINA Bolton   [facility] ([facility])    [facility]  [address]  [facility] [id number] [phone]            Nov 21, 2017  3:00 PM CST   (Arrive by 2:45 PM)   Post-Op with Manolo Cardenas MD   [facility] ([facility] [place])    [address] [place] [address]  4th [facility] [id number] [phone]              MyChart Information     [employer] gives you secure access to your electronic health record. If you see a primary care provider, you can also send messages to your care team and make appointments. If you have questions, please call your primary care clinic.  If you do not have a primary care provider, please call [phone] and they will assist you.        Care EveryWhere ID     This is your Care EveryWhere ID. This could be used by other organizations to access your [facility] medical records  [id number]        Equal Access to Services     SHIVA INFANTE: David  reyes Smith, jan peirce, lavern sales, terri avila. So [facility] [phone].    ATENCIÓN: Si habla español, tiene a becerra disposición servicios gratuitos de asistencia lingüística. Llame al [phone].    We comply with applicable federal civil rights laws and [place] laws. We do not discriminate on the basis of race, color, national origin, age, disability, sex, sexual orientation, or gender identity.

## 2017-10-03 NOTE — Clinical Note
Beto Nielson,  I am an Outpatient therapist working with client Govind Way. He was seen in my office yesterday for ongoing therapy and exhibit increase symptoms of depression and anxiety. He reports being unable to control his ADHD and is considering medication management. He recently started taking Buspar which was previously prescribed for him, however wants to know if he'll need to be off this medication before his surgery on 10/19/17.   I have advised for client to reach out to you directly. If there are any questions, please feel free to let me know.   Xander Salas [facility] Outpatient Therapist-[place]

## 2017-10-03 NOTE — TELEPHONE ENCOUNTER
Sent rx for seroquel.  Try that first.  If that does not work, a small supply of ativan to be used sparingly.  Do not take it with alcohol, don't drive after taking it, don't take it before surgery without telling anesthesia

## 2017-10-03 NOTE — TELEPHONE ENCOUNTER
JF  Pt is requesting Ativan or Valium for short term use until his surgery on the 19th   Says that his anxiety and ADHD are exacerbated by waking up frequently at night and the stress of the upcoming surgery  Explained that you recommended seroquel for anxiety but would like to try benzos for short term  OR if you really don't want to send benzo then Buspar and/or Seroquel   Pt would like to be contacted at work number.  Please advise,  Lucille Barber RN      Triage: please call back to work number

## 2017-10-04 LAB
BACTERIA SPEC CULT: NORMAL
SPECIMEN SOURCE: NORMAL

## 2017-10-04 ASSESSMENT — ANXIETY QUESTIONNAIRES: GAD7 TOTAL SCORE: 15

## 2017-10-04 NOTE — PROGRESS NOTES
"                                             Progress Note    Client Name: Govind Way  Date: 10/3/17         Service Type: Individual      Session Start Time: 3:40 pm  Session End Time: 4:30 pm      Session Length: 50 mins     Session #: 4     Attendees: Client attended alone    Treatment Plan Last Reviewed: 9/18/17  PHQ-9 / WAGNER-7 : 11/15     DATA      Progress Since Last Session (Related to Symptoms / Goals / Homework):   Symptoms: Worsening    Homework: Did not complete       Episode of Care Goals: No improvement - PRECONTEMPLATION (Not seeing need for change); Intervened by educating the patient about the effects of current behavior on health.  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 Client reported this concerns to management and however says his concerns were not addressed. He reports people around him such as his mother, manager and supervisor noticing change in his behavior as he appeared to be more tired than usual. Client describes having more anxiety than usual around the surgery, feels like his ADHD is getting a bit out of control. He was able to connect with Dr. Cardenas regarding the surgery and was informed of another procedure however not recommended by doctor; this conversation  does not appear to have decrease his anxiety. Client became tearful at times and discussed struggle around others \"not knowing what to do with him,\" often expressing feelings of shame and frustration with inability to control his thoughts. After several attempts to reach his PCP for Ativan, client went into old medication and started taking Seroquel and Buspar. Writer is recommending medication management for client at this time as mental health symptoms have " increased.      Treatment Objective(s) Addressed in This Session:   Limit the amount of time used for thinking about upcoming surgery  Identify negative self-talk and behaviors: challenge core beliefs, myths, and actions  Improve concentration, focus, and mindfulness in daily activities        Intervention:   CBT: strategies focused around identifying negative talk and practicing thought-stopping strategies        ASSESSMENT: Current Emotional / Mental Status (status of significant symptoms):   Risk status (Self / Other harm or suicidal ideation)   Client denies current fears or concerns for personal safety.   Client denies current or recent suicidal ideation or behaviors.   Client denies current or recent homicidal ideation or behaviors.   Client denies current or recent self injurious behavior or ideation.   Client denies other safety concerns.   A safety and risk management plan has not been developed at this time, however client was given the after-hours number / 911 should there be a change in any of these risk factors.     Appearance:   Appropriate    Eye Contact:   Good    Psychomotor Behavior: Hyperactive    Attitude:   Cooperative    Orientation:   All   Speech    Rate / Production: Hyperverbal     Volume:  Normal    Mood:    Anxious  Depressed  Sad    Affect:    Subdued  Worrisome    Thought Content:  Perservative    Thought Form:  Coherent  Logical    Insight:    Fair      Medication Review:   No current psychiatric medications prescribed- Client started taking Buspar previously prescribed by psychiatrist b/c anxiety was high.     Medication Compliance:   Client reports connected to triage nurse regarding the start-up of seroquel and Buspar-client reports he is taking wellbutrin regularly.     Changes in Health Issues:   Yes: fixing prostate, Associated Psychological Distress     Chemical Use Review:   Substance Use: Problem use continues with no change since last session, Patient declined discussion at  this time        Tobacco Use: No current tobacco use.       Collateral Reports Completed:   Not Applicable    PLAN: (Client Tasks / Therapist Tasks / Other)  Client: Practice thought-stopping strategies to stop continuation of negative thoughts.     Therapist: Note routed to PCP with questions from client regarding medication management for mental health. Client has surgery 10/19/17; he states wanting to know if he should take himself off the Buspar one week before surgery. Writer advised for client to connect with PCP.      MIKE Bolton [facility]                     October 3, 2017  Note reviewed and clinical supervision by MIKE Snowden [facility] 10/6/2017   ________________________________________________________________________    Treatment Plan    Client's Name: Govind Way  YOB: 1955    Date: 9/18/17    DSM-V Diagnoses: Attention-Deficit/Hyperactivity Disorder  314.01 (F90.2) Combined presentation, 296.31   (F33.0) Major Depressive Disorder, Recurrent Episode, Mild _ and With anxious distress  Substance-Related & Addictive Disorders Alcohol Use Disorder   303.90 (F10.20) Moderate In a controlled environment  Psychosocial / Contextual Factors: Client is working full-time and splitting his time between taking care of his aging parents and disabled daughter, managing his relationship with his partner and taking care of his medical needs. He reports experiencing stress around work with the number of changes in management, current renovations and different types of personalities. His first partner back in high school completed suicide--client was the last one to see him. His daughter has a disability and was in and out of the hospital for 2 years; he gave her one of his kidneys in order to save her life.   WHODAS: 22    Referral / Collaboration:  Referral to another professional/service is not indicated at this time.    Anticipated number of session or this episode of care:  12      MeasurableTreatment Goal(s) related to diagnosis / functional impairment(s)  Goal 1: Client will develop coping skills to effectively manage attention issues.    I will know I've met my goal when I am not overwhelmed with making time with everyone.      Objective #A (Client Action)    Client will identify and use 3 strategies to improve organization.  Status: New - Date: 9/18/17     Intervention(s)  Therapist will teach the client how to complete a 4-part pros and cons. In decision making.    Goal 2: Client will decrease my depressed mood.    I will know I've met my goal when I can feel happy and at ease with where I am at.      Objective #A (Client Action)    Status: New - Date: 9/18/17     Client will Increase interest, engagement, and pleasure in doing things  Decrease frequency and intensity of feeling down, depressed, hopeless.    Intervention(s)  Therapist will teach the client how to perform a behavioral chain analysis. Identifying and understanding negative thoughts and changing route of those thoughts into positives.    Objective #B  Client will Feel less tired and more energy during the day   Improve diet, appetite, mindful eating, and / or meal planning.    Status: New - Date: 9/18/17     Intervention(s)  Therapist will assign homework Participate in mindful activity daily for at least 10 mins/day  teach distraction skills. stopping negative thoughts with mindful activity .    Objective #C  Client will improve communication with partner and family members.  Status: New - Date: 9/18/17     Intervention(s)  Therapist will role-play effective communication skills  teach about healthy boundaries. Setting rules and expectations for self and others around you.      Goal 3: Client will develop skills to manage my drinking habits.    I will know I've met my goal when I no longer feel guilty about my drinking.      Objective #A (Client Action)    Status: New - Date: 9/18/17     Client will increase  understanding on the impacts of alcohol on mental and physical health.    Intervention(s)  Therapist will provide educational materials on alcohol on mental and physical health.    Objective #B  Client will develop problem-solving skills to limit number of drinks consumed..    Status: New - Date: 9/18/17     Intervention(s)  Therapist will role-play conflict management  teach rules for taking a timeout. Understanding the consequences for drinking and setting limits with self and others around drinking.      Client have not reviewed nor agreed to the above plan. Goals were set, however still need to review the objectives.      MIKE Bolton [facility]  October 3, 2017  Note [facility] and clinical supervision [facility]

## 2017-10-05 NOTE — TELEPHONE ENCOUNTER
"Called below work #; person who answered states \"Keagan is not here.\"  Called pt's cell #; left VM with female who answered with information to call clinic back  Ashli CHRISTOPHER RN    "

## 2017-10-05 NOTE — TELEPHONE ENCOUNTER
Patient informed of below message from PCP  Faxed Rx for Ativan to [facility]  Ashli CHRISTOPHER RN

## 2017-10-09 DIAGNOSIS — R30.0 DYSURIA: Primary | ICD-10-CM

## 2017-10-11 DIAGNOSIS — R30.0 DYSURIA: ICD-10-CM

## 2017-10-11 DIAGNOSIS — N40.1 BENIGN PROSTATIC HYPERPLASIA WITH INCOMPLETE BLADDER EMPTYING: ICD-10-CM

## 2017-10-11 DIAGNOSIS — R39.14 BENIGN PROSTATIC HYPERPLASIA WITH INCOMPLETE BLADDER EMPTYING: ICD-10-CM

## 2017-10-11 LAB
AFP SERPL-MCNC: 2.8 UG/L (ref 0–8)
ANION GAP SERPL CALCULATED.3IONS-SCNC: 9 MMOL/L (ref 3–14)
BUN SERPL-MCNC: 22 MG/DL (ref 7–30)
CALCIUM SERPL-MCNC: 8.7 MG/DL (ref 8.5–10.1)
CHLORIDE SERPL-SCNC: 105 MMOL/L (ref 94–109)
CO2 SERPL-SCNC: 25 MMOL/L (ref 20–32)
CREAT SERPL-MCNC: 1.17 MG/DL (ref 0.66–1.25)
ERYTHROCYTE [DISTWIDTH] IN BLOOD BY AUTOMATED COUNT: 12.7 % (ref 10–15)
GFR SERPL CREATININE-BSD FRML MDRD: 64 ML/MIN/1.7M2
GLUCOSE SERPL-MCNC: 88 MG/DL (ref 70–99)
HCT VFR BLD AUTO: 40.7 % (ref 40–53)
HGB BLD-MCNC: 13.8 G/DL (ref 13.3–17.7)
LDH SERPL L TO P-CCNC: 201 U/L (ref 85–227)
MCH RBC QN AUTO: 31.4 PG (ref 26.5–33)
MCHC RBC AUTO-ENTMCNC: 33.9 G/DL (ref 31.5–36.5)
MCV RBC AUTO: 93 FL (ref 78–100)
PLATELET # BLD AUTO: 222 10E9/L (ref 150–450)
POTASSIUM SERPL-SCNC: 3.9 MMOL/L (ref 3.4–5.3)
RBC # BLD AUTO: 4.4 10E12/L (ref 4.4–5.9)
SODIUM SERPL-SCNC: 139 MMOL/L (ref 133–144)
WBC # BLD AUTO: 8.1 10E9/L (ref 4–11)

## 2017-10-12 LAB
BACTERIA SPEC CULT: NORMAL
BACTERIA SPEC CULT: NORMAL
HCG-TM SERPL-ACNC: <3 IU/L
Lab: NORMAL
SPECIMEN SOURCE: NORMAL

## 2017-10-18 ENCOUNTER — ANESTHESIA EVENT (OUTPATIENT)
Dept: SURGERY | Facility: AMBULATORY SURGERY CENTER | Age: 62
End: 2017-10-18

## 2017-10-19 ENCOUNTER — HOSPITAL ENCOUNTER (OUTPATIENT)
Facility: AMBULATORY SURGERY CENTER | Age: 62
End: 2017-10-19
Attending: UROLOGY

## 2017-10-19 ENCOUNTER — ANESTHESIA (OUTPATIENT)
Dept: SURGERY | Facility: AMBULATORY SURGERY CENTER | Age: 62
End: 2017-10-19

## 2017-10-19 ENCOUNTER — SURGERY (OUTPATIENT)
Age: 62
End: 2017-10-19

## 2017-10-19 VITALS
SYSTOLIC BLOOD PRESSURE: 137 MMHG | HEART RATE: 52 BPM | BODY MASS INDEX: 23.87 KG/M2 | WEIGHT: 170.5 LBS | OXYGEN SATURATION: 99 % | RESPIRATION RATE: 17 BRPM | TEMPERATURE: 96.5 F | DIASTOLIC BLOOD PRESSURE: 95 MMHG | HEIGHT: 71 IN

## 2017-10-19 DIAGNOSIS — R33.9 URINARY RETENTION: Primary | ICD-10-CM

## 2017-10-19 RX ORDER — DEXAMETHASONE SODIUM PHOSPHATE 4 MG/ML
INJECTION, SOLUTION INTRA-ARTICULAR; INTRALESIONAL; INTRAMUSCULAR; INTRAVENOUS; SOFT TISSUE PRN
Status: DISCONTINUED | OUTPATIENT
Start: 2017-10-19 | End: 2017-10-19

## 2017-10-19 RX ORDER — FENTANYL CITRATE 50 UG/ML
25-50 INJECTION, SOLUTION INTRAMUSCULAR; INTRAVENOUS
Status: DISCONTINUED | OUTPATIENT
Start: 2017-10-19 | End: 2017-10-19 | Stop reason: HOSPADM

## 2017-10-19 RX ORDER — SODIUM CHLORIDE, SODIUM LACTATE, POTASSIUM CHLORIDE, CALCIUM CHLORIDE 600; 310; 30; 20 MG/100ML; MG/100ML; MG/100ML; MG/100ML
INJECTION, SOLUTION INTRAVENOUS CONTINUOUS
Status: DISCONTINUED | OUTPATIENT
Start: 2017-10-19 | End: 2017-10-20 | Stop reason: HOSPADM

## 2017-10-19 RX ORDER — LIDOCAINE HYDROCHLORIDE 20 MG/ML
INJECTION, SOLUTION INFILTRATION; PERINEURAL PRN
Status: DISCONTINUED | OUTPATIENT
Start: 2017-10-19 | End: 2017-10-19

## 2017-10-19 RX ORDER — GABAPENTIN 300 MG/1
300 CAPSULE ORAL ONCE
Status: COMPLETED | OUTPATIENT
Start: 2017-10-19 | End: 2017-10-19

## 2017-10-19 RX ORDER — ONDANSETRON 2 MG/ML
4 INJECTION INTRAMUSCULAR; INTRAVENOUS EVERY 30 MIN PRN
Status: DISCONTINUED | OUTPATIENT
Start: 2017-10-19 | End: 2017-10-20 | Stop reason: HOSPADM

## 2017-10-19 RX ORDER — ACETAMINOPHEN 325 MG/1
975 TABLET ORAL ONCE
Status: COMPLETED | OUTPATIENT
Start: 2017-10-19 | End: 2017-10-19

## 2017-10-19 RX ORDER — LIDOCAINE 40 MG/G
CREAM TOPICAL
Status: DISCONTINUED | OUTPATIENT
Start: 2017-10-19 | End: 2017-10-19 | Stop reason: HOSPADM

## 2017-10-19 RX ORDER — ACETAMINOPHEN 325 MG/1
650 TABLET ORAL ONCE
Status: DISCONTINUED | OUTPATIENT
Start: 2017-10-19 | End: 2017-10-20 | Stop reason: HOSPADM

## 2017-10-19 RX ORDER — PROPOFOL 10 MG/ML
INJECTION, EMULSION INTRAVENOUS CONTINUOUS PRN
Status: DISCONTINUED | OUTPATIENT
Start: 2017-10-19 | End: 2017-10-19

## 2017-10-19 RX ORDER — IBUPROFEN 600 MG/1
600 TABLET, FILM COATED ORAL EVERY 6 HOURS PRN
Qty: 30 TABLET | Refills: 0 | Status: SHIPPED | OUTPATIENT
Start: 2017-10-19 | End: 2018-12-19

## 2017-10-19 RX ORDER — ONDANSETRON 2 MG/ML
INJECTION INTRAMUSCULAR; INTRAVENOUS PRN
Status: DISCONTINUED | OUTPATIENT
Start: 2017-10-19 | End: 2017-10-19

## 2017-10-19 RX ORDER — PROPOFOL 10 MG/ML
INJECTION, EMULSION INTRAVENOUS PRN
Status: DISCONTINUED | OUTPATIENT
Start: 2017-10-19 | End: 2017-10-19

## 2017-10-19 RX ORDER — FENTANYL CITRATE 50 UG/ML
INJECTION, SOLUTION INTRAMUSCULAR; INTRAVENOUS PRN
Status: DISCONTINUED | OUTPATIENT
Start: 2017-10-19 | End: 2017-10-19

## 2017-10-19 RX ORDER — NALOXONE HYDROCHLORIDE 0.4 MG/ML
.1-.4 INJECTION, SOLUTION INTRAMUSCULAR; INTRAVENOUS; SUBCUTANEOUS
Status: DISCONTINUED | OUTPATIENT
Start: 2017-10-19 | End: 2017-10-20 | Stop reason: HOSPADM

## 2017-10-19 RX ORDER — MEPERIDINE HYDROCHLORIDE 25 MG/ML
12.5 INJECTION INTRAMUSCULAR; INTRAVENOUS; SUBCUTANEOUS
Status: DISCONTINUED | OUTPATIENT
Start: 2017-10-19 | End: 2017-10-20 | Stop reason: HOSPADM

## 2017-10-19 RX ORDER — SODIUM CHLORIDE, SODIUM LACTATE, POTASSIUM CHLORIDE, CALCIUM CHLORIDE 600; 310; 30; 20 MG/100ML; MG/100ML; MG/100ML; MG/100ML
INJECTION, SOLUTION INTRAVENOUS CONTINUOUS PRN
Status: DISCONTINUED | OUTPATIENT
Start: 2017-10-19 | End: 2017-10-19

## 2017-10-19 RX ORDER — TOLTERODINE TARTRATE 2 MG/1
2 TABLET, EXTENDED RELEASE ORAL 2 TIMES DAILY
Qty: 6 TABLET | Refills: 0 | Status: SHIPPED | OUTPATIENT
Start: 2017-10-19 | End: 2018-07-12

## 2017-10-19 RX ORDER — CEFAZOLIN SODIUM 1 G/3ML
1 INJECTION, POWDER, FOR SOLUTION INTRAMUSCULAR; INTRAVENOUS SEE ADMIN INSTRUCTIONS
Status: DISCONTINUED | OUTPATIENT
Start: 2017-10-19 | End: 2017-10-19 | Stop reason: HOSPADM

## 2017-10-19 RX ORDER — ACETAMINOPHEN 325 MG/1
650 TABLET ORAL EVERY 4 HOURS PRN
Qty: 100 TABLET | Refills: 0 | Status: SHIPPED | OUTPATIENT
Start: 2017-10-19 | End: 2018-12-19

## 2017-10-19 RX ORDER — SODIUM CHLORIDE, SODIUM LACTATE, POTASSIUM CHLORIDE, CALCIUM CHLORIDE 600; 310; 30; 20 MG/100ML; MG/100ML; MG/100ML; MG/100ML
INJECTION, SOLUTION INTRAVENOUS CONTINUOUS
Status: DISCONTINUED | OUTPATIENT
Start: 2017-10-19 | End: 2017-10-19 | Stop reason: HOSPADM

## 2017-10-19 RX ORDER — CIPROFLOXACIN 500 MG/1
500 TABLET, FILM COATED ORAL 2 TIMES DAILY
Qty: 14 TABLET | Refills: 0 | Status: SHIPPED | OUTPATIENT
Start: 2017-10-19 | End: 2017-11-06

## 2017-10-19 RX ORDER — IBUPROFEN 200 MG
600 TABLET ORAL ONCE
Status: DISCONTINUED | OUTPATIENT
Start: 2017-10-19 | End: 2017-10-20 | Stop reason: HOSPADM

## 2017-10-19 RX ORDER — ONDANSETRON 4 MG/1
4 TABLET, ORALLY DISINTEGRATING ORAL EVERY 30 MIN PRN
Status: DISCONTINUED | OUTPATIENT
Start: 2017-10-19 | End: 2017-10-20 | Stop reason: HOSPADM

## 2017-10-19 RX ORDER — CEFAZOLIN SODIUM 1 G/3ML
INJECTION, POWDER, FOR SOLUTION INTRAMUSCULAR; INTRAVENOUS PRN
Status: DISCONTINUED | OUTPATIENT
Start: 2017-10-19 | End: 2017-10-19

## 2017-10-19 RX ADMIN — PROPOFOL 70 MG: 10 INJECTION, EMULSION INTRAVENOUS at 07:35

## 2017-10-19 RX ADMIN — PROPOFOL 100 MCG/KG/MIN: 10 INJECTION, EMULSION INTRAVENOUS at 07:32

## 2017-10-19 RX ADMIN — ACETAMINOPHEN 975 MG: 325 TABLET ORAL at 06:40

## 2017-10-19 RX ADMIN — SODIUM CHLORIDE, SODIUM LACTATE, POTASSIUM CHLORIDE, CALCIUM CHLORIDE: 600; 310; 30; 20 INJECTION, SOLUTION INTRAVENOUS at 07:00

## 2017-10-19 RX ADMIN — PROPOFOL 200 MG: 10 INJECTION, EMULSION INTRAVENOUS at 07:32

## 2017-10-19 RX ADMIN — FENTANYL CITRATE 50 MCG: 50 INJECTION, SOLUTION INTRAMUSCULAR; INTRAVENOUS at 07:48

## 2017-10-19 RX ADMIN — ONDANSETRON 4 MG: 2 INJECTION INTRAMUSCULAR; INTRAVENOUS at 07:29

## 2017-10-19 RX ADMIN — DEXAMETHASONE SODIUM PHOSPHATE 4 MG: 4 INJECTION, SOLUTION INTRA-ARTICULAR; INTRALESIONAL; INTRAMUSCULAR; INTRAVENOUS; SOFT TISSUE at 07:29

## 2017-10-19 RX ADMIN — LIDOCAINE HYDROCHLORIDE 100 MG: 20 INJECTION, SOLUTION INFILTRATION; PERINEURAL at 07:31

## 2017-10-19 RX ADMIN — PROPOFOL 20 MG: 10 INJECTION, EMULSION INTRAVENOUS at 08:19

## 2017-10-19 RX ADMIN — GABAPENTIN 300 MG: 300 CAPSULE ORAL at 06:40

## 2017-10-19 RX ADMIN — CEFAZOLIN SODIUM 2 G: 1 INJECTION, POWDER, FOR SOLUTION INTRAMUSCULAR; INTRAVENOUS at 07:39

## 2017-10-19 RX ADMIN — SODIUM CHLORIDE, SODIUM LACTATE, POTASSIUM CHLORIDE, CALCIUM CHLORIDE: 600; 310; 30; 20 INJECTION, SOLUTION INTRAVENOUS at 06:46

## 2017-10-19 NOTE — IP AVS SNAPSHOT
MRN:[id number]                      After Visit Summary   10/19/2017    [address]    MRN: [id number]           Thank you!     Thank you for choosing [facility] for your care. Our goal is always to provide you with excellent care. Hearing back from our patients is one way we can continue to improve our services. Please take a few minutes to complete the written survey that you may receive in the mail after you visit with us. Thank you!        Patient Information     Date Of Birth          1955        About your hospital stay     You were admitted on:  October 19, 2017 [facility]    You were discharged on:  October 19, 2017       Who to Call     For medical emergencies, please call 911.  For non-urgent questions about your medical care, please call your primary care provider or clinic, [phone]  For questions related to your surgery, please call your surgery clinic        Attending Provider     Provider Specialty    Manolo Cardenas MD Urology       Primary Care Provider Office Phone # Fax #    Jose Luis Slava Nielson [phone]      After Care Instructions     Diet Instructions       Resume pre procedure diet            Discharge Instructions       Diet:  - Regular    Activity:   - No strenuous exercise for 4 weeks.   - No lifting, pushing, pulling more than 15 pounds for 4 weeks.   - Do not strain with bowel movements.   - Do not drive until you can press the brake pedal quickly and fully without pain.   - Do not operate a motor vehicle while taking narcotic pain medications.   - You are going home with a guerra catheter. Treat it as an extension of your body. Do not pull or tug on catheter.     Medications:   - Take both Tylenol (acetaminophen 625mg) and ibuprofen (600mg) as needed, alternating between these medications every 3 hours.   Do not take more than 4,000mg of Tylenol (acetaminophen/ APAP) from  all sources in any 24 hour period since this can cause liver damage.  Do not take more than 2400mg of ibuprofen in any 24 hour period since this can cause kidney damage. ]  - Detrol for bladder spasms   - Continue your antibiotic therapy for one week post-surgery  - Hold any anticoagulation medication for one week    Post-HoLEP Instructions:   - You may shower 24 hours after surgery   - Drink 2-3L of water a day to keep your urine clear to light pink in color. Some blood in your urine can be expected but should clear with reducing your activity and increasing your water consumption  - Call or return sooner than your regularly scheduled visit if you develop any of the following:  Fever (greater than 101.3F), uncontrolled pain, uncontrolled nausea or vomiting, thickening red urine, large clots or inability to urinate.      Follow-Up:   - Call your primary care provider to touch base regarding your recent procedure.     - Follow up with Dr. Carednas as scheduled for guerra catheter removal     Phone numbers:  - Monday through Friday 8am to 4:30pm: call [phone].    - Nights or weekends, call [phone] and ask the  to page the urology resident on call.   - For emergencies, always call [phone]            Encourage fluids       Encourage fluids at home to keep urine clear to light pink                  Your next 10 appointments already scheduled     Oct 20, 2017  9:30 AM CDT   (Arrive by 9:15 AM)   Return Visit with  Prostate Cancer Ctr Nurse   [facility] ([facility])    [address]  4th Floor  [facility] [address] [phone]            Oct 24, 2017  3:30 PM CDT   Return Visit with PAULINA Bolton   [facility] ([facility])    [facility]  [address]  [facility] [address]   [phone]            Nov 21, 2017  3:00 PM CST   (Arrive by 2:45 PM)   Post-Op with  Manolo Cardenas MD   [facility]rolog[facility] ([facility])    [address]  4th Floor  [facility] [address] [phone]              Further instructions from your care team       [facility]  Home Care Following Anesthesia  For 24 hours after surgery:  1. Get plenty of rest.  A responsible adult must stay with you for at least 24 hours after you leave the surgery center.  2. Do not drive or use heavy equipment.  If you have weakness or tingling, don't drive or use heavy equipment until this feeling goes away.   3. Do not drink alcohol.   4. Avoid strenuous or risky activities.  Ask for help when climbing stairs.  5. You may feel lightheaded.  IF so, sit for a few minutes before standing.  Have someone help you get up.   6. If you have nausea (feel sick to your stomach): Drink only clear liquids such as apple juice, ginger ale, broth or 7-Up.  Rest may also help.  Be sure to drink enough fluids.  Move to a regular diet as you feel able.   7. You may have a slight fever.  Call the doctor if your fever is over 100 F (37.7 C) (taken under the tongue) or lasts longer than 24 hours.  8. You may have a dry mouth, a sore throat, muscle aches or trouble sleeping. These should go away after 24 hours.  9. Do not make important or legal decisions.               Tips for taking pain medications  To get the best pain relief possible, remember these points:    Take pain medications as directed, before pain becomes severe.    Pain medication can upset your stomach: taking it with food may help.    Constipation is a common side effect of pain medication. Drink plenty of  fluids.    Eat foods high in fiber. Take a stool softener if recommended by your doctor or pharmacist.    Do not drink alcohol, drive or operate machinery while taking pain medications.    Ask about other ways to control pain, such as with heat, ice  or relaxation.    Tylenol/Acetaminophen Consumption  To help encourage the safe use of acetaminophen, the makers of TYLENOL  have lowered the maximum daily dose for single-ingredient Extra Strength TYLENOL  (acetaminophen) products sold in the [place] from 8 pills per day (4,000 mg) to 6 pills per day (3,000 mg). The dosing interval has also changed from 2 pills every 4-6 hours to 2 pills every 6 hours.    If you feel your pain relief is insufficient, you may take Tylenol/Acetaminophen in addition to your narcotic pain medication.     Be careful not to exceed 3,000 mg of Tylenol/Acetaminophen in a 24 hour period from all sources.    If you are taking extra strength Tylenol/acetaminophen (500 mg), the maximum dose is 6 tablets in 24 hours.    If you are taking regular strength acetaminophen (325 mg), the maximum dose is 9 tablets in 24 hours.    Call a doctor for any of the followin. Signs of infection (fever, growing tenderness at the surgery site, a large amount of drainage or bleeding, severe pain, foul-smelling drainage, redness, swelling).  2. It has been over 8 to 10 hours since surgery and you are still not able to urinate (pass water).  3. Headache for over 24 hours.  4. Numbness, tingling or weakness the day after surgery (if you had spinal anesthesia).  Your doctor is:  Dr. Manolo Cardenas, Prostate and Urology: [phone]                    Or dial [phone] and ask for the resident on call for:  Prostate Urology  For emergency care, call the:  [facility]:  [phone] (TTY for hearing impaired: [phone])                Pending Results     Date and Time Order Name Status Description    10/19/2017 0834 Surgical pathology exam In process             Admission Information     Date & Time Provider Department Dept. Phone    10/19/2017 Manolo Cardenas MD [facility] [phone]      Your Vitals Were     Blood Pressure Pulse Temperature Respirations  "Height Weight    136/93 59 96.8  F (36  C) (Temporal) 9 1.803 m (5' 10.98\") 77.3 kg (170 lb 8 oz)    Pulse Oximetry BMI (Body Mass Index)                96% 23.79 kg/m2          "[employer]hart Information     [employer] gives you secure access to your electronic health record. If you see a primary care provider, you can also send messages to your care team and make appointments. If you have questions, please call your primary care clinic.  If you do not have a primary care provider, please call [phone] and they will assist you.      [employer] is an electronic gateway that provides easy, online access to your medical records. With [employer], you can request a clinic appointment, read your test results, renew a prescription or communicate with your care team.     To access your existing account, please contact your [facility] or call [phone] for assistance.        Care EveryWhere ID     This is your Care EveryWhere ID. This could be used by other organizations to access your [facility] medical records  [id number]        Equal Access to Services     SHIVA VELA : Hadii reyes salazar Sokaya, wacarlosda lukittyadaha, qaybta kaalmadavid sales, terri avila. So [facility] [phone].    ATENCIÓN: Si habla español, tiene a becerra disposición servicios gratuitos de asistencia lingüística. Llame al [phone].    We comply with applicable federal civil rights laws and [place] laws. We do not discriminate on the basis of race, color, national origin, age, disability, sex, sexual orientation, or gender identity.               Review of your medicines      START taking        Dose / Directions    acetaminophen 325 MG tablet   Commonly known as:  TYLENOL   Used for:  Urinary retention        Dose:  650 mg   Take 2 tablets (650 mg) by mouth every 4 hours as needed for other (mild pain)   Quantity:  100 tablet   Refills:  0       ciprofloxacin 500 MG tablet   Commonly known as:  " CIPRO   Used for:  Urinary retention        Dose:  500 mg   Take 1 tablet (500 mg) by mouth 2 times daily   Quantity:  14 tablet   Refills:  0       ibuprofen 600 MG tablet   Commonly known as:  ADVIL/MOTRIN   Used for:  Urinary retention        Dose:  600 mg   Take 1 tablet (600 mg) by mouth every 6 hours as needed for other (For mild pain and temperature greater than 102F)   Quantity:  30 tablet   Refills:  0       tolterodine 2 MG tablet   Commonly known as:  DETROL   Used for:  Urinary retention        Dose:  2 mg   Take 1 tablet (2 mg) by mouth 2 times daily   Quantity:  6 tablet   Refills:  0         CONTINUE these medicines which have NOT CHANGED        Dose / Directions    DAILY HERBS PROSTATE PO   Indication:  [employer] prostate formula- saw palmetto blend.        Dose:  1 tablet   Take 1 tablet by mouth daily   Refills:  0       Fish Oil 500 MG Caps        Dose:  1 capsule   Take 1 capsule by mouth daily   Refills:  0       LORazepam 1 MG tablet   Commonly known as:  ATIVAN   Used for:  Anxiety, Episodic mood disorder (H)        Dose:  1 mg   Take 1 tablet (1 mg) by mouth every 8 hours as needed for anxiety   Quantity:  10 tablet   Refills:  0       magnesium 250 MG tablet        Dose:  1 tablet   Take 1 tablet by mouth nightly as needed (sleep)   Refills:  0       MULTIVITAMIN TABS   OR        1 TABLET EVETRY DAY   Refills:  0       QUEtiapine 50 MG tablet   Commonly known as:  SEROQUEL   Used for:  Episodic mood disorder (H), Anxiety        Dose:  50 mg   Take 1 tablet (50 mg) by mouth nightly as needed   Quantity:  30 tablet   Refills:  1            Where to get your medicines      These medications were sent to [address] [place] [address] [address] [place] [address]    Hours:  TRANSPLANT PHONE NUMBER [phone] Phone:  [phone]     acetaminophen 325 MG tablet    ciprofloxacin 500 MG tablet    ibuprofen 600 MG tablet     tolterodine 2 MG tablet               ANTIBIOTIC INSTRUCTION     You've Been Prescribed an Antibiotic - Now What?  Your healthcare team thinks that you or your loved one might have an infection. Some infections can be treated with antibiotics, which are powerful, life-saving drugs. Like all medications, antibiotics have side effects and should only be used when necessary. There are some important things you should know about your antibiotic treatment.      Your healthcare team may run tests before you start taking an antibiotic.    Your team may take samples (e.g., from your blood, urine or other areas) to run tests to look for bacteria. These test can be important to determine if you need an antibiotic at all and, if you do, which antibiotic will work best.      Within a few days, your healthcare team might change or even stop your antibiotic.    Your team may start you on an antibiotic while they are working to find out what is making you sick.    Your team might change your antibiotic because test results show that a different antibiotic would be better to treat your infection.    In some cases, once your team has more information, they learn that you do not need an antibiotic at all. They may find out that you don't have an infection, or that the antibiotic you're taking won't work against your infection. For example, an infection caused by a virus can't be treated with antibiotics. Staying on an antibiotic when you don't need it is more likely to be harmful than helpful.      You may experience side effects from your antibiotic.    Like all medications, antibiotics have side effects. Some of these can be serious.    Let you healthcare team know if you have any known allergies when you are admitted to the hospital.    One significant side effect of nearly all antibiotics is the risk of severe and sometimes deadly diarrhea caused by Clostridium difficile (C. Difficile). This occurs when a person takes antibiotics  because some good germs are destroyed. Antibiotic use allows C. diificile to take over, putting patients at high risk for this serious infection.    As a patient or caregiver, it is important to understand your or your loved one's antibiotic treatment. It is especially important for caregivers to speak up when patients can't speak for themselves. Here are some important questions to ask your healthcare team.    What infection is this antibiotic treating and how do you know I have that infection?    What side effects might occur from this antibiotic?    How long will I need to take this antibiotic?    Is it safe to take this antibiotic with other medications or supplements (e.g., vitamins) that I am taking?     Are there any special directions I need to know about taking this antibiotic? For example, should I take it with food?    How will I be monitored to know whether my infection is responding to the antibiotic?    What tests may help to make sure the right antibiotic is prescribed for me?      Information provided by:  www.cdc.gov/getsmart  U.S. Department of Health and Human Services  Centers for disease Control and Prevention  National Center for Emerging and Zoonotic Infectious Diseases  Division of Healthcare Quality Promotion         Protect others around you: Learn how to safely use, store and throw away your medicines at www.disposemymeds.org.             Medication List: This is a list of all your medications and when to take them. Check marks below indicate your daily home schedule. Keep this list as a reference.      Medications           Morning Afternoon Evening Bedtime As Needed    acetaminophen 325 MG tablet   Commonly known as:  TYLENOL   Take 2 tablets (650 mg) by mouth every 4 hours as needed for other (mild pain)   Last time this was given:  975 mg on 10/19/2017  6:40 AM                                ciprofloxacin 500 MG tablet   Commonly known as:  CIPRO   Take 1 tablet (500 mg) by mouth 2  times daily                                DAILY HERBS PROSTATE PO   Take 1 tablet by mouth daily                                Fish Oil 500 MG Caps   Take 1 capsule by mouth daily                                ibuprofen 600 MG tablet   Commonly known as:  ADVIL/MOTRIN   Take 1 tablet (600 mg) by mouth every 6 hours as needed for other (For mild pain and temperature greater than 102F)                                LORazepam 1 MG tablet   Commonly known as:  ATIVAN   Take 1 tablet (1 mg) by mouth every 8 hours as needed for anxiety                                magnesium 250 MG tablet   Take 1 tablet by mouth nightly as needed (sleep)                                MULTIVITAMIN TABS   OR   1 TABLET EVETRY DAY                                QUEtiapine 50 MG tablet   Commonly known as:  SEROQUEL   Take 1 tablet (50 mg) by mouth nightly as needed                                tolterodine 2 MG tablet   Commonly known as:  DETROL   Take 1 tablet (2 mg) by mouth 2 times daily

## 2017-10-19 NOTE — DISCHARGE INSTRUCTIONS
[facility]  Home Care Following Anesthesia  For 24 hours after surgery:  1. Get plenty of rest.  A responsible adult must stay with you for at least 24 hours after you leave the surgery center.  2. Do not drive or use heavy equipment.  If you have weakness or tingling, don't drive or use heavy equipment until this feeling goes away.   3. Do not drink alcohol.   4. Avoid strenuous or risky activities.  Ask for help when climbing stairs.  5. You may feel lightheaded.  IF so, sit for a few minutes before standing.  Have someone help you get up.   6. If you have nausea (feel sick to your stomach): Drink only clear liquids such as apple juice, ginger ale, broth or 7-Up.  Rest may also help.  Be sure to drink enough fluids.  Move to a regular diet as you feel able.   7. You may have a slight fever.  Call the doctor if your fever is over 100 F (37.7 C) (taken under the tongue) or lasts longer than 24 hours.  8. You may have a dry mouth, a sore throat, muscle aches or trouble sleeping. These should go away after 24 hours.  9. Do not make important or legal decisions.               Tips for taking pain medications  To get the best pain relief possible, remember these points:    Take pain medications as directed, before pain becomes severe.    Pain medication can upset your stomach: taking it with food may help.    Constipation is a common side effect of pain medication. Drink plenty of  fluids.    Eat foods high in fiber. Take a stool softener if recommended by your doctor or pharmacist.    Do not drink alcohol, drive or operate machinery while taking pain medications.    Ask about other ways to control pain, such as with heat, ice or relaxation.    Tylenol/Acetaminophen Consumption  To help encourage the safe use of acetaminophen, the makers of TYLENOL  have lowered the maximum daily dose for single-ingredient Extra Strength TYLENOL  (acetaminophen) products sold in the [place] from 8  pills per day (4,000 mg) to 6 pills per day (3,000 mg). The dosing interval has also changed from 2 pills every 4-6 hours to 2 pills every 6 hours.    If you feel your pain relief is insufficient, you may take Tylenol/Acetaminophen in addition to your narcotic pain medication.     Be careful not to exceed 3,000 mg of Tylenol/Acetaminophen in a 24 hour period from all sources.    If you are taking extra strength Tylenol/acetaminophen (500 mg), the maximum dose is 6 tablets in 24 hours.    If you are taking regular strength acetaminophen (325 mg), the maximum dose is 9 tablets in 24 hours.    Call a doctor for any of the followin. Signs of infection (fever, growing tenderness at the surgery site, a large amount of drainage or bleeding, severe pain, foul-smelling drainage, redness, swelling).  2. It has been over 8 to 10 hours since surgery and you are still not able to urinate (pass water).  3. Headache for over 24 hours.  4. Numbness, tingling or weakness the day after surgery (if you had spinal anesthesia).  Your doctor is:  Dr. Manolo Cardenas, Prostate and Urology: [phone]                    Or dial [phone] and ask for the resident on call for:  Prostate Urology  For emergency care, call the:  [facility]:  [phone] (TTY for hearing impaired: [phone])

## 2017-10-19 NOTE — IP AVS SNAPSHOT
[facility]    [address] [place] [address]    Phone:  [phone]    Fax:  [phone]                                       After Visit Summary   10/19/2017    [address]    MRN: [id number]           After Visit Summary Signature Page     I have received my discharge instructions, and my questions have been answered. I have discussed any challenges I see with this plan with the nurse or doctor.    ..........................................................................................................................................  Patient/Patient Representative Signature      ..........................................................................................................................................  Patient Representative Print Name and Relationship to Patient    ..................................................               ................................................  Date                                            Time    ..........................................................................................................................................  Reviewed by Signature/Title    ...................................................              ..............................................  Date                                                            Time

## 2017-10-19 NOTE — ANESTHESIA CARE TRANSFER NOTE
Patient: Govind Way    Procedure(s):  Holmium Laser Enculeation of the Prostate - Wound Class: II-Clean Contaminated    Diagnosis: Benign Prostatic Hyperplasia  Diagnosis Additional Information: No value filed.    Anesthesia Type:   General, ETT     Note:  Airway :Room Air  Patient transferred to:PACU  Comments: Patient awake and breathing spont. VSS. No complaints of pain or nausea. Report to RNHandoff Report: Identifed the Patient, Identified the Reponsible Provider, Reviewed the pertinent medical history, Discussed the surgical course, Reviewed Intra-OP anesthesia mangement and issues during anesthesia, Set expectations for post-procedure period and Allowed opportunity for questions and acknowledgement of understanding      Vitals: (Last set prior to Anesthesia Care Transfer)    CRNA VITALS  10/19/2017 0804 - 10/19/2017 0838      10/19/2017             Pulse: (!)  48    SpO2: 100 %    Resp Rate (observed): 9    EKG: Sinus bradycardia                Electronically Signed By: JEWELS Cartwright CRNA  October 19, 2017  8:38 AM

## 2017-10-19 NOTE — OP NOTE
Operative Report  10/19/2017    PREOPERATIVE DIAGNOSIS:  Prostatic hypertrophy with lower urinary tract symptoms  POSTOPERATIVE DIAGNOSIS: Same as above    PROCEDURE PERFORMED: Holmium laser enucleation of the prostate  ATTENDING SURGEON: Manolo Cardenas MD  RESIDENT SURGEON: Whit Helton MD    FINDINGS: Approximately 60 gm prostate with trilobar hypertrophy. Bladder with moderate trabeculation  ANESTHESIA: General.   INTRAVENOUS FLUIDS: See anesthesia records  ESTIMATED BLOOD LOSS: Less than 50 ml   SPECIMENS: Prostate adenoma  DRAINS: 22-Tamazight 3-way catheter with 30 ml in balloon     INDICATIONS FOR PROCEDURE: Govind Way is a(n) 62 year old male who was seen in consultation for BPH with obstructive voiding symptoms. He had failed optimal medical therapy. Prostate volume estimated to be 60 grams. His digital rectal exam was negative for any nodules. His most recent PSA is   PSA   Date Value Ref Range Status   11/29/2016 3.29 0 - 4 ug/L Final     Comment:     Assay Method:  Chemiluminescence using Siemens Vista analyzer   . After discussion of the risks, benefits and alternatives of the procedure, the patient agreed to proceed with the above stated procdure.      DESCRIPTION OF PROCEDURE: After obtaining informed consent, the patient was taken to the operating room and placed under general anesthesia.  He was repositioned in dorsal lithotomy making sure that the legs were positioned and padded safely.  He was then prepped and draped in standard sterile fashion.  Culture sensitive antibiotics were administered and bilateral sequential compression devices were placed.  A time out was performed confirming the appropriate patient identity and planned procedure.     The procedure was begun by generously lubricating the urethra.  We then sequentially dilated the urethra using the eva sounds from 22-28F.  The urethra was noted to be narrow distally and did require use of the lisa urethrotome in order to  place the 26F outer sheath.  The outer sheath was placed over a deflecting obturator and we then looked the scope through the posterior urethra and into the bladder.  The prostate was noted to have a subtle trilobar configuration with a steep median bar/lobe.  We inspected the bladder noting that it had moderate trabeculation.  At this point we inserted the 550 micron holmium laser through the 7F laser catheter.    We began enucleation by making a 5 o'clock incision.  We carried this incision down to the prostatic capsule from the bladder neck towards the apex just proximal to the veromontanum.  We then proceeded with a median lobe only approach as per the discussion with the patient preoperatiely due to conern about possible worsened urinary leakage of which he already experiences some.  We performed the majority of the dissection at 40 lora making sure to keep the energy at 40 lora or lower when working near the apex. After completing he 5 oclock groove we made a 7 oclock groove in a similar fashion on the opposite side of the median lobe.  In doing this we noted that there actually was a larger size median lobe than we had initially appreciated.  We then connected the groves at the apex and peeled the medain lobe off the capsule in a retrograde fashion flipping the prostatic adenoma into the bladder and transecting the bladder neck attachments.  We took care to stay well away from the ureteral orifices.  Enucleation time was roughly 20 minutes.      At this point there was a minimal amount of bleeding and approximately 1 minutes were spent identifying bleeding vessels in the fossa and coagulating them with the laser.  Once hemostasis was adequate we switched from the laser resectoscope to the 26F offset telescope with the Piranha morcellation device.  We added an extra inflow to distend the bladder.  The blades were adjusted and set at a rate of 1500 RPM.  We proceeded with morcellation making sure that we  morcellated the entirety of the adenoma.  Total morcellation time was 2 minutes.     We then switched back to the laser resectoscope one final time to ensure that no residual tissue was left in the bladder.  We also confirmed that the bladder was unharmed from morcellation and that both ureteral orifices were unharmed during the procedure.  We inspected the fossa and obtained final hemostasis.  We did  elect to perform a bladder neck incision at 6 o'clock as the prostate was not all that large and the median bar was prominent.  We used the laser to incise the bladder neck down to periprostatic fat and extended this incision distally towards the veromontanum     We looked the scope out noting that the sphincter was completely intact without evidence of thermal or mechanical injury.  The lateral lobes were not prominent and the fossa appeared open.     We lubricated the urethra again and passed a 22F 3-way guerra catheter over a catheter guide.  The urine was noted to be pink.  We filled the balloon with 30 ml of sterile water and did not place the catheter on traction.  The patient was woken from anesthesia and taken to the recovery room in stable condition.     The specimen was weighed and found to be approximately 5 g.     POSTOPERATIVE PLAN:   -We will monitor the patient post operatively on continuous bladder irrigation for signs of ongling bleeding.  If clear we will consider discharge with guerra removal as an outpatient.  If continues to have ongoing bleeding concerning for clot formation without bladder irrigation will plan to admit for observation    Manolo Cardenas

## 2017-10-19 NOTE — ANESTHESIA POSTPROCEDURE EVALUATION
Patient: Govind Way    Procedure(s):  Holmium Laser Enculeation of the Prostate - Wound Class: II-Clean Contaminated    Diagnosis:Benign Prostatic Hyperplasia  Diagnosis Additional Information: No value filed.    Anesthesia Type:  General, ETT    Note:  Anesthesia Post Evaluation    Patient location during evaluation: PACU  Patient participation: Able to fully participate in evaluation  Level of consciousness: awake and alert  Pain management: adequate  Airway patency: patent  Cardiovascular status: hemodynamically stable  Respiratory status: acceptable  Hydration status: stable  PONV: none     Anesthetic complications: None          Last vitals:  Vitals:    10/19/17 0616 10/19/17 0837   BP: (!) 143/93 128/87   Pulse: 59    Resp: 16 (!) 41   Temp: 36.5  C (97.7  F) 35.6  C (96  F)   SpO2: 95% 99%         Electronically Signed By: Greg Strickland MD  October 19, 2017  8:56 AM

## 2017-10-19 NOTE — ANESTHESIA PREPROCEDURE EVALUATION
Anesthesia Evaluation     .             ROS/MED HX    ENT/Pulmonary:  - neg pulmonary ROS     Neurologic:  - neg neurologic ROS     Cardiovascular:     (+) hypertension----. : . . . :. Irregular Heartbeat/Palpitations, .       METS/Exercise Tolerance:     Hematologic:  - neg hematologic  ROS       Musculoskeletal:  - neg musculoskeletal ROS       GI/Hepatic:     (+) hepatitis type A, liver disease,       Renal/Genitourinary:     (+) chronic renal disease, type: CRI, Pt does not require dialysis, Pt has no history of transplant,       Endo:  - neg endo ROS       Psychiatric:  - neg psychiatric ROS       Infectious Disease:  - neg infectious disease ROS       Malignancy:      - no malignancy   Other:    - neg other ROS                 Physical Exam  Normal systems: cardiovascular, pulmonary and dental    Airway   Mallampati: I  TM distance: >3 FB  Neck ROM: full    Dental     Cardiovascular   Rhythm and rate: regular and normal      Pulmonary    breath sounds clear to auscultation                    Anesthesia Plan      History & Physical Review  History and physical reviewed and following examination; no interval change.    ASA Status:  3 .    NPO Status:  > 8 hours    Plan for General and ETT with Intravenous and Propofol induction. Maintenance will be Balanced.    PONV prophylaxis:  Ondansetron (or other 5HT-3) and Dexamethasone or Solumedrol       Postoperative Care  Postoperative pain management:  IV analgesics.      Consents  Anesthetic plan, risks, benefits and alternatives discussed with:  Patient..                          .

## 2017-10-20 ENCOUNTER — ALLIED HEALTH/NURSE VISIT (OUTPATIENT)
Dept: UROLOGY | Facility: CLINIC | Age: 62
End: 2017-10-20

## 2017-10-20 ENCOUNTER — PRE VISIT (OUTPATIENT)
Dept: UROLOGY | Facility: CLINIC | Age: 62
End: 2017-10-20

## 2017-10-20 VITALS
BODY MASS INDEX: 24.42 KG/M2 | SYSTOLIC BLOOD PRESSURE: 153 MMHG | HEIGHT: 71 IN | HEART RATE: 68 BPM | DIASTOLIC BLOOD PRESSURE: 87 MMHG | WEIGHT: 174.4 LBS

## 2017-10-20 DIAGNOSIS — N40.0 BPH (BENIGN PROSTATIC HYPERPLASIA): Primary | ICD-10-CM

## 2017-10-20 LAB — COPATH REPORT: NORMAL

## 2017-10-20 ASSESSMENT — PAIN SCALES - GENERAL: PAINLEVEL: MILD PAIN (3)

## 2017-10-20 NOTE — PATIENT INSTRUCTIONS
Drink a lot of water starting now.  Call the clinic if you have not urinated by 3pm today.  If you develop retention at any time outside clinic hours, present to the ER.    It was a pleasure meeting with you today.  Thank you for allowing me and my team the privilege of caring for you today.  YOU are the reason we are here, and I truly hope we provided you with the excellent service you deserve.  Please let us know if there is anything else we can do for you so that we can be sure you are leaving completely satisfied with your care experience.       Robin Williamson LPN

## 2017-10-20 NOTE — NURSING NOTE
Govind Way comes into clinic today at the request of Dr. Cardenas Ordering Provider for TOV.    Govind Way presented today for a trial of void.   Approximately 250 mL of normal saline instilled into bladder via catheter.  Patient stated He had urge to urinate and catheter was removed with difficulty.    Was unable to remove catheter with assistance from Nahomy.  Resident Whit CHIN assisted and successfully removed.  Patient was given a urinal to measure urine output.  Patient voided approximately 300 mL of red urine with many clots and debris one day post-op.   Patient did tolerate procedure well.   Ciprofloxacin 500 mg given per protocol.  Teaching done with patient verbally as where to call or go if pain, fever, or unable to urinate post catheter removal.    Instructed to push fluids, and to call triage if he has not urinated by 3pm today.    Robin Williamson LPN  10/20/2017  11:05 AM        This service provided today was under the supervising provider of the day Dr. Thompson, who was available if needed.    Reason for visit: TOV    Robin Williamson

## 2017-10-20 NOTE — MR AVS SNAPSHOT
After Visit Summary   10/20/2017    Govind Way    MRN: [id number]           Patient Information     Date Of Birth          1955        Visit Information        Provider Department      10/20/2017 9:30 AM Nurse, [facility]        Today's Diagnoses     BPH (benign prostatic hyperplasia)    -  1      Care Instructions    Drink a lot of water starting now.  Call the clinic if you have not urinated by 3pm today.  If you develop retention at any time outside clinic hours, present to the ER.    It was a pleasure meeting with you today.  Thank you for allowing me and my team the privilege of caring for you today.  YOU are the reason we are here, and I truly hope we provided you with the excellent service you deserve.  Please let us know if there is anything else we can do for you so that we can be sure you are leaving completely satisfied with your care experience.       Robin Williamson LPN          Follow-ups after your visit        Your next 10 appointments already scheduled     Oct 24, 2017  3:30 PM CDT   Return Visit with PAULINA Bolton   [facility] ([facility]  [address] [place] [id number]   [phone]            Nov 21, 2017  3:00 PM CST   (Arrive by 2:45 PM)   Post-Op with Manolo Cardenas MD   [facility] ([facility])    [address]  4th Floor  [facility] [id number] [phone]              Who to contact     Please call your clinic at [phone] to:    Ask questions about your health    Make or cancel appointments    Discuss your medicines    Learn about your test results    Speak to your doctor   If you have compliments or concerns about an experience at your clinic, or if you wish to file a complaint, please contact [facility]  "[facility] Patient Relations at [phone] or email us at [email or web address]         Additional Information About Your Visit        GreenD[employer] Information     Noomeo gives you secure access to your electronic health record. If you see a primary care provider, you can also send messages to your care team and make appointments. If you have questions, please call your primary care clinic.  If you do not have a primary care provider, please call [phone] and they will assist you.      Noomeo is an electronic gateway that provides easy, online access to your medical records. With Noomeo, you can request a clinic appointment, read your test results, renew a prescription or communicate with your care team.     To access your existing account, please contact your [facility] or call [phone] for assistance.        Care EveryWhere ID     This is your Care EveryWhere ID. This could be used by other organizations to access your [place] medical records  [id number]        Your Vitals Were     Pulse Height BMI (Body Mass Index)             68 1.803 m (5' 11\") 24.32 kg/m2          Blood Pressure from Last 3 Encounters:   10/20/17 153/87   10/19/17 (!) 137/95   09/19/17 138/76    Weight from Last 3 Encounters:   10/20/17 79.1 kg (174 lb 6.4 oz)   10/19/17 77.3 kg (170 lb 8 oz)   09/19/17 77.3 kg (170 lb 8 oz)              Today, you had the following     No orders found for display       Primary Care Provider Office Phone # Fax #    Jose Luis Slava Nielson [phone] [address] [address]        Equal Access to Services     [facility] Center[facility]ILLIAM : Hadii reyes Smith, ashlyda lukittyadaha, qaandrea dyealterri tafoya . So [facility] [phone].    ATENCIÓN: Si habla español, tiene a becerra disposición servicios gratuitos de asistencia lingüística. Llame al [phone].    We comply with applicable " federal civil rights laws and [place] laws. We do not discriminate on the basis of race, color, national origin, age, disability, sex, sexual orientation, or gender identity.            Thank you!     Thank you for choosing [facility] AND [facility]  for your care. Our goal is always to provide you with excellent care. Hearing back from our patients is one way we can continue to improve our services. Please take a few minutes to complete the written survey that you may receive in the mail after your visit with us. Thank you!             Your Updated Medication List - Protect others around you: Learn how to safely use, store and throw away your medicines at www.disposemymeds.org.          This list is accurate as of: 10/20/17 11:10 AM.  Always use your most recent med list.                   Brand Name Dispense Instructions for use Diagnosis    acetaminophen 325 MG tablet    TYLENOL    100 tablet    Take 2 tablets (650 mg) by mouth every 4 hours as needed for other (mild pain)    Urinary retention       ciprofloxacin 500 MG tablet    CIPRO    14 tablet    Take 1 tablet (500 mg) by mouth 2 times daily    Urinary retention       DAILY HERBS PROSTATE PO      Take 1 tablet by mouth daily        Fish Oil 500 MG Caps      Take 1 capsule by mouth daily        ibuprofen 600 MG tablet    ADVIL/MOTRIN    30 tablet    Take 1 tablet (600 mg) by mouth every 6 hours as needed for other (For mild pain and temperature greater than 102F)    Urinary retention       LORazepam 1 MG tablet    ATIVAN    10 tablet    Take 1 tablet (1 mg) by mouth every 8 hours as needed for anxiety    Anxiety, Episodic mood disorder (H)       magnesium 250 MG tablet      Take 1 tablet by mouth nightly as needed (sleep)        MULTIVITAMIN TABS   OR      1 TABLET EVETRY DAY        QUEtiapine 50 MG tablet    SEROQUEL    30 tablet    Take 1 tablet (50 mg) by mouth nightly as needed    Episodic mood disorder (H), Anxiety        tolterodine 2 MG tablet    DETROL    6 tablet    Take 1 tablet (2 mg) by mouth 2 times daily    Urinary retention

## 2017-10-24 ENCOUNTER — OFFICE VISIT (OUTPATIENT)
Dept: PSYCHOLOGY | Facility: CLINIC | Age: 62
End: 2017-10-24
Payer: COMMERCIAL

## 2017-10-24 DIAGNOSIS — F90.8 ATTENTION DEFICIT HYPERACTIVITY DISORDER (ADHD), OTHER TYPE: ICD-10-CM

## 2017-10-24 DIAGNOSIS — F33.0 MAJOR DEPRESSIVE DISORDER, RECURRENT EPISODE, MILD (H): Primary | ICD-10-CM

## 2017-10-24 DIAGNOSIS — F10.20 ALCOHOL USE DISORDER, MODERATE, IN CONTROLLED ENVIRONMENT (H): ICD-10-CM

## 2017-10-24 PROCEDURE — 90834 PSYTX W PT 45 MINUTES: CPT | Performed by: SOCIAL WORKER

## 2017-10-24 ASSESSMENT — PATIENT HEALTH QUESTIONNAIRE - PHQ9
5. POOR APPETITE OR OVEREATING: SEVERAL DAYS
SUM OF ALL RESPONSES TO PHQ QUESTIONS 1-9: 14

## 2017-10-24 ASSESSMENT — ANXIETY QUESTIONNAIRES
7. FEELING AFRAID AS IF SOMETHING AWFUL MIGHT HAPPEN: SEVERAL DAYS
2. NOT BEING ABLE TO STOP OR CONTROL WORRYING: SEVERAL DAYS
1. FEELING NERVOUS, ANXIOUS, OR ON EDGE: MORE THAN HALF THE DAYS
GAD7 TOTAL SCORE: 9
6. BECOMING EASILY ANNOYED OR IRRITABLE: SEVERAL DAYS
5. BEING SO RESTLESS THAT IT IS HARD TO SIT STILL: SEVERAL DAYS
3. WORRYING TOO MUCH ABOUT DIFFERENT THINGS: MORE THAN HALF THE DAYS

## 2017-10-24 NOTE — PROGRESS NOTES
"                                             Progress Note    Client Name: Govind Way  Date: 10/24/17         Service Type: Individual      Session Start Time: 3:30 pm  Session End Time: 4:15 pm      Session Length: 45 mins     Session #: 5     Attendees: Client attended alone    Treatment Plan Last Reviewed: 9/18/17  PHQ-9 / WAGNER-7 : 14/9     DATA      Progress Since Last Session (Related to Symptoms / Goals / Homework):   Symptoms: Worsening    Homework: Did not complete       Episode of Care Goals: No improvement - PRECONTEMPLATION (Not seeing need for change); Intervened by educating the patient about the effects of current behavior on health.  Evoked information about reasons to continue behavior, express concern / recommendations, and explored any change talk     Current / Ongoing Stressors and Concerns:      Had some complications after surgery, still dealing with urination incontinence, taking medication that is making him feel drowsy. Urination is better, however frequency is still high, not being able to sleep which pushes anxiety up. Mood is high and low, Jarrod (boyfriend/partner) has been taking care of him, staying with Jarrod. FMLA is still pending, not completely resolved yet. Sometimes intrusive thoughts come in, more difficult to control those thoughts. Client feeling overwhelmed with recovering from surgery and also dealing with work, children and partner; states he wish things can slow down--feeling the thoughts are increasing where he can't control it. Writer and client discussed healthy boundaries for people in his life and client determining where that boundary should be. 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      Treatment Objective(s) Addressed in This Session:   Limit the amount of time used for thinking about upcoming surgery  Identify negative self-talk and behaviors: challenge core beliefs, " "myths, and actions  Improve concentration, focus, and mindfulness in daily activities        Intervention:   CBT: Setting healthy boundaries, scheduling \"worry time\"        ASSESSMENT: Current Emotional / Mental Status (status of significant symptoms):   Risk status (Self / Other harm or suicidal ideation)   Client denies current fears or concerns for personal safety.   Client denies current or recent suicidal ideation or behaviors.   Client denies current or recent homicidal ideation or behaviors.   Client denies current or recent self injurious behavior or ideation.   Client denies other safety concerns.   A safety and risk management plan has not been developed at this time, however client was given the after-hours number / 911 should there be a change in any of these risk factors.     Appearance:   Appropriate    Eye Contact:   Good    Psychomotor Behavior: Hyperactive    Attitude:   Cooperative    Orientation:   All   Speech    Rate / Production: Hyperverbal     Volume:  Normal    Mood:    Anxious  Depressed  Sad    Affect:    Subdued  Worrisome    Thought Content:  Perservative    Thought Form:  Coherent  Logical    Insight:    Fair      Medication Review:   No current psychiatric medications prescribed     Medication Compliance:   NA     Changes in Health Issues:   Yes: went thru surgery on prostate 10/19/17     Chemical Use Review:   Substance Use: decrease in alcohol .  Client reports frequency of use weekly.  Provided encouragement towards sobriety        Tobacco Use: No current tobacco use.       Collateral Reports Completed:   Not Applicable    PLAN: (Client Tasks / Therapist Tasks / Other)  Client: Get a planner and schedule worry time. Prioritize and determine how much time to give worry.       MIKE Bolton PHILL                     October 24, 2017  Note reviewed and clinical supervision by [facility] Snowden [facility] " 10/30/2017    ________________________________________________________________________    Treatment Plan    Client's Name: Govind Way  YOB: 1955    Date: 9/18/17    DSM-V Diagnoses: Attention-Deficit/Hyperactivity Disorder  314.01 (F90.2) Combined presentation, 296.31   (F33.0) Major Depressive Disorder, Recurrent Episode, Mild _ and With anxious distress  Substance-Related & Addictive Disorders Alcohol Use Disorder   303.90 (F10.20) Moderate  Psychosocial / Contextual Factors: Client is working full-time and splitting his time between taking care of his aging parents and disabled daughter, managing his relationship with his partner and taking care of his medical needs. He reports experiencing stress around work with the number of changes in management, current renovations and different types of personalities. His first partner back in high school completed suicide--client was the last one to see him. His daughter has a disability and was in and out of the hospital for 2 years; he gave her one of his kidneys in order to save her life.   WHODAS: 22    Referral / Collaboration:  Referral to another professional/service is not indicated at this time.    Anticipated number of session or this episode of care: 12      MeasurableTreatment Goal(s) related to diagnosis / functional impairment(s)  Goal 1: Client will develop coping skills to effectively manage attention issues.    I will know I've met my goal when I am not overwhelmed with making time with everyone.      Objective #A (Client Action)    Client will identify and use 3 strategies to improve organization.  Status: New - Date: 9/18/17     Intervention(s)  Therapist will teach the client how to complete a 4-part pros and cons. In decision making.    Goal 2: Client will decrease my depressed mood.    I will know I've met my goal when I can feel happy and at ease with where I am at.      Objective #A (Client Action)    Status: New - Date:  9/18/17     Client will Increase interest, engagement, and pleasure in doing things  Decrease frequency and intensity of feeling down, depressed, hopeless.    Intervention(s)  Therapist will teach the client how to perform a behavioral chain analysis. Identifying and understanding negative thoughts and changing route of those thoughts into positives.    Objective #B  Client will Feel less tired and more energy during the day   Improve diet, appetite, mindful eating, and / or meal planning.    Status: New - Date: 9/18/17     Intervention(s)  Therapist will assign homework Participate in mindful activity daily for at least 10 mins/day  teach distraction skills. stopping negative thoughts with mindful activity .    Objective #C  Client will improve communication with partner and family members.  Status: New - Date: 9/18/17     Intervention(s)  Therapist will role-play effective communication skills  teach about healthy boundaries. Setting rules and expectations for self and others around you.      Goal 3: Client will develop skills to manage my drinking habits.    I will know I've met my goal when I no longer feel guilty about my drinking.      Objective #A (Client Action)    Status: New - Date: 9/18/17     Client will increase understanding on the impacts of alcohol on mental and physical health.    Intervention(s)  Therapist will provide educational materials on alcohol on mental and physical health.    Objective #B  Client will develop problem-solving skills to limit number of drinks consumed..    Status: New - Date: 9/18/17     Intervention(s)  Therapist will role-play conflict management  teach rules for taking a timeout. Understanding the consequences for drinking and setting limits with self and others around drinking.      Client have not reviewed nor agreed to the above plan. Goals were set, however still need to review the objectives.      MIKE Bolton [facility]  October 3, 2017  Note reviewed and clinical  supervision by MIKE Snowden [facility] 10/30/2017

## 2017-10-24 NOTE — MR AVS SNAPSHOT
MRN:[id number]                      After Visit Summary   10/24/2017    Govind Way    MRN: [id number]           Visit Information        Provider Department      10/24/2017 3:30 PM Xander Salas LGSW [facility] Generic      Your next 10 appointments already scheduled     Nov 14, 2017  3:30 PM CST   Return Visit with Nhia PAULINA Salas   [facility] ([facility])    [facility]  [address]  [facility] [id number] [phone]            Nov 21, 2017  3:00 PM CST   (Arrive by 2:45 PM)   Post-Op with Manolo Cardenas MD   [facility] ([facility])    [address]  [address] [facility] [address] [phone]              MyChart Information     [employer] gives you secure access to your electronic health record. If you see a primary care provider, you can also send messages to your care team and make appointments. If you have questions, please call your primary care clinic.  If you do not have a primary care provider, please call [phone] and they will assist you.        Care EveryWhere ID     This is your Care EveryWhere ID. This could be used by other organizations to access your [place] medical records  [id number]        Equal Access to Services     SHIVA VELA : David salazar Sokaya, waaxda luqadaha, qaybta kaalmada adekateyyadavid, terri avila. So [facility] [phone].    ATENCIÓN: Si habla español, tiene a becerra disposición servicios gratuitos de asistencia lingüística. Llame al [phone].    We comply with applicable federal civil rights laws and [place] laws. We do not discriminate on the basis of race, color, national origin, age, disability, sex, sexual orientation, or gender identity.

## 2017-10-25 ASSESSMENT — ANXIETY QUESTIONNAIRES: GAD7 TOTAL SCORE: 9

## 2017-11-01 ENCOUNTER — PRE VISIT (OUTPATIENT)
Dept: UROLOGY | Facility: CLINIC | Age: 62
End: 2017-11-01

## 2017-11-02 ENCOUNTER — TELEPHONE (OUTPATIENT)
Dept: UROLOGY | Facility: CLINIC | Age: 62
End: 2017-11-02

## 2017-11-02 NOTE — TELEPHONE ENCOUNTER
Patient calling nurse triage stating he's having gross hematuria today.  Patient states he was doing a lot of vacuuming and lifting heavy objects the day prior.  Patient had a Holmium laser enucleation of the prostate on 10/19/17 with Dr. Cardenas.  Recommended patient push fluids and rest.  If tomorrow his urine isn't getting any lighter in color to call the nurse triage tomorrow.  Patient states understanding.    Analilia Nguyen RN

## 2017-11-04 ENCOUNTER — TELEPHONE (OUTPATIENT)
Dept: UROLOGY | Facility: CLINIC | Age: 62
End: 2017-11-04

## 2017-11-04 ENCOUNTER — TELEPHONE (OUTPATIENT)
Dept: OTHER | Facility: CLINIC | Age: 62
End: 2017-11-04

## 2017-11-04 NOTE — TELEPHONE ENCOUNTER
Telephone Encounter  Author: Flavio Haile MD    Date: 10:53 AM; 11/4/2017  Patient Name: Govind Way  MRN: [id number]    Paged by  that Mr. Govind Way called requesting to speak with urology on call.  I contacted the patient.  Briefly, he underwent a HoLEP with Dr. Cardenas on 10/19/2017.  Most recently, he has had intermittent hematuria with occasional clots.  Feels as though he is able to empty the bladder.  No straining.  Denies CP, SOB, or feeling lightheaded.  When he pushes fluids, his urine clears.  I discussed with the patient that while a phone conversation does not replace a physical exam, his signs and symptoms do not seem emergent.  Offered evaluation in the ED but the patient wishes to manage at home.  Questions solicited & answered.  Strict return precautions given, including but not limited to: fevers > 101.4, chills, an inability to keep food or fluids down, pain not controlled with oral medications, increasing hematuria, or an inability to urinate.    --    Hernandez Haile MD   Urology Resident  pgr: [phone]    10:53 AM; 11/4/2017

## 2017-11-05 NOTE — TELEPHONE ENCOUNTER
Patient contacted this provider earlier today with hematuria and occasional clots following recent HoLEP.  Contacted again this afternoon around 4 pm with urine not clearing following aggressive hydration.  Continues to have clots per urethra.  Advised patient to proceed to ED for evaluation.  Notified ED staff.    --    Hernandez Haile MD  Urology Resident  pgr: [phone]    10:16 PM, 11/4/2017

## 2017-11-06 ENCOUNTER — HOSPITAL ENCOUNTER (EMERGENCY)
Facility: CLINIC | Age: 62
Discharge: HOME | End: 2017-11-06
Attending: EMERGENCY MEDICINE | Admitting: EMERGENCY MEDICINE
Payer: COMMERCIAL

## 2017-11-06 ENCOUNTER — PRE VISIT (OUTPATIENT)
Dept: UROLOGY | Facility: CLINIC | Age: 62
End: 2017-11-06

## 2017-11-06 VITALS
SYSTOLIC BLOOD PRESSURE: 150 MMHG | BODY MASS INDEX: 24.17 KG/M2 | OXYGEN SATURATION: 98 % | WEIGHT: 173.28 LBS | HEART RATE: 76 BPM | RESPIRATION RATE: 16 BRPM | DIASTOLIC BLOOD PRESSURE: 90 MMHG | TEMPERATURE: 97.6 F

## 2017-11-06 DIAGNOSIS — R31.9 HEMATURIA, UNSPECIFIED TYPE: ICD-10-CM

## 2017-11-06 LAB
ALBUMIN SERPL-MCNC: 3.8 G/DL (ref 3.4–5)
ALBUMIN UR-MCNC: 100 MG/DL
ALP SERPL-CCNC: 71 U/L (ref 40–150)
ALT SERPL W P-5'-P-CCNC: 24 U/L (ref 0–70)
ANION GAP SERPL CALCULATED.3IONS-SCNC: 8 MMOL/L (ref 3–14)
APPEARANCE UR: ABNORMAL
AST SERPL W P-5'-P-CCNC: 20 U/L (ref 0–45)
BACTERIA #/AREA URNS HPF: ABNORMAL /HPF
BASOPHILS # BLD AUTO: 0 10E9/L (ref 0–0.2)
BASOPHILS NFR BLD AUTO: 0.1 %
BILIRUB SERPL-MCNC: 0.6 MG/DL (ref 0.2–1.3)
BILIRUB UR QL STRIP: NEGATIVE
BUN SERPL-MCNC: 14 MG/DL (ref 7–30)
CALCIUM SERPL-MCNC: 9.3 MG/DL (ref 8.5–10.1)
CHLORIDE SERPL-SCNC: 105 MMOL/L (ref 94–109)
CO2 SERPL-SCNC: 26 MMOL/L (ref 20–32)
COLOR UR AUTO: ABNORMAL
CREAT SERPL-MCNC: 0.88 MG/DL (ref 0.66–1.25)
DIFFERENTIAL METHOD BLD: NORMAL
EOSINOPHIL # BLD AUTO: 0.1 10E9/L (ref 0–0.7)
EOSINOPHIL NFR BLD AUTO: 1.3 %
ERYTHROCYTE [DISTWIDTH] IN BLOOD BY AUTOMATED COUNT: 12.9 % (ref 10–15)
GFR SERPL CREATININE-BSD FRML MDRD: 88 ML/MIN/1.7M2
GLUCOSE SERPL-MCNC: 83 MG/DL (ref 70–99)
GLUCOSE UR STRIP-MCNC: NEGATIVE MG/DL
HCT VFR BLD AUTO: 42.5 % (ref 40–53)
HGB BLD-MCNC: 14.4 G/DL (ref 13.3–17.7)
HGB UR QL STRIP: ABNORMAL
IMM GRANULOCYTES # BLD: 0 10E9/L (ref 0–0.4)
IMM GRANULOCYTES NFR BLD: 0.1 %
INR PPP: 0.93 (ref 0.86–1.14)
KETONES UR STRIP-MCNC: NEGATIVE MG/DL
LEUKOCYTE ESTERASE UR QL STRIP: ABNORMAL
LYMPHOCYTES # BLD AUTO: 1.9 10E9/L (ref 0.8–5.3)
LYMPHOCYTES NFR BLD AUTO: 26 %
MCH RBC QN AUTO: 31.4 PG (ref 26.5–33)
MCHC RBC AUTO-ENTMCNC: 33.9 G/DL (ref 31.5–36.5)
MCV RBC AUTO: 93 FL (ref 78–100)
MONOCYTES # BLD AUTO: 0.6 10E9/L (ref 0–1.3)
MONOCYTES NFR BLD AUTO: 8.6 %
MUCOUS THREADS #/AREA URNS LPF: PRESENT /LPF
NEUTROPHILS # BLD AUTO: 4.6 10E9/L (ref 1.6–8.3)
NEUTROPHILS NFR BLD AUTO: 63.9 %
NITRATE UR QL: NEGATIVE
NRBC # BLD AUTO: 0 10*3/UL
NRBC BLD AUTO-RTO: 0 /100
PH UR STRIP: 7 PH (ref 5–7)
PLATELET # BLD AUTO: 230 10E9/L (ref 150–450)
POTASSIUM SERPL-SCNC: 4 MMOL/L (ref 3.4–5.3)
PROT SERPL-MCNC: 7 G/DL (ref 6.8–8.8)
RBC # BLD AUTO: 4.59 10E12/L (ref 4.4–5.9)
RBC #/AREA URNS AUTO: >182 /HPF (ref 0–2)
SODIUM SERPL-SCNC: 139 MMOL/L (ref 133–144)
SOURCE: ABNORMAL
SP GR UR STRIP: 1 (ref 1–1.03)
UROBILINOGEN UR STRIP-MCNC: NORMAL MG/DL (ref 0–2)
WBC # BLD AUTO: 7.2 10E9/L (ref 4–11)
WBC #/AREA URNS AUTO: 11 /HPF (ref 0–2)

## 2017-11-06 PROCEDURE — 99284 EMERGENCY DEPT VISIT MOD MDM: CPT | Mod: 25 | Performed by: EMERGENCY MEDICINE

## 2017-11-06 PROCEDURE — 81001 URINALYSIS AUTO W/SCOPE: CPT | Performed by: EMERGENCY MEDICINE

## 2017-11-06 PROCEDURE — 80053 COMPREHEN METABOLIC PANEL: CPT | Performed by: EMERGENCY MEDICINE

## 2017-11-06 PROCEDURE — 87086 URINE CULTURE/COLONY COUNT: CPT | Performed by: EMERGENCY MEDICINE

## 2017-11-06 PROCEDURE — 93005 ELECTROCARDIOGRAM TRACING: CPT | Performed by: EMERGENCY MEDICINE

## 2017-11-06 PROCEDURE — 85610 PROTHROMBIN TIME: CPT | Performed by: EMERGENCY MEDICINE

## 2017-11-06 PROCEDURE — 51700 IRRIGATION OF BLADDER: CPT | Performed by: EMERGENCY MEDICINE

## 2017-11-06 PROCEDURE — 85025 COMPLETE CBC W/AUTO DIFF WBC: CPT | Performed by: EMERGENCY MEDICINE

## 2017-11-06 PROCEDURE — 99284 EMERGENCY DEPT VISIT MOD MDM: CPT | Mod: Z6 | Performed by: EMERGENCY MEDICINE

## 2017-11-06 RX ORDER — CIPROFLOXACIN 500 MG/1
500 TABLET, FILM COATED ORAL 2 TIMES DAILY
Qty: 6 TABLET | Refills: 0 | Status: SHIPPED | OUTPATIENT
Start: 2017-11-06 | End: 2017-11-09

## 2017-11-06 ASSESSMENT — ENCOUNTER SYMPTOMS
LIGHT-HEADEDNESS: 0
VOMITING: 0
HEMATURIA: 1
ABDOMINAL PAIN: 0
NAUSEA: 0

## 2017-11-06 NOTE — ED AVS SNAPSHOT
[facility], [place], Emergency Department    [address] [address]    Phone:  [phone]                                       G[address]ind [address]   MRN: [id number]    Department:  [facility], Emergency Department   Date of Visit:  11/6/2017           After Visit Summary Signature Page     I have received my discharge instructions, and my questions have been answered. I have discussed any challenges I see with this plan with the nurse or doctor.    ..........................................................................................................................................  Patient/Patient Representative Signature      ..........................................................................................................................................  Patient Representative Print Name and Relationship to Patient    ..................................................               ................................................  Date                                            Time    ..........................................................................................................................................  Reviewed by Signature/Title    ...................................................              ..............................................  Date                                                            Time

## 2017-11-06 NOTE — DISCHARGE INSTRUCTIONS
Blood in the Urine    Blood in the urine (hematuria) has many possible causes. If it occurs after an injury (such as a car accident or fall), it is most often a sign of bruising to the kidney or bladder. Common causes of blood in the urine include urinary tract infections, kidney stones, inflammation, tumors, or certain other diseases of the kidney or bladder. Menstruation can cause blood to appear in the urine sample, although it is not coming from the urinary tract.  If only a trace amount of blood is present, it will show up on the urine test, even though the urine may be yellow and not pink or red. This may occur with any of the above conditions, as well as heavy exercise or high fever. In this case, your doctor may want to repeat the urine test on another day. This will show if the blood is still present. If it is, then other tests can be done to find out the cause.  Home care  Follow these home care guidelines:    If your urine does not appear bloody (pink, brown or red) then you do not need to restrict your activity in any way.    If you can see blood in your urine, rest and avoid heavy exertion until your next exam. Do not use aspirin, blood thinners, or anti-platelet or anti-inflammatory medicines. These include ibuprofen and naproxen. These thin the blood and may increase bleeding.  Follow-up care  Follow up with your healthcare provider, or as advised. If you were injured and had blood in your urine, you should have a repeat urine test in 1 to 2 days. Contact your doctor for this test.  A radiologist will review any X-rays that were taken. You will be told of any new findings that may affect your care.  When to seek medical advice  Call your healthcare provider right away if any of these occur:    Bright red blood or blood clots in the urine (if you did not have this before)    Weakness, dizziness or fainting    New groin, abdominal, or back pain    Fever of 100.4 F (38 C) or higher, or as directed by  your healthcare provider    Repeated vomiting    Bleeding from the nose or gums or easy bruising  Date Last Reviewed: 9/1/2016 2000-2017 [employer]. [address] [place] [address], [place] [address]. All rights reserved. This information is not intended as a substitute for professional medical care. Always follow your healthcare professional's instructions.

## 2017-11-06 NOTE — ED PROVIDER NOTES
History     Chief Complaint   Patient presents with     Hematuria     HPI  Govind Way is a very pleasant 62-year-old male who is approximately 2 weeks status-post HoLEP procedure for BPH with obstructive voiding symptoms, now arriving to the emergency department for evaluation of persistent gross hematuria, passage of clot and intermittent obstructive sensation.  The patient reports that, following the procedure, he initially had resolution of hematuria; however, over the past 4-5 days this has recurred with, at times, heavy bleeding.  He states he has been in contact with Urology over the phone who recommended ED evaluation.  He denies lightheadedness or sensation of presyncope.  He reports no generalized abdominal pain, nausea or vomiting.  He does report development of malodorous urine and persistent passage of clots.  He states that at times it is hard to pass clots and he reports some symptoms of difficulty voiding.    This part of the medical record was transcribed by Bladimir Patterson Medical Scribe, from a dictation done by Bi Vaughn MD.      Past Medical History:   Diagnosis Date     Adjustment disorder      Dysthymic disorder      Enlarged prostate      Hepatitis      Irregular heart beat     intermittant palpitations     Palpitations     intermittent     PONV (postoperative nausea and vomiting)      Positive PPD      Pure hypercholesterolemia 1/00     Renal disease     single R kidney;donated left     Screening examination for pulmonary tuberculosis     positive mantoux     Unspecified essential hypertension 1/00       Past Surgical History:   Procedure Laterality Date     C ECG MONITOR/ 24 HRS, ORIG ECG, W VIS SUPERIMPOS SCAN, COMPLETE  1/00    um holter monitor     C REMV KIDNEY/URETER,SAME INCIS  6/30/05    Nephrouretectomy/LEFT KIDNEY DONATED TO DAUGHTER/[facility]     HC REMOVAL OF TONSILS,<11 Y/O  age 7     HC VASECTOMY UNILAT/BILAT W POSTOP SEMEN  age 41     HERNIORRHAPHY INGUINAL   12/23/2011    Procedure:HERNIORRHAPHY INGUINAL; Surgeon:JULIA SIERRA; Location:[facility] OR     LAPAROSCOPIC HERNIORRHAPHY INGUINAL Right 6/29/2015    Procedure: LAPAROSCOPIC HERNIORRHAPHY INGUINAL;  Surgeon: García Ibarra MD;  Location: [facility]     LAPAROSCOPIC HERNIORRHAPHY VENTRAL  12/23/2011    Procedure:LAPAROSCOPIC HERNIORRHAPHY VENTRAL; Laparoscopic Ventral Hernia Repair with Mesh, Open Left Inguinal Hernia Repair with Mesh; Surgeon:JULIA SIERRA; Location:[facility] OR     LASER HOLMIUM ENUCLEATION PROSTATE N/A 10/19/2017    Procedure: LASER HOLMIUM ENUCLEATION PROSTATE;  Holmium Laser Enculeation of the Prostate;  Surgeon: Manolo Cardenas MD;  Location:  OR     LASIK CUSTOMVUE BILATERAL  11/11/2011    Procedure:LASIK CUSTOMVUE BILATERAL; BILATERAL CUSTOMVUE LASIK WITH INTRALASE; Surgeon:POP SIMON; Location:[facility]       Family History   Problem Relation Age of Onset     DIABETES Mother      ADULT ONSET     Hypertension Mother      Coronary Artery Disease Mother      Also father and grandparent.  Father had coronary bypass and aortic valve replacement surgery     Heart Surgery Mother      Aortic valve     CANCER Other      prostate cancer     Thyroid Disease Other      Hypertension Father      Skin Cancer Other      Mother and father, grandfather     Arthritis Other      Mom, daughter, grandparents     Other - See Comments Other      Grandfather with kidney stone     Prostate Cancer Other      Grandfather     KIDNEY DISEASE Daughter      Lupus Daughter      Causing end-stage renal disease     Eye Disorder No family hx of      NONE KNOWN     Hyperlipidemia No family hx of      CEREBROVASCULAR DISEASE No family hx of      Breast Cancer No family hx of      Colon Cancer No family hx of      Other Cancer No family hx of      Depression No family hx of      Anxiety Disorder No family hx of      MENTAL ILLNESS No family hx of      Substance Abuse No family hx of       Anesthesia Reaction No family hx of      Asthma No family hx of      OSTEOPOROSIS No family hx of      Genetic Disorder No family hx of      Obesity No family hx of      Unknown/Adopted No family hx of        Social History   Substance Use Topics     Smoking status: Former Smoker     Quit date: 1/1/1982     Smokeless tobacco: Never Used      Comment: NO 2ND HAND SMOKE     Alcohol use 0.0 oz/week     0 Standard drinks or equivalent per week      Comment: 5/wk     No current facility-administered medications for this encounter.      Current Outpatient Prescriptions   Medication     acetaminophen (TYLENOL) 325 MG tablet     ibuprofen (ADVIL/MOTRIN) 600 MG tablet     ciprofloxacin (CIPRO) 500 MG tablet     tolterodine (DETROL) 2 MG tablet     QUEtiapine (SEROQUEL) 50 MG tablet     LORazepam (ATIVAN) 1 MG tablet     magnesium 250 MG tablet     Misc Natural Products (DAILY HERBS PROSTATE PO)     Omega-3 Fatty Acids (FISH OIL) 500 MG CAPS     MULTIVITAMIN TABS   OR        Allergies   Allergen Reactions     Ambien [Zolpidem Tartrate]      irritability     Codeine Sulfate Itching      I have reviewed the Medications, Allergies, Past Medical and Surgical History, and Social History in the Epic system.    Review of Systems   Gastrointestinal: Negative for abdominal pain, nausea and vomiting.   Genitourinary: Positive for hematuria.   Neurological: Negative for light-headedness.   All other systems reviewed and are negative.      Physical Exam   BP: (!) 162/94  Heart Rate: 63  Temp: 97.6  F (36.4  C)  Resp: 16  Weight: 78.6 kg (173 lb 4.5 oz)  SpO2: 94 %      Physical Exam   Constitutional: He appears well-developed and well-nourished. No distress.   HENT:   Head: Normocephalic.   Cardiovascular: Normal rate.    Pulmonary/Chest: Effort normal. No respiratory distress.   Abdominal: Soft. He exhibits no distension. There is no tenderness. There is no rebound.   Neurological: He is alert.   Skin: Skin is warm. No rash noted.    Psychiatric: He has a normal mood and affect.       ED Course     ED Course     Procedures             Labs Ordered and Resulted from Time of ED Arrival Up to the Time of Departure from the ED - No data to display    Consults  Urology: Responded (11/06/17 1211)    Assessments & Plan (with Medical Decision Making)   62-year-old male, 2 weeks status-post HoLEP procedure for BPH, now arriving with gross hematuria and, at times, obstructive uropathy.  Upon arrival he is noted to be alert, afebrile and hemodynamically stable.  He appears well and at this time has no generalized abdominal pain.  The patient has reported malodorous urine. Urinalysis has been sent for evaluation of infectious source.  As the patient has been in contact with Urology with request for ED evaluation, we will obtain baseline laboratory studies to evaluate for coagulopathy or significant anemia with urologic consultation to guide further workup emergently.    Laboratory studies reveal no significant anemia or leukocytosis.  U/A with large blood no infection.  Urology at bedside performed irrigation with notable persistent bleed with no obstruction.  Recs to ED for Cipro BID x 3 days and they will arrange close follow up.  He will call or return with development of urinary retention symptoms.    This part of the medical record was transcribed by Bladimir Patterson Medical Scribe, from a dictation done by Bi Vaughn MD.      I have reviewed the nursing notes.    I have reviewed the findings, diagnosis, plan and need for follow up with the patient.    New Prescriptions    No medications on file       Final diagnoses:   Hematuria, unspecified type       11/6/2017   [facility], [place], EMERGENCY DEPARTMENT     Bi Vaughn MD  11/06/17 4450

## 2017-11-06 NOTE — ED AVS SNAPSHOT
[facility], Emergency Department    [address] [facility] [address]    Phone:  [phone]                                       [address]   MRN: [id number]    Department:  [facility], Emergency Department   Date of Visit:  11/6/2017           Patient Information     Date Of Birth          1955        Your diagnoses for this visit were:     Hematuria, unspecified type        You were seen by Bi Vaughn MD.      Follow-up Information     Please follow up.    Contact information:    Follow up with Urology        Follow up with [facility] Emergency Department.    Specialty:  EMERGENCY MEDICINE    Why:  As needed, If symptoms worsen    Contact information:    [address] [facility] [facility] [address] [phone]    Additional information:    The [facility] is located on the corner of [address] [facility] and [address] [facility] on the [facility]. It is easily accessible from virtually any point in the [place] area, via [employer] and [employer].        Discharge Instructions         Blood in the Urine    Blood in the urine (hematuria) has many possible causes. If it occurs after an injury (such as a car accident or fall), it is most often a sign of bruising to the kidney or bladder. Common causes of blood in the urine include urinary tract infections, kidney stones, inflammation, tumors, or certain other diseases of the kidney or bladder. Menstruation can cause blood to appear in the urine sample, although it is not coming from the urinary tract.  If only a trace amount of blood is present, it will show up on the urine test, even though the urine may be yellow and not pink or red. This may occur with any of the above conditions, as well as heavy exercise or high fever. In this case, your doctor may want to repeat the urine test on another day. This will show if the blood is still present. If it is, then other tests can be done to find out the  cause.  Home care  Follow these home care guidelines:    If your urine does not appear bloody (pink, brown or red) then you do not need to restrict your activity in any way.    If you can see blood in your urine, rest and avoid heavy exertion until your next exam. Do not use aspirin, blood thinners, or anti-platelet or anti-inflammatory medicines. These include ibuprofen and naproxen. These thin the blood and may increase bleeding.  Follow-up care  Follow up with your healthcare provider, or as advised. If you were injured and had blood in your urine, you should have a repeat urine test in 1 to 2 days. Contact your doctor for this test.  A radiologist will review any X-rays that were taken. You will be told of any new findings that may affect your care.  When to seek medical advice  Call your healthcare provider right away if any of these occur:    Bright red blood or blood clots in the urine (if you did not have this before)    Weakness, dizziness or fainting    New groin, abdominal, or back pain    Fever of 100.4 F (38 C) or higher, or as directed by your healthcare provider    Repeated vomiting    Bleeding from the nose or gums or easy bruising  Date Last Reviewed: 9/1/2016 2000-2017 [employer]. [address] [place] [address]. All rights reserved. This information is not intended as a substitute for professional medical care. Always follow your healthcare professional's instructions.          Future Appointments        Provider Department Dept Phone Center    11/14/2017 3:30 PM PAULINA [facility] [phone] [facility]    11/21/2017 3:00 PM Manolo Cardenas MD [facility] Urology and [facility] [phone] [facility]      24 Hour Appointment Hotline       To make an appointment at any [facility] clinic, call [phone] ([phone]). If you don't have a family doctor or clinic, we will help you find one. [facility]  clinics are conveniently located to serve the needs of you and your family.             Review of your medicines      Our records show that you are taking the medicines listed below. If these are incorrect, please call your family doctor or clinic.        Dose / Directions Last dose taken    acetaminophen 325 MG tablet   Commonly known as:  TYLENOL   Dose:  650 mg   Quantity:  100 tablet        Take 2 tablets (650 mg) by mouth every 4 hours as needed for other (mild pain)   Refills:  0        ciprofloxacin 500 MG tablet   Commonly known as:  CIPRO   Dose:  500 mg   Quantity:  6 tablet        Take 1 tablet (500 mg) by mouth 2 times daily for 3 days   Refills:  0        DAILY HERBS PROSTATE PO   Dose:  1 tablet   Indication:  [employer] prostate formula- saw palmetto blend.        Take 1 tablet by mouth daily   Refills:  0        Fish Oil 500 MG Caps   Dose:  1 capsule        Take 1 capsule by mouth daily   Refills:  0        ibuprofen 600 MG tablet   Commonly known as:  ADVIL/MOTRIN   Dose:  600 mg   Quantity:  30 tablet        Take 1 tablet (600 mg) by mouth every 6 hours as needed for other (For mild pain and temperature greater than 102F)   Refills:  0        LORazepam 1 MG tablet   Commonly known as:  ATIVAN   Dose:  1 mg   Quantity:  10 tablet        Take 1 tablet (1 mg) by mouth every 8 hours as needed for anxiety   Refills:  0        magnesium 250 MG tablet   Dose:  1 tablet        Take 1 tablet by mouth nightly as needed (sleep)   Refills:  0        MULTIVITAMIN TABS   OR        1 TABLET EVETRY DAY   Refills:  0        QUEtiapine 50 MG tablet   Commonly known as:  SEROQUEL   Dose:  50 mg   Quantity:  30 tablet        Take 1 tablet (50 mg) by mouth nightly as needed   Refills:  1        tolterodine 2 MG tablet   Commonly known as:  DETROL   Dose:  2 mg   Quantity:  6 tablet        Take 1 tablet (2 mg) by mouth 2 times daily   Refills:  0                Prescriptions were sent or printed at these locations (1  Prescription)                   Other Prescriptions                Printed at Department/Unit printer (1 of 1)         ciprofloxacin (CIPRO) 500 MG tablet                Procedures and tests performed during your visit     CBC with platelets differential    Comprehensive metabolic panel    EKG 12-lead, tracing only    INR    UA reflex to Microscopic and Culture    Urine Culture Aerobic Bacterial      Orders Needing Specimen Collection     None      Pending Results     Date and Time Order Name Status Description    11/6/2017 1449 EKG 12-lead, tracing only Preliminary     11/6/2017 1202 Urine Culture Aerobic Bacterial Preliminary             Pending Culture Results     Date and Time Order Name Status Description    11/6/2017 1202 Urine Culture Aerobic Bacterial Preliminary             Pending Results Instructions     If you had any lab results that were not finalized at the time of your Discharge, you can call the ED Lab Result RN at [phone]. You will be contacted by this team for any positive Lab results or changes in treatment. The nurses are available 7 days a week from 10A to 6:30P.  You can leave a message 24 hours per day and they will return your call.        Thank you for choosing [facility]       Thank you for choosing [facility] for your care. Our goal is always to provide you with excellent care. Hearing back from our patients is one way we can continue to improve our services. Please take a few minutes to complete the written survey that you may receive in the mail after you visit with us. Thank you!        [employer] Information     [employer] gives you secure access to your electronic health record. If you see a primary care provider, you can also send messages to your care team and make appointments. If you have questions, please call your primary care clinic.  If you do not have a primary care provider, please call [phone] and they will assist you.        Care EveryWhere ID     This is your Care  EveryWhere ID. This could be used by other organizations to access your [facility] medical records  [id number]        Equal Access to Services     SHIVA VELA : David Smith, jan pierce, terri simons. So [facility] [phone].    ATENCIÓN: Si habla español, tiene a becerra disposición servicios gratuitos de asistencia lingüística. Llame al [phone].    We comply with applicable federal civil rights laws and [place] laws. We do not discriminate on the basis of race, color, national origin, age, disability, sex, sexual orientation, or gender identity.            After Visit Summary       This is your record. Keep this with you and show to your community pharmacist(s) and doctor(s) at your next visit.

## 2017-11-06 NOTE — CONSULTS
Urology Consult History and Physical    Name: Govind Way    MRN: [id number]   YOB: 1955       We were asked to see Govind Way at the request of ED Staff for evaluation and treatment of the following chief complaint:          Chief Complaint:   Gross hematuria  History is obtained from patient and review of records          History of Present Illness:   Govind Way is a 62 year old male with PMH significant for HLD, HTN, solitary right kidney (donated left), adjustment disorder, and BPH.  Urologically he is followed by Dr. Cardenas and is s/p HOLEP on 10/19.  He was discharged the same day with a Valdivia catheter, but this was removed by the Urology nurses after a successful voiding trial on 10/20/17.      His urine cleared almost immediately following Valdivia removal, but then last Thursday, 4 days ago, he noted recurrent hematuria with small clots.   He pushed fluids as instructed and for a short while yesterday his urine was yellow, but then the hematuria recurred again.  He did admit to cleaning his mom's house last weekend - doing lots of vacuuming and lifting heavy laundry bins.  He also walks about 20 minutes per day.  Denies fevers, chills, dysuria.  No nausea, emesis.  No anticoagulation and has been avoiding asa, ibuprofen and fish oil.  His hesitancy is completely resolved postoperatively.  His stream is very brisk.  He feels he is emptying fine.          Past Medical History:     Past Medical History:   Diagnosis Date     Adjustment disorder      Dysthymic disorder      Enlarged prostate      Hepatitis      Irregular heart beat     intermittant palpitations     Palpitations     intermittent     PONV (postoperative nausea and vomiting)      Positive PPD      Pure hypercholesterolemia 1/00     Renal disease     single R kidney;donated left     Screening examination for pulmonary tuberculosis     positive mantoux     Unspecified essential hypertension 1/00            Past  Surgical History:     Past Surgical History:   Procedure Laterality Date     C ECG MONITOR/ 24 HRS, ORIG ECG, W VIS SUPERIMPOS SCAN, COMPLETE  1/00    um holter monitor     C REMV KIDNEY/URETER,SAME INCIS  6/30/05    Nephrouretectomy/LEFT KIDNEY DONATED TO DAUGHTER/[facility]     HC REMOVAL OF TONSILS,<13 Y/O  age 7     HC VASECTOMY UNILAT/BILAT W POSTOP SEMEN  age 41     HERNIORRHAPHY INGUINAL  12/23/2011    Procedure:HERNIORRHAPHY INGUINAL; Surgeon:JULIA SIERRA; Location:[facility]     LAPAROSCOPIC HERNIORRHAPHY INGUINAL Right 6/29/2015    Procedure: LAPAROSCOPIC HERNIORRHAPHY INGUINAL;  Surgeon: García Ibarra MD;  Location:  OR     LAPAROSCOPIC HERNIORRHAPHY VENTRAL  12/23/2011    Procedure:LAPAROSCOPIC HERNIORRHAPHY VENTRAL; Laparoscopic Ventral Hernia Repair with Mesh, Open Left Inguinal Hernia Repair with Mesh; Surgeon:JULIA SIERRA; Location:[facility]     LASER HOLMIUM ENUCLEATION PROSTATE N/A 10/19/2017    Procedure: LASER HOLMIUM ENUCLEATION PROSTATE;  Holmium Laser Enculeation of the Prostate;  Surgeon: Manolo Cardenas MD;  Location:  OR     LASIK CUSTOMVUE BILATERAL  11/11/2011    Procedure:LASIK CUSTOMVUE BILATERAL; BILATERAL CUSTOMVUE LASIK WITH INTRALASE; Surgeon:POP SIMON; Location[facility]            Social History:     Social History   Substance Use Topics     Smoking status: Former Smoker     Quit date: 1/1/1982     Smokeless tobacco: Never Used      Comment: NO 2ND HAND SMOKE     Alcohol use 0.0 oz/week     0 Standard drinks or equivalent per week      Comment: 5/wk            Family History:     Family History   Problem Relation Age of Onset     DIABETES Mother      ADULT ONSET     Hypertension Mother      Coronary Artery Disease Mother      Also father and grandparent.  Father had coronary bypass and aortic valve replacement surgery     Heart Surgery Mother      Aortic valve     CANCER Other      prostate cancer     Thyroid Disease Other       Hypertension Father      Skin Cancer Other      Mother and father, grandfather     Arthritis Other      Mom, daughter, grandparents     Other - See Comments Other      Grandfather with kidney stone     Prostate Cancer Other      Grandfather     KIDNEY DISEASE Daughter      Lupus Daughter      Causing end-stage renal disease     Eye Disorder No family hx of      NONE KNOWN     Hyperlipidemia No family hx of      CEREBROVASCULAR DISEASE No family hx of      Breast Cancer No family hx of      Colon Cancer No family hx of      Other Cancer No family hx of      Depression No family hx of      Anxiety Disorder No family hx of      MENTAL ILLNESS No family hx of      Substance Abuse No family hx of      Anesthesia Reaction No family hx of      Asthma No family hx of      OSTEOPOROSIS No family hx of      Genetic Disorder No family hx of      Obesity No family hx of      Unknown/Adopted No family hx of             Allergies:     Allergies   Allergen Reactions     Ambien [Zolpidem Tartrate]      irritability     Codeine Sulfate Itching            Medications:     No current facility-administered medications for this encounter.      Current Outpatient Prescriptions   Medication Sig     acetaminophen (TYLENOL) 325 MG tablet Take 2 tablets (650 mg) by mouth every 4 hours as needed for other (mild pain)     ibuprofen (ADVIL/MOTRIN) 600 MG tablet Take 1 tablet (600 mg) by mouth every 6 hours as needed for other (For mild pain and temperature greater than 102F)     ciprofloxacin (CIPRO) 500 MG tablet Take 1 tablet (500 mg) by mouth 2 times daily     tolterodine (DETROL) 2 MG tablet Take 1 tablet (2 mg) by mouth 2 times daily     QUEtiapine (SEROQUEL) 50 MG tablet Take 1 tablet (50 mg) by mouth nightly as needed     LORazepam (ATIVAN) 1 MG tablet Take 1 tablet (1 mg) by mouth every 8 hours as needed for anxiety     magnesium 250 MG tablet Take 1 tablet by mouth nightly as needed (sleep)     Misc Natural Products (DAILY HERBS  PROSTATE PO) Take 1 tablet by mouth daily     Omega-3 Fatty Acids (FISH OIL) 500 MG CAPS Take 1 capsule by mouth daily      MULTIVITAMIN TABS   OR 1 TABLET EVETRY DAY             Review of Systems:    ROS: See HPI for pertinent details.  Remainder of 10-point ROS negative.         Physical Exam:   VS:  T: 97.6    HR: Data Unavailable    BP: 162/94    RR: 16   GEN:  AOx3.  NAD.  Pleasant.  BACK:  No costoverterbral tenderness.  ABD:  Soft.  NT.  ND.    :  Phallus circumcised.  Meatus patent. Normal penile shaft without plaques.  Testicles descended bilaterally, no tenderness.   EXT:  Warm, well perfused.  no lower extremity edema bilaterally  SKIN:  Warm.  Dry.  No rashes.  NEURO:  CN grossly intact.  Normal gait    URINE: cherry urine with small clots    PROCEDURE:  Prepped and draped  10cc urojet lidocaine  22Fr latex six eye catheter placed without difficulty  Bladder irrigated with sterile NS and about 30cc of small clots were retrieved.  At conclusion the irrigant remained light pink consistent with persistent very light bleeding.    The catheter was removed and the procedure was concluded.          Data:   All laboratory data reviewed:    Lab Results   Component Value Date    PSA 3.29 11/29/2016    PSA 2.54 06/18/2013    PSA 1.98 02/14/2012    PSA 2.42 04/26/2011    PSA 1.25 05/14/2010    PSA 1.49 06/03/2009    PSA 0.85 11/14/2007    PSA 0.68 10/20/2006    PSA 0.47 03/14/2005    PSA 0.47 03/09/2005     No lab results found in last 7 days.  No lab results found in last 7 days.    Recent Labs  Lab 11/06/17  1202   COLOR Dark Red   APPEARANCE Slightly Cloudy   URINEGLC Negative   URINEBILI Negative   URINEKETONE Negative   SG 1.005   URINEPH 7.0   PROTEIN 100*   NITRITE Negative   LEUKEST Trace*   RBCU >182*   WBCU 11*     Results for orders placed or performed in visit on 10/11/17   Urine Culture Aerobic Bacterial   Result Value Ref Range    Specimen Description Midstream Urine     Special Requests Specimen  received in preservative     Culture Micro <10,000 colonies/mL  urogenital stella       Culture Micro Susceptibility testing not routinely done    Results for orders placed or performed in visit on 10/03/17   Urine Culture Aerobic Bacterial   Result Value Ref Range    Specimen Description Unspecified Urine     Culture Micro       Canceled, Test credited  Specimen not received  Should be ordered as a future order              Impression and Plan:   Impression / Plan:   Govind Way is a 62 year old male s/p HOLEP on 10/18 now with delayed gross hematuria, likely instigated by a weekend filled with lifting and straining and high activity.     - Six eye catheter placed today for irrigation - minimal clot burden was retrieved  - Patient was left without a Valdivia  - Encouraged to drink plenty of fluids  - Take it easy (ie. No housekeeping or long walks today)  - Discharge patient with a 3 day course of Cipro (discussed with ED staff).  The urine culture is pending although clinically, Mr. Way has no symptoms or signs of UTI  - Will coordinate outpatient following in Urology clinic tomorrow to check PVR and recheck urine.     Urology will sign off but please call with questions.     Discussed with Dr. Cardenas    Thank you for the opportunity to participate in the care of Govind Way.     Mariluz Hobbs PA-C  Urology Physician Assistant  Personal Pager: [phone]    Please call Job Code:   [phone] to reach the Urology resident or PA on call - Weekdays  [phone] to reach the Urology resident or PA on call - Weeknights and weekends

## 2017-11-07 ENCOUNTER — PRE VISIT (OUTPATIENT)
Dept: UROLOGY | Facility: CLINIC | Age: 62
End: 2017-11-07

## 2017-11-07 ENCOUNTER — OFFICE VISIT (OUTPATIENT)
Dept: UROLOGY | Facility: CLINIC | Age: 62
End: 2017-11-07

## 2017-11-07 VITALS
BODY MASS INDEX: 23.81 KG/M2 | HEART RATE: 90 BPM | SYSTOLIC BLOOD PRESSURE: 139 MMHG | DIASTOLIC BLOOD PRESSURE: 86 MMHG | WEIGHT: 170.7 LBS

## 2017-11-07 DIAGNOSIS — N50.89 SCROTAL SWELLING: Primary | ICD-10-CM

## 2017-11-07 LAB
BACTERIA SPEC CULT: NO GROWTH
INTERPRETATION ECG - MUSE: NORMAL
Lab: NORMAL
SPECIMEN SOURCE: NORMAL

## 2017-11-07 ASSESSMENT — PAIN SCALES - GENERAL: PAINLEVEL: NO PAIN (0)

## 2017-11-07 NOTE — LETTER
11/7/2017      RE: Govind Way  [address]   [facility] [address]       HoLEP 4 Week Follow-Up Note    Today I had the pleasure of seeing Mr. Way in follow-up for a history of BPH with obstructive voiding symptoms.     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    1.5 gms of tissue were removed in a median lobe only approach.  Pathology was benign.     His recovery was going well until the past few days.  He had increased his activity level mainly overexerting himself around his parents house which resulted in gross hematuria, passage of clot, and intermittent obstructive sensation. He presented to the ED yesterday with these symptoms. In the ED a catheter was placed and he was irrigated for approximately 15 cc of clot.  He went home voiding and overnight his urine cleared back up.  Today he reports that it is clear.      He has noted a definite improvement in strength of stream. He denies any significant urinary leakage, however he is still wearing a pad for protection just as a precaution.      AUA Symptom Score is 15/35 with a 2/6 for bother    Uroflow/Post Void Residual  PVR 0    Ucx yesterday no growth    Lab Results   Component Value Date    WBC 7.2 11/06/2017     Lab Results   Component Value Date    RBC 4.59 11/06/2017     Lab Results   Component Value Date    HGB 14.4 11/06/2017     Lab Results   Component Value Date    HCT 42.5 11/06/2017        Lab Results   Component Value Date    MCV 93 11/06/2017     Lab Results   Component Value Date    MCH 31.4 11/06/2017     Lab Results   Component Value Date    MCHC 33.9 11/06/2017     Lab Results   Component Value Date    RDW 12.9 11/06/2017     Lab Results   Component Value Date     11/06/2017       Assessment/Plan - 62 year old male 3 weeks status post HoLEP  - Suspect bleeding is reactive secondary to increased activity  - Encouraged rest and hydration  - PSA at next  visit  - US for further evaluation of testicular cyst   - F/u in 6 weeks     >15 minutes were spent face to face with patient over half of which was spent providing medical counseling regarding post HoLEP urination, hematuria    I, Manolo Cardenas saw and evaluated this patient and agree with the plan as stated above       Manolo Cardenas MD

## 2017-11-07 NOTE — PROGRESS NOTES
HoLEP 4 Week Follow-Up Note    Today I had the pleasure of seeing Mr. Way in follow-up for a history of BPH with obstructive voiding symptoms.     He underwent holmium laser enucleation of the prostate approximately 4 weeks ago (10/19/17).    1.5 gms of tissue were removed.  Pathology was benign.     His recovery thus far has been remarkable for persistent gross hematuria, passage of clot, and intermittent obstructive sensation. He presented to the ED yesterday with these symptoms.     Today he states he is generally satisfied with his voiding and notes improved flow. He denies any significant urinary leakage, however he is still wearing pads for protection.        AUA Symptom Score is 15/35 with a 2/6 for bother    Uroflow/Post Void Residual  Voided volume - ***  Qm ***  Qavg ***  PVR 0    Assessment/Plan - 62 year old male 4 weeks status post HoLEP  -PSA at next visit  - US for further evaluation of testicular cyst   - F/u in 6 weeks     >15 minutes were spent face to face with patient over half of which was spent providing medical counseling regarding ***.

## 2017-11-07 NOTE — PATIENT INSTRUCTIONS
Return in 6 weeks for scrotal ultrasound, PSA lab, and appt with Dr. Cardenas same day (move 11/21 appt to that day)    It was a pleasure meeting with you today.  Thank you for allowing me and my team the privilege of caring for you today.  YOU are the reason we are here, and I truly hope we provided you with the excellent service you deserve.  Please let us know if there is anything else we can do for you so that we can be sure you are leaving completely satisfied with your care experience.       Robin Williamson LPN

## 2017-11-07 NOTE — LETTER
Govind Way  [address]   [facility] [address]      November 7, 2017    Date of Exam: 11/7/2017    To Whom it May Concern,     This letter is being written on the behalf of Govind Way.  Govind underwent prostate surgery with me recently and is recovering.  He was initially cleared to resume full work related activities on 11/6/17 but due to ongoing healing it is medically advisable that he take another two weeks off from work related strenuous activity.  I anticipate he will be able to resume full duties on 11/20/2017.    Sincerely,    Manolo Cardenas MD

## 2017-11-07 NOTE — LETTER
11/7/2017       RE: Govind Way  [address]   [facility] [address]     Dear Colleague,    Thank you for referring your patient, Govind Way, to the [facility] UROLOGY AND INST FOR PROSTATE AND UROLOGIC CANCERS at [facility]. Please see a copy of my visit note below.    HoLEP 4 Week Follow-Up Note    Today I had the pleasure of seeing Mr. Way in follow-up for a history of BPH with obstructive voiding symptoms.     He underwent holmium laser enucleation of the prostate approximately 4 weeks ago (10/19/17).    1.5 gms of tissue were removed.  Pathology was benign.     His recovery thus far has been remarkable for persistent gross hematuria, passage of clot, and intermittent obstructive sensation. He presented to the ED yesterday with these symptoms.     Today he states he is generally satisfied with his voiding and notes improved flow. He denies any significant urinary leakage, however he is still wearing pads for protection.        AUA Symptom Score is 15/35 with a 2/6 for bother    Uroflow/Post Void Residual  Voided volume - ***  Qm ***  Qavg ***  PVR 0    Assessment/Plan - 62 year old male 4 weeks status post HoLEP  -PSA at next visit  - US for further evaluation of testicular cyst   - F/u in 6 weeks     >15 minutes were spent face to face with patient over half of which was spent providing medical counseling regarding ***.        HoLEP 4 Week Follow-Up Note    Today I had the pleasure of seeing Mr. Way in follow-up for a history of BPH with obstructive voiding symptoms.     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    1.5 gms of tissue were removed in a median lobe only approach.  Pathology was benign.     His recovery was going well until the past few days.  He had increased his activity level mainly overexerting himself around his parents house which resulted in gross hematuria, passage  of clot, and intermittent obstructive sensation. He presented to the ED yesterday with these symptoms. In the ED a catheter was placed and he was irrigated for approximately 15 cc of clot.  He went home voiding and overnight his urine cleared back up.  Today he reports that it is clear.      He has noted a definite improvement in strength of stream. He denies any significant urinary leakage, however he is still wearing a pad for protection just as a precaution.      AUA Symptom Score is 15/35 with a 2/6 for bother    Uroflow/Post Void Residual  PVR 0    Ucx yesterday no growth    Lab Results   Component Value Date    WBC 7.2 11/06/2017     Lab Results   Component Value Date    RBC 4.59 11/06/2017     Lab Results   Component Value Date    HGB 14.4 11/06/2017     Lab Results   Component Value Date    HCT 42.5 11/06/2017     No components found for: MCT  Lab Results   Component Value Date    MCV 93 11/06/2017     Lab Results   Component Value Date    MCH 31.4 11/06/2017     Lab Results   Component Value Date    MCHC 33.9 11/06/2017     Lab Results   Component Value Date    RDW 12.9 11/06/2017     Lab Results   Component Value Date     11/06/2017       Assessment/Plan - 62 year old male 3 weeks status post HoLEP  - Suspect bleeding is reactive secondary to increased activity  - Encouraged rest and hydration  - PSA at next visit  - US for further evaluation of testicular cyst   - F/u in 6 weeks     >15 minutes were spent face to face with patient over half of which was spent providing medical counseling regarding post HoLEP urination, hematuria    I, Manolo Cardenas saw and evaluated this patient and agree with the plan as stated above         Again, thank you for allowing me to participate in the care of your patient.      Sincerely,    Manolo Cardenas MD

## 2017-11-07 NOTE — MR AVS SNAPSHOT
After Visit Summary   11/7/2017    Govind Way    MRN: [id number]           Patient Information     Date Of Birth          1955        Visit Information        Provider Department      11/7/2017 10:00 AM Manolo Cardenas MD [facility] Urology and [facility] for Prostate and Urologic Cancers        Today's Diagnoses     Scrotal swelling    -  1      Care Instructions    Return in 6 weeks for scrotal ultrasound, PSA lab, and appt with Dr. Cardenas same day (move 11/21 appt to that day)    It was a pleasure meeting with you today.  Thank you for allowing me and my team the privilege of caring for you today.  YOU are the reason we are here, and I truly hope we provided you with the excellent service you deserve.  Please let us know if there is anything else we can do for you so that we can be sure you are leaving completely satisfied with your care experience.       Robin Williamson LPN              Follow-ups after your visit        Your next 10 appointments already scheduled     Nov 14, 2017  3:30 PM CST   Return Visit with PAULINA Bolton   [facility] ([place] [address] [address] [phone]            Dec 19, 2017  8:15 AM CST   LAB with  LAB   [facility])    [address] [place] [id number] [phone]           Please do not eat 10-12 hours before your appointment if you are coming in fasting for labs on lipids, cholesterol, or glucose (sugar). This does not apply to pregnant women. Water, hot tea and black coffee (with nothing added) are okay. Do not drink other fluids, diet soda or chew gum.            Dec 19, 2017  8:30 AM CST   US TESTICULAR AND SCROTUM with US1   [facility])    [address] [place] [id number] [phone]           Please  bring a list of your medicines (including vitamins, minerals and over-the-counter drugs). Also, tell your doctor about any allergies you may have. Wear comfortable clothes and leave your valuables at home.  You do not need to do anything special to prepare for your exam.  Please call the Imaging Department at your exam site with any questions.            Dec 19, 2017  9:30 AM CST   (Arrive by 9:15 AM)   Return Visit with Manolo Cardenas MD   [facility]rology and [facility] for Prostate and Urologic Cancers ([facility])    [address]  [address] [facility] [address] [phone]              Future tests that were ordered for you today     Open Future Orders        Priority Expected Expires Ordered    PSA tumor marker Routine  11/7/2018 11/7/2017    US Testicular and Scrotum Routine  11/7/2018 11/7/2017            Who to contact     Please call your clinic at [phone] to:    Ask questions about your health    Make or cancel appointments    Discuss your medicines    Learn about your test results    Speak to your doctor   If you have compliments or concerns about an experience at your clinic, or if you wish to file a complaint, please contact [facility] Patient Relations at [phone] or email us at [email or web address]         Additional Information About Your Visit        Dragon Insidehart Information     Instabug gives you secure access to your electronic health record. If you see a primary care provider, you can also send messages to your care team and make appointments. If you have questions, please call your primary care clinic.  If you do not have a primary care provider, please call [phone] and they will assist you.      Instabug is an electronic gateway that provides easy, online access to your medical records. With Instabug, you can request a clinic appointment, read your test results, renew a prescription or communicate with your  care team.     To access your existing account, please contact your [facility] or call [phone] for assistance.        Care EveryWhere ID     This is your Care EveryWhere ID. This could be used by other organizations to access your [place] medical records  [id number]        Your Vitals Were     Pulse BMI (Body Mass Index)                90 23.81 kg/m2           Blood Pressure from Last 3 Encounters:   11/07/17 139/86   11/06/17 150/90   10/20/17 153/87    Weight from Last 3 Encounters:   11/07/17 77.4 kg (170 lb 11.2 oz)   11/06/17 78.6 kg (173 lb 4.5 oz)   10/20/17 79.1 kg (174 lb 6.4 oz)               Primary Care Provider Office Phone # Fax #    Jose Luis Slava Nielson [phone] [address] [facility] [address]        Equal Access to Services     SHIVA VELA : Hadii aad ku hadasho Soomaali, waaxda luqadaha, qaybta kaalmada adeegyada, terri ace . So [facility] [phone].    ATENCIÓN: Si habla español, tiene a becerra disposición servicios gratuitos de asistencia lingüística. Llame al [phone].    We comply with applicable federal civil rights laws and [place] laws. We do not discriminate on the basis of race, color, national origin, age, disability, sex, sexual orientation, or gender identity.            Thank you!     Thank you for choosing [facility] UROLOGY AND [facility] FOR PROSTATE AND UROLOGIC CANCERS  for your care. Our goal is always to provide you with excellent care. Hearing back from our patients is one way we can continue to improve our services. Please take a few minutes to complete the written survey that you may receive in the mail after your visit with us. Thank you!             Your Updated Medication List - Protect others around you: Learn how to safely use, store and throw away your medicines at www.disposemymeds.org.          This list is accurate as of: 11/7/17 11:02 AM.  Always use your most recent  med list.                   Brand Name Dispense Instructions for use Diagnosis    acetaminophen 325 MG tablet    TYLENOL    100 tablet    Take 2 tablets (650 mg) by mouth every 4 hours as needed for other (mild pain)    Urinary retention       ciprofloxacin 500 MG tablet    CIPRO    6 tablet    Take 1 tablet (500 mg) by mouth 2 times daily for 3 days        DAILY HERBS PROSTATE PO      Take 1 tablet by mouth daily        Fish Oil 500 MG Caps      Take 1 capsule by mouth daily        ibuprofen 600 MG tablet    ADVIL/MOTRIN    30 tablet    Take 1 tablet (600 mg) by mouth every 6 hours as needed for other (For mild pain and temperature greater than 102F)    Urinary retention       LORazepam 1 MG tablet    ATIVAN    10 tablet    Take 1 tablet (1 mg) by mouth every 8 hours as needed for anxiety    Anxiety, Episodic mood disorder (H)       magnesium 250 MG tablet      Take 1 tablet by mouth nightly as needed (sleep)        MULTIVITAMIN TABS   OR      1 TABLET EVETRY DAY        QUEtiapine 50 MG tablet    SEROQUEL    30 tablet    Take 1 tablet (50 mg) by mouth nightly as needed    Episodic mood disorder (H), Anxiety       tolterodine 2 MG tablet    DETROL    6 tablet    Take 1 tablet (2 mg) by mouth 2 times daily    Urinary retention

## 2017-11-14 ENCOUNTER — OFFICE VISIT (OUTPATIENT)
Dept: PSYCHOLOGY | Facility: CLINIC | Age: 62
End: 2017-11-14
Payer: COMMERCIAL

## 2017-11-14 DIAGNOSIS — F90.8 ATTENTION DEFICIT HYPERACTIVITY DISORDER (ADHD), OTHER TYPE: Primary | ICD-10-CM

## 2017-11-14 DIAGNOSIS — F33.0 MAJOR DEPRESSIVE DISORDER, RECURRENT EPISODE, MILD (H): ICD-10-CM

## 2017-11-14 PROCEDURE — 90834 PSYTX W PT 45 MINUTES: CPT | Performed by: SOCIAL WORKER

## 2017-11-14 ASSESSMENT — PATIENT HEALTH QUESTIONNAIRE - PHQ9
5. POOR APPETITE OR OVEREATING: SEVERAL DAYS
SUM OF ALL RESPONSES TO PHQ QUESTIONS 1-9: 7

## 2017-11-14 ASSESSMENT — ANXIETY QUESTIONNAIRES
1. FEELING NERVOUS, ANXIOUS, OR ON EDGE: MORE THAN HALF THE DAYS
6. BECOMING EASILY ANNOYED OR IRRITABLE: SEVERAL DAYS
7. FEELING AFRAID AS IF SOMETHING AWFUL MIGHT HAPPEN: SEVERAL DAYS
2. NOT BEING ABLE TO STOP OR CONTROL WORRYING: MORE THAN HALF THE DAYS
5. BEING SO RESTLESS THAT IT IS HARD TO SIT STILL: NOT AT ALL
3. WORRYING TOO MUCH ABOUT DIFFERENT THINGS: MORE THAN HALF THE DAYS
GAD7 TOTAL SCORE: 9

## 2017-11-14 NOTE — PROGRESS NOTES
"                                             Progress Note    Client Name: Govind Way  Date: 11/14/17         Service Type: Individual      Session Start Time: 3:30 pm  Session End Time: 4:15 pm      Session Length: 45 mins     Session #: 6     Attendees: Client attended alone    Treatment Plan Last Reviewed: 9/18/17  PHQ-9 / WAGNER-7 : 14/9     DATA      Progress Since Last Session (Related to Symptoms / Goals / Homework):   Symptoms: Worsening    Homework: Did not complete       Episode of Care Goals: No improvement - PRECONTEMPLATION (Not seeing need for change); Intervened by educating the patient about the effects of current behavior on health.  Evoked information about reasons to continue behavior, express concern / recommendations, and explored any change talk     Current / Ongoing Stressors and Concerns:      Had some setbacks with prostate surgery, bloody urine; doctor requested for client to rest for another 2 weeks. Labs came back negative for cancer, feeling some relief--more testing in the future, \"Body is changing and I am trying to re-learn things.\" \"I have to slow down with some things and have to re-evaluate some things in my life.\" Client reports he is taking time to enjoy his meals, reading what he wants, and learning to prioritize his concerns. He explains that his son Phan wants to go to [facility] with his friend; client says he is concern son will get into problems with drugs and the law particularly b/c of his friend; feeling unsure how to convince son not to go. Writer and client did play role plays on how client would convince him; client realizing that son is set on going. Writer and client then shifted role play around what message client would want to express to son that would be welcomed and yet include client's concerns; walked thru different scenarios, pros and cons for son taking the trip--client was able to identify ways trip could benefit son thru client's past experiences in a " different countries.     Treatment Objective(s) Addressed in This Session:   Identify negative self-talk and behaviors: challenge core beliefs, myths, and actions  Improve concentration, focus, and mindfulness in daily activities        Intervention:   CBT: Emotion identification in client and possibly in son's response, behavioral reaction to emotion  Motivational Interviewing: open-ended questions, affirming client's ability to process and prioritize concerns, reflective listening emphasizing strengths in ability to change, choice and control over reactions        ASSESSMENT: Current Emotional / Mental Status (status of significant symptoms):   Risk status (Self / Other harm or suicidal ideation)   Client denies current fears or concerns for personal safety.   Client denies current or recent suicidal ideation or behaviors.   Client denies current or recent homicidal ideation or behaviors.   Client denies current or recent self injurious behavior or ideation.   Client denies other safety concerns.   A safety and risk management plan has not been developed at this time, however client was given the after-hours number / 911 should there be a change in any of these risk factors.     Appearance:   Appropriate    Eye Contact:   Good    Psychomotor Behavior: Normal    Attitude:   Cooperative    Orientation:   All   Speech    Rate / Production: Hyperverbal     Volume:  Normal    Mood:    Anxious    Affect:    Worrisome    Thought Content:  Clear    Thought Form:  Coherent  Logical    Insight:    Fair      Medication Review:   No current psychiatric medications prescribed     Medication Compliance:   NA     Changes in Health Issues:   Yes: went thru surgery on prostate 10/19/17. Client ended up going to ER due to bloody urine, on leave from work.     Chemical Use Review:   Substance Use: Chemical use reviewed, no active concerns identified      Tobacco Use: No current tobacco use.       Collateral Reports Completed:   Not  Applicable    PLAN: (Client Tasks / Therapist Tasks / Other)  Client: Explore having conversation with son from different angles, focus on message client wants to delivery that's focused around love and support and yet also addressing concerns.      Xander Salas, MSMARILIN [facility]                     November 14, 2017  Note reviewed and clinical supervision by MIKE Snowden [facility] 11/28/2017  ________________________________________________________________________    Treatment Plan    Client's Name: Govind Way  YOB: 1955    Date: 9/18/17    DSM-V Diagnoses: Attention-Deficit/Hyperactivity Disorder  314.01 (F90.2) Combined presentation, 296.31   (F33.0) Major Depressive Disorder, Recurrent Episode, Mild _ and With anxious distress  Substance-Related & Addictive Disorders Alcohol Use Disorder   303.90 (F10.20) Moderate  Psychosocial / Contextual Factors: Client is working full-time and splitting his time between taking care of his aging parents and disabled daughter, managing his relationship with his partner and taking care of his medical needs. He reports experiencing stress around work with the number of changes in management, current renovations and different types of personalities. His first partner back in high school completed suicide--client was the last one to see him. His daughter has a disability and was in and out of the hospital for 2 years; he gave her one of his kidneys in order to save her life.   WHODAS: 24    Referral / Collaboration:  Referral to another professional/service is not indicated at this time.    Anticipated number of session or this episode of care: 12      MeasurableTreatment Goal(s) related to diagnosis / functional impairment(s)  Goal 1: Client will develop coping skills to effectively manage attention issues.    I will know I've met my goal when I am not overwhelmed with making time with everyone.      Objective #A (Client Action)    Client will identify and use 3  strategies to improve organization.  Status: New - Date: 9/18/17     Intervention(s)  Therapist will teach the client how to complete a 4-part pros and cons. In decision making.    Goal 2: Client will decrease my depressed mood.    I will know I've met my goal when I can feel happy and at ease with where I am at.      Objective #A (Client Action)    Status: New - Date: 9/18/17     Client will Increase interest, engagement, and pleasure in doing things  Decrease frequency and intensity of feeling down, depressed, hopeless.    Intervention(s)  Therapist will teach the client how to perform a behavioral chain analysis. Identifying and understanding negative thoughts and changing route of those thoughts into positives.    Objective #B  Client will Feel less tired and more energy during the day   Improve diet, appetite, mindful eating, and / or meal planning.    Status: New - Date: 9/18/17     Intervention(s)  Therapist will assign homework Participate in mindful activity daily for at least 10 mins/day  teach distraction skills. stopping negative thoughts with mindful activity .    Objective #C  Client will improve communication with partner and family members.  Status: New - Date: 9/18/17     Intervention(s)  Therapist will role-play effective communication skills  teach about healthy boundaries. Setting rules and expectations for self and others around you.      Goal 3: Client will develop skills to manage my drinking habits.    I will know I've met my goal when I no longer feel guilty about my drinking.      Objective #A (Client Action)    Status: New - Date: 9/18/17     Client will increase understanding on the impacts of alcohol on mental and physical health.    Intervention(s)  Therapist will provide educational materials on alcohol on mental and physical health.    Objective #B  Client will develop problem-solving skills to limit number of drinks consumed..    Status: New - Date: 9/18/17      Intervention(s)  Therapist will role-play conflict management  teach rules for taking a timeout. Understanding the consequences for drinking and setting limits with self and others around drinking.      Client have not reviewed nor agreed to the above plan. Goals were set, however still need to review the objectives.      MIKE Bolton [facility]  October 3, 2017    Note reviewed and clinical supervision by MIKE Snowden [facility] 11/28/2017

## 2017-11-14 NOTE — MR AVS SNAPSHOT
MRN:[id number]                      After Visit Summary   11/14/2017    Govind Way    MRN: [id number]           Visit Information        Provider Department      11/14/2017 3:30 PM Xander Salas LGSW [facility]      Your next 10 appointments already scheduled     Dec 05, 2017  3:30 PM CST   Return Visit with PAULINA Bolton   [facility] ([facility])    [facility]  [address]  [facility] [address]   [phone]            Dec 19, 2017  8:15 AM CST   LAB with  LAB   [facility] ([facility])    [address] [place] [id number] [phone]           Please do not eat 10-12 hours before your appointment if you are coming in fasting for labs on lipids, cholesterol, or glucose (sugar). This does not apply to pregnant women. Water, hot tea and black coffee (with nothing added) are okay. Do not drink other fluids, diet soda or chew gum.            Dec 19, 2017  8:30 AM CST   US TESTICULAR AND SCROTUM with UCUS1   [facility] US ([facility])    [address] [place] [id number] [phone]           Please bring a list of your medicines (including vitamins, minerals and over-the-counter drugs). Also, tell your doctor about any allergies you may have. Wear comfortable clothes and leave your valuables at home.  You do not need to do anything special to prepare for your exam.  Please call the Imaging Department at your exam site with any questions.            Dec 19, 2017  9:30 AM CST   (Arrive by 9:15 AM)   Return Visit with Manolo Cardenas MD   [facility] and [facility] for Prostate and Urologic Cancers ([facility])    [address] [place] [id number] [phone]              MyChart Information     [employer]ers gives you secure  access to your electronic health record. If you see a primary care provider, you can also send messages to your care team and make appointments. If you have questions, please call your primary care clinic.  If you do not have a primary care provider, please call [phone] and they will assist you.        Care EveryWhere ID     This is your Care EveryWhere ID. This could be used by other organizations to access your [place] medical records  [id number]        Equal Access to Services     SHIVA VELA : David Smith, jan pierce, lavern kaalmaria luisa sales, terri avila. So [facility] [phone].    ATENCIÓN: Si habla español, tiene a becerra disposición servicios gratuitos de asistencia lingüística. Llame al [phone].    We comply with applicable federal civil rights laws and [place] laws. We do not discriminate on the basis of race, color, national origin, age, disability, sex, sexual orientation, or gender identity.

## 2017-11-15 ASSESSMENT — ANXIETY QUESTIONNAIRES: GAD7 TOTAL SCORE: 9

## 2017-11-24 ENCOUNTER — PRE VISIT (OUTPATIENT)
Dept: UROLOGY | Facility: CLINIC | Age: 62
End: 2017-11-24

## 2017-12-05 ENCOUNTER — OFFICE VISIT (OUTPATIENT)
Dept: PSYCHOLOGY | Facility: CLINIC | Age: 62
End: 2017-12-05
Payer: COMMERCIAL

## 2017-12-05 DIAGNOSIS — F90.8 ATTENTION DEFICIT HYPERACTIVITY DISORDER (ADHD), OTHER TYPE: ICD-10-CM

## 2017-12-05 DIAGNOSIS — F33.0 MAJOR DEPRESSIVE DISORDER, RECURRENT EPISODE, MILD (H): Primary | ICD-10-CM

## 2017-12-05 DIAGNOSIS — F10.20 ALCOHOL USE DISORDER, MODERATE, IN CONTROLLED ENVIRONMENT (H): ICD-10-CM

## 2017-12-05 PROCEDURE — 90834 PSYTX W PT 45 MINUTES: CPT | Performed by: SOCIAL WORKER

## 2017-12-05 ASSESSMENT — ANXIETY QUESTIONNAIRES
3. WORRYING TOO MUCH ABOUT DIFFERENT THINGS: NOT AT ALL
2. NOT BEING ABLE TO STOP OR CONTROL WORRYING: SEVERAL DAYS
6. BECOMING EASILY ANNOYED OR IRRITABLE: SEVERAL DAYS
GAD7 TOTAL SCORE: 4
5. BEING SO RESTLESS THAT IT IS HARD TO SIT STILL: SEVERAL DAYS
7. FEELING AFRAID AS IF SOMETHING AWFUL MIGHT HAPPEN: NOT AT ALL
1. FEELING NERVOUS, ANXIOUS, OR ON EDGE: SEVERAL DAYS

## 2017-12-05 ASSESSMENT — PATIENT HEALTH QUESTIONNAIRE - PHQ9
5. POOR APPETITE OR OVEREATING: NOT AT ALL
SUM OF ALL RESPONSES TO PHQ QUESTIONS 1-9: 2

## 2017-12-05 NOTE — MR AVS SNAPSHOT
MRN:[id number]                      After Visit Summary   12/5/2017    Govind Way    MRN: [id number]           Visit Information        Provider Department      12/5/2017 3:30 PM Xander Salas LGSW [facility] Generic      Your next 10 appointments already scheduled     Dec 19, 2017  8:15 AM CST   LAB with  LAB   [facility] ([facility])    [address] [place] [id number] [phone]           Please do not eat 10-12 hours before your appointment if you are coming in fasting for labs on lipids, cholesterol, or glucose (sugar). This does not apply to pregnant women. Water, hot tea and black coffee (with nothing added) are okay. Do not drink other fluids, diet soda or chew gum.            Dec 19, 2017  8:30 AM CST   US TESTICULAR AND SCROTUM with US1   [facility] US ([facility])    [address] [place] [address] [id number] [phone]           Please bring a list of your medicines (including vitamins, minerals and over-the-counter drugs). Also, tell your doctor about any allergies you may have. Wear comfortable clothes and leave your valuables at home.  You do not need to do anything special to prepare for your exam.  Please call the Imaging Department at your exam site with any questions.            Dec 19, 2017  9:30 AM CST   (Arrive by 9:15 AM)   Return Visit with Manolo Cardenas MD   [facility] and [facility] ([facility])    [address] [place] [id number] [phone]            Dec 21, 2017  3:30 PM CST   Return Visit with PAULINA Bolton   [facility] ([facility])    [facility]  [address]  [address]  [facility] [id number] [phone]              MyChart Information     [employer] gives you secure  access to your electronic health record. If you see a primary care provider, you can also send messages to your care team and make appointments. If you have questions, please call your primary care clinic.  If you do not have a primary care provider, please call [phone] and they will assist you.        Care EveryWhere ID     This is your Care EveryWhere ID. This could be used by other organizations to access your [place] medical records  [id number]        Equal Access to Services     SHIVA VELA : David Smith, jan pierce, lavern kaalmaria luisa sales, terri avila. So [facility] [phone].    ATENCIÓN: Si habla español, tiene a becerra disposición servicios gratuitos de asistencia lingüística. Llame al [phone].    We comply with applicable federal civil rights laws and [place] laws. We do not discriminate on the basis of race, color, national origin, age, disability, sex, sexual orientation, or gender identity.

## 2017-12-05 NOTE — PROGRESS NOTES
Progress Note    Client Name: Govind Way  Date: 12/5/2017         Service Type: Individual      Session Start Time: 3:30 pm  Session End Time: 4:15 pm      Session Length: 45 mins     Session #: 7     Attendees: Client attended alone    Treatment Plan Last Reviewed: 9/18/17  PHQ-9 / WAGNER-7 : 2/4     DATA      Progress Since Last Session (Related to Symptoms / Goals / Homework):   Symptoms: Improved-calmer, more relaxed about situations.    Homework: Achieved / completed to satisfaction-expressed concerns with son in caring manner       Episode of Care Goals: Minimal progress - ACTION (Actively working towards change); Intervened by reinforcing change plan / affirming steps taken     Current / Ongoing Stressors and Concerns:      Client started back at work last week, Partner is currently in [place] and son is in hospitals. He says that mood has improved, he is trying to give more attention to the positive moments in his life. He acknowledged having ongoing stressors; however can only focus on certain things at a time, and some things he can let go of. He is concern about his parents b/c they are getting older; feeling some uncertainty on how he will manage it.     Treatment Objective(s) Addressed in This Session:   Identify negative self-talk and behaviors: challenge core beliefs, myths, and actions  Improve concentration, focus, and mindfulness in daily activities        Intervention:   CBT: Recognizing how change in views can shift emotional response to situations  Motivational Interviewing: open-ended questions around making changes and seeing changes; affirming strength in ability to have difficult conversations and let go of worries that are no longer useful, reflecting on improvement in mood and ongoing challenges        ASSESSMENT: Current Emotional / Mental Status (status of significant symptoms):   Risk status (Self / Other harm or suicidal ideation)   Client  denies current fears or concerns for personal safety.   Client denies current or recent suicidal ideation or behaviors.   Client denies current or recent homicidal ideation or behaviors.   Client denies current or recent self injurious behavior or ideation.   Client denies other safety concerns.   A safety and risk management plan has not been developed at this time, however client was given the after-hours number / 911 should there be a change in any of these risk factors.     Appearance:   Appropriate    Eye Contact:   Good    Psychomotor Behavior: Normal    Attitude:   Cooperative    Orientation:   All   Speech    Rate / Production: Hyperverbal     Volume:  Normal    Mood:    Normal   Affect:    Appropriate  Bright    Thought Content:  Clear    Thought Form:  Coherent  Logical    Insight:    Good      Medication Review:   No current psychiatric medications prescribed     Medication Compliance:   NA     Changes in Health Issues:   None reported. Client is feeling better after surgery     Chemical Use Review:   Substance Use: Chemical use reviewed, no active concerns identified      Tobacco Use: No current tobacco use.       Collateral Reports Completed:   Not Applicable    PLAN: (Client Tasks / Therapist Tasks / Other)  Client: Focus on positive moments each night before sleeping, taking a step back to re-evaluate situation before reasponding      MIKE Bolton [facility]                     December 5, 2017  Note reviewed and clinical supervision by MIKE Snowden [facility] 12/11/2017    ________________________________________________________________________    Treatment Plan    Client's Name: Govind Way  YOB: 1955    Date: 9/18/17    DSM-V Diagnoses: Attention-Deficit/Hyperactivity Disorder  314.01 (F90.2) Combined presentation, 296.31   (F33.0) Major Depressive Disorder, Recurrent Episode, Mild _ and With anxious distress  Substance-Related & Addictive Disorders Alcohol Use Disorder    303.90 (F10.20) Moderate  Psychosocial / Contextual Factors: Client is working full-time and splitting his time between taking care of his aging parents and disabled daughter, managing his relationship with his partner and taking care of his medical needs. He reports experiencing stress around work with the number of changes in management, current renovations and different types of personalities. His first partner back in high school completed suicide--client was the last one to see him. His daughter has a disability and was in and out of the hospital for 2 years; he gave her one of his kidneys in order to save her life.   WHODAS: 22    Referral / Collaboration:  Referral to another professional/service is not indicated at this time.    Anticipated number of session or this episode of care: 12      MeasurableTreatment Goal(s) related to diagnosis / functional impairment(s)  Goal 1: Client will develop coping skills to effectively manage attention issues.    I will know I've met my goal when I am not overwhelmed with making time with everyone.      Objective #A (Client Action)    Client will identify and use 3 strategies to improve organization.  Status: New - Date: 9/18/17     Intervention(s)  Therapist will teach the client how to complete a 4-part pros and cons. In decision making.    Goal 2: Client will decrease my depressed mood.    I will know I've met my goal when I can feel happy and at ease with where I am at.      Objective #A (Client Action)    Status: New - Date: 9/18/17     Client will Increase interest, engagement, and pleasure in doing things  Decrease frequency and intensity of feeling down, depressed, hopeless.    Intervention(s)  Therapist will teach the client how to perform a behavioral chain analysis. Identifying and understanding negative thoughts and changing route of those thoughts into positives.    Objective #B  Client will Feel less tired and more energy during the day   Improve diet,  appetite, mindful eating, and / or meal planning.    Status: New - Date: 9/18/17     Intervention(s)  Therapist will assign homework Participate in mindful activity daily for at least 10 mins/day  teach distraction skills. stopping negative thoughts with mindful activity .    Objective #C  Client will improve communication with partner and family members.  Status: New - Date: 9/18/17     Intervention(s)  Therapist will role-play effective communication skills  teach about healthy boundaries. Setting rules and expectations for self and others around you.      Goal 3: Client will develop skills to manage my drinking habits.    I will know I've met my goal when I no longer feel guilty about my drinking.      Objective #A (Client Action)    Status: New - Date: 9/18/17     Client will increase understanding on the impacts of alcohol on mental and physical health.    Intervention(s)  Therapist will provide educational materials on alcohol on mental and physical health.    Objective #B  Client will develop problem-solving skills to limit number of drinks consumed..    Status: New - Date: 9/18/17     Intervention(s)  Therapist will role-play conflict management  teach rules for taking a timeout. Understanding the consequences for drinking and setting limits with self and others around drinking.      Client have not reviewed nor agreed to the above plan. Goals were set, however still need to review the objectives.      MIKE Bolton [facility]  October 3, 2017    Note reviewed and clinical supervision by MIKE Snowden [facility] 12/11/2017

## 2017-12-06 ASSESSMENT — ANXIETY QUESTIONNAIRES: GAD7 TOTAL SCORE: 4

## 2017-12-18 ENCOUNTER — OFFICE VISIT (OUTPATIENT)
Dept: PSYCHOLOGY | Facility: CLINIC | Age: 62
End: 2017-12-18
Payer: COMMERCIAL

## 2017-12-18 DIAGNOSIS — F33.0 MAJOR DEPRESSIVE DISORDER, RECURRENT EPISODE, MILD (H): ICD-10-CM

## 2017-12-18 DIAGNOSIS — F90.8 ATTENTION DEFICIT HYPERACTIVITY DISORDER (ADHD), OTHER TYPE: Primary | ICD-10-CM

## 2017-12-18 PROCEDURE — 90834 PSYTX W PT 45 MINUTES: CPT | Performed by: SOCIAL WORKER

## 2017-12-18 ASSESSMENT — ANXIETY QUESTIONNAIRES
2. NOT BEING ABLE TO STOP OR CONTROL WORRYING: SEVERAL DAYS
1. FEELING NERVOUS, ANXIOUS, OR ON EDGE: NOT AT ALL
5. BEING SO RESTLESS THAT IT IS HARD TO SIT STILL: NOT AT ALL
GAD7 TOTAL SCORE: 2
7. FEELING AFRAID AS IF SOMETHING AWFUL MIGHT HAPPEN: NOT AT ALL
6. BECOMING EASILY ANNOYED OR IRRITABLE: NOT AT ALL
3. WORRYING TOO MUCH ABOUT DIFFERENT THINGS: SEVERAL DAYS

## 2017-12-18 ASSESSMENT — PATIENT HEALTH QUESTIONNAIRE - PHQ9
5. POOR APPETITE OR OVEREATING: NOT AT ALL
SUM OF ALL RESPONSES TO PHQ QUESTIONS 1-9: 2

## 2017-12-18 NOTE — MR AVS SNAPSHOT
MRN:[id number]                      After Visit Summary   12/18/2017    Govind Way    MRN: [id number]           Visit Information        Provider Department      12/18/2017 3:30 PM Xander Salas LGSW [facility] Generic      Your next 10 appointments already scheduled     Jan 11, 2018  3:30 PM CST   Return Visit with PAULINA Bolton   [facility] ([facility])    [facility]  [address]  [address]  [facility] [phone]              MyChart Information     [employer] gives you secure access to your electronic health record. If you see a primary care provider, you can also send messages to your care team and make appointments. If you have questions, please call your primary care clinic.  If you do not have a primary care provider, please call [phone] and they will assist you.        Care EveryWhere ID     This is your Care EveryWhere ID. This could be used by other organizations to access your [place] medical records  [id number]        Equal Access to Services     SHIVA VELA : Hadii aad ku hadasho Soomaali, waaxda luqadaha, qaybta kaalmada adeegyadavid, terri avila. So [facility] [phone].    ATENCIÓN: Si habla español, tiene a becerra disposición servicios gratuitos de asistencia lingüística. Llame al [phone].    We comply with applicable federal civil rights laws and [place] laws. We do not discriminate on the basis of race, color, national origin, age, disability, sex, sexual orientation, or gender identity.

## 2017-12-18 NOTE — PROGRESS NOTES
"                                             Progress Note    Client Name: Govind Way  Date: 12/18/2017         Service Type: Individual      Session Start Time: 3:30 pm  Session End Time: 4:15 pm      Session Length: 45 mins     Session #: 8     Attendees: Client attended alone    Treatment Plan Last Reviewed: 9/18/17  PHQ-9 / WAGNER-7 : 2/2     DATA      Progress Since Last Session (Related to Symptoms / Goals / Homework):   Symptoms: Stable-Event happened with son in [place]-- has created lots of anxiety, however client is trying to stay calm    Homework: Achieved / completed to satisfaction      Episode of Care Goals: Minimal progress - ACTION (Actively working towards change); Intervened by reinforcing change plan / affirming steps taken     Current / Ongoing Stressors and Concerns:      Client says that some of his coworkers don't do their work; sometimes the issue isn't being addressed by supervisor or manager--this can create some irritability. Client said that son called from [place] and was facing some problem with the tenant and needed money. Client said he felt angry and disappointed however agreed to send over $300 and stated for daughter to tell son that the next call son makes, client is purchasing a plane ticket for client to come home. He said setting boundaries were difficult, however knew that he couldn't financially bail son out when facing challenges.      Treatment Objective(s) Addressed in This Session:   Identify negative self-talk and behaviors: challenge core beliefs, myths, and actions  Improve concentration, focus, and mindfulness in daily activities , discussed role expectations of being an employee and being a father---client states that he nurtures his children and sometimes \"do too much\" which may inhibit their independence, says children are older and are capable of taking care of themselves, realizing he needs to do more for himself now.       Intervention:   CBT: Recognizing how " change in views can shift emotional response to situations  Motivational Interviewing: open-ended questions around making changes and seeing changes; affirming strength in ability to have difficult conversations and let go of worries that are no longer useful, reflecting on improvement in mood and ongoing challenges. Discussed what son might be going thru, building understanding of how client can create a space for son to connect with client.        ASSESSMENT: Current Emotional / Mental Status (status of significant symptoms):   Risk status (Self / Other harm or suicidal ideation)   Client denies current fears or concerns for personal safety.   Client denies current or recent suicidal ideation or behaviors.   Client denies current or recent homicidal ideation or behaviors.   Client denies current or recent self injurious behavior or ideation.   Client denies other safety concerns.   A safety and risk management plan has not been developed at this time, however client was given the after-hours number / 911 should there be a change in any of these risk factors.     Appearance:   Appropriate    Eye Contact:   Good    Psychomotor Behavior: Normal    Attitude:   Cooperative    Orientation:   All   Speech    Rate / Production: Hyperverbal     Volume:  Normal    Mood:    Normal   Affect:    Appropriate  Bright    Thought Content:  Clear    Thought Form:  Coherent  Logical    Insight:    Good      Medication Review:   No current psychiatric medications prescribed     Medication Compliance:   NA     Changes in Health Issues:   None reported.      Chemical Use Review:   Substance Use: Chemical use reviewed, no active concerns identified      Tobacco Use: No current tobacco use.       Collateral Reports Completed:   Not Applicable    PLAN: (Client Tasks / Therapist Tasks / Other)  Client: Focus on positive moments each night before sleeping--the here and now.      MIKE Bolton [facility]                     December 18,  2017  Note reviewed and clinical supervision by MIKE Snowden LICSW 12/22/2017    ________________________________________________________________________    Treatment Plan    Client's Name: Govind Way  YOB: 1955    Date: 9/18/17    DSM-V Diagnoses: Attention-Deficit/Hyperactivity Disorder  314.01 (F90.2) Combined presentation, 296.31   (F33.0) Major Depressive Disorder, Recurrent Episode, Mild _ and With anxious distress  Substance-Related & Addictive Disorders Alcohol Use Disorder   303.90 (F10.20) Moderate  Psychosocial / Contextual Factors: Client is working full-time and splitting his time between taking care of his aging parents and disabled daughter, managing his relationship with his partner and taking care of his medical needs. He reports experiencing stress around work with the number of changes in management, current renovations and different types of personalities. His first partner back in high school completed suicide--client was the last one to see him. His daughter has a disability and was in and out of the hospital for 2 years; he gave her one of his kidneys in order to save her life.   WHODAS: 22    Referral / Collaboration:  Referral to another professional/service is not indicated at this time.    Anticipated number of session or this episode of care: 12      MeasurableTreatment Goal(s) related to diagnosis / functional impairment(s)  Goal 1: Client will develop coping skills to effectively manage attention issues.    I will know I've met my goal when I am not overwhelmed with making time with everyone.      Objective #A (Client Action)    Client will identify and use 3 strategies to improve organization.  Status: New - Date: 9/18/17     Intervention(s)  Therapist will teach the client how to complete a 4-part pros and cons. In decision making.    Goal 2: Client will decrease my depressed mood.    I will know I've met my goal when I can feel happy and at ease with  where I am at.      Objective #A (Client Action)    Status: New - Date: 9/18/17     Client will Increase interest, engagement, and pleasure in doing things  Decrease frequency and intensity of feeling down, depressed, hopeless.    Intervention(s)  Therapist will teach the client how to perform a behavioral chain analysis. Identifying and understanding negative thoughts and changing route of those thoughts into positives.    Objective #B  Client will Feel less tired and more energy during the day   Improve diet, appetite, mindful eating, and / or meal planning.    Status: New - Date: 9/18/17     Intervention(s)  Therapist will assign homework Participate in mindful activity daily for at least 10 mins/day  teach distraction skills. stopping negative thoughts with mindful activity .    Objective #C  Client will improve communication with partner and family members.  Status: New - Date: 9/18/17     Intervention(s)  Therapist will role-play effective communication skills  teach about healthy boundaries. Setting rules and expectations for self and others around you.      Goal 3: Client will develop skills to manage my drinking habits.    I will know I've met my goal when I no longer feel guilty about my drinking.      Objective #A (Client Action)    Status: New - Date: 9/18/17     Client will increase understanding on the impacts of alcohol on mental and physical health.    Intervention(s)  Therapist will provide educational materials on alcohol on mental and physical health.    Objective #B  Client will develop problem-solving skills to limit number of drinks consumed..    Status: New - Date: 9/18/17     Intervention(s)  Therapist will role-play conflict management  teach rules for taking a timeout. Understanding the consequences for drinking and setting limits with self and others around drinking.      Client have not reviewed nor agreed to the above plan. Goals were set, however still need to review the  objectives.      MIKE Bolton [facility]  October 3, 2017      Note reviewed and clinical supervision by MIKE Snowden [facility] 12/22/2017

## 2017-12-19 ENCOUNTER — RADIANT APPOINTMENT (OUTPATIENT)
Dept: ULTRASOUND IMAGING | Facility: CLINIC | Age: 62
End: 2017-12-19
Attending: UROLOGY
Payer: COMMERCIAL

## 2017-12-19 ENCOUNTER — OFFICE VISIT (OUTPATIENT)
Dept: UROLOGY | Facility: CLINIC | Age: 62
End: 2017-12-19
Payer: COMMERCIAL

## 2017-12-19 VITALS
HEART RATE: 66 BPM | BODY MASS INDEX: 23.8 KG/M2 | DIASTOLIC BLOOD PRESSURE: 106 MMHG | HEIGHT: 71 IN | WEIGHT: 170 LBS | SYSTOLIC BLOOD PRESSURE: 154 MMHG

## 2017-12-19 DIAGNOSIS — R35.0 BENIGN PROSTATIC HYPERPLASIA WITH URINARY FREQUENCY: Primary | ICD-10-CM

## 2017-12-19 DIAGNOSIS — N40.1 BENIGN PROSTATIC HYPERPLASIA WITH INCOMPLETE BLADDER EMPTYING: ICD-10-CM

## 2017-12-19 DIAGNOSIS — N50.89 SCROTAL SWELLING: ICD-10-CM

## 2017-12-19 DIAGNOSIS — R39.14 BENIGN PROSTATIC HYPERPLASIA WITH INCOMPLETE BLADDER EMPTYING: ICD-10-CM

## 2017-12-19 DIAGNOSIS — N40.1 BENIGN PROSTATIC HYPERPLASIA WITH URINARY FREQUENCY: Primary | ICD-10-CM

## 2017-12-19 LAB
ALBUMIN UR-MCNC: NEGATIVE MG/DL
APPEARANCE UR: CLEAR
BILIRUB UR QL STRIP: NEGATIVE
COLOR UR AUTO: YELLOW
GLUCOSE UR STRIP-MCNC: NEGATIVE MG/DL
HGB UR QL STRIP: NEGATIVE
KETONES UR STRIP-MCNC: NEGATIVE MG/DL
LEUKOCYTE ESTERASE UR QL STRIP: NEGATIVE
NITRATE UR QL: NEGATIVE
PH UR STRIP: 8 PH (ref 5–7)
PSA SERPL-MCNC: 2.34 UG/L (ref 0–4)
RBC #/AREA URNS AUTO: <1 /HPF (ref 0–2)
SOURCE: ABNORMAL
SP GR UR STRIP: 1.01 (ref 1–1.03)
UROBILINOGEN UR STRIP-MCNC: 0 MG/DL (ref 0–2)
WBC #/AREA URNS AUTO: <1 /HPF (ref 0–2)

## 2017-12-19 ASSESSMENT — PAIN SCALES - GENERAL: PAINLEVEL: NO PAIN (0)

## 2017-12-19 ASSESSMENT — ANXIETY QUESTIONNAIRES: GAD7 TOTAL SCORE: 2

## 2017-12-19 NOTE — LETTER
"12/19/2017       RE: Govind Way  [address]   [facility] [address]     Dear Colleague,    Thank you for referring your patient, Govind Way, to the [facility] UROLOGY AND INST FOR PROSTATE AND UROLOGIC CANCERS at [facility]. Please see a copy of my visit note below.      UROLOGY FOLLOW-UP NOTE          Chief Complaint:   Today I had the pleasure of seeing Mr. Govind Way in follow-up for a chief complaint of BPH.         Interval Update    Govind Way is a very pleasant 62 year old male     Brief  History:  Mr. Way has a history of BPH with obstructive voiding symptoms. He underwent HoLEP in 10/2017. He was last seen approximately one month ago. At that time, he was noted to have a testicular cyst which has been followed conservatively for over a year.    Today notes:   Overall he is quite satisfied with his voiding. He reports occasional and very minor mixed incontinence, but this is rare and not very bothersome to him. Reports strong stream. He denies any hematuria.    AUA is 8/35, QOL   PVR is 0         Physical Exam:   Patient is a 62 year old  male   Vitals: Blood pressure (!) 154/106, pulse 66, height 1.803 m (5' 11\"), weight 77.1 kg (170 lb).  General Appearance Adult: Alert, no acute distress, oriented  HENT: throat/mouth:normal, good dentition  Neck: No adenopathy,masses or thyromegaly  Lungs: no respiratory distress, or pursed lip breathing  Heart: No obvious jugular venous distension present  Abdomen: soft, nontender, no organomegaly or masses, Body mass index is 23.71 kg/(m^2).  Lymphatics: No cervical or supraclavicular adenopathy  Musculoskeltal: extremities normal, no peripheral edema  Skin: no suspicious lesions or rashes  Neuro: Alert, oriented, speech and mentation normal  Psych: affect and mood normal  Gait: Normal  : left  testicle slightly enlarged, not firm, nontender, no masses      Labs and Pathology:  "   I personally reviewed all applicable laboratory data and went over findings with patient  Significant for:    CBC RESULTS:  Recent Labs   Lab Test  11/06/17   1313  10/11/17   1555  07/28/17   0617  07/27/17   1940   WBC  7.2  8.1  7.0  7.9   HGB  14.4  13.8  14.3  15.2   PLT  230  222  190  209        BMP RESULTS:  Recent Labs   Lab Test  11/06/17   1313  10/11/17   1555  07/28/17   0617  07/27/17   1940   NA  139  139  144  142   POTASSIUM  4.0  3.9  3.9  4.0   CHLORIDE  105  105  109  108   CO2  26  25  27  25   ANIONGAP  8  9  8  9   GLC  83  88  88  104*   BUN  14  22  11  16   CR  0.88  1.17  0.91  1.01   GFRESTIMATED  88  64  85  75   GFRESTBLACK  >90  77  >90   GFR Calc    >90   GFR Calc     BINDU  9.3  8.7  8.3*  9.2       UA RESULTS:   Recent Labs   Lab Test  12/19/17   0904  11/06/17   1202  07/27/17   2200   01/10/12   1621   SG  1.006  1.005  1.020   < >  1.013   URINEPH  8.0*  7.0  7.0   < >  5.0   NITRITE  Negative  Negative  Negative   < >  Negative   RBCU  <1  >182*   --    --   0   WBCU  <1  11*   --    --   <1    < > = values in this interval not displayed.       PSA RESULTS  PSA   Date Value Ref Range Status   12/19/2017 2.34 0 - 4 ug/L Final     Comment:     Assay Method:  Chemiluminescence using Siemens Vista analyzer   11/29/2016 3.29 0 - 4 ug/L Final     Comment:     Assay Method:  Chemiluminescence using Siemens Vista analyzer   06/18/2013 2.54 0 - 4 ug/L Final     Comment:     PSA results are about 7% lower than our prior method due to a methodology   change   on August 30, 2011.   02/14/2012 1.98 0 - 4 ug/L Final     Comment:     PSA results are about 7% lower than our prior method due to a methodology   change   on August 30, 2011.   04/26/2011 2.42 0 - 4 ug/L Final   05/14/2010 1.25 0 - 4 ug/L Final   06/03/2009 1.49 0 - 4 ug/L Final   11/14/2007 0.85 0 - 4 ug/L Final   10/20/2006 0.68 0 - 4 ug/L Final   03/14/2005 0.47 0 - 4 ug/L Final           Imaging:     I personally reviewed all applicable imaging and went over findings with patient.  Significant for US Testicular and Scrotal 12/19/2017:    Findings:  The testes demonstrate normal and symmetric echotexture and  vascularity. No evidence of a focal solid mass lesion. There is  redemonstration of a large intratesticular anechoic avascular cyst on  the left measuring 3.4 x 3.0 x 3.0 cm, previously measuring 2.7 x 2.6  x 2.9 cm. The right testicle measures 3.0 x 3.5 x 5.0 cm and the left  measures 4.0 x 3.4 x 5.3 cm. There is no evidence of torsion.     On the left, the testicular venous plexus measures up to 3.9 mm with  Valsalva, compatible with a small varicocele.     Small right and moderate left hydrocele with mobile echogenic debris,  similar to prior.     Both the right and left epididymis are within normal limits. There is  a small left epididymal head cyst.         Impression:   1.  Slight enlargement of the large benign-appearing left  intratesticular cyst, now measuring up to 3.4 cm compared to 2.9 cm on  12/1/2016. No evidence of acute abnormality such as testicular  torsion.  2.  Small left varicocele.  3.  Moderate left and small right hydroceles with some intraluminal  debris, not significantly changed.             Past Medical History:     Past Medical History:   Diagnosis Date     Adjustment disorder      Dysthymic disorder      Enlarged prostate      Hepatitis      Irregular heart beat     intermittant palpitations     Palpitations     intermittent     PONV (postoperative nausea and vomiting)      Positive PPD      Pure hypercholesterolemia 1/00     Renal disease     single R kidney;donated left     Screening examination for pulmonary tuberculosis     positive mantoux     Unspecified essential hypertension 1/00            Past Surgical History:     Past Surgical History:   Procedure Laterality Date     C ECG MONITOR/ 24 HRS, ORIG ECG, W VIS SUPERIMPOS SCAN, COMPLETE  1/00    um holter monitor     C  REMV KIDNEY/URETER,SAME INCIS  6/30/05    Nephrouretectomy/LEFT KIDNEY DONATED TO DAUGHTER/[facility]     HC REMOVAL OF TONSILS,<11 Y/O  age 7     HC VASECTOMY UNILAT/BILAT W POSTOP SEMEN  age 41     HERNIORRHAPHY INGUINAL  12/23/2011    Procedure:HERNIORRHAPHY INGUINAL; Surgeon:JULIA SIERRA; Location:[facility] OR     LAPAROSCOPIC HERNIORRHAPHY INGUINAL Right 6/29/2015    Procedure: LAPAROSCOPIC HERNIORRHAPHY INGUINAL;  Surgeon: García Ibarra MD;  Location: [facility]     LAPAROSCOPIC HERNIORRHAPHY VENTRAL  12/23/2011    Procedure:LAPAROSCOPIC HERNIORRHAPHY VENTRAL; Laparoscopic Ventral Hernia Repair with Mesh, Open Left Inguinal Hernia Repair with Mesh; Surgeon:JULIA SIERRA; Location:[facility] OR     LASER HOLMIUM ENUCLEATION PROSTATE N/A 10/19/2017    Procedure: LASER HOLMIUM ENUCLEATION PROSTATE;  Holmium Laser Enculeation of the Prostate;  Surgeon: Manolo Cardenas MD;  Location: [facility] OR     LASIK CUSTOMVUE BILATERAL  11/11/2011    Procedure:LASIK CUSTOMVUE BILATERAL; BILATERAL CUSTOMVUE LASIK WITH INTRALASE; Surgeon:POP SIMON; [facility]            Medications     Current Outpatient Prescriptions   Medication     acetaminophen (TYLENOL) 325 MG tablet     ibuprofen (ADVIL/MOTRIN) 600 MG tablet     tolterodine (DETROL) 2 MG tablet     QUEtiapine (SEROQUEL) 50 MG tablet     LORazepam (ATIVAN) 1 MG tablet     magnesium 250 MG tablet     Misc Natural Products (DAILY HERBS PROSTATE PO)     Omega-3 Fatty Acids (FISH OIL) 500 MG CAPS     MULTIVITAMIN TABS   OR     No current facility-administered medications for this visit.             Family History:     Family History   Problem Relation Age of Onset     DIABETES Mother      ADULT ONSET     Hypertension Mother      Coronary Artery Disease Mother      Also father and grandparent.  Father had coronary bypass and aortic valve replacement surgery     Heart Surgery Mother      Aortic valve     CANCER Other      prostate cancer      Thyroid Disease Other      Hypertension Father      Skin Cancer Other      Mother and father, grandfather     Arthritis Other      Mom, daughter, grandparents     Other - See Comments Other      Grandfather with kidney stone     Prostate Cancer Other      Grandfather     KIDNEY DISEASE Daughter      Lupus Daughter      Causing end-stage renal disease     Eye Disorder No family hx of      NONE KNOWN     Hyperlipidemia No family hx of      CEREBROVASCULAR DISEASE No family hx of      Breast Cancer No family hx of      Colon Cancer No family hx of      Other Cancer No family hx of      Depression No family hx of      Anxiety Disorder No family hx of      MENTAL ILLNESS No family hx of      Substance Abuse No family hx of      Anesthesia Reaction No family hx of      Asthma No family hx of      OSTEOPOROSIS No family hx of      Genetic Disorder No family hx of      Obesity No family hx of      Unknown/Adopted No family hx of             Social History:     Social History     Social History     Marital status: Legally      Spouse name: N/A     Number of children: N/A     Years of education: N/A     Occupational History     Not on file.     Social History Main Topics     Smoking status: Former Smoker     Quit date: 1/1/1982     Smokeless tobacco: Never Used      Comment: NO 2ND HAND SMOKE     Alcohol use 0.0 oz/week     0 Standard drinks or equivalent per week      Comment: 5/wk     Drug use: No     Sexual activity: Yes     Partners: Male     Other Topics Concern     Parent/Sibling W/ Cabg, Mi Or Angioplasty Before 65f 55m? No     Social History Narrative    , two children.            Allergies:   Ambien [zolpidem tartrate] and Codeine sulfate         Review of Systems:  From intake questionnaire   Negative 14 system review except as noted on HPI, nurse's note.           Assessment/Plan   61 yo M w/ hx of BPH s/p HoLEP, stable left testicular cyst  - From a BPH/urinary retention standpoint, he is voiding  well at this time with marked improvement in PVR  - At this point in time left testicualr cyst appears benign, possibly related to prior hx of vasectomy.  In the absence of symptoms he elects to observe.  We did discuss the possibility of drainage or surgery but prefers to continue observation for now.  Will continue with periodic US surveilance.  He will call sooner if symptoms.    Scribe Disclosure:   ILynn, am serving as a scribe; to document services personally performed by Manolo Cardenas MD based on data collection and the provider's statements to me.     IManolo saw and evaluated this patient and agree with the plan as stated above    >15 minutes were spent face to face with patient over half of which was spent providing medical counseling regarding BPH, testicular cysts.    Again, thank you for allowing me to participate in the care of your patient.      Sincerely,    Manolo Cardenas MD      CC:  Jose Luis Nielson

## 2017-12-19 NOTE — MR AVS SNAPSHOT
After Visit Summary   12/19/2017    Govind Way    MRN: [id number]           Patient Information     Date Of Birth          1955        Visit Information        Provider Department      12/19/2017 9:30 AM Manolo Cardenas MD [facility] Urology and [facility] for Prostate and Urologic Cancers        Today's Diagnoses     Benign prostatic hyperplasia with urinary frequency    -  1      Care Instructions    Follow up with Dr. Cardenas as needed.    It was a pleasure meeting with you today.  Thank you for allowing me and my team the privilege of caring for you today.  YOU are the reason we are here, and I truly hope we provided you with the excellent service you deserve.  Please let us know if there is anything else we can do for you so that we can be sure you are leaving completely satisfied with your care experience.              Follow-ups after your visit        Your next 10 appointments already scheduled     Jan 11, 2018  3:30 PM CST   Return Visit with PAULINA Bolton   [facility] ([address] [address] [phone]              Who to contact     Please call your clinic at [phone] to:    Ask questions about your health    Make or cancel appointments    Discuss your medicines    Learn about your test results    Speak to your doctor   If you have compliments or concerns about an experience at your clinic, or if you wish to file a complaint, please contact [facility] Patient Relations at [phone] or email us at [email or web address]         Additional Information About Your Visit        MyChart Information     [employer] gives you secure access to your electronic health record. If you see a primary care provider, you can also send messages to your care team and make appointments. If you have questions, please call your primary care clinic.  If you do  "not have a primary care provider, please call [phone] and they will assist you.      [employer] is an electronic gateway that provides easy, online access to your medical records. With [employer], you can request a clinic appointment, read your test results, renew a prescription or communicate with your care team.     To access your existing account, please contact your [facility] or call [phone] for assistance.        Care EveryWhere ID     This is your Care EveryWhere ID. This could be used by other organizations to access your [facility] medical records  [id number]        Your Vitals Were     Pulse Height BMI (Body Mass Index)             66 1.803 m (5' 11\") 23.71 kg/m2          Blood Pressure from Last 3 Encounters:   12/19/17 (!) 154/106   11/07/17 139/86   11/06/17 150/90    Weight from Last 3 Encounters:   12/19/17 77.1 kg (170 lb)   11/07/17 77.4 kg (170 lb 11.2 oz)   11/06/17 78.6 kg (173 lb 4.5 oz)              Today, you had the following     No orders found for display       Primary Care Provider Office Phone # Fax #    Jose Luis Slava Nielson [phone] [address] [facility] [address]        Equal Access to Services     SHIVA VELA : Hadii aad ku hadasho Soomaali, waaxda luqadaha, qaybta kaalmada adeegyada, waxelidia idiin hayaan olivia ace . So [facility] [phone].    ATENCIÓN: Si habla español, tiene a becerra disposición servicios gratuitos de asistencia lingüística. Llame al [phone].    We comply with applicable federal civil rights laws and [place] laws. We do not discriminate on the basis of race, color, national origin, age, disability, sex, sexual orientation, or gender identity.            Thank you!     Thank you for choosing [facility] UROLOGY AND [facility] FOR PROSTATE AND UROLOGIC CANCERS  for your care. Our goal is always to provide you with excellent care. Hearing back from our patients is one way we can continue to " improve our services. Please take a few minutes to complete the written survey that you may receive in the mail after your visit with us. Thank you!             Your Updated Medication List - Protect others around you: Learn how to safely use, store and throw away your medicines at www.disposemymeds.org.          This list is accurate as of: 12/19/17 11:59 PM.  Always use your most recent med list.                   Brand Name Dispense Instructions for use Diagnosis    acetaminophen 325 MG tablet    TYLENOL    100 tablet    Take 2 tablets (650 mg) by mouth every 4 hours as needed for other (mild pain)    Urinary retention       DAILY HERBS PROSTATE PO      Take 1 tablet by mouth daily        Fish Oil 500 MG Caps      Take 1 capsule by mouth daily        ibuprofen 600 MG tablet    ADVIL/MOTRIN    30 tablet    Take 1 tablet (600 mg) by mouth every 6 hours as needed for other (For mild pain and temperature greater than 102F)    Urinary retention       LORazepam 1 MG tablet    ATIVAN    10 tablet    Take 1 tablet (1 mg) by mouth every 8 hours as needed for anxiety    Anxiety, Episodic mood disorder (H)       magnesium 250 MG tablet      Take 1 tablet by mouth nightly as needed (sleep)        MULTIVITAMIN TABS   OR      1 TABLET EVETRY DAY        QUEtiapine 50 MG tablet    SEROQUEL    30 tablet    Take 1 tablet (50 mg) by mouth nightly as needed    Episodic mood disorder (H), Anxiety       tolterodine 2 MG tablet    DETROL    6 tablet    Take 1 tablet (2 mg) by mouth 2 times daily    Urinary retention

## 2017-12-19 NOTE — NURSING NOTE
Chief Complaint   Patient presents with     RECHECK     Post holep follow up with imaging/lab prior.     Yanna Kang

## 2017-12-19 NOTE — PATIENT INSTRUCTIONS
Follow up with Dr. Cardenas as needed.    It was a pleasure meeting with you today.  Thank you for allowing me and my team the privilege of caring for you today.  YOU are the reason we are here, and I truly hope we provided you with the excellent service you deserve.  Please let us know if there is anything else we can do for you so that we can be sure you are leaving completely satisfied with your care experience.

## 2017-12-19 NOTE — PROGRESS NOTES
"  UROLOGY FOLLOW-UP NOTE          Chief Complaint:   Today I had the pleasure of seeing Mr. Govind Way in follow-up for a chief complaint of BPH.         Interval Update    Govind Way is a very pleasant 62 year old male     Brief  History:  Mr. Way has a history of BPH with obstructive voiding symptoms. He underwent HoLEP in 10/2017. He was last seen approximately one month ago. At that time, he was noted to have a testicular cyst which has been followed conservatively for over a year.    Today notes:   Overall he is quite satisfied with his voiding. He reports occasional and very minor mixed incontinence, but this is rare and not very bothersome to him. Reports strong stream. He denies any hematuria.    AUA is 8/35, QOL   PVR is 0         Physical Exam:   Patient is a 62 year old  male   Vitals: Blood pressure (!) 154/106, pulse 66, height 1.803 m (5' 11\"), weight 77.1 kg (170 lb).  General Appearance Adult: Alert, no acute distress, oriented  HENT: throat/mouth:normal, good dentition  Neck: No adenopathy,masses or thyromegaly  Lungs: no respiratory distress, or pursed lip breathing  Heart: No obvious jugular venous distension present  Abdomen: soft, nontender, no organomegaly or masses, Body mass index is 23.71 kg/(m^2).  Lymphatics: No cervical or supraclavicular adenopathy  Musculoskeltal: extremities normal, no peripheral edema  Skin: no suspicious lesions or rashes  Neuro: Alert, oriented, speech and mentation normal  Psych: affect and mood normal  Gait: Normal  : left  testicle slightly enlarged, not firm, nontender, no masses      Labs and Pathology:    I personally reviewed all applicable laboratory data and went over findings with patient  Significant for:    CBC RESULTS:  Recent Labs   Lab Test  11/06/17   1313  10/11/17   1555  07/28/17   0617  07/27/17   1940   WBC  7.2  8.1  7.0  7.9   HGB  14.4  13.8  14.3  15.2   PLT  230  222  190  209        BMP RESULTS:  Recent Labs   Lab " Test  11/06/17   1313  10/11/17   1555  07/28/17   0617  07/27/17   1940   NA  139  139  144  142   POTASSIUM  4.0  3.9  3.9  4.0   CHLORIDE  105  105  109  108   CO2  26  25  27  25   ANIONGAP  8  9  8  9   GLC  83  88  88  104*   BUN  14  22  11  16   CR  0.88  1.17  0.91  1.01   GFRESTIMATED  88  64  85  75   GFRESTBLACK  >90  77  >90   GFR Calc    >90   GFR Calc     BINDU  9.3  8.7  8.3*  9.2       UA RESULTS:   Recent Labs   Lab Test  12/19/17   0904  11/06/17   1202  07/27/17   2200   01/10/12   1621   SG  1.006  1.005  1.020   < >  1.013   URINEPH  8.0*  7.0  7.0   < >  5.0   NITRITE  Negative  Negative  Negative   < >  Negative   RBCU  <1  >182*   --    --   0   WBCU  <1  11*   --    --   <1    < > = values in this interval not displayed.       PSA RESULTS  PSA   Date Value Ref Range Status   12/19/2017 2.34 0 - 4 ug/L Final     Comment:     Assay Method:  Chemiluminescence using Siemens Vista analyzer   11/29/2016 3.29 0 - 4 ug/L Final     Comment:     Assay Method:  Chemiluminescence using Siemens Vista analyzer   06/18/2013 2.54 0 - 4 ug/L Final     Comment:     PSA results are about 7% lower than our prior method due to a methodology   change   on August 30, 2011.   02/14/2012 1.98 0 - 4 ug/L Final     Comment:     PSA results are about 7% lower than our prior method due to a methodology   change   on August 30, 2011.   04/26/2011 2.42 0 - 4 ug/L Final   05/14/2010 1.25 0 - 4 ug/L Final   06/03/2009 1.49 0 - 4 ug/L Final   11/14/2007 0.85 0 - 4 ug/L Final   10/20/2006 0.68 0 - 4 ug/L Final   03/14/2005 0.47 0 - 4 ug/L Final           Imaging:    I personally reviewed all applicable imaging and went over findings with patient.  Significant for US Testicular and Scrotal 12/19/2017:    Findings:  The testes demonstrate normal and symmetric echotexture and  vascularity. No evidence of a focal solid mass lesion. There is  redemonstration of a large intratesticular anechoic  avascular cyst on  the left measuring 3.4 x 3.0 x 3.0 cm, previously measuring 2.7 x 2.6  x 2.9 cm. The right testicle measures 3.0 x 3.5 x 5.0 cm and the left  measures 4.0 x 3.4 x 5.3 cm. There is no evidence of torsion.     On the left, the testicular venous plexus measures up to 3.9 mm with  Valsalva, compatible with a small varicocele.     Small right and moderate left hydrocele with mobile echogenic debris,  similar to prior.     Both the right and left epididymis are within normal limits. There is  a small left epididymal head cyst.         Impression:   1.  Slight enlargement of the large benign-appearing left  intratesticular cyst, now measuring up to 3.4 cm compared to 2.9 cm on  12/1/2016. No evidence of acute abnormality such as testicular  torsion.  2.  Small left varicocele.  3.  Moderate left and small right hydroceles with some intraluminal  debris, not significantly changed.             Past Medical History:     Past Medical History:   Diagnosis Date     Adjustment disorder      Dysthymic disorder      Enlarged prostate      Hepatitis      Irregular heart beat     intermittant palpitations     Palpitations     intermittent     PONV (postoperative nausea and vomiting)      Positive PPD      Pure hypercholesterolemia 1/00     Renal disease     single R kidney;donated left     Screening examination for pulmonary tuberculosis     positive mantoux     Unspecified essential hypertension 1/00            Past Surgical History:     Past Surgical History:   Procedure Laterality Date     C ECG MONITOR/ 24 HRS, ORIG ECG, W VIS SUPERIMPOS SCAN, COMPLETE  1/00    um holter monitor     C REMV KIDNEY/URETER,SAME INCIS  6/30/05    Nephrouretectomy/LEFT KIDNEY DONATED TO DAUGHTER/[facility]     HC REMOVAL OF TONSILS,<11 Y/O  age 7     HC VASECTOMY UNILAT/BILAT W POSTOP SEMEN  age 41     HERNIORRHAPHY INGUINAL  12/23/2011    Procedure:HERNIORRHAPHY INGUINAL; Surgeon:JULIA SIERRA; Location:[facility] OR      LAPAROSCOPIC HERNIORRHAPHY INGUINAL Right 6/29/2015    Procedure: LAPAROSCOPIC HERNIORRHAPHY INGUINAL;  Surgeon: García Ibarra MD;  [facility]     LAPAROSCOPIC HERNIORRHAPHY VENTRAL  12/23/2011    Procedure:LAPAROSCOPIC HERNIORRHAPHY VENTRAL; Laparoscopic Ventral Hernia Repair with Mesh, Open Left Inguinal Hernia Repair with Mesh; Surgeon:JULIA SIERRA; Location:[facility] OR     LASER HOLMIUM ENUCLEATION PROSTATE N/A 10/19/2017    Procedure: LASER HOLMIUM ENUCLEATION PROSTATE;  Holmium Laser Enculeation of the Prostate;  Surgeon: Manolo Cardenas MD;  [facility]     LASIK CUSTOMVUE BILATERAL  11/11/2011    Procedure:LASIK CUSTOMVUE BILATERAL; BILATERAL CUSTOMVUE LASIK WITH INTRALASE; Surgeon:POP SIMON; [facility]            Medications     Current Outpatient Prescriptions   Medication     acetaminophen (TYLENOL) 325 MG tablet     ibuprofen (ADVIL/MOTRIN) 600 MG tablet     tolterodine (DETROL) 2 MG tablet     QUEtiapine (SEROQUEL) 50 MG tablet     LORazepam (ATIVAN) 1 MG tablet     magnesium 250 MG tablet     Misc Natural Products (DAILY HERBS PROSTATE PO)     Omega-3 Fatty Acids (FISH OIL) 500 MG CAPS     MULTIVITAMIN TABS   OR     No current facility-administered medications for this visit.             Family History:     Family History   Problem Relation Age of Onset     DIABETES Mother      ADULT ONSET     Hypertension Mother      Coronary Artery Disease Mother      Also father and grandparent.  Father had coronary bypass and aortic valve replacement surgery     Heart Surgery Mother      Aortic valve     CANCER Other      prostate cancer     Thyroid Disease Other      Hypertension Father      Skin Cancer Other      Mother and father, grandfather     Arthritis Other      Mom, daughter, grandparents     Other - See Comments Other      Grandfather with kidney stone     Prostate Cancer Other      Grandfather     KIDNEY DISEASE Daughter      Lupus Daughter      Causing  end-stage renal disease     Eye Disorder No family hx of      NONE KNOWN     Hyperlipidemia No family hx of      CEREBROVASCULAR DISEASE No family hx of      Breast Cancer No family hx of      Colon Cancer No family hx of      Other Cancer No family hx of      Depression No family hx of      Anxiety Disorder No family hx of      MENTAL ILLNESS No family hx of      Substance Abuse No family hx of      Anesthesia Reaction No family hx of      Asthma No family hx of      OSTEOPOROSIS No family hx of      Genetic Disorder No family hx of      Obesity No family hx of      Unknown/Adopted No family hx of             Social History:     Social History     Social History     Marital status: Legally      Spouse name: N/A     Number of children: N/A     Years of education: N/A     Occupational History     Not on file.     Social History Main Topics     Smoking status: Former Smoker     Quit date: 1/1/1982     Smokeless tobacco: Never Used      Comment: NO 2ND HAND SMOKE     Alcohol use 0.0 oz/week     0 Standard drinks or equivalent per week      Comment: 5/wk     Drug use: No     Sexual activity: Yes     Partners: Male     Other Topics Concern     Parent/Sibling W/ Cabg, Mi Or Angioplasty Before 65f 55m? No     Social History Narrative    , two children.            Allergies:   Ambien [zolpidem tartrate] and Codeine sulfate         Review of Systems:  From intake questionnaire   Negative 14 system review except as noted on HPI, nurse's note.           Assessment/Plan   63 yo M w/ hx of BPH s/p HoLEP, stable left testicular cyst  - From a BPH/urinary retention standpoint, he is voiding well at this time with marked improvement in PVR  - At this point in time left testicualr cyst appears benign, possibly related to prior hx of vasectomy.  In the absence of symptoms he elects to observe.  We did discuss the possibility of drainage or surgery but prefers to continue observation for now.  Will continue with  periodic US surveilance.  He will call sooner if symptoms.    Scribe Disclosure:   I,Lynn Shrestha, am serving as a scribe; to document services personally performed by Manolo Cardenas MD based on data collection and the provider's statements to me.     IManolo saw and evaluated this patient and agree with the plan as stated above    CC:  Jose Luis Nielson      >15 minutes were spent face to face with patient over half of which was spent providing medical counseling regarding BPH, testicular cysts.

## 2017-12-29 DIAGNOSIS — N44.2 TESTICULAR CYST: Primary | ICD-10-CM

## 2018-01-17 ENCOUNTER — TELEPHONE (OUTPATIENT)
Dept: FAMILY MEDICINE | Facility: CLINIC | Age: 63
End: 2018-01-17

## 2018-01-17 ENCOUNTER — OFFICE VISIT (OUTPATIENT)
Dept: FAMILY MEDICINE | Facility: CLINIC | Age: 63
End: 2018-01-17
Payer: COMMERCIAL

## 2018-01-17 VITALS
SYSTOLIC BLOOD PRESSURE: 135 MMHG | BODY MASS INDEX: 24.98 KG/M2 | OXYGEN SATURATION: 98 % | TEMPERATURE: 98 F | WEIGHT: 179.1 LBS | HEART RATE: 76 BPM | DIASTOLIC BLOOD PRESSURE: 77 MMHG

## 2018-01-17 DIAGNOSIS — J06.9 UPPER RESPIRATORY TRACT INFECTION, UNSPECIFIED TYPE: ICD-10-CM

## 2018-01-17 DIAGNOSIS — I10 BENIGN ESSENTIAL HYPERTENSION: ICD-10-CM

## 2018-01-17 DIAGNOSIS — F41.1 GAD (GENERALIZED ANXIETY DISORDER): ICD-10-CM

## 2018-01-17 DIAGNOSIS — Z12.11 SCREEN FOR COLON CANCER: Primary | ICD-10-CM

## 2018-01-17 PROCEDURE — 99214 OFFICE O/P EST MOD 30 MIN: CPT | Performed by: NURSE PRACTITIONER

## 2018-01-17 RX ORDER — LISINOPRIL 10 MG/1
10 TABLET ORAL DAILY
COMMUNITY
End: 2018-01-17

## 2018-01-17 RX ORDER — FLUTICASONE PROPIONATE 50 MCG
2 SPRAY, SUSPENSION (ML) NASAL DAILY
Qty: 1 BOTTLE | Refills: 11 | Status: SHIPPED | OUTPATIENT
Start: 2018-01-17

## 2018-01-17 RX ORDER — PREDNISONE 20 MG/1
20 TABLET ORAL DAILY
Qty: 5 TABLET | Refills: 0 | Status: SHIPPED | OUTPATIENT
Start: 2018-01-17 | End: 2018-05-07

## 2018-01-17 RX ORDER — LISINOPRIL 10 MG/1
10 TABLET ORAL DAILY
Qty: 90 TABLET | Refills: 1 | Status: SHIPPED | OUTPATIENT
Start: 2018-01-17 | End: 2018-09-18

## 2018-01-17 NOTE — LETTER
January 17, 2018      Govind Way  [address]   [facility] [address]        To Whom It May Concern:    Govind Way  was seen on 1/17/2018.  Please excuse him on 1/18/2018 and 1/19/2018.         Sincerely,        JEWELS Reveles CNP

## 2018-01-17 NOTE — TELEPHONE ENCOUNTER
Left message for pt to return call to clinic    Needs further triage?  Pt scheduled an appt to be seen today at 430p  has had chest cold for a week    Mague Osman, RN   [facility]

## 2018-01-17 NOTE — MR AVS SNAPSHOT
After Visit Summary   1/17/2018    Govind Way    MRN: [id number]           Patient Information     Date Of Birth          1955        Visit Information        Provider Department      1/17/2018 4:30 PM Pratibha Lane APRN CNP [facility]        Today's Diagnoses     Screen for colon cancer    -  1    Upper respiratory tract infection, unspecified type        Benign essential hypertension           Follow-ups after your visit        Future tests that were ordered for you today     Open Future Orders        Priority Expected Expires Ordered    Fecal colorectal cancer screen FIT - Future (S+30) Routine 2/7/2018 2/16/2018 1/17/2018            Who to contact     If you have questions or need follow up information about today's clinic visit or your schedule please contact [facility] directly at [phone].  Normal or non-critical lab and imaging results will be communicated to you by MyChart, letter or phone within 4 business days after the clinic has received the results. If you do not hear from us within 7 days, please contact the clinic through [employer] or phone. If you have a critical or abnormal lab result, we will notify you by phone as soon as possible.  Submit refill requests through [employer] or call your pharmacy and they will forward the refill request to us. Please allow 3 business days for your refill to be completed.          Additional Information About Your Visit        MyChart Information     [employer] gives you secure access to your electronic health record. If you see a primary care provider, you can also send messages to your care team and make appointments. If you have questions, please call your primary care clinic.  If you do not have a primary care provider, please call [phone] and they will assist you.        Care EveryWhere ID     This is your Care EveryWhere ID. This could be used by other organizations to access your [facility]  medical records  [id number]        Your Vitals Were     Pulse Temperature Pulse Oximetry BMI (Body Mass Index)          76 98  F (36.7  C) (Oral) 98% 24.98 kg/m2         Blood Pressure from Last 3 Encounters:   01/17/18 135/77   12/19/17 (!) 154/106   11/07/17 139/86    Weight from Last 3 Encounters:   01/17/18 179 lb 1.6 oz (81.2 kg)   12/19/17 170 lb (77.1 kg)   11/07/17 170 lb 11.2 oz (77.4 kg)              We Performed the Following     DEPRESSION ACTION PLAN (DAP)          Today's Medication Changes          These changes are accurate as of: 1/17/18  4:57 PM.  If you have any questions, ask your nurse or doctor.               Start taking these medicines.        Dose/Directions    fluticasone 50 MCG/ACT spray   Commonly known as:  FLONASE   Used for:  Upper respiratory tract infection, unspecified type   Started by:  Pratibha Lane APRN CNP        Dose:  2 spray   Spray 2 sprays into both nostrils daily   Quantity:  1 Bottle   Refills:  11       predniSONE 20 MG tablet   Commonly known as:  DELTASONE   Used for:  Upper respiratory tract infection, unspecified type   Started by:  Pratibha Lane APRN CNP        Dose:  20 mg   Take 1 tablet (20 mg) by mouth daily   Quantity:  5 tablet   Refills:  0            Where to get your medicines      These medications were sent to [facility] [place], [place] - [address]  [address], [facility] [address]     Phone:  [phone]     fluticasone 50 MCG/ACT spray    lisinopril 10 MG tablet         Some of these will need a paper prescription and others can be bought over the counter.  Ask your nurse if you have questions.     Bring a paper prescription for each of these medications     predniSONE 20 MG tablet                Primary Care Provider Office Phone # Fax #    Jose Luis Slava Nielson [phone] [address] [facility] [address]        Equal Access to Services     SHIVA VELA AH: David chambers  nathaly Smith, wacarlosda lukittyadaha, qaybta kaalmada henrry, terri shamirin hayaan karissakatey rodgersil laclementesebastien margarita. So [facility] [phone].    ATENCIÓN: Si antoniettala faustina, tiene a becerra disposición servicios gratuitos de asistencia lingüística. Chu al [phone].    We comply with applicable federal civil rights laws and [place] laws. We do not discriminate on the basis of race, color, national origin, age, disability, sex, sexual orientation, or gender identity.            Thank you!     Thank you for choosing [facility]  for your care. Our goal is always to provide you with excellent care. Hearing back from our patients is one way we can continue to improve our services. Please take a few minutes to complete the written survey that you may receive in the mail after your visit with us. Thank you!             Your Updated Medication List - Protect others around you: Learn how to safely use, store and throw away your medicines at www.disposemymeds.org.          This list is accurate as of: 1/17/18  4:57 PM.  Always use your most recent med list.                   Brand Name Dispense Instructions for use Diagnosis    acetaminophen 325 MG tablet    TYLENOL    100 tablet    Take 2 tablets (650 mg) by mouth every 4 hours as needed for other (mild pain)    Urinary retention       DAILY HERBS PROSTATE PO      Take 1 tablet by mouth daily        Fish Oil 500 MG Caps      Take 1 capsule by mouth daily        fluticasone 50 MCG/ACT spray    FLONASE    1 Bottle    Spray 2 sprays into both nostrils daily    Upper respiratory tract infection, unspecified type       ibuprofen 600 MG tablet    ADVIL/MOTRIN    30 tablet    Take 1 tablet (600 mg) by mouth every 6 hours as needed for other (For mild pain and temperature greater than 102F)    Urinary retention       lisinopril 10 MG tablet    PRINIVIL/ZESTRIL    90 tablet    Take 1 tablet (10 mg) by mouth daily    Benign essential hypertension       LORazepam 1 MG tablet     ATIVAN    10 tablet    Take 1 tablet (1 mg) by mouth every 8 hours as needed for anxiety    Anxiety, Episodic mood disorder (H)       magnesium 250 MG tablet      Take 1 tablet by mouth nightly as needed (sleep)        MULTIVITAMIN TABS   OR      1 TABLET EVETRY DAY        predniSONE 20 MG tablet    DELTASONE    5 tablet    Take 1 tablet (20 mg) by mouth daily    Upper respiratory tract infection, unspecified type       QUEtiapine 50 MG tablet    SEROQUEL    30 tablet    Take 1 tablet (50 mg) by mouth nightly as needed    Episodic mood disorder (H), Anxiety       tolterodine 2 MG tablet    DETROL    6 tablet    Take 1 tablet (2 mg) by mouth 2 times daily    Urinary retention

## 2018-01-17 NOTE — PROGRESS NOTES
SUBJECTIVE:   Govind Way is a 62 year old male who presents to clinic today for the following health issues:    Acute Illness   Acute illness concerns: cold symptoms improving but still feeling very tired and achy   Onset: 7 days     Fever: YES- the first few days     Chills/Sweats: YES- the first few days     Headache (location?): no    Sinus Pressure:no    Conjunctivitis:  no    Ear Pain: YES: right    Rhinorrhea: YES    Congestion: no    Sore Throat: YES- started with a sore throat      Cough: YES-productive of yellow/green sputum    Wheeze: no    Decreased Appetite: YES    Nausea: YES- the first few days     Vomiting: no    Diarrhea:  no    Dysuria/Freq.: no    Fatigue/Achiness: YES    Sick/Strep Exposure: YES     Therapies Tried and outcome: Ibuprofen, Night time cold medicine       Hypertension Follow-up      Outpatient blood pressures are being checked at home.  Results are 140s/90s.    Low Salt Diet: no added salt    Keagan stopped taking Lisinopril for several months, and recently restarted when he noticed his blood pressures were higher at home. No side effects with medication, seems to be working well now that he is taking it again.    Anxiety Follow-Up    Status since last visit: Worsened     Other associated symptoms:family stress, concern for daughter    Complicating factors:   Significant life event: No   Current substance abuse: None  Depression symptoms: No  WAGNER-7 SCORE 11/14/2017 12/5/2017 12/18/2017   Total Score - - -   Total Score 9 4 2       GAD7    -------------------------------------    Problem list and histories reviewed & adjusted, as indicated.  Additional history: as documented    Patient Active Problem List   Diagnosis     Adjustment disorder with mixed anxiety and depressed mood     Abdominal hernia     Hypertension goal BP (blood pressure) < 140/90     CARDIOVASCULAR SCREENING; LDL GOAL LESS THAN 160     History of hyperthyroidism     Right inguinal hernia     Irritable bowel  syndrome     Donor of kidney for transplant     Hernia     Testis disorder     Screening for prostate cancer     Other hydrocele     Hallux rigidus, right foot     Advance care planning     Tendinopathy of right gluteus medius     Attention deficit hyperactivity disorder (ADHD), other type     Benign non-nodular prostatic hyperplasia with lower urinary tract symptoms     Anxiety     SBO (small bowel obstruction)     Major depressive disorder, recurrent episode, mild (H)     Alcohol use disorder, moderate, in controlled environment (H)     Past Surgical History:   Procedure Laterality Date     C ECG MONITOR/ 24 HRS, ORIG ECG, W VIS SUPERIMPOS SCAN, COMPLETE  1/00    um holter monitor     C REMV KIDNEY/URETER,SAME INCIS  6/30/05    Nephrouretectomy/LEFT KIDNEY DONATED TO DAUGHTER/[facility]     HC REMOVAL OF TONSILS,<13 Y/O  age 7     HC VASECTOMY UNILAT/BILAT W POSTOP SEMEN  age 41     HERNIORRHAPHY INGUINAL  12/23/2011    Procedure:HERNIORRHAPHY INGUINAL; Surgeon:JULIA SIERRA; Location:[facility]     LAPAROSCOPIC HERNIORRHAPHY INGUINAL Right 6/29/2015    Procedure: LAPAROSCOPIC HERNIORRHAPHY INGUINAL;  Surgeon: García Ibarra MD;  Location:  OR     LAPAROSCOPIC HERNIORRHAPHY VENTRAL  12/23/2011    Procedure:LAPAROSCOPIC HERNIORRHAPHY VENTRAL; Laparoscopic Ventral Hernia Repair with Mesh, Open Left Inguinal Hernia Repair with Mesh; Surgeon:JULIA SIERRA; Location: OR     LASER HOLMIUM ENUCLEATION PROSTATE N/A 10/19/2017    Procedure: LASER HOLMIUM ENUCLEATION PROSTATE;  Holmium Laser Enculeation of the Prostate;  Surgeon: Manolo Cardenas MD;  Location:  OR     LASIK CUSTOMVUE BILATERAL  11/11/2011    Procedure:LASIK CUSTOMVUE BILATERAL; BILATERAL CUSTOMVUE LASIK WITH INTRALASE; Surgeon:POP SIMON; Location:[facility]       Social History   Substance Use Topics     Smoking status: Former Smoker     Quit date: 1/1/1982     Smokeless tobacco: Never Used      Comment: NO  2ND HAND SMOKE     Alcohol use 0.0 oz/week     0 Standard drinks or equivalent per week      Comment: 5/wk     Family History   Problem Relation Age of Onset     DIABETES Mother      ADULT ONSET     Hypertension Mother      Coronary Artery Disease Mother      Also father and grandparent.  Father had coronary bypass and aortic valve replacement surgery     Heart Surgery Mother      Aortic valve     CANCER Other      prostate cancer     Thyroid Disease Other      Hypertension Father      Skin Cancer Other      Mother and father, grandfather     Arthritis Other      Mom, daughter, grandparents     Other - See Comments Other      Grandfather with kidney stone     Prostate Cancer Other      Grandfather     KIDNEY DISEASE Daughter      Lupus Daughter      Causing end-stage renal disease     Eye Disorder No family hx of      NONE KNOWN     Hyperlipidemia No family hx of      CEREBROVASCULAR DISEASE No family hx of      Breast Cancer No family hx of      Colon Cancer No family hx of      Other Cancer No family hx of      Depression No family hx of      Anxiety Disorder No family hx of      MENTAL ILLNESS No family hx of      Substance Abuse No family hx of      Anesthesia Reaction No family hx of      Asthma No family hx of      OSTEOPOROSIS No family hx of      Genetic Disorder No family hx of      Obesity No family hx of      Unknown/Adopted No family hx of              Reviewed and updated as needed this visit by clinical staffTobacco  Allergies  Meds  Med Hx  Surg Hx  Fam Hx  Soc Hx      Reviewed and updated as needed this visit by Provider         ROS:  Constitutional, HEENT, cardiovascular, pulmonary, gi and gu systems are negative, except as otherwise noted.      OBJECTIVE:   /77 (BP Location: Left arm, Patient Position: Chair, Cuff Size: Adult Regular)  Pulse 76  Temp 98  F (36.7  C) (Oral)  Wt 179 lb 1.6 oz (81.2 kg)  SpO2 98%  BMI 24.98 kg/m2  Body mass index is 24.98 kg/(m^2).   GENERAL: healthy,  alert and no distress  EYES: Eyes grossly normal to inspection, PERRL and conjunctivae and sclerae normal  HENT: normal cephalic/atraumatic, ear canals and TM's normal, nose and mouth without ulcers or lesions, nasal mucosa edematous , oropharynx clear and oral mucous membranes moist  NECK: no adenopathy, no asymmetry, masses, or scars and thyroid normal to palpation  RESP: lungs clear to auscultation - no rales, rhonchi or wheezes  CV: regular rate and rhythm, normal S1 S2, no S3 or S4, no murmur, click or rub, no peripheral edema and peripheral pulses strong  ABDOMEN: soft, nontender, no hepatosplenomegaly, no masses and bowel sounds normal  MS: no gross musculoskeletal defects noted, no edema    Diagnostic Test Results:  No results found for this or any previous visit (from the past 24 hour(s)).    ASSESSMENT/PLAN:     Problem List Items Addressed This Visit     None      Visit Diagnoses     Screen for colon cancer    -  Primary    Relevant Orders    Fecal colorectal cancer screen FIT - Future (S+30)    Upper respiratory tract infection, unspecified type        Relevant Medications    fluticasone (FLONASE) 50 MCG/ACT spray    predniSONE (DELTASONE) 20 MG tablet    Benign essential hypertension        Relevant Medications    lisinopril (PRINIVIL/ZESTRIL) 10 MG tablet    WAGNER (generalized anxiety disorder)           - restart medication for hypertension  - no changes to anxiety medications currently, discussed therapy briefly  - start prednisone only if symptoms of URI worsen over the next 5 days    JEWELS Reveles [facility]

## 2018-01-17 NOTE — NURSING NOTE
"Chief Complaint   Patient presents with     URI       Initial /77 (BP Location: Left arm, Patient Position: Chair, Cuff Size: Adult Regular)  Pulse 76  Temp 98  F (36.7  C) (Oral)  Wt 179 lb 1.6 oz (81.2 kg)  SpO2 98%  BMI 24.98 kg/m2 Estimated body mass index is 24.98 kg/(m^2) as calculated from the following:    Height as of 12/19/17: 5' 11\" (1.803 m).    Weight as of this encounter: 179 lb 1.6 oz (81.2 kg).  Medication Reconciliation: complete     Radha Rosenthal CMA    "

## 2018-03-23 ENCOUNTER — OFFICE VISIT (OUTPATIENT)
Dept: PSYCHOLOGY | Facility: CLINIC | Age: 63
End: 2018-03-23
Payer: COMMERCIAL

## 2018-03-23 DIAGNOSIS — F33.0 MAJOR DEPRESSIVE DISORDER, RECURRENT EPISODE, MILD (H): ICD-10-CM

## 2018-03-23 DIAGNOSIS — F90.8 ATTENTION DEFICIT HYPERACTIVITY DISORDER (ADHD), OTHER TYPE: Primary | ICD-10-CM

## 2018-03-23 PROCEDURE — 90834 PSYTX W PT 45 MINUTES: CPT | Performed by: SOCIAL WORKER

## 2018-03-23 NOTE — PROGRESS NOTES
"                                             Progress Note    Client Name: Govind Way  Date: 3/23/2018         Service Type: Individual      Session Start Time: 3:40 pm  Session End Time: 4:25 pm      Session Length: 45 mins     Session #: 9     Attendees: Client attended alone    Treatment Plan Last Reviewed: 9/18/17, 3/23/2018  PHQ-9 / WAGNER-7 : 2/9     DATA      Progress Since Last Session (Related to Symptoms / Goals / Homework):   Symptoms: Worsening-Anxiety is high, stress with work and balancing needs of family members    Homework: Did not complete      Episode of Care Goals: Minimal progress - ACTION (Actively working towards change); Intervened by reinforcing change plan / affirming steps taken     Current / Ongoing Stressors and Concerns:   Client's supervisor retired. He expressed frustration with workspace and coworkers. Client on edge during shifts and would watch and tell managers and supervisor when coworker doesn't \"follow the rules.\" He's received some reassurance that atmosphere would change with three new supervisors and the restructuring of his work. He is a few years from correction yet feels overwhelmed with his frustration and not sure he feels passionate about his work anymore. He is losing part of his hearing in right ear which doctors say could be related to stress.      Treatment Objective(s) Addressed in This Session:   Identify negative self-talk and behaviors: challenge core beliefs, myths, and actions  Improve concentration, focus, and mindfulness in daily activities , discussed role expectations of being an employee and being a father  Improve concentration and attention in daily activities       Intervention:   CBT: evaluating client's concerns about coworkers \"breaking rules,\" psychoeducation regarding anxiety and depression  Motivational Interviewing: open-ended questions around making changes and seeing changes; affirming strength in ability to have difficult conversations " and let go of worries that are no longer useful, reflecting on improvement in mood and ongoing challenges.         ASSESSMENT: Current Emotional / Mental Status (status of significant symptoms):   Risk status (Self / Other harm or suicidal ideation)   Client denies current fears or concerns for personal safety.   Client denies current or recent suicidal ideation or behaviors.   Client denies current or recent homicidal ideation or behaviors.   Client denies current or recent self injurious behavior or ideation.   Client denies other safety concerns.   A safety and risk management plan has not been developed at this time, however client was given the after-hours number / 911 should there be a change in any of these risk factors.     Appearance:   Appropriate    Eye Contact:   Good    Psychomotor Behavior: Hyperactive , moving around in seat, fidgeting, attempted to refocus several times during session when writer spoke   Attitude:   Cooperative    Orientation:   All   Speech    Rate / Production: Hyperverbal     Volume:  Normal    Mood:    Anxious  Irritable    Affect:    Appropriate    Thought Content:  Perservative , continual focus on coworker's actions   Thought Form:  Coherent  Logical    Insight:    Poor      Medication Review:   No current psychiatric medications prescribed, discussion with client about medication management for ADHD. Client wants to think about it, does not like the side effects.      Medication Compliance:   NA     Changes in Health Issues:   None reported.      Chemical Use Review:   Substance Use: Chemical use reviewed, no active concerns identified      Tobacco Use: No current tobacco use.       Collateral Reports Completed:   Not Applicable    PLAN: (Client Tasks / Therapist Tasks / Other)  Client: Notice physiological response to stress, pay attention to client's thought patterns and emotions.       MIKE Bolton LG                     3/23/2018  Note reviewed and clinical  supervision by MIKE Snowden [facility] 4/2/2018     ________________________________________________________________________    Treatment Plan    Client's Name: Govind Way  YOB: 1955    Date: 9/18/17    DSM-V Diagnoses: Attention-Deficit/Hyperactivity Disorder  314.01 (F90.2) Combined presentation, 296.31   (F33.0) Major Depressive Disorder, Recurrent Episode, Mild _ and With anxious distress  Substance-Related & Addictive Disorders Alcohol Use Disorder   303.90 (F10.20) Moderate  Psychosocial / Contextual Factors: Client is working full-time and splitting his time between taking care of his aging parents and disabled daughter, managing his relationship with his partner and taking care of his medical needs. He reports experiencing stress around work with the number of changes in management, current renovations and different types of personalities. His first partner back in high school completed suicide--client was the last one to see him. His daughter has a disability and was in and out of the hospital for 2 years; he gave her one of his kidneys in order to save her life.   WHODAS: 22    Referral / Collaboration:  Referral to another professional/service is not indicated at this time.    Anticipated number of session or this episode of care: 12      MeasurableTreatment Goal(s) related to diagnosis / functional impairment(s)  Goal 1: Client will develop coping skills to effectively manage attention issues.    I will know I've met my goal when I am not overwhelmed with making time with everyone.      Objective #A (Client Action)    Client will identify and use 3 strategies to improve organization.  Status: Continued - Date(s): 3/23/2018     Intervention(s)  Therapist will teach completing Eisenhowever scale, use of journal/calendar and writing things in a notebook.    Goal 2: Client will report a decrease in depressive symptoms with decrease PHQ 9 score from 14 below 5.    I will know I've  met my goal when I can feel happy and at ease with where I am at.      Objective #A (Client Action)    Status: Continued - Date(s): 3/23/2018     Client will Increase interest, engagement, and pleasure in doing things  Decrease frequency and intensity of feeling down, depressed, hopeless.    Intervention(s)  Therapist will teach the client how to perform a behavioral chain analysis. Identifying and understanding negative thoughts and changing route of those thoughts into positives.    Objective #B  Client will Feel less tired and more energy during the day   Improve diet, appetite, mindful eating, and / or meal planning.    Status: Continued - Date(s): 3/23/2018     Intervention(s)  Therapist will assign homework Participate in mindful activity daily for at least 10 mins/day  teach distraction skills. stopping negative thoughts with mindful activity .    Objective #C  Client will improve communication with partner and family members.  Status: Continued - Date(s): 3/23/2018     Intervention(s)  Therapist will role-play effective communication skills  teach about healthy boundaries. Setting rules and expectations for self and others around you.      Goal 3: Client will develop skills to manage my drinking habits.    I will know I've met my goal when I no longer feel guilty about my drinking.      Objective #A (Client Action)    Status: Continued - Date(s): 3/23/2018     Client will increase understanding on the impacts of alcohol on mental and physical health.    Intervention(s)  Therapist will provide educational materials on alcohol on mental and physical health.    Objective #B  Client will develop problem-solving skills to limit number of drinks consumed..    Status: Continued - Date(s): 3/23/2018     Intervention(s)  Therapist will role-play conflict management  teach rules for taking a timeout. Understanding the consequences for drinking and setting limits with self and others around drinking.      Client have  not reviewed nor agreed to the above plan. Goals were set, however still need to review the objectives.      MIKE Bolton [facility]  October 3, 2017; March 23, 2018      Note reviewed and clinical supervision by [facility]

## 2018-03-23 NOTE — MR AVS SNAPSHOT
MRN:[id number]                      After Visit Summary   3/23/2018    Govind Way    MRN: [id number]           Visit Information        Provider Department      3/23/2018 3:30 PM Xander Salas LGSW [facility] Generic      Your next 10 appointments already scheduled     Apr 16, 2018  3:30 PM CDT   Return Visit with PAULINA Bolton   [facility] ([facility])    [facility]  [address]  [address]  [facility] [address]   [phone]            May 03, 2018  3:00 PM CDT   Walk In From ENT with Callie Bonilla [facility] ([facility])    [address] [place] [address] [id number] [phone]            May 03, 2018  4:00 PM CDT   (Arrive by 3:45 PM)   New Patient Visit with MD DEDRICK Davila [facility] Ear Nose and Throat ([facility])    [address] [place] [id number] [phone]              MyChart Information     [employer] gives you secure access to your electronic health record. If you see a primary care provider, you can also send messages to your care team and make appointments. If you have questions, please call your primary care clinic.  If you do not have a primary care provider, please call [phone] and they will assist you.        Care EveryWhere ID     This is your Care EveryWhere ID. This could be used by other organizations to access your [place] medical records  [id number]        Equal Access to Services     SHIVA VELA AH: Hadii aad ku hadasho Soomaali, waaxda luqadaha, qaybta kaalmada adeegyada, waxay joni hayirena avila. So [facility] [phone].    ATENCIÓN: Si habla español, tiene a becerra disposición servicios gratuitos de asistencia lingüística. Llame al [phone].    We comply with applicable federal civil rights laws and [place] laws. We do not  discriminate on the basis of race, color, national origin, age, disability, sex, sexual orientation, or gender identity.

## 2018-03-26 ASSESSMENT — ANXIETY QUESTIONNAIRES
3. WORRYING TOO MUCH ABOUT DIFFERENT THINGS: SEVERAL DAYS
1. FEELING NERVOUS, ANXIOUS, OR ON EDGE: MORE THAN HALF THE DAYS
GAD7 TOTAL SCORE: 9
7. FEELING AFRAID AS IF SOMETHING AWFUL MIGHT HAPPEN: SEVERAL DAYS
6. BECOMING EASILY ANNOYED OR IRRITABLE: MORE THAN HALF THE DAYS
2. NOT BEING ABLE TO STOP OR CONTROL WORRYING: SEVERAL DAYS
5. BEING SO RESTLESS THAT IT IS HARD TO SIT STILL: SEVERAL DAYS

## 2018-03-26 ASSESSMENT — PATIENT HEALTH QUESTIONNAIRE - PHQ9: 5. POOR APPETITE OR OVEREATING: SEVERAL DAYS

## 2018-03-27 ASSESSMENT — ANXIETY QUESTIONNAIRES: GAD7 TOTAL SCORE: 9

## 2018-03-27 ASSESSMENT — PATIENT HEALTH QUESTIONNAIRE - PHQ9: SUM OF ALL RESPONSES TO PHQ QUESTIONS 1-9: 2

## 2018-04-06 ENCOUNTER — TELEPHONE (OUTPATIENT)
Dept: FAMILY MEDICINE | Facility: CLINIC | Age: 63
End: 2018-04-06

## 2018-04-06 NOTE — TELEPHONE ENCOUNTER
Pt said he went to see his psychologist and they will like him to be back on Adderall XR 10 mg. He also said psychologist put in request for medication to be refill again but I don't see it. He hasn't been taking Adderall in over some years. Not on active med list anymore. He wasn't sure if you want him to be seen for this issue. Can we get new RX's or respond to either pharmacy or pt.                 Thank You,  Boby Salas, [facility]  On behalf of [facility]

## 2018-04-06 NOTE — TELEPHONE ENCOUNTER
Last OV here 9/19/2017 for preop.  Cannot start Adderall without an appointment.  Left message for patient to callback.  Ashli CHRISTOPHER RN

## 2018-04-09 ENCOUNTER — MYC MEDICAL ADVICE (OUTPATIENT)
Dept: FAMILY MEDICINE | Facility: CLINIC | Age: 63
End: 2018-04-09

## 2018-04-16 ENCOUNTER — OFFICE VISIT (OUTPATIENT)
Dept: PSYCHOLOGY | Facility: CLINIC | Age: 63
End: 2018-04-16
Payer: COMMERCIAL

## 2018-04-16 DIAGNOSIS — F10.90 ALCOHOL USE DISORDER: ICD-10-CM

## 2018-04-16 DIAGNOSIS — F90.8 ATTENTION DEFICIT HYPERACTIVITY DISORDER (ADHD), OTHER TYPE: ICD-10-CM

## 2018-04-16 DIAGNOSIS — F33.0 MAJOR DEPRESSIVE DISORDER, RECURRENT EPISODE, MILD (H): Primary | ICD-10-CM

## 2018-04-16 PROCEDURE — 90834 PSYTX W PT 45 MINUTES: CPT | Performed by: SOCIAL WORKER

## 2018-04-16 ASSESSMENT — ANXIETY QUESTIONNAIRES
GAD7 TOTAL SCORE: 9
6. BECOMING EASILY ANNOYED OR IRRITABLE: SEVERAL DAYS
3. WORRYING TOO MUCH ABOUT DIFFERENT THINGS: MORE THAN HALF THE DAYS
5. BEING SO RESTLESS THAT IT IS HARD TO SIT STILL: SEVERAL DAYS
7. FEELING AFRAID AS IF SOMETHING AWFUL MIGHT HAPPEN: NOT AT ALL
2. NOT BEING ABLE TO STOP OR CONTROL WORRYING: MORE THAN HALF THE DAYS
1. FEELING NERVOUS, ANXIOUS, OR ON EDGE: MORE THAN HALF THE DAYS

## 2018-04-16 ASSESSMENT — PATIENT HEALTH QUESTIONNAIRE - PHQ9: 5. POOR APPETITE OR OVEREATING: SEVERAL DAYS

## 2018-04-16 NOTE — PROGRESS NOTES
"                                             Progress Note    Client Name: Govind Way  Date: 2018         Service Type: Individual      Session Start Time: 3:30 pm  Session End Time: 4:15 pm      Session Length: 45 mins     Session #: 10     Attendees: Client attended alone    Treatment Plan Last Reviewed: 17, 3/23/2018  PHQ-9 / WAGNER-7 :      DATA      Progress Since Last Session (Related to Symptoms / Goals / Homework):   Symptoms: Stable- still high in anxiety, attention is better    Homework: Partially completed      Episode of Care Goals: Minimal progress - ACTION (Actively working towards change); Intervened by reinforcing change plan / affirming steps taken     Current / Ongoing Stressors and Concerns:   Client said last week was difficult for him, his son returned home from [facility] last week, his supervisor retired and he ended up getting a ticket for  tabs. He explained feeling overwhelmed so he started taking his old Adderall prescription because he couldn't get in to see PCP soon enough. Client said the Adderall helped him \"hyperfocus\" on certain things rather than everything. He talked about creating balance for him in the workplace and limiting how much time he invest in engaging in conflict at work.      Treatment Objective(s) Addressed in This Session:   Identify negative self-talk and behaviors: challenge core beliefs, myths, and actions  Improve concentration, focus, and mindfulness in daily activities , discussed role expectations of being an employee and being a father  Improve concentration and attention in daily activities       Intervention:   CBT: walked thru behavioral triangle and client's awareness on his experience with coworkers  Motivational Interviewing: open-ended questions around making changes and seeing changes; affirming strength in ability to have difficult conversations and let go of worries that are no longer useful, reflecting on improvement in mood and " ongoing challenges.         ASSESSMENT: Current Emotional / Mental Status (status of significant symptoms):   Risk status (Self / Other harm or suicidal ideation)   Client denies current fears or concerns for personal safety.   Client denies current or recent suicidal ideation or behaviors.   Client denies current or recent homicidal ideation or behaviors.   Client denies current or recent self injurious behavior or ideation.   Client denies other safety concerns.   A safety and risk management plan has not been developed at this time, however client was given the after-hours number / 911 should there be a change in any of these risk factors.     Appearance:   Appropriate    Eye Contact:   Good    Psychomotor Behavior: Hyperactive     Attitude:   Cooperative    Orientation:   All   Speech    Rate / Production: Hyperverbal     Volume:  Normal    Mood:    Anxious  Irritable    Affect:    Appropriate    Thought Content:  Clear  Perservative , still focus on tasks of coworkers, however better able to re-direct client's attention    Thought Form:  Coherent  Logical    Insight:    Fair      Medication Review:   No current psychiatric medications prescribed, discussion with client about medication management for ADHD.   Has appointment with PCP to discuss medication to manage ADHD.     Medication Compliance:   Client started taking old Adderall prescription at 10 mg. He was advised to be cautious when taking medication . Encouraged him to connect with PCP to consult around medication options     Changes in Health Issues:   None reported.      Chemical Use Review:   Substance Use: Chemical use reviewed, no active concerns identified      Tobacco Use: No current tobacco use.       Collateral Reports Completed:   Not Applicable    PLAN: (Client Tasks / Therapist Tasks / Other)  Client: Notice physiological response to stress, pay attention to client's thought patterns and emotions. Practice bringing attention back to thoughts  if his attention drifts elsewhere.       Xander Salas, MSW SW                     April 16, 2018  Note reviewed and clinical supervision by Latonya Lewis, [facility]     ________________________________________________________________________    Treatment Plan    Client's Name: Govind Way  YOB: 1955    Date: 9/18/17    DSM-V Diagnoses: Attention-Deficit/Hyperactivity Disorder  314.01 (F90.2) Combined presentation, 296.31   (F33.0) Major Depressive Disorder, Recurrent Episode, Mild _ and With anxious distress  Substance-Related & Addictive Disorders Alcohol Use Disorder   303.90 (F10.20) Moderate  Psychosocial / Contextual Factors: Client is working full-time and splitting his time between taking care of his aging parents and disabled daughter, managing his relationship with his partner and taking care of his medical needs. He reports experiencing stress around work with the number of changes in management, current renovations and different types of personalities. His first partner back in high school completed suicide--client was the last one to see him. His daughter has a disability and was in and out of the hospital for 2 years; he gave her one of his kidneys in order to save her life.   WHODAS: 22    Referral / Collaboration:  Referral to another professional/service is not indicated at this time.    Anticipated number of session or this episode of care: 12      MeasurableTreatment Goal(s) related to diagnosis / functional impairment(s)  Goal 1: Client will develop coping skills to effectively manage attention issues.    I will know I've met my goal when I am not overwhelmed with making time with everyone.      Objective #A (Client Action)    Client will identify and use 3 strategies to improve organization.  Status: Continued - Date(s): 3/23/2018     Intervention(s)  Therapist will teach completing Eisenhowever scale, use of journal/calendar and writing things in a  notebook.    Goal 2: Client will report a decrease in depressive symptoms with decrease PHQ 9 score from 14 below 5.    I will know I've met my goal when I can feel happy and at ease with where I am at.      Objective #A (Client Action)    Status: Continued - Date(s): 3/23/2018     Client will Increase interest, engagement, and pleasure in doing things  Decrease frequency and intensity of feeling down, depressed, hopeless.    Intervention(s)  Therapist will teach the client how to perform a behavioral chain analysis. Identifying and understanding negative thoughts and changing route of those thoughts into positives.    Objective #B  Client will Feel less tired and more energy during the day   Improve diet, appetite, mindful eating, and / or meal planning.    Status: Continued - Date(s): 3/23/2018     Intervention(s)  Therapist will assign homework Participate in mindful activity daily for at least 10 mins/day  teach distraction skills. stopping negative thoughts with mindful activity .    Objective #C  Client will improve communication with partner and family members.  Status: Continued - Date(s): 3/23/2018     Intervention(s)  Therapist will role-play effective communication skills  teach about healthy boundaries. Setting rules and expectations for self and others around you.      Goal 3: Client will develop skills to manage my drinking habits.    I will know I've met my goal when I no longer feel guilty about my drinking.      Objective #A (Client Action)    Status: Continued - Date(s): 3/23/2018     Client will increase understanding on the impacts of alcohol on mental and physical health.    Intervention(s)  Therapist will provide educational materials on alcohol on mental and physical health.    Objective #B  Client will develop problem-solving skills to limit number of drinks consumed..    Status: Continued - Date(s): 3/23/2018     Intervention(s)  Therapist will role-play conflict management  teach rules for  taking a timeout. Understanding the consequences for drinking and setting limits with self and others around drinking.      Client have not reviewed nor agreed to the above plan. Goals were set, however still need to review the objectives.      MIKE Bolton SW  October 3, 2017; March 23, 2018      Note reviewed and clinical supervision by MIKE Snowden [facility]

## 2018-04-16 NOTE — MR AVS SNAPSHOT
MRN:[id number]                      After Visit Summary   4/16/2018    Govind Way    MRN: [id number]           Visit Information        Provider Department      4/16/2018 3:30 PM Xander Salas LGSW [facility] Generic      Your next 10 appointments already scheduled     May 03, 2018  3:00 PM CDT   Walk In From ENT with Callie Bonilla [facility] Audiology ([facility])    [address]  4th Floor  [place] [facility] [address]   [phone]            May 03, 2018  4:00 PM CDT   (Arrive by 3:45 PM)   New Patient Visit with Slava Cook MD   [facility] Ear Nose and Throat ([facility])    [address]  [address] [place] [address]   [phone]            May 07, 2018  8:30 AM CDT   New Visit with MISHEL MA VISIT   [facility] ([facility])    [address]  [address]  [place] [address]   [phone]            May 07, 2018  9:00 AM CDT   New Visit with JEWELS Castano [facility] ([facility])    [address]  [address]  [place] [facility] [place] [address]   [phone]            May 07, 2018  9:00 AM CDT   New Visit with Larry Albright [facility] ([facility])    [address]  [address]  [place] [facility] [address]   [phone]            May 15, 2018   Procedure with Lobo Pelaez MD   [facility] Endoscopy ([facility])    [address]  [place] [address]   [phone]           [facility] is located at [address] [place]            May 29, 2018  3:30 PM CDT   Return Visit with PAULINA Bolton   [facility] ([facility])    [place] [id number]  [address]  [place] [facility] [address]   [phone]            Jun 11, 2018  3:30 PM CDT   Return Visit with PAULINA Bolton   [facility] ([facility])    [facility]  [address]  [facility] [address]   [phone]              MyChart Information     [facility] [employer] gives you secure access to your electronic health record. If you see a primary care provider, you can also send messages to your care team and make appointments. If you have questions, please call your primary care clinic.  If you do not have a primary care provider, please call [phone] and they will assist you.        Care EveryWhere ID     This is your Care EveryWhere ID. This could be used by other organizations to access your [place] medical records  [id number]        Equal Access to Services     SHIVA VELA : David Smith, jan pierce, lavern sales, terri avila. So [facility] [phone].    ATENCIÓN: Si habla español, tiene a becerra disposición servicios gratuitos de asistencia lingüística. Llgerardo al [phone].    We comply with applicable federal civil rights laws and [place] laws. We do not discriminate on the basis of race, color, national origin, age, disability, sex, sexual orientation, or gender identity.

## 2018-04-17 ASSESSMENT — ANXIETY QUESTIONNAIRES: GAD7 TOTAL SCORE: 9

## 2018-04-17 ASSESSMENT — PATIENT HEALTH QUESTIONNAIRE - PHQ9: SUM OF ALL RESPONSES TO PHQ QUESTIONS 1-9: 9

## 2018-04-19 ENCOUNTER — OFFICE VISIT (OUTPATIENT)
Dept: FAMILY MEDICINE | Facility: CLINIC | Age: 63
End: 2018-04-19
Payer: COMMERCIAL

## 2018-04-19 VITALS
HEART RATE: 88 BPM | RESPIRATION RATE: 16 BRPM | OXYGEN SATURATION: 100 % | TEMPERATURE: 96.9 F | WEIGHT: 180.8 LBS | DIASTOLIC BLOOD PRESSURE: 76 MMHG | BODY MASS INDEX: 25.22 KG/M2 | SYSTOLIC BLOOD PRESSURE: 120 MMHG

## 2018-04-19 DIAGNOSIS — F33.0 MAJOR DEPRESSIVE DISORDER, RECURRENT EPISODE, MILD (H): ICD-10-CM

## 2018-04-19 DIAGNOSIS — I10 HYPERTENSION GOAL BP (BLOOD PRESSURE) < 140/90: ICD-10-CM

## 2018-04-19 DIAGNOSIS — H93.13 TINNITUS, BILATERAL: ICD-10-CM

## 2018-04-19 DIAGNOSIS — F41.9 ANXIETY: Primary | ICD-10-CM

## 2018-04-19 DIAGNOSIS — F90.8 ATTENTION DEFICIT HYPERACTIVITY DISORDER (ADHD), OTHER TYPE: ICD-10-CM

## 2018-04-19 DIAGNOSIS — Z12.11 SCREEN FOR COLON CANCER: ICD-10-CM

## 2018-04-19 DIAGNOSIS — F10.90 ALCOHOL USE DISORDER: ICD-10-CM

## 2018-04-19 PROCEDURE — 99215 OFFICE O/P EST HI 40 MIN: CPT | Performed by: FAMILY MEDICINE

## 2018-04-19 RX ORDER — DEXTROAMPHETAMINE SACCHARATE, AMPHETAMINE ASPARTATE MONOHYDRATE, DEXTROAMPHETAMINE SULFATE AND AMPHETAMINE SULFATE 2.5; 2.5; 2.5; 2.5 MG/1; MG/1; MG/1; MG/1
10 CAPSULE, EXTENDED RELEASE ORAL DAILY
Qty: 30 CAPSULE | Refills: 0 | Status: SHIPPED | OUTPATIENT
Start: 2018-04-19 | End: 2018-05-19

## 2018-04-19 RX ORDER — DEXTROAMPHETAMINE SACCHARATE, AMPHETAMINE ASPARTATE MONOHYDRATE, DEXTROAMPHETAMINE SULFATE AND AMPHETAMINE SULFATE 2.5; 2.5; 2.5; 2.5 MG/1; MG/1; MG/1; MG/1
10 CAPSULE, EXTENDED RELEASE ORAL DAILY
Qty: 30 CAPSULE | Refills: 0 | Status: SHIPPED | OUTPATIENT
Start: 2018-06-20 | End: 2018-06-08

## 2018-04-19 RX ORDER — DEXTROAMPHETAMINE SACCHARATE, AMPHETAMINE ASPARTATE MONOHYDRATE, DEXTROAMPHETAMINE SULFATE AND AMPHETAMINE SULFATE 2.5; 2.5; 2.5; 2.5 MG/1; MG/1; MG/1; MG/1
10 CAPSULE, EXTENDED RELEASE ORAL DAILY
Qty: 30 CAPSULE | Refills: 0 | Status: SHIPPED | OUTPATIENT
Start: 2018-05-20 | End: 2018-06-08

## 2018-04-19 NOTE — PROGRESS NOTES
SUBJECTIVE:   Govind Way is a 62 year old male who presents to clinic today for the following health issues:    Chief Complaint   Patient presents with     Behavioral Problem     Medication check: adderall and blood pressure medication      Following with Psychologist, new since last I saw him in 2016 for this stuff    Had an eval back then , some symptoms of anxiety, attention, psychology suggested something to help with attention might be warranted  He did have some adderall then that was helpful      Onset: since childhood     Description:   Easily distracted: YES  Short attention span: YES  Trouble following directions: YES   Impulsive behavior: no   Trouble completing tasks: YES    Accompanying Signs & Symptoms    Irritability at certain times of the day: no  Socially withdrawn: YES  Depression symptoms: YES  Anxiety symptoms: YES    2016 Psychology evaluation consistent with some domains suggesting ADD as well as anxiety and some learning and processing difficulties     Previously patient has used SSRIs to some minor benefit        Objective:  /76  Pulse 88  Temp 96.9  F (36.1  C) (Tympanic)  Resp 16  Wt 180 lb 12.8 oz (82 kg)  SpO2 100%  BMI 25.22 kg/m2     EXAM:  General Appearance: Pleasant, alert, male, WN/WD in no acute respiratory distress.  Psych:  GROOMING: Adequately groomed.  ATTIRE: Appropriate.  AGE: Appears stated.  BEHAVIOR TOWARDS EXAMINER: Cooperative.  MOTOR ACTIVITY: elevated  EYE CONTACT: Appropriate.  MOOD: anxious  AFFECT: Congruent with content of speech.  SPEECH/LANGUAGE: Unremarkable.  ATTENTION: Unremarkable.  CONCENTRATION: Unremarkable.  THOUGHT PROCESS: Unremarkable  THOUGHT CONTENT: Unremarkable for hallucinations and delusions.  ORIENTATION: Times 3.  MEMORY: No evidence of impairment.  JUDGMENT: No evidence of impairment.  ESTIMATED INTELLIGENCE: Average.  DEMONSTRATED INSIGHT: Adequate.  FUND OF KNOWLEDGE: Adequate.  SUICIDE RISK: None apparent and  denied.    Assessment:    ICD-10-CM    1. Anxiety F41.9 [facility] REFERR[facility]     MENTAL HEALTH REFERRAL  - Adult; Psychiatry and Medication Management; Psychiatry; Other: Not Listed - Enter Referral Details in Scheduling Comments Below; Patient call to schedule   2. Attention deficit hyperactivity disorder (ADHD), other type F90.8 [facility] REFERRAL     MENTAL HEALTH REFERRAL  - Adult; Psychiatry and Medication Management; Psychiatry; Other: Not Listed - Enter Referral Details in Scheduling Comments Below; Patient call to schedule     amphetamine-dextroamphetamine (ADDERALL XR) 10 MG per 24 hr capsule     amphetamine-dextroamphetamine (ADDERALL XR) 10 MG per 24 hr capsule     amphetamine-dextroamphetamine (ADDERALL XR) 10 MG per 24 hr capsule   3. Alcohol use disorder (H) F10.99 [facility] REFERRAL     MENTAL HEALTH REFERRAL  - Adult; Psychiatry and Medication Management; Psychiatry; Other: Not Listed - Enter Referral Details in Scheduling Comments Below; Patient call to schedule   4. Tinnitus, bilateral H93.13    5. Hypertension goal BP (blood pressure) < 140/90 I10    6. Screen for colon cancer Z12.11 GASTROENTEROLOGY ADULT REF PROCEDURE ONLY Chip Acuña [phone]; Dr. DEYA Pelaez   7. Major depressive disorder, recurrent episode, mild (H) F33.0      Perhaps hypomania, but this is normal behavior for him in my experience, animated and intense    Multiple mental health issues, personality axis, stable alch abuse    Plan:     He is interested in integrated care and that may be a good fit, else psych    Follow-up  discussion of medication trial , controlled substance, or with psychiatry we discussed referral    Counselled on medication choice, use, side effects of medications, nonprescription adjunct treatment and appropriate follow up. Couns time: 25 minutes of 40 minutes total face to face time.

## 2018-04-19 NOTE — MR AVS SNAPSHOT
After Visit Summary   4/19/2018    [address]    MRN: [id number]           Patient Information     Date Of Birth          1955        Visit Information        Provider Department      4/19/2018 1:00 PM Jose Luis Nielson MD [facility]        Today's Diagnoses     Anxiety    -  1    Attention deficit hyperactivity disorder (ADHD), other type        Alcohol use disorder (H)        Tinnitus, bilateral        Hypertension goal BP (blood pressure) < 140/90        Screen for colon cancer           Follow-ups after your visit        Additional Services     GASTROENTEROLOGY ADULT REF PROCEDURE ONLY Chip Hige [phone]; Dr. DEYA Pelaez       Last Lab Result: Creatinine (mg/dL)       Date                     Value                 11/06/2017               0.88             ----------  Body mass index is 25.22 kg/(m^2).     Needed:  No  Language:  English    Patient will be contacted to schedule procedure.     Please be aware that coverage of these services is subject to the terms and limitations of your health insurance plan.  Call member services at your health plan with any benefit or coverage questions.  Any procedures must be performed at a [facility] facility OR coordinated by your clinic's referral office.    Please bring the following with you to your appointment:    (1) Any X-Rays, CTs or MRIs which have been performed.  Contact the facility where they were done to arrange for  prior to your scheduled appointment.    (2) List of current medications   (3) This referral request   (4) Any documents/labs given to you for this referral            IN[facility]GRATED PRIMARY [facility]ARE REFERRAL       Your provider has referred you to: [facility]  [facility] - [facility] serves adults 18 years and older that have complex medical issues with a mental health overlay that are difficult to manage well in a traditional primary care setting.  Patients referred to IPC meet at least one of the following criteria:    - Complex medical issues with a mental health overlay  - Patients who are difficult to manage in the traditional primary care setting due to mental health issues    Referrals to [facility] ([facility]) assume that the referring provider has discussed with the patient that the initial visit will be an establish care visit and [facility] will take over all primary care services at that visit. The current primary care provider will need to provide care for the patient until the patient has had their first visit at [facility].      Please complete the following questions to help us determine if your patient qualifies for our program:    Is your patient dealing with chronic pain? No    Is your patient on chronic narcotics? No    Does your patient have any chemical health issues? Yes.  If yes, have they been in a treatment program  No    Is your patient currently receiving psychiatric care? No    I have discussed this referral with my patient and they are agreeable to transfer their primary care to [facility]. YES.    Please be aware that coverage of these services is subject to the terms and limitations of your health insurance plan.  Call member services at your health plan with any benefit or coverage questions.      Please bring the following to your appointment:  >>   Any x-rays, CTs or MRIs which have been performed.  Contact the facility where they were done to arrange for  prior to your scheduled appointment.  Any new CT, MRI or other procedures ordered by your specialist must be performed at a [facility] facility or coordinated by your clinic's referral office.    >>   List of current medications   >>   This referral request   >>   Any documents/labs given to you for this referral            MENTAL HEALTH REFERRAL  - Adult; Psychiatry and Medication Management; Psychiatry; Other: Not Listed - Enter Referral  Details in Scheduling Comments Below; Patient call to schedule       Psychiatry Offices accepting new patients:    [facility] [place]:  Intake Line: [phone]  [email or web address]    [facility]:   Intake Line (called a Needs Assessment): [phone]   [email or web address]      For other options contact your insurance provider.      All scheduling is subject to the client's specific insurance plan & benefits, provider/location availability, and provider clinical specialities.  Please arrive 15 minutes early for your first appointment and bring your completed paperwork.    Please be aware that coverage of these services is subject to the terms and limitations of your health insurance plan.  Call member services at your health plan with any benefit or coverage questions.                  Your next 10 appointments already scheduled     May 03, 2018  3:00 PM CDT   Walk In From ENT with Callie Bonilla   [facility] ([place] [facility])    [address] [place] [id number] [phone]            May 03, 2018  4:00 PM CDT   (Arrive by 3:45 PM)   New Patient Visit with Slava Cook MD   [facility] ([place] [facility])    [address] [place] [id number] [phone]            May 15, 2018  3:30 PM CDT   Return Visit with PAULINA Bolton   [facility] ([address] [phone]            May 29, 2018  3:30 PM CDT   Return Visit with PAULINA Bolton   [facility] ([place] [address] [place] [phone]              Who to contact     If you have questions or need follow up information about today's clinic visit or your schedule please contact [facility]  directly at [phone].  Normal or non-critical lab and imaging results will be communicated to you by [facility], letter or phone within 4 business days after the clinic has received the results. If you do not hear from us within 7 days, please contact the clinic through [facility] or phone. If you have a critical or abnormal lab result, we will notify you by phone as soon as possible.  Submit refill requests through [employer] or call your pharmacy and they will forward the refill request to us. Please allow 3 business days for your refill to be completed.          Additional Information About Your Visit        [facility] Information     [employer] gives you secure access to your electronic health record. If you see a primary care provider, you can also send messages to your care team and make appointments. If you have questions, please call your primary care clinic.  If you do not have a primary care provider, please call [phone] and they will assist you.        Care EveryWhere ID     This is your Care EveryWhere ID. This could be used by other organizations to access your [facility] medical records  [id number]        Your Vitals Were     Pulse Temperature Respirations Pulse Oximetry BMI (Body Mass Index)       88 96.9  F (36.1  C) (Tympanic) 16 100% 25.22 kg/m2        Blood Pressure from Last 3 Encounters:   04/19/18 120/76   01/17/18 135/77   12/19/17 (!) 154/106    Weight from Last 3 Encounters:   04/19/18 180 lb 12.8 oz (82 kg)   01/17/18 179 lb 1.6 oz (81.2 kg)   12/19/17 170 lb (77.1 kg)              We Performed the Following     GASTROENTEROLOGY ADULT REF PROCEDURE ONLY Chip Acuña [phone]; Dr. DEYA Pelaez     INTEGRATED PRIMARY CARE REFERRAL     MENTAL HEALTH REFERRAL  - Adult; Psychiatry and Medication Management; Psychiatry; Other: Not Listed - Enter Referral Details in Scheduling Comments Below; Patient call to schedule          Today's Medication Changes          These changes are accurate as of  4/19/18  1:33 PM.  If you have any questions, ask your nurse or doctor.               These medicines have changed or have updated prescriptions.        Dose/Directions    * ADDERALL PO   This may have changed:  Another medication with the same name was added. Make sure you understand how and when to take each.   Changed by:  Jose Luis Nielson MD        Dose:  10 mg   Take 10 mg by mouth   Refills:  0       * amphetamine-dextroamphetamine 10 MG per 24 hr capsule   Commonly known as:  ADDERALL XR   This may have changed:  You were already taking a medication with the same name, and this prescription was added. Make sure you understand how and when to take each.   Used for:  Attention deficit hyperactivity disorder (ADHD), other type   Changed by:  Jose Luis Nielsno MD        Dose:  10 mg   Take 1 capsule (10 mg) by mouth daily   Quantity:  30 capsule   Refills:  0       * amphetamine-dextroamphetamine 10 MG per 24 hr capsule   Commonly known as:  ADDERALL XR   This may have changed:  You were already taking a medication with the same name, and this prescription was added. Make sure you understand how and when to take each.   Used for:  Attention deficit hyperactivity disorder (ADHD), other type   Changed by:  Jose Luis Nielson MD        Dose:  10 mg   Start taking on:  5/20/2018   Take 1 capsule (10 mg) by mouth daily   Quantity:  30 capsule   Refills:  0       * amphetamine-dextroamphetamine 10 MG per 24 hr capsule   Commonly known as:  ADDERALL XR   This may have changed:  You were already taking a medication with the same name, and this prescription was added. Make sure you understand how and when to take each.   Used for:  Attention deficit hyperactivity disorder (ADHD), other type   Changed by:  Jose Luis Nielson MD        Dose:  10 mg   Start taking on:  6/20/2018   Take 1 capsule (10 mg) by mouth daily   Quantity:  30 capsule   Refills:  0       * Notice:  This list has 4  medication(s) that are the same as other medications prescribed for you. Read the directions carefully, and ask your doctor or other care provider to review them with you.         Where to get your medicines      Some of these will need a paper prescription and others can be bought over the counter.  Ask your nurse if you have questions.     Bring a paper prescription for each of these medications     amphetamine-dextroamphetamine 10 MG per 24 hr capsule    amphetamine-dextroamphetamine 10 MG per 24 hr capsule    amphetamine-dextroamphetamine 10 MG per 24 hr capsule                Primary Care Provider Office Phone # Fax #    Jose Luis Slava Nielson [phone] [address] [facility] [address]        Equal Access to Services     SHIVA VELA : Hadii reyes Smith, waaxdavid pierce, qaybazeb kaalmada henrry, terri avila. So [facility] [phone].    ATENCIÓN: Si habla español, tiene a becerra disposición servicios gratuitos de asistencia lingüística. Llame al [phone].    We comply with applicable federal civil rights laws and [place] laws. We do not discriminate on the basis of race, color, national origin, age, disability, sex, sexual orientation, or gender identity.            Thank you!     Thank you for choosing [facility]  for your care. Our goal is always to provide you with excellent care. Hearing back from our patients is one way we can continue to improve our services. Please take a few minutes to complete the written survey that you may receive in the mail after your visit with us. Thank you!             Your Updated Medication List - Protect others around you: Learn how to safely use, store and throw away your medicines at www.disposemymeds.org.          This list is accurate as of 4/19/18  1:33 PM.  Always use your most recent med list.                   Brand Name Dispense Instructions for use Diagnosis    acetaminophen 325  MG tablet    TYLENOL    100 tablet    Take 2 tablets (650 mg) by mouth every 4 hours as needed for other (mild pain)    Urinary retention       * ADDERALL PO      Take 10 mg by mouth        * amphetamine-dextroamphetamine 10 MG per 24 hr capsule    ADDERALL XR    30 capsule    Take 1 capsule (10 mg) by mouth daily    Attention deficit hyperactivity disorder (ADHD), other type       * amphetamine-dextroamphetamine 10 MG per 24 hr capsule   Start taking on:  5/20/2018    ADDERALL XR    30 capsule    Take 1 capsule (10 mg) by mouth daily    Attention deficit hyperactivity disorder (ADHD), other type       * amphetamine-dextroamphetamine 10 MG per 24 hr capsule   Start taking on:  6/20/2018    ADDERALL XR    30 capsule    Take 1 capsule (10 mg) by mouth daily    Attention deficit hyperactivity disorder (ADHD), other type       DAILY HERBS PROSTATE PO      Take 1 tablet by mouth daily        Fish Oil 500 MG Caps      Take 1 capsule by mouth daily        fluticasone 50 MCG/ACT spray    FLONASE    1 Bottle    Spray 2 sprays into both nostrils daily    Upper respiratory tract infection, unspecified type       ibuprofen 600 MG tablet    ADVIL/MOTRIN    30 tablet    Take 1 tablet (600 mg) by mouth every 6 hours as needed for other (For mild pain and temperature greater than 102F)    Urinary retention       lisinopril 10 MG tablet    PRINIVIL/ZESTRIL    90 tablet    Take 1 tablet (10 mg) by mouth daily    Benign essential hypertension       LORazepam 1 MG tablet    ATIVAN    10 tablet    Take 1 tablet (1 mg) by mouth every 8 hours as needed for anxiety    Anxiety, Episodic mood disorder (H)       magnesium 250 MG tablet      Take 1 tablet by mouth nightly as needed (sleep)        MULTIVITAMIN TABS   OR      1 TABLET EVETRY DAY        predniSONE 20 MG tablet    DELTASONE    5 tablet    Take 1 tablet (20 mg) by mouth daily    Upper respiratory tract infection, unspecified type       QUEtiapine 50 MG tablet    SEROQUEL    30  tablet    Take 1 tablet (50 mg) by mouth nightly as needed    Episodic mood disorder (H), Anxiety       tolterodine 2 MG tablet    DETROL    6 tablet    Take 1 tablet (2 mg) by mouth 2 times daily    Urinary retention       * Notice:  This list has 4 medication(s) that are the same as other medications prescribed for you. Read the directions carefully, and ask your doctor or other care provider to review them with you.

## 2018-04-19 NOTE — NURSING NOTE
"Chief Complaint   Patient presents with     Behavioral Problem     Medication check: adderall and blood pressure medication     Initial /76  Pulse 88  Temp 96.9  F (36.1  C) (Tympanic)  Resp 16  Wt 180 lb 12.8 oz (82 kg)  SpO2 100%  BMI 25.22 kg/m2 Estimated body mass index is 25.22 kg/(m^2) as calculated from the following:    Height as of 12/19/17: 5' 11\" (1.803 m).    Weight as of this encounter: 180 lb 12.8 oz (82 kg).  BP completed using cuff size: large. L  arm       Health Maintenance that is potentially due pending provider review:   Colonoscopy/FIT    COLON--Gave pt phone number, and pended order for Mammo and/or Colonoscopy       Crys Nazario CMA     "

## 2018-04-23 ENCOUNTER — TELEPHONE (OUTPATIENT)
Dept: FAMILY MEDICINE | Facility: CLINIC | Age: 63
End: 2018-04-23

## 2018-04-23 NOTE — TELEPHONE ENCOUNTER
Patient was referred to the Integrated Primary [facility] clinic, chart was reviewed, MH issues not well controlled, depression, anxiety, ADHD, seeing a psychologist through [facility], hx of alcohol addiction, moderate medical complexity, no chronic pain, patient does qualify for our program, please schedule with a PCP, and a [facility]    Sarah Taylor FNP  MEDICAL LEAD

## 2018-04-24 NOTE — TELEPHONE ENCOUNTER
Writer spoke with pt; pt is scheduled for an Establish Care visit at [facility] on 5/7/18. In the meantime, pt will continue care with current PCP until pt attends this appt at [facility].     Please contact Sarah Taylor with questions/concerns  [phone]    Thank you,  Alejandro Chaudhari      [facility]

## 2018-04-26 NOTE — TELEPHONE ENCOUNTER
FUTURE VISIT INFORMATION      FUTURE VISIT INFORMATION:    Date: 05/03/2018    Time: 4:00    Location[place] [facility] [place]  REFERRAL INFORMATION:    Referring provider:  SELF    Referring providers clinic:  N/A    Reason for visit/diagnosis  TINNITUS        RECORDS STATUS    **LAST SEEN 5 YEARS AGO AT ANOTHER CLINIC UNKNOWN WHERE**- [place]

## 2018-05-02 DIAGNOSIS — H93.19 TINNITUS: Primary | ICD-10-CM

## 2018-05-03 ENCOUNTER — OFFICE VISIT (OUTPATIENT)
Dept: OTOLARYNGOLOGY | Facility: CLINIC | Age: 63
End: 2018-05-03
Payer: COMMERCIAL

## 2018-05-03 ENCOUNTER — PRE VISIT (OUTPATIENT)
Dept: OTOLARYNGOLOGY | Facility: CLINIC | Age: 63
End: 2018-05-03

## 2018-05-03 ENCOUNTER — OFFICE VISIT (OUTPATIENT)
Dept: AUDIOLOGY | Facility: CLINIC | Age: 63
End: 2018-05-03
Payer: COMMERCIAL

## 2018-05-03 VITALS — WEIGHT: 180 LBS | HEIGHT: 72 IN | BODY MASS INDEX: 24.38 KG/M2

## 2018-05-03 DIAGNOSIS — H93.13 TINNITUS, BILATERAL: ICD-10-CM

## 2018-05-03 DIAGNOSIS — H90.3 SENSORY HEARING LOSS, BILATERAL: Primary | ICD-10-CM

## 2018-05-03 DIAGNOSIS — H90.3 SENSORINEURAL HEARING LOSS (SNHL) OF BOTH EARS: Primary | ICD-10-CM

## 2018-05-03 DIAGNOSIS — J34.89 NASAL VESTIBULITIS: ICD-10-CM

## 2018-05-03 RX ORDER — MUPIROCIN 20 MG/G
OINTMENT TOPICAL
Qty: 2 G | Refills: 3 | Status: SHIPPED | OUTPATIENT
Start: 2018-05-03 | End: 2018-12-19

## 2018-05-03 ASSESSMENT — PAIN SCALES - GENERAL: PAINLEVEL: NO PAIN (0)

## 2018-05-03 NOTE — MR AVS SNAPSHOT
After Visit Summary   5/3/2018    [address]    MRN: [id number]           Patient Information     Date Of Birth          1955        Visit Information        Provider Department      5/3/2018 4:00 PM Slava Cook MD [facility] Ear Nose and Throat        Today's Diagnoses     Sensorineural hearing loss (SNHL) of both ears    -  1    Tinnitus, bilateral        Nasal vestibulitis          Care Instructions    The patient presents with a history of hearing loss and tinnitus. He will be referred for a hearing aid consultation and to the tinnitus management program with Audiology. The patient will also use bactroban ointment in the anterior nares for eight weeks and he will be seen again in eight weeks to assess his progress with therapy.           Follow-ups after your visit        Your next 10 appointments already scheduled     May 07, 2018  8:30 AM CDT   New Visit with MISHEL SCHUMACHER VISIT   [facility] ([facility]    [address]  [address]  [facility] [address]   [phone]            May 07, 2018  9:00 AM CDT   New Visit with JEWELS Castano CNP   [facility]    [address]  [address]  [facility] [address]   [phone]            May 07, 2018  9:00 AM CDT   New Visit with DANGELO Mcclure   [facility] ([facility])    [address]  [address]  [facility] [address]   [phone]            May 15, 2018   Procedure with Lobo Pelaez MD   [facility] ([facility])    [address]  [place] [address]   [phone]           [facility] is located at [address]            May 18, 2018  8:30 AM CDT   Hearing Aid Consult with Callie Bonilla [facility] ([facility]  [facility])    [address] [place] [id number] [phone]            May 23, 2018  9:30 AM CDT   (Arrive by 9:15 AM)   TINNITUS EVALUATION with Callie Barajas [facility] ([facility])    [address] [place] [id number] [phone]            May 29, 2018  3:30 PM CDT   Return Visit with PAULINA Bolton   [facility] ([place] [address] [place] [address]   [phone]            Jun 11, 2018  3:30 PM CDT   Return Visit with PAULINA Bolton   [facility] ([place] [address] [place] [address]   [phone]            Jun 28, 2018  4:30 PM CDT   (Arrive by 4:15 PM)   Return Visit with Slava Cook MD   [facility] Ear Nose and Throat ([facility])    [address] [place] [id number] [phone]            Jun 29, 2018  8:00 AM CDT   Hearing Aid Fitting with Callie Bonilla [facility] ([facility])    [address] [place] [id number] [phone]              Who to contact     Please call your clinic at [phone] to:    Ask questions about your health    Make or cancel appointments    Discuss your medicines    Learn about your test results    Speak to your doctor            Additional Information About Your Visit        [employer] Information     [employer] gives you secure access to your electronic health record. If you see a primary care provider, you can also send messages to your care team and make appointments. If you have questions, please call your primary care clinic.  If you do not have a primary care provider, please call [phone] and they will assist you.      [employer] is an electronic gateway that  "provides easy, online access to your medical records. With [employer], you can request a clinic appointment, read your test results, renew a prescription or communicate with your care team.     To access your existing account, please contact your [facility] or call [phone] for assistance.        Care EveryWhere ID     This is your Care EveryWhere ID. This could be used by other organizations to access your [place] medical records  [id number]        Your Vitals Were     Height BMI (Body Mass Index)                1.816 m (5' 11.5\") 24.76 kg/m2           Blood Pressure from Last 3 Encounters:   04/19/18 120/76   01/17/18 135/77   12/19/17 (!) 154/106    Weight from Last 3 Encounters:   05/03/18 81.6 kg (180 lb)   04/19/18 82 kg (180 lb 12.8 oz)   01/17/18 81.2 kg (179 lb 1.6 oz)              We Performed the Following     AUDIO REVIEW/CONSULT          Today's Medication Changes          These changes are accurate as of 5/3/18  4:24 PM.  If you have any questions, ask your nurse or doctor.               Start taking these medicines.        Dose/Directions    mupirocin 2 % ointment   Commonly known as:  BACTROBAN   Used for:  Nasal vestibulitis   Started by:  Slava Cook MD        Apply to anterior nares BID X 2 month supply   Quantity:  2 g   Refills:  3            Where to get your medicines      These medications were sent to [facility] [place], [place] - [address]  [address], [facility] [place] [address]     Phone:  [phone]     mupirocin 2 % ointment                Primary Care Provider Office Phone # Fax #    Jose Luis Slava Nielson [phone] [address] [facility] [address]        Equal Access to Services     SHIVA VELA : Hadii reyes perezo Sobladimirali, waaxda luqadaha, qaybta kaalmada adeegyada, terri avila. So [facility] [phone].    ATENCIÓN: Si daniel ni " becerra disposición servicios gratuitos de asistencia lingüística. Chu zafar [phone].    We comply with applicable federal civil rights laws and [place] laws. We do not discriminate on the basis of race, color, national origin, age, disability, sex, sexual orientation, or gender identity.            Thank you!     Thank you for choosing [facility] EAR NOSE AND THROAT  for your care. Our goal is always to provide you with excellent care. Hearing back from our patients is one way we can continue to improve our services. Please take a few minutes to complete the written survey that you may receive in the mail after your visit with us. Thank you!             Your Updated Medication List - Protect others around you: Learn how to safely use, store and throw away your medicines at www.disposemymeds.org.          This list is accurate as of 5/3/18  4:24 PM.  Always use your most recent med list.                   Brand Name Dispense Instructions for use Diagnosis    acetaminophen 325 MG tablet    TYLENOL    100 tablet    Take 2 tablets (650 mg) by mouth every 4 hours as needed for other (mild pain)    Urinary retention       * ADDERALL PO      Take 10 mg by mouth        * amphetamine-dextroamphetamine 10 MG per 24 hr capsule    ADDERALL XR    30 capsule    Take 1 capsule (10 mg) by mouth daily    Attention deficit hyperactivity disorder (ADHD), other type       * amphetamine-dextroamphetamine 10 MG per 24 hr capsule   Start taking on:  5/20/2018    ADDERALL XR    30 capsule    Take 1 capsule (10 mg) by mouth daily    Attention deficit hyperactivity disorder (ADHD), other type       * amphetamine-dextroamphetamine 10 MG per 24 hr capsule   Start taking on:  6/20/2018    ADDERALL XR    30 capsule    Take 1 capsule (10 mg) by mouth daily    Attention deficit hyperactivity disorder (ADHD), other type       cholecalciferol 400 UNIT/ML Liqd liquid    vitamin D/D-VI-SOL     Take 5,000 Units by mouth daily        DAILY HERBS  PROSTATE PO      Take 1 tablet by mouth daily        Fish Oil 500 MG Caps      Take 1 capsule by mouth daily        fluticasone 50 MCG/ACT spray    FLONASE    1 Bottle    Spray 2 sprays into both nostrils daily    Upper respiratory tract infection, unspecified type       ibuprofen 600 MG tablet    ADVIL/MOTRIN    30 tablet    Take 1 tablet (600 mg) by mouth every 6 hours as needed for other (For mild pain and temperature greater than 102F)    Urinary retention       lisinopril 10 MG tablet    PRINIVIL/ZESTRIL    90 tablet    Take 1 tablet (10 mg) by mouth daily    Benign essential hypertension       LORazepam 1 MG tablet    ATIVAN    10 tablet    Take 1 tablet (1 mg) by mouth every 8 hours as needed for anxiety    Anxiety, Episodic mood disorder (H)       magnesium 250 MG tablet      Take 1 tablet by mouth nightly as needed (sleep)        MULTIVITAMIN TABS   OR      1 TABLET EVETRY DAY        mupirocin 2 % ointment    BACTROBAN    2 g    Apply to anterior nares BID X 2 month supply    Nasal vestibulitis       predniSONE 20 MG tablet    DELTASONE    5 tablet    Take 1 tablet (20 mg) by mouth daily    Upper respiratory tract infection, unspecified type       QUEtiapine 50 MG tablet    SEROQUEL    30 tablet    Take 1 tablet (50 mg) by mouth nightly as needed    Episodic mood disorder (H), Anxiety       tolterodine 2 MG tablet    DETROL    6 tablet    Take 1 tablet (2 mg) by mouth 2 times daily    Urinary retention       * Notice:  This list has 4 medication(s) that are the same as other medications prescribed for you. Read the directions carefully, and ask your doctor or other care provider to review them with you.

## 2018-05-03 NOTE — PROGRESS NOTES
The patient presents with a history of hearing loss and tinnitus in both ears.  The patient reports a progressive hearing loss in both ears over at least the past few years.  The patient denies ear infections, otalgia or otorrhea, but the patient has experienced some difficulty with dizziness and tinnitus.  The tinnitus has become more bothersome to him. The patient denies sinusitis, rhinitis, facial pain, nasal obstruction or purulent nasal discharge. However, he has experienced some difficulty nasal vestibulitis. The patient denies chronic or recurrent tonsillitis, chronic or recurrent pharyngitis. The patient's Audiogram and Tympanogram are reviewed with him and they demonstrate bilateral high frequency sensorineural hearing loss with 100% word recognition in both ears and normal tympanograms. The patient is having difficulty with hearing when he is in environments with background noise.     This patient is seen in consultation as a self referral to my clinic.    All other systems were reviewed and they are either negative or they are not directly pertinent to this Otolaryngology examination.      Past Medical History:    Past Medical History:   Diagnosis Date     Adjustment disorder      Dysthymic disorder      Enlarged prostate      Hepatitis      Irregular heart beat     intermittant palpitations     Palpitations     intermittent     PONV (postoperative nausea and vomiting)      Positive PPD      Pure hypercholesterolemia 1/00     Renal disease     single R kidney;donated left     Screening examination for pulmonary tuberculosis     positive mantoux     Unspecified essential hypertension 1/00       Past Surgical History:    Past Surgical History:   Procedure Laterality Date     C ECG MONITOR/ 24 HRS, ORIG ECG, W VIS SUPERIMPOS SCAN, COMPLETE  1/00    um holter monitor     C REMV KIDNEY/URETER,SAME INCIS  6/30/05    Nephrouretectomy/LEFT KIDNEY DONATED TO DAUGHTER/[facility]     HC REMOVAL OF TONSILS,<11 Y/O  age 7      HC VASECTOMY UNILAT/BILAT W POSTOP SEMEN  age 41     HERNIORRHAPHY INGUINAL  12/23/2011    Procedure:HERNIORRHAPHY INGUINAL; Surgeon:JULIA SIERRA; Location:[facility]     LAPAROSCOPIC HERNIORRHAPHY INGUINAL Right 6/29/2015    Procedure: LAPAROSCOPIC HERNIORRHAPHY INGUINAL;  Surgeon: García Ibarra MD;  [facility]     LAPAROSCOPIC HERNIORRHAPHY VENTRAL  12/23/2011    Procedure:LAPAROSCOPIC HERNIORRHAPHY VENTRAL; Laparoscopic Ventral Hernia Repair with Mesh, Open Left Inguinal Hernia Repair with Mesh; Surgeon:JULIA SIERRA; Location:[facility]     LASER HOLMIUM ENUCLEATION PROSTATE N/A 10/19/2017    Procedure: LASER HOLMIUM ENUCLEATION PROSTATE;  Holmium Laser Enculeation of the Prostate;  Surgeon: Manolo Cardenas MD;  Location:  OR     LASIK CUSTOMVUE BILATERAL  11/11/2011    Procedure:LASIK CUSTOMVUE BILATERAL; BILATERAL CUSTOMVUE LASIK WITH INTRALASE; Surgeon:POP SIMON; Location:[facility]       Medications:      Current Outpatient Prescriptions:      Amphetamine-Dextroamphetamine (ADDERALL PO), Take 10 mg by mouth, Disp: , Rfl:      cholecalciferol (VITAMIN D/D-VI-SOL) 400 UNIT/ML LIQD liquid, Take 5,000 Units by mouth daily, Disp: , Rfl:      fluticasone (FLONASE) 50 MCG/ACT spray, Spray 2 sprays into both nostrils daily, Disp: 1 Bottle, Rfl: 11     ibuprofen (ADVIL/MOTRIN) 600 MG tablet, Take 1 tablet (600 mg) by mouth every 6 hours as needed for other (For mild pain and temperature greater than 102F), Disp: 30 tablet, Rfl: 0     lisinopril (PRINIVIL/ZESTRIL) 10 MG tablet, Take 1 tablet (10 mg) by mouth daily, Disp: 90 tablet, Rfl: 1     magnesium 250 MG tablet, Take 1 tablet by mouth nightly as needed (sleep), Disp: , Rfl:      Misc Natural Products (DAILY HERBS PROSTATE PO), Take 1 tablet by mouth daily, Disp: , Rfl:      MULTIVITAMIN TABS   OR, 1 TABLET EVETRY DAY, Disp: , Rfl: 0     Omega-3 Fatty Acids (FISH OIL) 500 MG CAPS, Take 1 capsule by mouth daily  , Disp: , Rfl:      QUEtiapine (SEROQUEL) 50 MG tablet, Take 1 tablet (50 mg) by mouth nightly as needed, Disp: 30 tablet, Rfl: 1     acetaminophen (TYLENOL) 325 MG tablet, Take 2 tablets (650 mg) by mouth every 4 hours as needed for other (mild pain) (Patient not taking: Reported on 5/3/2018), Disp: 100 tablet, Rfl: 0     amphetamine-dextroamphetamine (ADDERALL XR) 10 MG per 24 hr capsule, Take 1 capsule (10 mg) by mouth daily (Patient not taking: Reported on 5/3/2018), Disp: 30 capsule, Rfl: 0     [START ON 5/20/2018] amphetamine-dextroamphetamine (ADDERALL XR) 10 MG per 24 hr capsule, Take 1 capsule (10 mg) by mouth daily (Patient not taking: Reported on 5/3/2018), Disp: 30 capsule, Rfl: 0     [START ON 6/20/2018] amphetamine-dextroamphetamine (ADDERALL XR) 10 MG per 24 hr capsule, Take 1 capsule (10 mg) by mouth daily (Patient not taking: Reported on 5/3/2018), Disp: 30 capsule, Rfl: 0     LORazepam (ATIVAN) 1 MG tablet, Take 1 tablet (1 mg) by mouth every 8 hours as needed for anxiety (Patient not taking: Reported on 1/17/2018), Disp: 10 tablet, Rfl: 0     predniSONE (DELTASONE) 20 MG tablet, Take 1 tablet (20 mg) by mouth daily (Patient not taking: Reported on 4/19/2018), Disp: 5 tablet, Rfl: 0     tolterodine (DETROL) 2 MG tablet, Take 1 tablet (2 mg) by mouth 2 times daily (Patient not taking: Reported on 1/17/2018), Disp: 6 tablet, Rfl: 0    Allergies:    Ambien [zolpidem tartrate] and Codeine sulfate    Physical Examination:    The patient is a well developed, well nourished male in no apparent distress.  He is normocephalic, atraumatic with pupils equally round and reactive to light.    Oral Cavity Examination:  Normal mucosa with no masses or lesions  Nasal Examination: scabbing along the anterior nasal mucosa with no masses or lesions  Ear Examination: Ear canals clear, tympanic membranes and middle ear spaces normal  Neurological Examination: Facial nerve function intact and symmetric  Integumentary  Examination: No lesions on the skin of the head and neck  Neck Examination: No masses or lesions, no lymphadenopathy  Endocrine Examination: Normal thyroid examination    Assessment and Plan:    The patient presents with a history of hearing loss and tinnitus. He will be referred for a hearing aid consultation and to the tinnitus management program with Audiology. The patient will also use bactroban ointment in the anterior nares for eight weeks and he will be seen again in eight weeks to assess his progress with therapy.

## 2018-05-03 NOTE — MR AVS SNAPSHOT
After Visit Summary   5/3/2018    [address] Way    MRN: [id number]           Patient Information     Date Of Birth          1955        Visit Information        Provider Department      5/3/2018 3:00 PM Kamilla Oviedo AuD [facility] Audiology        Today's Diagnoses     Sensory hearing loss, bilateral    -  1       Follow-ups after your visit        Your next 10 appointments already scheduled     May 03, 2018  4:00 PM CDT   (Arrive by 3:45 PM)   New Patient Visit with Slava Cook MD   [facility] Ear Nose and Throat ([facility] [place])    [address]  4th Floor  [facility] [address]   [phone]            May 07, 2018  8:30 AM CDT   New Visit with MISHEL MA VISIT   [facility] ([facility])    [address]  [address]  [facility] [address]   [phone]            May 07, 2018  9:00 AM CDT   New Visit with JEWELS Castano CNP   [facility] ([facility])    [address]  [address]  [facility] [address]   [phone]            May 07, 2018  9:00 AM CDT   New Visit with Larry Albright [facility] ([facility])    [address]  [address]  [facility] [address]   [phone]            May 15, 2018   Procedure with Lobo Pelaez MD   [facility] ([facility])    [address]  [place] [address]   [phone]           [facility] is located at [address] [place]            May 29, 2018  3:30 PM CDT   Return Visit with Xander Salas PHILL   [facility] ([facility]  [address]  [facility] [address]   [phone]            Jun 11, 2018  3:30 PM CDT   Return Visit with PAULINA Bolton   [facility] ([facility]    [facility]  [address]  [facility] [id number] [phone]              Who to contact     Please call your clinic at [phone] to:    Ask questions about your health    Make or cancel appointments    Discuss your medicines    Learn about your test results    Speak to your doctor            Additional Information About Your Visit        [employer] Information     [employer] gives you secure access to your electronic health record. If you see a primary care provider, you can also send messages to your care team and make appointments. If you have questions, please call your primary care clinic.  If you do not have a primary care provider, please call [phone] and they will assist you.      [employer] is an electronic gateway that provides easy, online access to your medical records. With [employer], you can request a clinic appointment, read your test results, renew a prescription or communicate with your care team.     To access your existing account, please contact your [facility] or call [phone] for assistance.        Care EveryWhere ID     This is your Care EveryWhere ID. This could be used by other organizations to access your [place] medical records  [id number]         Blood Pressure from Last 3 Encounters:   04/19/18 120/76   01/17/18 135/77   12/19/17 (!) 154/106    Weight from Last 3 Encounters:   04/19/18 82 kg (180 lb 12.8 oz)   01/17/18 81.2 kg (179 lb 1.6 oz)   12/19/17 77.1 kg (170 lb)              We Performed the Following     AUDIOGRAM/TYMPANOGRAM - INTERFACE     [facility] ([address])     Tymps / Reflex   ([id number])        Primary Care Provider Office Phone # Fax #    Jose Luis Slava Nielson [phone] [address] [facility] [address]        Equal Access to Services     SHIVA VELA : jan Rodriguez,  lavern hardikadindavid hancockterri ba shamirmike calvoaasebastien ah. So [facility] [phone].    ATENCIÓN: Si loraine weems, tiene a becerra disposición servicios gratuitos de asistencia lingüística. Chu al [phone].    We comply with applicable federal civil rights laws and [place] laws. We do not discriminate on the basis of race, color, national origin, age, disability, sex, sexual orientation, or gender identity.            Thank you!     Thank you for choosing [facility] AUDIOLOGY  for your care. Our goal is always to provide you with excellent care. Hearing back from our patients is one way we can continue to improve our services. Please take a few minutes to complete the written survey that you may receive in the mail after your visit with us. Thank you!             Your Updated Medication List - Protect others around you: Learn how to safely use, store and throw away your medicines at www.disposemymeds.org.          This list is accurate as of 5/3/18  3:22 PM.  Always use your most recent med list.                   Brand Name Dispense Instructions for use Diagnosis    acetaminophen 325 MG tablet    TYLENOL    100 tablet    Take 2 tablets (650 mg) by mouth every 4 hours as needed for other (mild pain)    Urinary retention       * ADDERALL PO      Take 10 mg by mouth        * amphetamine-dextroamphetamine 10 MG per 24 hr capsule    ADDERALL XR    30 capsule    Take 1 capsule (10 mg) by mouth daily    Attention deficit hyperactivity disorder (ADHD), other type       * amphetamine-dextroamphetamine 10 MG per 24 hr capsule   Start taking on:  5/20/2018    ADDERALL XR    30 capsule    Take 1 capsule (10 mg) by mouth daily    Attention deficit hyperactivity disorder (ADHD), other type       * amphetamine-dextroamphetamine 10 MG per 24 hr capsule   Start taking on:  6/20/2018    ADDERALL XR    30 capsule    Take 1 capsule (10 mg) by mouth daily    Attention deficit hyperactivity disorder (ADHD), other type        DAILY HERBS PROSTATE PO      Take 1 tablet by mouth daily        Fish Oil 500 MG Caps      Take 1 capsule by mouth daily        fluticasone 50 MCG/ACT spray    FLONASE    1 Bottle    Spray 2 sprays into both nostrils daily    Upper respiratory tract infection, unspecified type       ibuprofen 600 MG tablet    ADVIL/MOTRIN    30 tablet    Take 1 tablet (600 mg) by mouth every 6 hours as needed for other (For mild pain and temperature greater than 102F)    Urinary retention       lisinopril 10 MG tablet    PRINIVIL/ZESTRIL    90 tablet    Take 1 tablet (10 mg) by mouth daily    Benign essential hypertension       LORazepam 1 MG tablet    ATIVAN    10 tablet    Take 1 tablet (1 mg) by mouth every 8 hours as needed for anxiety    Anxiety, Episodic mood disorder (H)       magnesium 250 MG tablet      Take 1 tablet by mouth nightly as needed (sleep)        MULTIVITAMIN TABS   OR      1 TABLET EVETRY DAY        predniSONE 20 MG tablet    DELTASONE    5 tablet    Take 1 tablet (20 mg) by mouth daily    Upper respiratory tract infection, unspecified type       QUEtiapine 50 MG tablet    SEROQUEL    30 tablet    Take 1 tablet (50 mg) by mouth nightly as needed    Episodic mood disorder (H), Anxiety       tolterodine 2 MG tablet    DETROL    6 tablet    Take 1 tablet (2 mg) by mouth 2 times daily    Urinary retention       * Notice:  This list has 4 medication(s) that are the same as other medications prescribed for you. Read the directions carefully, and ask your doctor or other care provider to review them with you.

## 2018-05-03 NOTE — PROGRESS NOTES
AUDIOLOGY REPORT    SUMMARY: Audiology visit completed. See audiogram for results.      RECOMMENDATIONS: Follow-up with ENT.      Yadira Reinoso, CCC-A  Licensed Audiologist  [place] #[id number]

## 2018-05-03 NOTE — LETTER
5/3/2018       RE: Govind [address]  [address]   [facility] [address]     Dear Colleague,    Thank you for referring your patient, Govind Way, to the [facility] EAR NOSE AND THROAT at [facility]. Please see a copy of my visit note below.    The patient presents with a history of hearing loss and tinnitus in both ears.  The patient reports a progressive hearing loss in both ears over at least the past few years.  The patient denies ear infections, otalgia or otorrhea, but the patient has experienced some difficulty with dizziness and tinnitus.  The tinnitus has become more bothersome to him. The patient denies sinusitis, rhinitis, facial pain, nasal obstruction or purulent nasal discharge. However, he has experienced some difficulty nasal vestibulitis. The patient denies chronic or recurrent tonsillitis, chronic or recurrent pharyngitis. The patient's Audiogram and Tympanogram are reviewed with him and they demonstrate bilateral high frequency sensorineural hearing loss with 100% word recognition in both ears and normal tympanograms. The patient is having difficulty with hearing when he is in environments with background noise.     This patient is seen in consultation as a self referral to my clinic.    All other systems were reviewed and they are either negative or they are not directly pertinent to this Otolaryngology examination.      Past Medical History:    Past Medical History:   Diagnosis Date     Adjustment disorder      Dysthymic disorder      Enlarged prostate      Hepatitis      Irregular heart beat     intermittant palpitations     Palpitations     intermittent     PONV (postoperative nausea and vomiting)      Positive PPD      Pure hypercholesterolemia 1/00     Renal disease     single R kidney;donated left     Screening examination for pulmonary tuberculosis     positive mantoux     Unspecified essential hypertension 1/00       Past  Surgical History:    Past Surgical History:   Procedure Laterality Date     C ECG MONITOR/ 24 HRS, ORIG ECG, W VIS SUPERIMPOS SCAN, COMPLETE  1/00    um holter monitor     C REMV KIDNEY/URETER,SAME INCIS  6/30/05    Nephrouretectomy/LEFT KIDNEY DONATED TO DAUGHTER/[facility]     HC REMOVAL OF TONSILS,<11 Y/O  age 7     HC VASECTOMY UNILAT/BILAT W POSTOP SEMEN  age 41     HERNIORRHAPHY INGUINAL  12/23/2011    Procedure:HERNIORRHAPHY INGUINAL; Surgeon:JULIA SIERRA; Location:[facility] OR     LAPAROSCOPIC HERNIORRHAPHY INGUINAL Right 6/29/2015    Procedure: LAPAROSCOPIC HERNIORRHAPHY INGUINAL;  Surgeon: García Ibarra MD;  [facility]: [facility]     LAPAROSCOPIC HERNIORRHAPHY VENTRAL  12/23/2011    Procedure:LAPAROSCOPIC HERNIORRHAPHY VENTRAL; Laparoscopic Ventral Hernia Repair with Mesh, Open Left Inguinal Hernia Repair with Mesh; Surgeon:JULIA SIERRA; Location:[facility] OR     LASER HOLMIUM ENUCLEATION PROSTATE N/A 10/19/2017    Procedure: LASER HOLMIUM ENUCLEATION PROSTATE;  Holmium Laser Enculeation of the Prostate;  Surgeon: Manolo Cardenas MD;  [facility]     LASIK CUSTOMVUE BILATERAL  11/11/2011    Procedure:LASIK CUSTOMVUE BILATERAL; BILATERAL CUSTOMVUE LASIK WITH INTRALASE; Surgeon:POP SIMON; Location:[facility]       Medications:      Current Outpatient Prescriptions:      Amphetamine-Dextroamphetamine (ADDERALL PO), Take 10 mg by mouth, Disp: , Rfl:      cholecalciferol (VITAMIN D/D-VI-SOL) 400 UNIT/ML LIQD liquid, Take 5,000 Units by mouth daily, Disp: , Rfl:      fluticasone (FLONASE) 50 MCG/ACT spray, Spray 2 sprays into both nostrils daily, Disp: 1 Bottle, Rfl: 11     ibuprofen (ADVIL/MOTRIN) 600 MG tablet, Take 1 tablet (600 mg) by mouth every 6 hours as needed for other (For mild pain and temperature greater than 102F), Disp: 30 tablet, Rfl: 0     lisinopril (PRINIVIL/ZESTRIL) 10 MG tablet, Take 1 tablet (10 mg) by mouth daily, Disp: 90 tablet, Rfl: 1     magnesium 250  MG tablet, Take 1 tablet by mouth nightly as needed (sleep), Disp: , Rfl:      Misc Natural Products (DAILY HERBS PROSTATE PO), Take 1 tablet by mouth daily, Disp: , Rfl:      MULTIVITAMIN TABS   OR, 1 TABLET EVETRY DAY, Disp: , Rfl: 0     Omega-3 Fatty Acids (FISH OIL) 500 MG CAPS, Take 1 capsule by mouth daily , Disp: , Rfl:      QUEtiapine (SEROQUEL) 50 MG tablet, Take 1 tablet (50 mg) by mouth nightly as needed, Disp: 30 tablet, Rfl: 1     acetaminophen (TYLENOL) 325 MG tablet, Take 2 tablets (650 mg) by mouth every 4 hours as needed for other (mild pain) (Patient not taking: Reported on 5/3/2018), Disp: 100 tablet, Rfl: 0     amphetamine-dextroamphetamine (ADDERALL XR) 10 MG per 24 hr capsule, Take 1 capsule (10 mg) by mouth daily (Patient not taking: Reported on 5/3/2018), Disp: 30 capsule, Rfl: 0     [START ON 5/20/2018] amphetamine-dextroamphetamine (ADDERALL XR) 10 MG per 24 hr capsule, Take 1 capsule (10 mg) by mouth daily (Patient not taking: Reported on 5/3/2018), Disp: 30 capsule, Rfl: 0     [START ON 6/20/2018] amphetamine-dextroamphetamine (ADDERALL XR) 10 MG per 24 hr capsule, Take 1 capsule (10 mg) by mouth daily (Patient not taking: Reported on 5/3/2018), Disp: 30 capsule, Rfl: 0     LORazepam (ATIVAN) 1 MG tablet, Take 1 tablet (1 mg) by mouth every 8 hours as needed for anxiety (Patient not taking: Reported on 1/17/2018), Disp: 10 tablet, Rfl: 0     predniSONE (DELTASONE) 20 MG tablet, Take 1 tablet (20 mg) by mouth daily (Patient not taking: Reported on 4/19/2018), Disp: 5 tablet, Rfl: 0     tolterodine (DETROL) 2 MG tablet, Take 1 tablet (2 mg) by mouth 2 times daily (Patient not taking: Reported on 1/17/2018), Disp: 6 tablet, Rfl: 0    Allergies:    Ambien [zolpidem tartrate] and Codeine sulfate    Physical Examination:    The patient is a well developed, well nourished male in no apparent distress.  He is normocephalic, atraumatic with pupils equally round and reactive to light.    Oral  Cavity Examination:  Normal mucosa with no masses or lesions  Nasal Examination: scabbing along the anterior nasal mucosa with no masses or lesions  Ear Examination: Ear canals clear, tympanic membranes and middle ear spaces normal  Neurological Examination: Facial nerve function intact and symmetric  Integumentary Examination: No lesions on the skin of the head and neck  Neck Examination: No masses or lesions, no lymphadenopathy  Endocrine Examination: Normal thyroid examination    Assessment and Plan:    The patient presents with a history of hearing loss and tinnitus. He will be referred for a hearing aid consultation and to the tinnitus management program with Audiology. The patient will also use bactroban ointment in the anterior nares for eight weeks and he will be seen again in eight weeks to assess his progress with therapy.       Again, thank you for allowing me to participate in the care of your patient.      Sincerely,    Slava Cook MD

## 2018-05-03 NOTE — NURSING NOTE
Chief Complaint   Patient presents with     Consult     tinnitus, pulsating in ear     Shahram Tyson

## 2018-05-07 ENCOUNTER — OFFICE VISIT (OUTPATIENT)
Dept: FAMILY MEDICINE | Facility: CLINIC | Age: 63
End: 2018-05-07
Payer: COMMERCIAL

## 2018-05-07 ENCOUNTER — OFFICE VISIT (OUTPATIENT)
Dept: BEHAVIORAL HEALTH | Facility: CLINIC | Age: 63
End: 2018-05-07
Payer: COMMERCIAL

## 2018-05-07 ENCOUNTER — APPOINTMENT (OUTPATIENT)
Dept: FAMILY MEDICINE | Facility: CLINIC | Age: 63
End: 2018-05-07
Payer: COMMERCIAL

## 2018-05-07 VITALS
OXYGEN SATURATION: 97 % | WEIGHT: 180.5 LBS | HEART RATE: 83 BPM | RESPIRATION RATE: 16 BRPM | SYSTOLIC BLOOD PRESSURE: 122 MMHG | TEMPERATURE: 98 F | BODY MASS INDEX: 24.82 KG/M2 | DIASTOLIC BLOOD PRESSURE: 82 MMHG

## 2018-05-07 DIAGNOSIS — F43.23 ADJUSTMENT DISORDER WITH MIXED ANXIETY AND DEPRESSED MOOD: ICD-10-CM

## 2018-05-07 DIAGNOSIS — N40.1 BENIGN NON-NODULAR PROSTATIC HYPERPLASIA WITH LOWER URINARY TRACT SYMPTOMS: ICD-10-CM

## 2018-05-07 DIAGNOSIS — F90.8 ATTENTION DEFICIT HYPERACTIVITY DISORDER (ADHD), OTHER TYPE: ICD-10-CM

## 2018-05-07 DIAGNOSIS — F10.90 ALCOHOL USE DISORDER: ICD-10-CM

## 2018-05-07 DIAGNOSIS — I10 HYPERTENSION GOAL BP (BLOOD PRESSURE) < 140/90: Primary | ICD-10-CM

## 2018-05-07 DIAGNOSIS — F33.0 MAJOR DEPRESSIVE DISORDER, RECURRENT EPISODE, MILD (H): ICD-10-CM

## 2018-05-07 DIAGNOSIS — F33.0 MAJOR DEPRESSIVE DISORDER, RECURRENT EPISODE, MILD (H): Primary | ICD-10-CM

## 2018-05-07 LAB
ALBUMIN SERPL-MCNC: 4.1 G/DL (ref 3.4–5)
ALP SERPL-CCNC: 66 U/L (ref 40–150)
ALT SERPL W P-5'-P-CCNC: 28 U/L (ref 0–70)
ANION GAP SERPL CALCULATED.3IONS-SCNC: 8 MMOL/L (ref 3–14)
AST SERPL W P-5'-P-CCNC: 19 U/L (ref 0–45)
BASOPHILS # BLD AUTO: 0 10E9/L (ref 0–0.2)
BASOPHILS NFR BLD AUTO: 0.4 %
BILIRUB SERPL-MCNC: 0.5 MG/DL (ref 0.2–1.3)
BUN SERPL-MCNC: 18 MG/DL (ref 7–30)
CALCIUM SERPL-MCNC: 9.1 MG/DL (ref 8.5–10.1)
CHLORIDE SERPL-SCNC: 110 MMOL/L (ref 94–109)
CO2 SERPL-SCNC: 22 MMOL/L (ref 20–32)
CREAT SERPL-MCNC: 0.97 MG/DL (ref 0.66–1.25)
DIFFERENTIAL METHOD BLD: NORMAL
EOSINOPHIL # BLD AUTO: 0.1 10E9/L (ref 0–0.7)
EOSINOPHIL NFR BLD AUTO: 1.8 %
ERYTHROCYTE [DISTWIDTH] IN BLOOD BY AUTOMATED COUNT: 13.3 % (ref 10–15)
GFR SERPL CREATININE-BSD FRML MDRD: 78 ML/MIN/1.7M2
GLUCOSE SERPL-MCNC: 106 MG/DL (ref 70–99)
HCT VFR BLD AUTO: 44.3 % (ref 40–53)
HGB BLD-MCNC: 14.9 G/DL (ref 13.3–17.7)
LYMPHOCYTES # BLD AUTO: 1.6 10E9/L (ref 0.8–5.3)
LYMPHOCYTES NFR BLD AUTO: 27.7 %
MCH RBC QN AUTO: 31.5 PG (ref 26.5–33)
MCHC RBC AUTO-ENTMCNC: 33.6 G/DL (ref 31.5–36.5)
MCV RBC AUTO: 94 FL (ref 78–100)
MONOCYTES # BLD AUTO: 0.5 10E9/L (ref 0–1.3)
MONOCYTES NFR BLD AUTO: 8.8 %
NEUTROPHILS # BLD AUTO: 3.5 10E9/L (ref 1.6–8.3)
NEUTROPHILS NFR BLD AUTO: 61.3 %
PLATELET # BLD AUTO: 246 10E9/L (ref 150–450)
POTASSIUM SERPL-SCNC: 4 MMOL/L (ref 3.4–5.3)
PROT SERPL-MCNC: 7.3 G/DL (ref 6.8–8.8)
RBC # BLD AUTO: 4.73 10E12/L (ref 4.4–5.9)
SODIUM SERPL-SCNC: 140 MMOL/L (ref 133–144)
WBC # BLD AUTO: 5.7 10E9/L (ref 4–11)

## 2018-05-07 PROCEDURE — 99215 OFFICE O/P EST HI 40 MIN: CPT | Performed by: NURSE PRACTITIONER

## 2018-05-07 PROCEDURE — 90834 PSYTX W PT 45 MINUTES: CPT | Performed by: SOCIAL WORKER

## 2018-05-07 PROCEDURE — 85025 COMPLETE CBC W/AUTO DIFF WBC: CPT | Performed by: NURSE PRACTITIONER

## 2018-05-07 PROCEDURE — 36415 COLL VENOUS BLD VENIPUNCTURE: CPT | Performed by: NURSE PRACTITIONER

## 2018-05-07 PROCEDURE — 80053 COMPREHEN METABOLIC PANEL: CPT | Performed by: NURSE PRACTITIONER

## 2018-05-07 RX ORDER — BUSPIRONE HYDROCHLORIDE 10 MG/1
TABLET ORAL
Qty: 90 TABLET | Refills: 1 | Status: SHIPPED | OUTPATIENT
Start: 2018-05-07 | End: 2018-06-08

## 2018-05-07 NOTE — PROGRESS NOTES
"[facility] - Integrated Primary Care   May 7, 2018      Behavioral Health Clinician Progress Note    Patient Name: Govind Way           Service Type:  Individual      Service Location:   Face to Face in Clinic     Session Start Time: 8:55 a.m.  Session End Time: 9:45 a.m.      Session Length: 38 - 52      Attendees: Patient and PCP    Visit Activities (Refresh list every visit): [facility] and [facility]    Diagnostic Assessment Date: 9/7/17 by MIKE Bolton, PAULINA  Treatment Plan Review Date: to be completed  See Flowsheets for today's PHQ-9 and WAGNER-7 results  Previous PHQ-9:   PHQ-9 SCORE 12/18/2017 3/26/2018 4/16/2018   Total Score - - -   Total Score 2 2 9     Previous WAGNER-7:   WAGNER-7 SCORE 12/18/2017 3/26/2018 4/16/2018   Total Score - - -   Total Score 2 9 9       SHASHANK LEVEL:  SHASHANK Score (Last Two) 6/14/2012   SHASHANK Raw Score 50   Activation Score 86.3   SHASHANK Level 4       DATA  Extended Session (60+ minutes): No  Interactive Complexity: No  Crisis: No  [facility] Patient: No    Treatment Objective(s) Addressed in This Session:  Target Behavior(s): alcohol use and depression    Depressed Mood: Increase interest, engagement, and pleasure in doing things  Identify negative self-talk and behaviors: challenge core beliefs, myths, and actions  Improve concentration, focus, and mindfulness in daily activities   Feel less fidgety, restless or slow in daily activities / interpersonal interactions  Alcohol / Substance Use: will make healthier choices in regard to substance use    Current Stressors / Issues:  [facility] co-visit with patient and PCP for an establish care visit. Explained the integrated care model and how behavioral health impacts care and treatment planning. Patient reports anxiety and depression are primary concerns, and that he is noticing some memory problems. He states these issues are affecting all areas of life, and that he is feeling overwhelmed in general. \"Anxiety is taking over my life\". Patient admits " "using alcohol more frequently than he would prefer, and states that it's become part of relational and social functioning. He will have at least 1-2 glasses of wine at night and drinks more on the weekends. He attributes this to the \"wealthy [place] social scene\", and feels conflicted about enjoying the lifestyle his partner is accustomed to, and managing the reality of his responsibilities. Patient has two children who he is concerned about. His daughter who is disabled lives with him, \"I hope she will launch soon. I think she can do it, she needs a little more guidance\". He does not live with his partner and states this is contributing to stress, as his partner wants him to move in. Patient reports his son is struggling with addiction and that he is living with patient's ex-wife. Patient reports that work is also a source of stress, in addition to caring for his elderly mother.   Patient reports that \"I had a nervous breakdown after my divorce and was in the hospital for two weeks\". He denies SI/SIB thoughts or urges since, though is concerned that if anxiety and depression do not improve, he may consider a higher level of care. Patient exercises regularly and feels good about being able to resume biking despite prostate issues.   Patient will schedule with the clinic psychiatrist and in the meantime, will continue taking medications as prescribed. He agreed to consider reducing the number of drinks he has in a day by taking a minute to stop, breathe, and think about alternatives. Patient is seeing therapist PAULINA Bolton, in the [facility] and will continue with regular appointments to address depression and anxiety.    Progress on Treatment Objective(s) / Homework:  New Objective established this session - PREPARATION (Decided to change - considering how); Intervened by negotiating a change plan and determining options / strategies for behavior change, identifying triggers, exploring social supports, and working towards " "setting a date to begin behavior change    Motivational Interviewing    MI Intervention: Expressed Empathy/Understanding, Supported Autonomy, Collaboration, Evocation, Permission to raise concern or advise, Open-ended questions and Reflections: simple and complex     Change Talk Expressed by the Patient: Desire to change Ability to change Reasons to change Need to change    Provider Response to Change Talk: E - Evoked more info from patient about behavior change, A - Affirmed patient's thoughts, decisions, or attempts at behavior change, R - Reflected patient's change talk and S - Summarized patient's change talk statements    Also provided psychoeducation about behavioral health condition, symptoms, and treatment options    Care Plan review completed: No    Medication Review:  No changes to current psychiatric medication(s) - patient scheduled with Dr. Kang for psychiatry    Medication Compliance:  Yes    Changes in Health Issues:   Yes: please see medical note by PCP JEWELS Vásquez, NP    Chemical Use Review:   Substance Use: Problem use continues with no change since last session, Contemplation  Provided encouragement towards sobriety  Provided support and affirmation for steps taken towards sobriety   Consulted with PCP about current impact on health        Tobacco Use: No current tobacco use.      Assessment: Current Emotional / Mental Status (status of significant symptoms):  Risk status (Self / Other harm or suicidal ideation)  Patient has had a history of suicidal ideation: psychiatric hospitalization several years ago - \"nervous breakdown\"  Patient denies current fears or concerns for personal safety.  Patient denies current or recent suicidal ideation or behaviors.  Patient denies current or recent homicidal ideation or behaviors.  Patient denies current or recent self injurious behavior or ideation.  Patient denies other safety concerns.  A safety and risk management plan has not been developed at " this time, however patient was encouraged to call [facility] should there be a change in any of these risk factors.    Appearance:   Appropriate   Eye Contact:   Good   Psychomotor Behavior: Hyperactive   Attitude:   Cooperative   Orientation:   All  Speech   Rate / Production: Hyperverbal    Volume:  Normal   Mood:    Anxious  Sad   Affect:    Appropriate  Bright   Thought Content:  Clear   Thought Form:  Coherent  Logical   Insight:    Good     Diagnoses:  1. Major depressive disorder, recurrent episode, mild (H)        Collateral Reports Completed:  Not Applicable    Plan: (Homework, other):  Patient was given information about behavioral services and encouraged to schedule a follow up appointment with the clinic [facility] as needed.  He was also given information about mental health symptoms and treatment options  and deep Breathing Strategies to practice when experiencing anxiety.  CD Recommendations: Practice Harm Reduction: reduce # of drinks per day.   DANGELO Porras, [facility]

## 2018-05-07 NOTE — PATIENT INSTRUCTIONS
Labs today.  Schedule with our Psychiatrist Dr. Kang for medication evaluation.  Restart Adderall.  Start Buspar 1/2 tab (5 mg) twice daily for a week then increase to 1 tab (10 mg) twice daily.  Come back in two weeks and see me.

## 2018-05-07 NOTE — MR AVS SNAPSHOT
After Visit Summary   5/7/2018    Govind Way    MRN: [id number]           Patient Information     Date Of Birth          1955        Visit Information        Provider Department      5/7/2018 9:00 AM Larry Albright, [facility]        Today's Diagnoses     Major depressive disorder, recurrent episode, mild (H)    -  1       Follow-ups after your visit        Your next 10 appointments already scheduled     May 15, 2018   Procedure with Lobo Pelaez MD   [facility] ([facility])    [address]  [place] [address]   [phone]           [facility] is located at [address] [place]            May 18, 2018  8:30 AM CDT   Hearing Aid Consult with Callie Bonilla [facility] Audiology ([facility])    [address] [place] [address]  [address] [facility] [address] [phone]            May 23, 2018  9:30 AM CDT   (Arrive by 9:15 AM)   TINNITUS EVALUATION with Callie Barajas [facility] Audiology ([facility])    [address] [place] [address]  [address] [facility] [address] [phone]            May 29, 2018  3:30 PM CDT   Return Visit with PAULINA Bolton   [facility] ([facility]  [address] [facility]  [facility] [address]   [phone]            May 31, 2018  3:30 PM CDT   Return Visit with JEWELS Castano CNP   [facility] ([facility])    [address]  [address]  [facility] [address] [phone]            May 31, 2018  3:30 PM CDT   [facility])    [address]  [address]  [facility] [address] [phone]            Jun 08, 2018  2:30 PM CDT   New  Visit with Saul Kang MD   [facility] ([facility])    [address]  [facility] [address] [phone]            Jun 11, 2018  3:30 PM CDT   Return Visit with PAULINA Bolton   [facility] ([facility])    [facility]  [address]  [facility] [address]   [phone]            Jun 28, 2018  4:30 PM CDT   (Arrive by 4:15 PM)   Return Visit with Slava Cook MD   [facility] Ear Nose and Throat ([facility])    [address]  4th Floor  [facility] [address] [phone]            Jun 29, 2018  8:00 AM CDT   Hearing Aid Fitting with Callie Bonilla   [facility]iology ([facility])    [address]  4th [address]  [facility] [address] [phone]              Who to contact     If you have questions or need follow up information about today's clinic visit or your schedule please contact [facility] PRIMARY CARE directly at [phone].  Normal or non-critical lab and imaging results will be communicated to you by MyChart, letter or phone within 4 business days after the clinic has received the results. If you do not hear from us within 7 days, please contact the clinic through [employer] or phone. If you have a critical or abnormal lab result, we will notify you by phone as soon as possible.  Submit refill requests through [employer] or call your pharmacy and they will forward the refill request to us. Please allow 3 business days for your refill to be completed.          Additional Information About Your Visit        [employer] Information     [employer]Box gives you secure access to your electronic health record. If you see a primary care provider, you can also send messages to your care team and make appointments. If you have questions, please call your primary care clinic.  If you do not have  a primary care provider, please call [phone] and they will assist you.        Care EveryWhere ID     This is your Care EveryWhere ID. This could be used by other organizations to access your [facility] medical records  [id number]         Blood Pressure from Last 3 Encounters:   05/07/18 122/82   04/19/18 120/76   01/17/18 135/77    Weight from Last 3 Encounters:   05/07/18 180 lb 8 oz (81.9 kg)   05/03/18 180 lb (81.6 kg)   04/19/18 180 lb 12.8 oz (82 kg)              Today, you had the following     No orders found for display         Today's Medication Changes          These changes are accurate as of 5/7/18 11:59 PM.  If you have any questions, ask your nurse or doctor.               Start taking these medicines.        Dose/Directions    busPIRone 10 MG tablet   Commonly known as:  BUSPAR   Used for:  Adjustment disorder with mixed anxiety and depressed mood, Major depressive disorder, recurrent episode, mild (H)   Started by:  Ирина Bishop APRN CNP        Take 1/2 tablet twice daily for one week then increase to 1 tablet twice daily   Quantity:  90 tablet   Refills:  1            Where to get your medicines      These medications were sent to [facility] [address]  [address] [address] [address]     Phone:  [phone]     busPIRone 10 MG tablet                Primary Care Provider Office Phone # Fax #    JEWELS Castano [phone]       [address]   [facility] [address]        Equal Access to Services     [facility]: Hadii aad ku hadasho Soomaali, waaxda luqadaha, qaybta kaalmada terri sales . So [facility] [phone].    ATENCIÓN: Si habla español, tiene a becerra disposición servicios gratuitos de asistencia lingüística. Llame al [phone].    We comply with applicable federal civil rights laws and [place] laws. We do not discriminate on the basis of race,  color, national origin, age, disability, sex, sexual orientation, or gender identity.            Thank you!     Thank you for choosing [facility] PRIMARY CARE  for your care. Our goal is always to provide you with excellent care. Hearing back from our patients is one way we can continue to improve our services. Please take a few minutes to complete the written survey that you may receive in the mail after your visit with us. Thank you!             Your Updated Medication List - Protect others around you: Learn how to safely use, store and throw away your medicines at www.disposemymeds.org.          This list is accurate as of 5/7/18 11:59 PM.  Always use your most recent med list.                   Brand Name Dispense Instructions for use Diagnosis    acetaminophen 325 MG tablet    TYLENOL    100 tablet    Take 2 tablets (650 mg) by mouth every 4 hours as needed for other (mild pain)    Urinary retention       * ADDERALL PO      Take 10 mg by mouth        * amphetamine-dextroamphetamine 10 MG per 24 hr capsule    ADDERALL XR    30 capsule    Take 1 capsule (10 mg) by mouth daily    Attention deficit hyperactivity disorder (ADHD), other type       * amphetamine-dextroamphetamine 10 MG per 24 hr capsule   Start taking on:  5/20/2018    ADDERALL XR    30 capsule    Take 1 capsule (10 mg) by mouth daily    Attention deficit hyperactivity disorder (ADHD), other type       * amphetamine-dextroamphetamine 10 MG per 24 hr capsule   Start taking on:  6/20/2018    ADDERALL XR    30 capsule    Take 1 capsule (10 mg) by mouth daily    Attention deficit hyperactivity disorder (ADHD), other type       busPIRone 10 MG tablet    BUSPAR    90 tablet    Take 1/2 tablet twice daily for one week then increase to 1 tablet twice daily    Adjustment disorder with mixed anxiety and depressed mood, Major depressive disorder, recurrent episode, mild (H)       cholecalciferol 400 UNIT/ML Liqd liquid    vitamin D/D-VI-SOL      Take 5,000 Units by mouth daily        DAILY HERBS PROSTATE PO      Take 1 tablet by mouth daily        Fish Oil 500 MG Caps      Take 1 capsule by mouth daily        fluticasone 50 MCG/ACT spray    FLONASE    1 Bottle    Spray 2 sprays into both nostrils daily    Upper respiratory tract infection, unspecified type       ibuprofen 600 MG tablet    ADVIL/MOTRIN    30 tablet    Take 1 tablet (600 mg) by mouth every 6 hours as needed for other (For mild pain and temperature greater than 102F)    Urinary retention       lisinopril 10 MG tablet    PRINIVIL/ZESTRIL    90 tablet    Take 1 tablet (10 mg) by mouth daily    Benign essential hypertension       magnesium 250 MG tablet      Take 1 tablet by mouth nightly as needed (sleep)        MULTIVITAMIN TABS   OR      1 TABLET EVETRY DAY        mupirocin 2 % ointment    BACTROBAN    2 g    Apply to anterior nares BID X 2 month supply    Nasal vestibulitis       QUEtiapine 50 MG tablet    SEROQUEL    30 tablet    Take 1 tablet (50 mg) by mouth nightly as needed    Episodic mood disorder (H), Anxiety       tolterodine 2 MG tablet    DETROL    6 tablet    Take 1 tablet (2 mg) by mouth 2 times daily    Urinary retention       * Notice:  This list has 4 medication(s) that are the same as other medications prescribed for you. Read the directions carefully, and ask your doctor or other care provider to review them with you.

## 2018-05-07 NOTE — MR AVS SNAPSHOT
After Visit Summary   5/7/2018    Govind [address]    MRN: [id number]           Patient Information     Date Of Birth          1955        Visit Information        Provider Department      5/7/2018 9:00 AM Ирина Bishop APRN CNP [facility] Integrated [facility]        Today's Diagnoses     Hypertension goal BP (blood pressure) < 140/90    -  1    Alcohol use disorder (H)        Adjustment disorder with mixed anxiety and depressed mood        Attention deficit hyperactivity disorder (ADHD), other type        Benign non-nodular prostatic hyperplasia with lower urinary tract symptoms        Major depressive disorder, recurrent episode, mild (H)          Care Instructions    Labs today.  Schedule with our Psychiatrist Dr. Kang for medication evaluation.  Restart Adderall.  Start Buspar 1/2 tab (5 mg) twice daily for a week then increase to 1 tab (10 mg) twice daily.  Come back in two weeks and see me.          Follow-ups after your visit        Your next 10 appointments already scheduled     May 15, 2018   Procedure with Lobo Pelaez MD   [facility] ([facility])    [address] [place] [address]   [phone]           [facility] is located at [address] [place] [address] [address] [place]            May 18, 2018  8:30 AM CDT   Hearing Aid Consult with Callie Bonilla [facility] ([facility])    [address] [place] [address]   [phone]            May 23, 2018  9:30 AM CDT   (Arrive by 9:15 AM)   TINNITUS EVALUATION with Callie Barajas [facility] ([facility])    [address] [place] [address]   [phone]            May 29, 2018  3:30 PM CDT   Return Visit with PAULINA Bolton   [facility] ([place] [address]  [place] [address]   [phone]            Jun 11, 2018  3:30 PM CDT   Return Visit with PAULINA Bolton   [facility] ([facility])    [facility]  [address]  [facility] [address]   [phone]            Jun 28, 2018  4:30 PM CDT   (Arrive by 4:15 PM)   Return Visit with MD DEDRICK Davila [facility] ([facility])    [address] [place] [address]   [phone]            Jun 29, 2018  8:00 AM CDT   Hearing Aid Fitting with Callie Bonilla [facility] ([facility])    [address] [place] [address]   [phone]            Jul 23, 2018  8:00 AM CDT   (Arrive by 7:45 AM)   Initial Review Program with Callie Bonilla [facility] ([facility])    [address] [place] [address]   [phone]              Future tests that were ordered for you today     Open Future Orders        Priority Expected Expires Ordered    Lipid panel reflex to direct LDL Fasting Routine 5/7/2018 5/7/2019 5/7/2018            Who to contact     If you have questions or need follow up information about today's clinic visit or your schedule please contact [facility] PRIMARY CARE directly at [phone].  Normal or non-critical lab and imaging results will be communicated to you by MyChart, letter or phone within 4 business days after the clinic has received the results. If you do not hear from us within 7 days, please contact the clinic through MyChart or phone. If you have a critical or abnormal lab result, we will notify you by phone as soon as possible.  Submit refill requests through [email or web address] or call your pharmacy and they will forward the refill request to us. Please allow 3 business days for your refill to be completed.          Additional  Information About Your Visit        [employer] Information     [employer] gives you secure access to your electronic health record. If you see a primary care provider, you can also send messages to your care team and make appointments. If you have questions, please call your primary care clinic.  If you do not have a primary care provider, please call [phone] and they will assist you.        Care EveryWhere ID     This is your Care EveryWhere ID. This could be used by other organizations to access your [place] medical records  [id number]        Your Vitals Were     Pulse Temperature Respirations Pulse Oximetry BMI (Body Mass Index)       83 98  F (36.7  C) (Oral) 16 97% 24.82 kg/m2        Blood Pressure from Last 3 Encounters:   05/07/18 122/82   04/19/18 120/76   01/17/18 135/77    Weight from Last 3 Encounters:   05/07/18 180 lb 8 oz (81.9 kg)   05/03/18 180 lb (81.6 kg)   04/19/18 180 lb 12.8 oz (82 kg)              We Performed the Following     CBC with platelets and differential     Comprehensive metabolic panel (BMP + Alb, Alk Phos, ALT, AST, Total. Bili, TP)          Today's Medication Changes          These changes are accurate as of 5/7/18  9:59 AM.  If you have any questions, ask your nurse or doctor.               Start taking these medicines.        Dose/Directions    busPIRone 10 MG tablet   Commonly known as:  BUSPAR   Used for:  Adjustment disorder with mixed anxiety and depressed mood, Major depressive disorder, recurrent episode, mild (H)   Started by:  Ирина Bishop APRN CNP        Take 1/2 tablet twice daily for one week then increase to 1 tablet twice daily   Quantity:  90 tablet   Refills:  1            Where to get your medicines      These medications were sent to [facility] [place], [place] - [address]  [address] [address]     Phone:  [phone]     busPIRone 10 MG tablet                Primary Care Provider Office Phone # Fax #     Jose Luis Nielson [phone]       [address] [facility] [address]        Equal Access to Services     CARLOTAMIKIE DANE : Hadii reyes andersen marie Smith, wacarlosda farzadtoña, sapphireta kaadinda henrry, terri wong laclementesebastien margarita. So [facility] [phone].    ATENCIÓN: Si habla español, tiene a becerra disposición servicios gratuitos de asistencia lingüística. Llame al [phone].    We comply with applicable federal civil rights laws and [place] laws. We do not discriminate on the basis of race, color, national origin, age, disability, sex, sexual orientation, or gender identity.            Thank you!     Thank you for choosing [facility] PRIMARY CARE  for your care. Our goal is always to provide you with excellent care. Hearing back from our patients is one way we can continue to improve our services. Please take a few minutes to complete the written survey that you may receive in the mail after your visit with us. Thank you!             Your Updated Medication List - Protect others around you: Learn how to safely use, store and throw away your medicines at www.disposemymeds.org.          This list is accurate as of 5/7/18  9:59 AM.  Always use your most recent med list.                   Brand Name Dispense Instructions for use Diagnosis    acetaminophen 325 MG tablet    TYLENOL    100 tablet    Take 2 tablets (650 mg) by mouth every 4 hours as needed for other (mild pain)    Urinary retention       * ADDERALL PO      Take 10 mg by mouth        * amphetamine-dextroamphetamine 10 MG per 24 hr capsule    ADDERALL XR    30 capsule    Take 1 capsule (10 mg) by mouth daily    Attention deficit hyperactivity disorder (ADHD), other type       * amphetamine-dextroamphetamine 10 MG per 24 hr capsule   Start taking on:  5/20/2018    ADDERALL XR    30 capsule    Take 1 capsule (10 mg) by mouth daily    Attention deficit hyperactivity disorder (ADHD), other type       *  amphetamine-dextroamphetamine 10 MG per 24 hr capsule   Start taking on:  6/20/2018    ADDERALL XR    30 capsule    Take 1 capsule (10 mg) by mouth daily    Attention deficit hyperactivity disorder (ADHD), other type       busPIRone 10 MG tablet    BUSPAR    90 tablet    Take 1/2 tablet twice daily for one week then increase to 1 tablet twice daily    Adjustment disorder with mixed anxiety and depressed mood, Major depressive disorder, recurrent episode, mild (H)       cholecalciferol 400 UNIT/ML Liqd liquid    vitamin D/D-VI-SOL     Take 5,000 Units by mouth daily        DAILY HERBS PROSTATE PO      Take 1 tablet by mouth daily        Fish Oil 500 MG Caps      Take 1 capsule by mouth daily        fluticasone 50 MCG/ACT spray    FLONASE    1 Bottle    Spray 2 sprays into both nostrils daily    Upper respiratory tract infection, unspecified type       ibuprofen 600 MG tablet    ADVIL/MOTRIN    30 tablet    Take 1 tablet (600 mg) by mouth every 6 hours as needed for other (For mild pain and temperature greater than 102F)    Urinary retention       lisinopril 10 MG tablet    PRINIVIL/ZESTRIL    90 tablet    Take 1 tablet (10 mg) by mouth daily    Benign essential hypertension       magnesium 250 MG tablet      Take 1 tablet by mouth nightly as needed (sleep)        MULTIVITAMIN TABS   OR      1 TABLET EVETRY DAY        mupirocin 2 % ointment    BACTROBAN    2 g    Apply to anterior nares BID X 2 month supply    Nasal vestibulitis       QUEtiapine 50 MG tablet    SEROQUEL    30 tablet    Take 1 tablet (50 mg) by mouth nightly as needed    Episodic mood disorder (H), Anxiety       tolterodine 2 MG tablet    DETROL    6 tablet    Take 1 tablet (2 mg) by mouth 2 times daily    Urinary retention       * Notice:  This list has 4 medication(s) that are the same as other medications prescribed for you. Read the directions carefully, and ask your doctor or other care provider to review them with you.

## 2018-05-09 ENCOUNTER — OFFICE VISIT (OUTPATIENT)
Dept: PSYCHOLOGY | Facility: CLINIC | Age: 63
End: 2018-05-09
Payer: COMMERCIAL

## 2018-05-09 DIAGNOSIS — F33.0 MAJOR DEPRESSIVE DISORDER, RECURRENT EPISODE, MILD (H): Primary | ICD-10-CM

## 2018-05-09 DIAGNOSIS — F90.8 ATTENTION DEFICIT HYPERACTIVITY DISORDER (ADHD), OTHER TYPE: ICD-10-CM

## 2018-05-09 DIAGNOSIS — F10.90 ALCOHOL USE DISORDER: ICD-10-CM

## 2018-05-09 PROCEDURE — 90834 PSYTX W PT 45 MINUTES: CPT | Performed by: SOCIAL WORKER

## 2018-05-09 ASSESSMENT — ANXIETY QUESTIONNAIRES
2. NOT BEING ABLE TO STOP OR CONTROL WORRYING: MORE THAN HALF THE DAYS
7. FEELING AFRAID AS IF SOMETHING AWFUL MIGHT HAPPEN: SEVERAL DAYS
GAD7 TOTAL SCORE: 9
5. BEING SO RESTLESS THAT IT IS HARD TO SIT STILL: SEVERAL DAYS
6. BECOMING EASILY ANNOYED OR IRRITABLE: MORE THAN HALF THE DAYS
1. FEELING NERVOUS, ANXIOUS, OR ON EDGE: SEVERAL DAYS
3. WORRYING TOO MUCH ABOUT DIFFERENT THINGS: SEVERAL DAYS

## 2018-05-09 ASSESSMENT — PATIENT HEALTH QUESTIONNAIRE - PHQ9: 5. POOR APPETITE OR OVEREATING: SEVERAL DAYS

## 2018-05-09 NOTE — PROGRESS NOTES
"                                             Progress Note    Client Name: Govind Way  Date: 5/9/2018         Service Type: Individual      Session Start Time: 3:30 pm  Session End Time: 4:15 pm      Session Length: 45 mins     Session #: 11     Attendees: Client attended alone    Treatment Plan Last Reviewed: 9/18/17, 3/23/2018  PHQ-9 / WAGNER-7 : 5/9     DATA      Progress Since Last Session (Related to Symptoms / Goals / Homework):   Symptoms: Stable- worries about work and relationships    Homework: Partially completed      Episode of Care Goals: Minimal progress - ACTION (Actively working towards change); Intervened by reinforcing change plan / affirming steps taken     Current / Ongoing Stressors and Concerns:   Client started on Buspar and Adderall this week, noticing some changes. Client still hyperactive however able to re-direct himself back to the discussion topic. Client says he feels \"I am pouring engine oil into the engine but it's not getting in.\" He explains wanting a higher level of care for his mental health, hence the reason for the transfer to [facility]. He reports good connection with all new providers, has an appointment with psychiatry in a month for medication management.      Treatment Objective(s) Addressed in This Session:   Identify negative self-talk and behaviors: challenge core beliefs, myths, and actions  Improve concentration, focus, and mindfulness in daily activities , discussed role expectations of being an employee and being a father  Improve concentration and attention in daily activities       Intervention:   CBT: walked thru behavioral triangle and client's awareness on his experience with coworkers  Motivational Interviewing: open-ended questions around making changes and seeing changes; affirming strength in ability to have difficult conversations and let go of worries that are no longer useful, reflecting on improvement in mood and ongoing challenges.         ASSESSMENT: " Current Emotional / Mental Status (status of significant symptoms):   Risk status (Self / Other harm or suicidal ideation)   Client denies current fears or concerns for personal safety.   Client denies current or recent suicidal ideation or behaviors.   Client denies current or recent homicidal ideation or behaviors.   Client denies current or recent self injurious behavior or ideation.   Client denies other safety concerns.   A safety and risk management plan has not been developed at this time, however client was given the after-hours number / 911 should there be a change in any of these risk factors.     Appearance:   Appropriate    Eye Contact:   Good    Psychomotor Behavior: Hyperactive     Attitude:   Cooperative    Orientation:   All   Speech    Rate / Production: Hyperverbal     Volume:  Normal    Mood:    Anxious  Irritable    Affect:    Appropriate    Thought Content:  Clear  Perservative    Thought Form:  Coherent  Logical    Insight:    Fair      Medication Review:   No current psychiatric medications prescribed     Medication Compliance:   Client started taking old Adderall prescription at 10 mg. He was advised to be cautious when taking medication . Encouraged him to connect with PCP to consult around medication options     Changes in Health Issues:   None reported.      Chemical Use Review:   Substance Use: Chemical use reviewed, no active concerns identified      Tobacco Use: No current tobacco use.       Collateral Reports Completed:   Not Applicable    PLAN: (Client Tasks / Therapist Tasks / Other)  Client:  Notice physiological response to stress, pay attention to client's thought patterns and emotions.    Practice bringing attention back to thoughts if his attention drifts elsewhere.       MIKE Bolton SW                     May 9, 2018  Note reviewed and clinical supervision by MIKE Snowden [facility] 5/11/2018      ________________________________________________________________________    Treatment Plan    Client's Name: Govind Way  YOB: 1955    Date: 9/18/17    DSM-V Diagnoses: Attention-Deficit/Hyperactivity Disorder  314.01 (F90.2) Combined presentation, 296.31   (F33.0) Major Depressive Disorder, Recurrent Episode, Mild _ and With anxious distress  Substance-Related & Addictive Disorders Alcohol Use Disorder   303.90 (F10.20) Moderate  Psychosocial / Contextual Factors: Client is working full-time and splitting his time between taking care of his aging parents and disabled daughter, managing his relationship with his partner and taking care of his medical needs. He reports experiencing stress around work with the number of changes in management, current renovations and different types of personalities. His first partner back in high school completed suicide--client was the last one to see him. His daughter has a disability and was in and out of the hospital for 2 years; he gave her one of his kidneys in order to save her life.   WHODAS: 24    Referral / Collaboration:  Referral to another professional/service is not indicated at this time.    Anticipated number of session or this episode of care: 12      MeasurableTreatment Goal(s) related to diagnosis / functional impairment(s)  Goal 1: Client will develop coping skills to effectively manage attention issues.    I will know I've met my goal when I am not overwhelmed with making time with everyone.      Objective #A (Client Action)    Client will identify and use 3 strategies to improve organization.  Status: Continued - Date(s): 3/23/2018     Intervention(s)  Therapist will teach completing Eisenhowever scale, use of journal/calendar and writing things in a notebook.    Goal 2: Client will report a decrease in depressive symptoms with decrease PHQ 9 score from 14 below 5.    I will know I've met my goal when I can feel happy and at ease with  where I am at.      Objective #A (Client Action)    Status: Continued - Date(s): 3/23/2018     Client will Increase interest, engagement, and pleasure in doing things  Decrease frequency and intensity of feeling down, depressed, hopeless.    Intervention(s)  Therapist will teach the client how to perform a behavioral chain analysis. Identifying and understanding negative thoughts and changing route of those thoughts into positives.    Objective #B  Client will Feel less tired and more energy during the day   Improve diet, appetite, mindful eating, and / or meal planning.    Status: Continued - Date(s): 3/23/2018     Intervention(s)  Therapist will assign homework Participate in mindful activity daily for at least 10 mins/day  teach distraction skills. stopping negative thoughts with mindful activity .    Objective #C  Client will improve communication with partner and family members.  Status: Continued - Date(s): 3/23/2018     Intervention(s)  Therapist will role-play effective communication skills  teach about healthy boundaries. Setting rules and expectations for self and others around you.      Goal 3: Client will develop skills to manage my drinking habits.    I will know I've met my goal when I no longer feel guilty about my drinking.      Objective #A (Client Action)    Status: Continued - Date(s): 3/23/2018     Client will increase understanding on the impacts of alcohol on mental and physical health.    Intervention(s)  Therapist will provide educational materials on alcohol on mental and physical health.    Objective #B  Client will develop problem-solving skills to limit number of drinks consumed..    Status: Continued - Date(s): 3/23/2018     Intervention(s)  Therapist will role-play conflict management  teach rules for taking a timeout. Understanding the consequences for drinking and setting limits with self and others around drinking.      Client have not reviewed nor agreed to the above plan. Goals  were set, however still need to review the objectives.      MIKE Bolton LGPHILL  October 3, 2017; March 23, 2018      Note reviewed and clinical supervision by MIKE Snowden [facility]

## 2018-05-09 NOTE — MR AVS SNAPSHOT
MRN:[id number]                      After Visit Summary   5/9/2018    [address]ind Way    MRN: [id number]           Visit Information        Provider Department      5/9/2018 3:30 PM Xander Salas LGSW [facility]      Your next 10 appointments already scheduled     May 15, 2018   Procedure with Lobo Pelaez MD   [facility] ([facility])    [address]  [place] [address]   [phone]           [facility] is located at [address] [place]            May 18, 2018  8:30 AM CDT   Hearing Aid Consult with Callie Bonilla [facility] ([facility])    [address] [place] [address]   [phone]            May 23, 2018  9:30 AM CDT   (Arrive by 9:15 AM)   TINNITUS EVALUATION with Callie Barajas [facility] ([facility])    [address] [place] [place] [address] [phone]            May 29, 2018  3:30 PM CDT   Return Visit with PAULINA Bolton   [facility] ([facility])    [facility]  [address] [facility] [address]  [facility] [address] [phone]            May 31, 2018  3:30 PM CDT   Return Visit with JEWELS Castano CNP   [facility] ([facility])    [address]  [facility] [address] [phone]            May 31, 2018  3:30 PM CDT   [facility]   [facility] ([facility])    [address]  [facility] [address] [phone]            Jun 08, 2018  2:30 PM CDT   New Visit with Saul Kang MD   [facility] ([facility])    [address]  [facility]  [address]   [phone]            Jun 11, 2018  3:30 PM CDT   Return Visit with PAULINA Bolton   [facility] ([facility])    [facility]  [address]  [facility] [address]   [phone]            Jun 28, 2018  4:30 PM CDT   (Arrive by 4:15 PM)   Return Visit with Slava Cook MD   [facility] ([facility])    [address] [place] [id number] [phone]            Jun 29, 2018  8:00 AM CDT   Hearing Aid Fitting with Callie Bonilla [facility] ([facility])    [address] [place] [id number] [phone]              MyChart Information     [employer] gives you secure access to your electronic health record. If you see a primary care provider, you can also send messages to your care team and make appointments. If you have questions, please call your primary care clinic.  If you do not have a primary care provider, please call [phone] and they will assist you.        Care EveryWhere ID     This is your Care EveryWhere ID. This could be used by other organizations to access your [place] medical records  [id number]        Equal Access to Services     SHIVA VELA : Hadii reyes andersen hadasho Sobladimirali, waaxda luqadaha, qaybta kaalmada adeegyada, terri avila. So [facility] [phone].    ATENCIÓN: Si habla español, tiene a becerra disposición servicios gratuitos de asistencia lingüística. Llame al [phone].    We comply with applicable federal civil rights laws and [place] laws. We do not discriminate on the basis of race, color, national origin, age, disability, sex, sexual orientation, or gender identity.

## 2018-05-10 ASSESSMENT — ANXIETY QUESTIONNAIRES: GAD7 TOTAL SCORE: 9

## 2018-05-10 ASSESSMENT — PATIENT HEALTH QUESTIONNAIRE - PHQ9: SUM OF ALL RESPONSES TO PHQ QUESTIONS 1-9: 5

## 2018-05-15 ENCOUNTER — HOSPITAL ENCOUNTER (OUTPATIENT)
Facility: CLINIC | Age: 63
Discharge: HOME | End: 2018-05-15
Attending: SPECIALIST | Admitting: SPECIALIST
Payer: COMMERCIAL

## 2018-05-15 ENCOUNTER — SURGERY (OUTPATIENT)
Age: 63
End: 2018-05-15

## 2018-05-15 VITALS
RESPIRATION RATE: 18 BRPM | OXYGEN SATURATION: 99 % | SYSTOLIC BLOOD PRESSURE: 114 MMHG | DIASTOLIC BLOOD PRESSURE: 82 MMHG

## 2018-05-15 LAB — COLONOSCOPY: NORMAL

## 2018-05-15 PROCEDURE — G0121 COLON CA SCRN NOT HI RSK IND: HCPCS | Performed by: SPECIALIST

## 2018-05-15 PROCEDURE — 25000128 H RX IP 250 OP 636: Performed by: SPECIALIST

## 2018-05-15 PROCEDURE — G0500 MOD SEDAT ENDO SERVICE >5YRS: HCPCS | Performed by: SPECIALIST

## 2018-05-15 PROCEDURE — 45378 DIAGNOSTIC COLONOSCOPY: CPT | Performed by: SPECIALIST

## 2018-05-15 RX ORDER — ONDANSETRON 2 MG/ML
4 INJECTION INTRAMUSCULAR; INTRAVENOUS
Status: COMPLETED | OUTPATIENT
Start: 2018-05-15 | End: 2018-05-15

## 2018-05-15 RX ORDER — FENTANYL CITRATE 50 UG/ML
INJECTION, SOLUTION INTRAMUSCULAR; INTRAVENOUS PRN
Status: DISCONTINUED | OUTPATIENT
Start: 2018-05-15 | End: 2018-05-15 | Stop reason: HOSPADM

## 2018-05-15 RX ORDER — LIDOCAINE 40 MG/G
CREAM TOPICAL
Status: DISCONTINUED | OUTPATIENT
Start: 2018-05-15 | End: 2018-05-15 | Stop reason: HOSPADM

## 2018-05-15 RX ADMIN — ONDANSETRON 4 MG: 2 INJECTION INTRAMUSCULAR; INTRAVENOUS at 11:18

## 2018-05-15 RX ADMIN — MIDAZOLAM 1 MG: 1 INJECTION INTRAMUSCULAR; INTRAVENOUS at 11:29

## 2018-05-15 RX ADMIN — FENTANYL CITRATE 50 MCG: 50 INJECTION, SOLUTION INTRAMUSCULAR; INTRAVENOUS at 11:24

## 2018-05-15 RX ADMIN — MIDAZOLAM 1 MG: 1 INJECTION INTRAMUSCULAR; INTRAVENOUS at 11:24

## 2018-05-15 RX ADMIN — FENTANYL CITRATE 50 MCG: 50 INJECTION, SOLUTION INTRAMUSCULAR; INTRAVENOUS at 11:29

## 2018-05-15 NOTE — H&P
Pre-Endoscopy History and Physical     Govind Way MRN# [id number]   YOB: 1955 Age: 62 year old     Date of Procedure: 5/15/2018  Primary care provider: рИина Bishpo  Type of Endoscopy: Colonoscopy with possible biopsy, possible polypectomy  Reason for Procedure: screening  Type of Anesthesia Anticipated: Conscious Sedation    HPI:    Govind is a 62 year old male who will be undergoing the above procedure.      A history and physical has been performed. The patient's medications and allergies have been reviewed. The risks and benefits of the procedure and the sedation options and risks were discussed with the patient.  All questions were answered and informed consent was obtained.      He denies a personal or family history of anesthesia complications or bleeding disorders.     Patient Active Problem List   Diagnosis     Adjustment disorder with mixed anxiety and depressed mood     Abdominal hernia     Hypertension goal BP (blood pressure) < 140/90     CARDIOVASCULAR SCREENING; LDL GOAL LESS THAN 160     History of hyperthyroidism     Right inguinal hernia     Irritable bowel syndrome     Donor of kidney for transplant     Hernia     Testis disorder     Screening for prostate cancer     Other hydrocele     Hallux rigidus, right foot     Advance care planning     Tendinopathy of right gluteus medius     Attention deficit hyperactivity disorder (ADHD), other type     Benign non-nodular prostatic hyperplasia with lower urinary tract symptoms     Anxiety     SBO (small bowel obstruction)     Major depressive disorder, recurrent episode, mild (H)     Alcohol use disorder (H)        Past Medical History:   Diagnosis Date     Adjustment disorder      Anxiety      Depressive disorder      Dysthymic disorder      Enlarged prostate      Hepatitis      Ileus (H)      Irregular heart beat     intermittant palpitations     Palpitations     intermittent     PONV (postoperative nausea and vomiting)       Positive PPD      Pure hypercholesterolemia 1/00     Renal disease     single R kidney;donated left     Screening examination for pulmonary tuberculosis     positive mantoux     Unspecified essential hypertension 1/00        Past Surgical History:   Procedure Laterality Date     C ECG MONITOR/ 24 HRS, ORIG ECG, W VIS SUPERIMPOS SCAN, COMPLETE  1/00    um holter monitor     C REMV KIDNEY/URETER,SAME INCIS  6/30/05    Nephrouretectomy/LEFT KIDNEY DONATED TO DAUGHTER/[facility]     CYSTOSCOPY, TRANSURETHRAL RESECTION (TUR) PROSTATE, COMBINED       HC REMOVAL OF TONSILS,<11 Y/O  age 7     HC VASECTOMY UNILAT/BILAT W POSTOP SEMEN  age 41     HERNIORRHAPHY INGUINAL  12/23/2011    Procedure:HERNIORRHAPHY INGUINAL; Surgeon:JULIA SIERRA; Location: OR     LAPAROSCOPIC HERNIORRHAPHY INGUINAL Right 6/29/2015    Procedure: LAPAROSCOPIC HERNIORRHAPHY INGUINAL;  Surgeon: García Ibarra MD;  Location:  OR     LAPAROSCOPIC HERNIORRHAPHY VENTRAL  12/23/2011    Procedure:LAPAROSCOPIC HERNIORRHAPHY VENTRAL; Laparoscopic Ventral Hernia Repair with Mesh, Open Left Inguinal Hernia Repair with Mesh; Surgeon:JULIA SIERRA; Location: OR     LASER HOLMIUM ENUCLEATION PROSTATE N/A 10/19/2017    Procedure: LASER HOLMIUM ENUCLEATION PROSTATE;  Holmium Laser Enculeation of the Prostate;  Surgeon: Manolo Cardenas MD;  Location:  OR     LASIK CUSTOMVUE BILATERAL  11/11/2011    Procedure:LASIK CUSTOMVUE BILATERAL; BILATERAL CUSTOMVUE LASIK WITH INTRALASE; Surgeon:POP SIMON; Location:[facility]       Social History   Substance Use Topics     Smoking status: Former Smoker     Quit date: 1/1/1982     Smokeless tobacco: Never Used      Comment: NO 2ND HAND SMOKE     Alcohol use 0.0 oz/week     0 Standard drinks or equivalent per week      Comment: 5/wk       Family History   Problem Relation Age of Onset     DIABETES Mother      ADULT ONSET     Hypertension Mother      Coronary Artery Disease  Mother      Also father and grandparent.  Father had coronary bypass and aortic valve replacement surgery     Heart Surgery Mother      Aortic valve     CANCER Other      prostate cancer     Thyroid Disease Other      Hypertension Father      Skin Cancer Other      Mother and father, grandfather     Arthritis Other      Mom, daughter, grandparents     Other - See Comments Other      Grandfather with kidney stone     Prostate Cancer Other      Grandfather     KIDNEY DISEASE Daughter      Lupus Daughter      Causing end-stage renal disease     Eye Disorder No family hx of      NONE KNOWN     Hyperlipidemia No family hx of      CEREBROVASCULAR DISEASE No family hx of      Breast Cancer No family hx of      Colon Cancer No family hx of      Other Cancer No family hx of      Depression No family hx of      Anxiety Disorder No family hx of      MENTAL ILLNESS No family hx of      Substance Abuse No family hx of      Anesthesia Reaction No family hx of      Asthma No family hx of      OSTEOPOROSIS No family hx of      Genetic Disorder No family hx of      Obesity No family hx of      Unknown/Adopted No family hx of        Prior to Admission medications    Medication Sig Start Date End Date Taking? Authorizing Provider   Amphetamine-Dextroamphetamine (ADDERALL PO) Take 10 mg by mouth   Yes Reported, Patient   amphetamine-dextroamphetamine (ADDERALL XR) 10 MG per 24 hr capsule Take 1 capsule (10 mg) by mouth daily 4/19/18 5/19/18 Yes Jose Luis Nielson MD   amphetamine-dextroamphetamine (ADDERALL XR) 10 MG per 24 hr capsule Take 1 capsule (10 mg) by mouth daily 5/20/18 6/19/18 Yes Jose Luis Nielson MD   amphetamine-dextroamphetamine (ADDERALL XR) 10 MG per 24 hr capsule Take 1 capsule (10 mg) by mouth daily 6/20/18 7/20/18 Yes Jose Luis Nielson MD   busPIRone (BUSPAR) 10 MG tablet Take 1/2 tablet twice daily for one week then increase to 1 tablet twice daily 5/7/18  Yes Ирина Bishop, JEWELS CNP  "  cholecalciferol (VITAMIN D/D-VI-SOL) 400 UNIT/ML LIQD liquid Take 5,000 Units by mouth daily   Yes Reported, Patient   fluticasone (FLONASE) 50 MCG/ACT spray Spray 2 sprays into both nostrils daily 1/17/18  Yes Pratibha Lane APRN CNP   lisinopril (PRINIVIL/ZESTRIL) 10 MG tablet Take 1 tablet (10 mg) by mouth daily 1/17/18  Yes Pratibha Lane APRN CNP   magnesium 250 MG tablet Take 1 tablet by mouth nightly as needed (sleep)   Yes Unknown, Entered By History   mupirocin (BACTROBAN) 2 % ointment Apply to anterior nares BID X 2 month supply 5/3/18  Yes Slava Cook MD   Omega-3 Fatty Acids (FISH OIL) 500 MG CAPS Take 1 capsule by mouth daily  3/22/04  Yes Reported, Patient   QUEtiapine (SEROQUEL) 50 MG tablet Take 1 tablet (50 mg) by mouth nightly as needed 10/3/17  Yes Jose Luis Nielson MD   acetaminophen (TYLENOL) 325 MG tablet Take 2 tablets (650 mg) by mouth every 4 hours as needed for other (mild pain) 10/19/17   Whit Helton MD   ibuprofen (ADVIL/MOTRIN) 600 MG tablet Take 1 tablet (600 mg) by mouth every 6 hours as needed for other (For mild pain and temperature greater than 102F) 10/19/17   Whit Helton MD   [facility] Natural Products (DAILY HERBS PROSTATE PO) Take 1 tablet by mouth daily    Unknown, Entered By History   MULTIVITAMIN TABS   OR 1 TABLET EVETRY DAY 3/22/04      tolterodine (DETROL) 2 MG tablet Take 1 tablet (2 mg) by mouth 2 times daily 10/19/17   Whit Helton MD       Allergies   Allergen Reactions     Ambien [Zolpidem Tartrate]      irritability     Codeine Sulfate Itching        REVIEW OF SYSTEMS:   5 point ROS negative except as noted above in HPI, including Gen., Resp., CV, GI &  system review.    PHYSICAL EXAM:   /89  Resp 16  SpO2 98% Estimated body mass index is 24.82 kg/(m^2) as calculated from the following:    Height as of 5/3/18: 1.816 m (5' 11.5\").    Weight as of 5/7/18: 81.9 kg (180 lb 8 oz).   GENERAL " APPEARANCE: alert, and oriented  MENTAL STATUS: alert  AIRWAY EXAM: Mallampatti Class I (visualization of the soft palate, fauces, uvula, anterior and posterior pillars)  RESP: lungs clear to auscultation - no rales, rhonchi or wheezes  CV: regular rates and rhythm  DIAGNOSTICS:    Not indicated    IMPRESSION   ASA Class 2 - Mild systemic disease    PLAN:   Plan for Colonoscopy with possible biopsy, possible polypectomy. We discussed the risks, benefits and alternatives and the patient wished to proceed.    The above has been forwarded to the consulting provider.      Signed Electronically by: Lobo Pelaez  May 15, 2018

## 2018-05-15 NOTE — BRIEF OP NOTE
[facility] Brief Operative Note    Pre-operative diagnosis: screening   Post-operative diagnosis normal     Procedure: Procedure(s):  colonoscopy - Wound Class: II-Clean Contaminated   Surgeon(s): Surgeon(s) and Role:     * Lobo Pelaez MD - Primary   Estimated blood loss: * No values recorded between 5/15/2018 12:00 AM and 5/15/2018 11:49 AM *    Specimens: * No specimens in log *   Findings: Please see ProVation procedure note in Chart Review

## 2018-05-18 ENCOUNTER — OFFICE VISIT (OUTPATIENT)
Dept: AUDIOLOGY | Facility: CLINIC | Age: 63
End: 2018-05-18
Payer: COMMERCIAL

## 2018-05-18 DIAGNOSIS — H93.13 TINNITUS OF BOTH EARS: ICD-10-CM

## 2018-05-18 DIAGNOSIS — I10 HYPERTENSION GOAL BP (BLOOD PRESSURE) < 140/90: ICD-10-CM

## 2018-05-18 DIAGNOSIS — H90.3 SENSORINEURAL HEARING LOSS, BILATERAL: Primary | ICD-10-CM

## 2018-05-18 LAB
CHOLEST SERPL-MCNC: 174 MG/DL
HDLC SERPL-MCNC: 64 MG/DL
LDLC SERPL CALC-MCNC: 90 MG/DL
NONHDLC SERPL-MCNC: 109 MG/DL
TRIGL SERPL-MCNC: 98 MG/DL

## 2018-05-18 NOTE — MR AVS SNAPSHOT
After Visit Summary   5/18/2018    Govind Way    MRN: [id number]           Patient Information     Date Of Birth          1955        Visit Information        Provider Department      5/18/2018 8:30 AM Daisy Dodson AuD [facility] Audiology        Today's Diagnoses     Sensorineural hearing loss, bilateral    -  1    Tinnitus of both ears           Follow-ups after your visit        Your next 10 appointments already scheduled     May 23, 2018  9:30 AM CDT   (Arrive by 9:15 AM)   TINNITUS EVALUATION with Callie Barajas [facility] ([facility])    [address] [place] [address]   [phone]            May 29, 2018  3:30 PM CDT   Return Visit with PAULINA Bolton   [facility]  [address] [facility]  [facility] [address]   [phone]            May 31, 2018  3:30 PM CDT   Return Visit with JEWELS Castano CNP   [facility] ([facility])    [address]  [address]  [facility] [address]   [phone]            May 31, 2018  3:30 PM CDT   Return Visit with Larry Albright [facility] ([facility])    [address]  [address]  [facility] [address]   [phone]            Jun 08, 2018  2:30 PM CDT   New Visit with Saul Kang MD   [facility] ([facility])    [address]  [facility] [address] [phone]            Jun 11, 2018  3:30 PM CDT   Return Visit with PAULINA Bolton   [facility]  [address]  [facility] [address]   [phone]            Jun 29, 2018  8:00 AM CDT   Hearing Aid Fitting with Callie Bonilla  [facility] ([facility])    [address] [id number] [phone]            Jul 23, 2018  8:00 AM CDT   (Arrive by 7:45 AM)   Initial Review Program with Callie Bonilla [facility] ([facility])    [address] [id number] [phone]              Who to contact     Please call your clinic at [phone] to:    Ask questions about your health    Make or cancel appointments    Discuss your medicines    Learn about your test results    Speak to your doctor            Additional Information About Your Visit        [employer] Information     [employer] gives you secure access to your electronic health record. If you see a primary care provider, you can also send messages to your care team and make appointments. If you have questions, please call your primary care clinic.  If you do not have a primary care provider, please call [phone] and they will assist you.      [employer] is an electronic gateway that provides easy, online access to your medical records. With [employer], you can request a clinic appointment, read your test results, renew a prescription or communicate with your care team.     To access your existing account, please contact your [facility] or call [phone] for assistance.        Care EveryWhere ID     This is your Care EveryWhere ID. This could be used by other organizations to access your [place] medical records  [id number]         Blood Pressure from Last 3 Encounters:   05/15/18 114/82   05/07/18 122/82   04/19/18 120/76    Weight from Last 3 Encounters:   05/07/18 81.9 kg (180 lb 8 oz)   05/03/18 81.6 kg (180 lb)   04/19/18 82 kg (180 lb 12.8 oz)              We Performed the Following     Hearing Aid Exam, Binaural ([address])        Primary Care Provider Office Phone # Fax #    JEWELS Castano [phone]  [phone]       [address]  [facility] [address]        Equal Access to Services     SHIVA VELA : Hadii reyes andersen chriso Sokaya, wacarlosda luqadaha, qaybta kaalmada henrry, terri shamirin hayaasebastien hancockkatey wong malka avila. So [facility] [phone].    ATENCIÓN: Si habla español, tiene a becerra disposición servicios gratuitos de asistencia lingüística. Llame al [phone].    We comply with applicable federal civil rights laws and [place] laws. We do not discriminate on the basis of race, color, national origin, age, disability, sex, sexual orientation, or gender identity.            Thank you!     Thank you for choosing [facility] AUDIOLOGY  for your care. Our goal is always to provide you with excellent care. Hearing back from our patients is one way we can continue to improve our services. Please take a few minutes to complete the written survey that you may receive in the mail after your visit with us. Thank you!             Your Updated Medication List - Protect others around you: Learn how to safely use, store and throw away your medicines at www.disposemymeds.org.          This list is accurate as of 5/18/18 11:01 AM.  Always use your most recent med list.                   Brand Name Dispense Instructions for use Diagnosis    acetaminophen 325 MG tablet    TYLENOL    100 tablet    Take 2 tablets (650 mg) by mouth every 4 hours as needed for other (mild pain)    Urinary retention       * ADDERALL PO      Take 10 mg by mouth        * amphetamine-dextroamphetamine 10 MG per 24 hr capsule    ADDERALL XR    30 capsule    Take 1 capsule (10 mg) by mouth daily    Attention deficit hyperactivity disorder (ADHD), other type       * amphetamine-dextroamphetamine 10 MG per 24 hr capsule   Start taking on:  5/20/2018    ADDERALL XR    30 capsule    Take 1 capsule (10 mg) by mouth daily    Attention deficit hyperactivity disorder (ADHD), other type       * amphetamine-dextroamphetamine 10 MG per 24 hr capsule   Start  taking on:  6/20/2018    ADDERALL XR    30 capsule    Take 1 capsule (10 mg) by mouth daily    Attention deficit hyperactivity disorder (ADHD), other type       busPIRone 10 MG tablet    BUSPAR    90 tablet    Take 1/2 tablet twice daily for one week then increase to 1 tablet twice daily    Adjustment disorder with mixed anxiety and depressed mood, Major depressive disorder, recurrent episode, mild (H)       cholecalciferol 400 UNIT/ML Liqd liquid    vitamin D/D-VI-SOL     Take 5,000 Units by mouth daily        DAILY HERBS PROSTATE PO      Take 1 tablet by mouth daily        Fish Oil 500 MG Caps      Take 1 capsule by mouth daily        fluticasone 50 MCG/ACT spray    FLONASE    1 Bottle    Spray 2 sprays into both nostrils daily    Upper respiratory tract infection, unspecified type       ibuprofen 600 MG tablet    ADVIL/MOTRIN    30 tablet    Take 1 tablet (600 mg) by mouth every 6 hours as needed for other (For mild pain and temperature greater than 102F)    Urinary retention       lisinopril 10 MG tablet    PRINIVIL/ZESTRIL    90 tablet    Take 1 tablet (10 mg) by mouth daily    Benign essential hypertension       magnesium 250 MG tablet      Take 1 tablet by mouth nightly as needed (sleep)        MULTIVITAMIN TABS   OR      1 TABLET EVETRY DAY        mupirocin 2 % ointment    BACTROBAN    2 g    Apply to anterior nares BID X 2 month supply    Nasal vestibulitis       QUEtiapine 50 MG tablet    SEROQUEL    30 tablet    Take 1 tablet (50 mg) by mouth nightly as needed    Episodic mood disorder (H), Anxiety       tolterodine 2 MG tablet    DETROL    6 tablet    Take 1 tablet (2 mg) by mouth 2 times daily    Urinary retention       * Notice:  This list has 4 medication(s) that are the same as other medications prescribed for you. Read the directions carefully, and ask your doctor or other care provider to review them with you.

## 2018-05-18 NOTE — PROGRESS NOTES
"AUDIOLOGY REPORT    SUBJECTIVE: Govind Way is a 63 year old male was seen in the Audiology Clinic at  [facility] on 5/18/18 to discuss concerns with hearing and functional communication difficulties. Govind has been seen previously on 5/3/18, and results revealed a bilateral sensorineural hearing loss that is very mild and only present at a few frequencies.  The patient was medically evaluated and determined to be cleared for binaural hearing aids by Slava Cook. Govind notes difficulty with communication in a variety of listening situations, he reports difficulty hearing is daughter who is soft spoken.  He also reports bothersome tinnitus but notes it is mostly bothersome when he is trying to go to sleep.    OBJECTIVE:  Patient is a hearing aid candidate. Patient would like to move forward with a hearing aid evaluation today. Therefore, the patient was presented with different options for amplification to help aid in communication. Discussed styles, levels of technology and monaural vs. binaural fitting.     It was discussed that he is a very borderline hearing aid candidate because of how mild the hearing loss is. The use of a hearing aid for tinnitus relief was reviewed as it can help alleviate the tinnitus.  It was discussed though that he would not wear the hearing aids at night when he reports the tinnitus is most bothersome as it is recommended that you take the hearing aids out when sleeping. He expressed understanding.  The use of a masking device/white noise machine was reviewed for nighttime.     He also reports that he works in what he considers to be a \"loud\" work environment and feels that he is hypersensitive to the sounds (he works processing sterile medical equipment).  It was discussed that the hearing aids will amplify the sounds in his work environment even more. He expressed understanding.    Realistic expectations were reviewed with the patient for " "roughly 45 minutes. He would still like to proceed with a trial \"just to try it\" as he is motivated to improve his outside of work quality of life with his family and friends as he feels he has difficulty hearing them. He expressed understanding regarding realistic expectations of benefit.    The hearing aid(s) mutually chosen were:  Binaural: Widex Beyond Fusion 220  COLOR: Titan Montejo  BATTERY SIZE: 312  EARMOLD/TIPS: Open dome  CANAL/ LENGTH: 2 standard    Govind also has an appointment for a full tinnitus evaluation next week. It was reviewed with him that the evaluation would include a few tests but the recommendations would likely be the same, hearing aids and the use of masking devices to begin with. He expressed understanding and would not like to keep that appointment.  That appointment will be cancelled.    ASSESSMENT:     Reviewed purchase information and warranty information with patient. The 45 day trial period was explained to patient. The patient was given a copy of the [place] Department of [employer] consumer brochure on purchasing hearing instruments. Patient risk factors have been provided to the patient in writing prior to the sale of the hearing aid per FDA regulation. The risk factors are also available in the User Instructional Booklet to be presented on the day of the hearing aid fitting. Hearing aids ordered. Hearing aid evaluation completed.    PLAN: Govind is scheduled to return in 2-3 weeks for a hearing aid fitting and programming. Purchase agreement will be completed on that date. Tinnitus evaluation appointment that was scheduled for 5/23/18 will be cancelled. Please contact this clinic with any questions or concerns.      Callie Bonilla  Audiologist  [place] License  #[id number]              "

## 2018-05-29 ENCOUNTER — ALLIED HEALTH/NURSE VISIT (OUTPATIENT)
Dept: FAMILY MEDICINE | Facility: CLINIC | Age: 63
End: 2018-05-29
Payer: COMMERCIAL

## 2018-05-29 ENCOUNTER — OFFICE VISIT (OUTPATIENT)
Dept: PSYCHOLOGY | Facility: CLINIC | Age: 63
End: 2018-05-29
Payer: COMMERCIAL

## 2018-05-29 DIAGNOSIS — F90.8 ATTENTION DEFICIT HYPERACTIVITY DISORDER (ADHD), OTHER TYPE: ICD-10-CM

## 2018-05-29 DIAGNOSIS — F33.0 MAJOR DEPRESSIVE DISORDER, RECURRENT EPISODE, MILD (H): Primary | ICD-10-CM
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
## 2018-05-29 DIAGNOSIS — F10.90 ALCOHOL USE DISORDER: ICD-10-CM

## 2018-05-29 PROCEDURE — 99211 OFF/OP EST MAY X REQ PHY/QHP: CPT

## 2018-05-29 PROCEDURE — 90834 PSYTX W PT 45 MINUTES: CPT | Performed by: SOCIAL WORKER

## 2018-05-29 RX ORDER — DOXYCYCLINE 100 MG/1
200 CAPSULE ORAL ONCE
Qty: 2 CAPSULE | Refills: 0 | Status: SHIPPED | OUTPATIENT
Start: 2018-05-29 | End: 2018-05-29

## 2018-05-29 ASSESSMENT — ANXIETY QUESTIONNAIRES
1. FEELING NERVOUS, ANXIOUS, OR ON EDGE: SEVERAL DAYS
3. WORRYING TOO MUCH ABOUT DIFFERENT THINGS: NOT AT ALL
5. BEING SO RESTLESS THAT IT IS HARD TO SIT STILL: SEVERAL DAYS
7. FEELING AFRAID AS IF SOMETHING AWFUL MIGHT HAPPEN: NOT AT ALL
GAD7 TOTAL SCORE: 3
6. BECOMING EASILY ANNOYED OR IRRITABLE: SEVERAL DAYS
2. NOT BEING ABLE TO STOP OR CONTROL WORRYING: NOT AT ALL

## 2018-05-29 ASSESSMENT — PATIENT HEALTH QUESTIONNAIRE - PHQ9: 5. POOR APPETITE OR OVEREATING: NOT AT ALL

## 2018-05-29 NOTE — PROGRESS NOTES
"                                             Progress Note    Client Name: Govind Way  Date: 5/29/2018         Service Type: Individual      Session Start Time: 3:30 pm  Session End Time: 4:15 pm      Session Length: 45 mins     Session #: 12     Attendees: Client attended alone    Treatment Plan Last Reviewed: 9/18/17, 3/23/2018  PHQ-9 / WAGNER-7 : 0/3     DATA      Progress Since Last Session (Related to Symptoms / Goals / Homework):   Symptoms: Improved- less worried    Homework: Partially completed-practiced being mindful and letting go of unuseful thoughts      Episode of Care Goals: Minimal progress - ACTION (Actively working towards change); Intervened by reinforcing change plan / affirming steps taken     Current / Ongoing Stressors and Concerns:   Client reported a good Memorial weekend spent with Jarrod and some of his friends in [place]. Client said it felt good to leave his worries at home and enjoy his time away. 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 He worries about \"launching\" his daughter Swetha (32 years old) but unsure how to start up the process of preparing her to be more independent.      Treatment Objective(s) Addressed in This Session:   Identify negative self-talk and behaviors: challenge core beliefs, myths, and actions  Improve concentration, focus, and mindfulness in daily activities , discussed role expectations of being an employee and being a father  Improve concentration and attention in daily activities       Intervention:   CBT: walked thru behavioral triangle and client's awareness on his experience with coworkers, identifying successful application of skills  Motivational Interviewing: open-ended questions around making changes and seeing changes; affirming strength in ability to have difficult " conversations and let go of worries that are no longer useful, reflecting on improvement in mood and ongoing challenges.         ASSESSMENT: Current Emotional / Mental Status (status of significant symptoms):   Risk status (Self / Other harm or suicidal ideation)   Client denies current fears or concerns for personal safety.   Client denies current or recent suicidal ideation or behaviors.   Client denies current or recent homicidal ideation or behaviors.   Client denies current or recent self injurious behavior or ideation.   Client denies other safety concerns.   A safety and risk management plan has not been developed at this time, however client was given the after-hours number / 911 should there be a change in any of these risk factors.     Appearance:   Appropriate    Eye Contact:   Good    Psychomotor Behavior: Hyperactive     Attitude:   Cooperative    Orientation:   All   Speech    Rate / Production: Hyperverbal     Volume:  Normal    Mood:    Anxious  Irritable    Affect:    Appropriate    Thought Content:  Clear  Perservative    Thought Form:  Coherent  Logical    Insight:    Fair      Medication Review:   No current psychiatric medications prescribed     Medication Compliance:   Yes small dosage, but noticing better improvement on focus     Changes in Health Issues:   None reported.      Chemical Use Review:   Substance Use: Chemical use reviewed, no active concerns identified      Tobacco Use: No current tobacco use.       Collateral Reports Completed:   Not Applicable    PLAN: (Client Tasks / Therapist Tasks / Other)  Client:  Notice physiological response to stress, pay attention to client's thought patterns and emotions.    Practice bringing attention back to thoughts if his attention drifts elsewhere.    Focus on drinking and relationship with Swetha on next session.       MIKE Bolton [facility]                     May 29, 2018   Note reviewed and clinical supervision by MIKE Snowden [facility]  6/1/2018     ________________________________________________________________________    Treatment Plan    Client's Name: Govind Way  YOB: 1955    Date: 9/18/17    DSM-V Diagnoses: Attention-Deficit/Hyperactivity Disorder  314.01 (F90.2) Combined presentation, 296.31   (F33.0) Major Depressive Disorder, Recurrent Episode, Mild _ and With anxious distress  Substance-Related & Addictive Disorders Alcohol Use Disorder   303.90 (F10.20) Moderate  Psychosocial / Contextual Factors: Client is working full-time and splitting his time between taking care of his aging parents and disabled daughter, managing his relationship with his partner and taking care of his medical needs. He reports experiencing stress around work with the number of changes in management, current renovations and different types of personalities. His first partner back in high school completed suicide--client was the last one to see him. His daughter has a disability and was in and out of the hospital for 2 years; he gave her one of his kidneys in order to save her life.   WHODAS: 22    Referral / Collaboration:  Referral to another professional/service is not indicated at this time.    Anticipated number of session or this episode of care: 12      MeasurableTreatment Goal(s) related to diagnosis / functional impairment(s)  Goal 1: Client will develop coping skills to effectively manage attention issues.    I will know I've met my goal when I am not overwhelmed with making time with everyone.      Objective #A (Client Action)    Client will identify and use 3 strategies to improve organization.  Status: Continued - Date(s): 3/23/2018     Intervention(s)  Therapist will teach completing Eisenhowever scale, use of journal/calendar and writing things in a notebook.    Goal 2: Client will report a decrease in depressive symptoms with decrease PHQ 9 score from 14 below 5.    I will know I've met my goal when I can feel happy and at  ease with where I am at.      Objective #A (Client Action)    Status: Continued - Date(s): 3/23/2018     Client will Increase interest, engagement, and pleasure in doing things  Decrease frequency and intensity of feeling down, depressed, hopeless.    Intervention(s)  Therapist will teach the client how to perform a behavioral chain analysis. Identifying and understanding negative thoughts and changing route of those thoughts into positives.    Objective #B  Client will Feel less tired and more energy during the day   Improve diet, appetite, mindful eating, and / or meal planning.    Status: Continued - Date(s): 3/23/2018     Intervention(s)  Therapist will assign homework Participate in mindful activity daily for at least 10 mins/day  teach distraction skills. stopping negative thoughts with mindful activity .    Objective #C  Client will improve communication with partner and family members.  Status: Continued - Date(s): 3/23/2018     Intervention(s)  Therapist will role-play effective communication skills  teach about healthy boundaries. Setting rules and expectations for self and others around you.      Goal 3: Client will develop skills to manage my drinking habits.    I will know I've met my goal when I no longer feel guilty about my drinking.      Objective #A (Client Action)    Status: Continued - Date(s): 3/23/2018     Client will increase understanding on the impacts of alcohol on mental and physical health.    Intervention(s)  Therapist will provide educational materials on alcohol on mental and physical health.    Objective #B  Client will develop problem-solving skills to limit number of drinks consumed..    Status: Continued - Date(s): 3/23/2018     Intervention(s)  Therapist will role-play conflict management  teach rules for taking a timeout. Understanding the consequences for drinking and setting limits with self and others around drinking.      Client have not reviewed nor agreed to the above plan.  Goals were set, however still need to review the objectives.      MIKE Bolton [facility]  October 3, 2017; March 23, 2018      Note reviewed and clinical supervision by [facility]

## 2018-05-29 NOTE — PROGRESS NOTES
RN Tickbite Protocol: Ages 8 and older  Govind Way is a 63 year old male is being assessed for indication of tick bite.     NURSING ASSESSMENT:   Tick is smaller, redder, no white striping on back and more triangular in shape? yes   Tick was removed within the last 72 hours? yes   Location on body of suspected tick bite: lower left back area   Symptoms:  None  Complicating factors:  Reports: None   Denies:Any aymptoms    NURSING PLAN: Huddle with provider, plan includes 200 mg doxycycline prohylactically    EDUCATION: Take doxycycline with food and 8 ounces of water, How to identify a deer tick, Important time frames related to tick bite and Preventing tick bites    RECOMMENDED DISPOSITION:    Will comply with recommendation: Yes  Nurse Triage with Huddle - provider name: Sonia Ray RN

## 2018-05-29 NOTE — MR AVS SNAPSHOT
MRN:[id number]                      After Visit Summary   5/29/2018    Govind Way    MRN: [id number]           Visit Information        Provider Department      5/29/2018 3:30 PM Xander Salas LGSW [facility] Generic      Your next 10 appointments already scheduled     Jun 08, 2018  2:30 PM CDT   New Visit with Saul Kang MD   [facility] ([facility])    [address]  [facility] [address] [phone]            Jun 11, 2018  3:30 PM CDT   Return Visit with PAULINA Bolton   [facility]    [facility]  [address]  [facility] [address] [phone]            Jun 18, 2018  4:00 PM CDT   [facility]   [facility]    [address]  [address]  [facility] [id number] [phone]            Jun 25, 2018  3:30 PM CDT   Return Visit with PAULINA Bolton   [facility] ([facility])    [facility]  [address]  [facility] [address] [phone]            Jun 29, 2018  8:00 AM CDT   Hearing Aid Fitting with Daisy Dodson [facility] ([facility])    [address] [place] [address]  [address]th [facility] [address]   [phone]            Jul 06, 2018  3:30 PM CDT   Return Visit with JEWELS Castano CNP   [facility] ([facility])    [address]  [address]  [facility] [address] [phone]            Jul 06, 2018  3:30 PM CDT   Return Visit with DANGELO Mcclure   [facility] ([facility])    [address]  [address]  [facility] [id number] [phone]             Jul 23, 2018  8:00 AM CDT   (Arrive by 7:45 AM)   Initial Review Program with Callie Bonilla [facility] ([facility])    [address] [address] [phone]              MyChart Information     [employer] gives you secure access to your electronic health record. If you see a primary care provider, you can also send messages to your care team and make appointments. If you have questions, please call your primary care clinic.  If you do not have a primary care provider, please call [phone] and they will assist you.        Care EveryWhere ID     This is your Care EveryWhere ID. This could be used by other organizations to access your [place] medical records  [id number]        Equal Access to Services     SHIVA VELA : David Smith, jan pierce, terri simons. So [facility] [phone].    ATENCIÓN: Si habla español, tiene a becerra disposición servicios gratuitos de asistencia lingüística. Llame al [phone].    We comply with applicable federal civil rights laws and [place] laws. We do not discriminate on the basis of race, color, national origin, age, disability, sex, sexual orientation, or gender identity.

## 2018-05-29 NOTE — MR AVS SNAPSHOT
After Visit Summary   5/29/2018    Govind Way    MRN: [id number]           Patient Information     Date Of Birth          1955        Visit Information        Provider Department      5/29/2018 9:30 AM [facility] FLOAT NURSE [facility]        Today's Diagnoses     Tick bite    -  1       Follow-ups after your visit        Your next 10 appointments already scheduled     May 29, 2018  3:30 PM CDT   Return Visit with PAULINA Bolton   [facility] ([facility])    [facility]  [address]  [facility] [address]   [phone]            May 30, 2018  4:00 PM CDT   Return Visit with JEWELS Castano CNP   [facility] ([facility])    [address]  [address]  [facility] [address]   [phone]            May 31, 2018  3:30 PM CDT   Return Visit with JEWELS Castano CNP   [facility] ([facility])    [address]  [address]  [facility] [address]   [phone]            May 31, 2018  3:30 PM CDT   Return Visit with Larry Albright [facility] ([facility])    [address]  [address]  [facility] [address]   [phone]            Jun 08, 2018  2:30 PM CDT   New Visit with Saul Kang MD   [facility] ([facility])    [address]  [facility] [address]   [phone]            Jun 11, 2018  3:30 PM CDT   Return Visit with PAULINA Bolton   [facility] ([facility])    [facility]  [address]  [facility] [address]   [phone]            Jun 29, 2018  8:00 AM CDT   Hearing Aid Fitting with Daisy Dodson [facility] ([facility])    [address] [place] [id number] [phone]            Jul 23, 2018  8:00 AM CDT   (Arrive by 7:45 AM)   Initial Review Program with Callie Bonilla [facility] ([facility])    [address] [place] [id number] [phone]              Who to contact     If you have questions or need follow up information about today's clinic visit or your schedule please contact [facility] directly at [phone].  Normal or non-critical lab and imaging results will be communicated to you by MyChart, letter or phone within 4 business days after the clinic has received the results. If you do not hear from us within 7 days, please contact the clinic through [employer] or phone. If you have a critical or abnormal lab result, we will notify you by phone as soon as possible.  Submit refill requests through [employer] or call your pharmacy and they will forward the refill request to us. Please allow 3 business days for your refill to be completed.          Additional Information About Your Visit        [employer] Information     [employer] gives you secure access to your electronic health record. If you see a primary care provider, you can also send messages to your care team and make appointments. If you have questions, please call your primary care clinic.  If you do not have a primary care provider, please call [phone] and they will assist you.        Care EveryWhere ID     This is your Care EveryWhere ID. This could be used by other organizations to access your [place] medical records  [id number]         Blood Pressure from Last 3 Encounters:   05/15/18 114/82   05/07/18 122/82   04/19/18 120/76    Weight from Last 3 Encounters:   05/07/18 180 lb 8 oz (81.9 kg)   05/03/18 180 lb (81.6 kg)   04/19/18 180 lb 12.8 oz (82 kg)              Today, you had the following     No orders found for  display         Today's Medication Changes          These changes are accurate as of 5/29/18 11:21 AM.  If you have any questions, ask your nurse or doctor.               Start taking these medicines.        Dose/Directions    doxycycline 100 MG capsule   Commonly known as:  VIBRAMYCIN   Used for:  Tick bite        Dose:  200 mg   Take 2 capsules (200 mg) by mouth once for 1 dose   Quantity:  2 capsule   Refills:  0            Where to get your medicines      These medications were sent to [facility] [place], [place] - [address]  [address], [facility] [address]     Phone:  [phone]     doxycycline 100 MG capsule                Primary Care Provider Office Phone # Fax #    Ирина Bishop, APRN [phone]       [address]   [facility] [address]        Equal Access to Services     SHIVA VELA : Hadii aad ku hadasho Soomaali, waaxda luqadaha, qaybta kaalmada adeegyada, terri ace . So [facility] [phone].    ATENCIÓN: Si habla español, tiene a becerra disposición servicios gratuitos de asistencia lingüística. Chu al [phone].    We comply with applicable federal civil rights laws and [place] laws. We do not discriminate on the basis of race, color, national origin, age, disability, sex, sexual orientation, or gender identity.            Thank you!     Thank you for choosing [facility]  for your care. Our goal is always to provide you with excellent care. Hearing back from our patients is one way we can continue to improve our services. Please take a few minutes to complete the written survey that you may receive in the mail after your visit with us. Thank you!             Your Updated Medication List - Protect others around you: Learn how to safely use, store and throw away your medicines at www.disposemymeds.org.          This list is accurate as of 5/29/18 11:21 AM.  Always use  your most recent med list.                   Brand Name Dispense Instructions for use Diagnosis    acetaminophen 325 MG tablet    TYLENOL    100 tablet    Take 2 tablets (650 mg) by mouth every 4 hours as needed for other (mild pain)    Urinary retention       * ADDERALL PO      Take 10 mg by mouth        * amphetamine-dextroamphetamine 10 MG per 24 hr capsule    ADDERALL XR    30 capsule    Take 1 capsule (10 mg) by mouth daily    Attention deficit hyperactivity disorder (ADHD), other type       * amphetamine-dextroamphetamine 10 MG per 24 hr capsule   Start taking on:  6/20/2018    ADDERALL XR    30 capsule    Take 1 capsule (10 mg) by mouth daily    Attention deficit hyperactivity disorder (ADHD), other type       busPIRone 10 MG tablet    BUSPAR    90 tablet    Take 1/2 tablet twice daily for one week then increase to 1 tablet twice daily    Adjustment disorder with mixed anxiety and depressed mood, Major depressive disorder, recurrent episode, mild (H)       cholecalciferol 400 UNIT/ML Liqd liquid    vitamin D/D-VI-SOL     Take 5,000 Units by mouth daily        DAILY HERBS PROSTATE PO      Take 1 tablet by mouth daily        doxycycline 100 MG capsule    VIBRAMYCIN    2 capsule    Take 2 capsules (200 mg) by mouth once for 1 dose    Tick bite       Fish Oil 500 MG Caps      Take 1 capsule by mouth daily        fluticasone 50 MCG/ACT spray    FLONASE    1 Bottle    Spray 2 sprays into both nostrils daily    Upper respiratory tract infection, unspecified type       ibuprofen 600 MG tablet    ADVIL/MOTRIN    30 tablet    Take 1 tablet (600 mg) by mouth every 6 hours as needed for other (For mild pain and temperature greater than 102F)    Urinary retention       lisinopril 10 MG tablet    PRINIVIL/ZESTRIL    90 tablet    Take 1 tablet (10 mg) by mouth daily    Benign essential hypertension       magnesium 250 MG tablet      Take 1 tablet by mouth nightly as needed (sleep)        MULTIVITAMIN TABS   OR      1 TABLET  EVETRY DAY        mupirocin 2 % ointment    BACTROBAN    2 g    Apply to anterior nares BID X 2 month supply    Nasal vestibulitis       QUEtiapine 50 MG tablet    SEROQUEL    30 tablet    Take 1 tablet (50 mg) by mouth nightly as needed    Episodic mood disorder (H), Anxiety       tolterodine 2 MG tablet    DETROL    6 tablet    Take 1 tablet (2 mg) by mouth 2 times daily    Urinary retention       * Notice:  This list has 3 medication(s) that are the same as other medications prescribed for you. Read the directions carefully, and ask your doctor or other care provider to review them with you.

## 2018-05-30 ASSESSMENT — PATIENT HEALTH QUESTIONNAIRE - PHQ9: SUM OF ALL RESPONSES TO PHQ QUESTIONS 1-9: 0

## 2018-05-30 ASSESSMENT — ANXIETY QUESTIONNAIRES: GAD7 TOTAL SCORE: 3

## 2018-05-31 ENCOUNTER — OFFICE VISIT (OUTPATIENT)
Dept: FAMILY MEDICINE | Facility: CLINIC | Age: 63
End: 2018-05-31
Payer: COMMERCIAL

## 2018-05-31 ENCOUNTER — OFFICE VISIT (OUTPATIENT)
Dept: BEHAVIORAL HEALTH | Facility: CLINIC | Age: 63
End: 2018-05-31
Payer: COMMERCIAL

## 2018-05-31 VITALS
WEIGHT: 176 LBS | SYSTOLIC BLOOD PRESSURE: 104 MMHG | RESPIRATION RATE: 16 BRPM | DIASTOLIC BLOOD PRESSURE: 66 MMHG | BODY MASS INDEX: 24.2 KG/M2 | TEMPERATURE: 98.2 F | HEART RATE: 81 BPM | OXYGEN SATURATION: 95 %

## 2018-05-31 DIAGNOSIS — L98.9 SKIN LESION: ICD-10-CM

## 2018-05-31 DIAGNOSIS — F90.8 ATTENTION DEFICIT HYPERACTIVITY DISORDER (ADHD), OTHER TYPE: ICD-10-CM
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
## 2018-05-31 DIAGNOSIS — F33.0 MAJOR DEPRESSIVE DISORDER, RECURRENT EPISODE, MILD (H): Primary | ICD-10-CM

## 2018-05-31 DIAGNOSIS — I10 HYPERTENSION GOAL BP (BLOOD PRESSURE) < 140/90: Primary | ICD-10-CM

## 2018-05-31 DIAGNOSIS — N40.0 BENIGN PROSTATIC HYPERPLASIA WITHOUT LOWER URINARY TRACT SYMPTOMS: ICD-10-CM

## 2018-05-31 DIAGNOSIS — F10.90 ALCOHOL USE DISORDER: ICD-10-CM

## 2018-05-31 DIAGNOSIS — F41.9 ANXIETY: ICD-10-CM

## 2018-05-31 DIAGNOSIS — S30.860S TICK BITE OF BACK, SEQUELA: ICD-10-CM

## 2018-05-31 DIAGNOSIS — F33.0 MAJOR DEPRESSIVE DISORDER, RECURRENT EPISODE, MILD (H): ICD-10-CM

## 2018-05-31 PROCEDURE — 99215 OFFICE O/P EST HI 40 MIN: CPT | Performed by: NURSE PRACTITIONER

## 2018-05-31 PROCEDURE — 90834 PSYTX W PT 45 MINUTES: CPT | Performed by: SOCIAL WORKER

## 2018-05-31 NOTE — MR AVS SNAPSHOT
After Visit Summary   5/31/2018    [address]    MRN: [id number]           Patient Information     Date Of Birth          1955        Visit Information        Provider Department      5/31/2018 3:30 PM Larry Albright [facility]        Today's Diagnoses     Major depressive disorder, recurrent episode, mild (H)    -  1    Attention deficit hyperactivity disorder (ADHD), other type           Follow-ups after your visit        Your next 10 appointments already scheduled     Jun 08, 2018  2:30 PM CDT   [facility]    [address]  [facility] [address]   [phone]            Jun 11, 2018  3:30 PM CDT   Return Visit with PAULINA Bolton   [facility] ([place] [address] [place] [address]   [phone]            Jun 18, 2018  4:00 PM CDT   [facility] Visit with DANGELO [facility]cl[facility]   [facility]    [address]  [address]  [facility] [address]   [phone]            Jun 25, 2018  3:30 PM CDT   Return Visit with PAULINA Bolton   [facility] ([place] [address]  [facility] [address]   [phone]            Jun 29, 2018  8:00 AM CDT   Hearing Aid Fitting with Callie Bonilla   [facility] ([facility])    [address] [place] [address]   [phone]            Jul 06, 2018  3:30 PM CDT   Return Visit with JEWELS Castano CNP   [facility]    [address]  [address]  [facility] [address]   [phone]            Jul 06, 2018  3:30 PM  CDT   Return Visit with DANGELO Mcclure   [facility]    [address]  [address]  [facility] [id number] [phone]            Jul 23, 2018  8:00 AM CDT   (Arrive by 7:45 AM)   Initial Review Program with Callie Bonilla   [facility] ([facility])    [address]  [address]th Floor  [facility] [address] [phone]              Who to contact     If you have questions or need follow up information about today's clinic visit or your schedule please contact [facility] directly at [phone].  Normal or non-critical lab and imaging results will be communicated to you by MyChart, letter or phone within 4 business days after the clinic has received the results. If you do not hear from us within 7 days, please contact the clinic through MyChart or phone. If you have a critical or abnormal lab result, we will notify you by phone as soon as possible.  Submit refill requests through [employer] or call your pharmacy and they will forward the refill request to us. Please allow 3 business days for your refill to be completed.          Additional Information About Your Visit        [employer] Information     [employer] gives you secure access to your electronic health record. If you see a primary care provider, you can also send messages to your care team and make appointments. If you have questions, please call your primary care clinic.  If you do not have a primary care provider, please call [phone] and they will assist you.        Care EveryWhere ID     This is your Care EveryWhere ID. This could be used by other organizations to access your [place] medical records  [id number]         Blood Pressure from Last 3 Encounters:   05/31/18 104/66   05/15/18 114/82   05/07/18 122/82    Weight from Last 3 Encounters:   05/31/18 176 lb (79.8 kg)   05/07/18 180 lb  8 oz (81.9 kg)   05/03/18 180 lb (81.6 kg)              Today, you had the following     No orders found for display       Primary Care Provider Office Phone # Fax JEWELS Navarrete [phone]       [address] [facility] [address]  [facility] [address]        Equal Access to Services     [facility]: Hadii aad ku hadasho Soomaali, waaxda luqadaha, qaybta kaalmada adeegyada, waxay idiin hayaan adeeg kharash la'aan . So [facility] [place] [phone].    ATENCIÓN: Si habla español, tiene a becerra disposición servicios gratuitos de asistencia lingüística. Solitarioame al [phone].    We comply with applicable federal civil rights laws and [place] laws. We do not discriminate on the basis of race, color, national origin, age, disability, sex, sexual orientation, or gender identity.            Thank you!     Thank you for choosing [facility]  for your care. Our goal is always to provide you with excellent care. Hearing back from our patients is one way we can continue to improve our services. Please take a few minutes to complete the written survey that you may receive in the mail after your visit with us. Thank you!             Your Updated Medication List - Protect others around you: Learn how to safely use, store and throw away your medicines at www.disposemymeds.org.          This list is accurate as of 5/31/18 11:59 PM.  Always use your most recent med list.                   Brand Name Dispense Instructions for use Diagnosis    acetaminophen 325 MG tablet    TYLENOL    100 tablet    Take 2 tablets (650 mg) by mouth every 4 hours as needed for other (mild pain)    Urinary retention       * ADDERALL PO      Take 10 mg by mouth        * amphetamine-dextroamphetamine 10 MG per 24 hr capsule    ADDERALL XR    30 capsule    Take 1 capsule (10 mg) by mouth daily    Attention deficit hyperactivity disorder (ADHD), other type       * amphetamine-dextroamphetamine 10 MG per 24 hr  capsule   Start taking on:  6/20/2018    ADDERALL XR    30 capsule    Take 1 capsule (10 mg) by mouth daily    Attention deficit hyperactivity disorder (ADHD), other type       busPIRone 10 MG tablet    BUSPAR    90 tablet    Take 1/2 tablet twice daily for one week then increase to 1 tablet twice daily    Adjustment disorder with mixed anxiety and depressed mood, Major depressive disorder, recurrent episode, mild (H)       cholecalciferol 400 UNIT/ML Liqd liquid    vitamin D/D-VI-SOL     Take 5,000 Units by mouth daily        DAILY HERBS PROSTATE PO      Take 1 tablet by mouth daily        Fish Oil 500 MG Caps      Take 1 capsule by mouth daily        fluticasone 50 MCG/ACT spray    FLONASE    1 Bottle    Spray 2 sprays into both nostrils daily    Upper respiratory tract infection, unspecified type       ibuprofen 600 MG tablet    ADVIL/MOTRIN    30 tablet    Take 1 tablet (600 mg) by mouth every 6 hours as needed for other (For mild pain and temperature greater than 102F)    Urinary retention       lisinopril 10 MG tablet    PRINIVIL/ZESTRIL    90 tablet    Take 1 tablet (10 mg) by mouth daily    Benign essential hypertension       magnesium 250 MG tablet      Take 1 tablet by mouth nightly as needed (sleep)        MULTIVITAMIN TABS   OR      1 TABLET EVETRY DAY        mupirocin 2 % ointment    BACTROBAN    2 g    Apply to anterior nares BID X 2 month supply    Nasal vestibulitis       QUEtiapine 50 MG tablet    SEROQUEL    30 tablet    Take 1 tablet (50 mg) by mouth nightly as needed    Episodic mood disorder (H), Anxiety       tolterodine 2 MG tablet    DETROL    6 tablet    Take 1 tablet (2 mg) by mouth 2 times daily    Urinary retention       * Notice:  This list has 3 medication(s) that are the same as other medications prescribed for you. Read the directions carefully, and ask your doctor or other care provider to review them with you.

## 2018-05-31 NOTE — MR AVS SNAPSHOT
After Visit Summary   5/31/2018    [address]    MRN: [id number]           Patient Information     Date Of Birth          1955        Visit Information        Provider Department      5/31/2018 3:30 PM Ирина Bishop APRN CNP [facility]        Care Instructions    Continue current doses of medications for now.  Consider adjunctive therapy with Larry between visits with Xander.  Come back and see me in about 4-5 weeks.                Follow-ups after your visit        Your next 10 appointments already scheduled     Jun 08, 2018  2:30 PM CDT   New Visit with Saul Kang MD   [facility] ([facility])    [address] [facility] [address] [phone]            Jun 11, 2018  3:30 PM CDT   Return Visit with PAULINA Bolton   [facility] ([facility]  [address]  [facility] [address]   [phone]            Jun 25, 2018  3:30 PM CDT   Return Visit with PAULINA Bolton   [facility] ([facility]  [address]  [facility] [address]   [phone]            Jun 29, 2018  8:00 AM CDT   Hearing Aid Fitting with Callie Bonilla [facility] ([facility])    [address] [place] [id number] [phone]            Jul 23, 2018  8:00 AM CDT   (Arrive by 7:45 AM)   Initial Review Program with Callie Bonilla [facility] ([facility])    [address] [place] [id number] [phone]              Who to contact     If you have questions or need follow up information about today's clinic visit or your schedule please contact [facility] directly at [phone].  Normal or non-critical  lab and imaging results will be communicated to you by MyChart, letter or phone within 4 business days after the clinic has received the results. If you do not hear from us within 7 days, please contact the clinic through [employer] or phone. If you have a critical or abnormal lab result, we will notify you by phone as soon as possible.  Submit refill requests through [employer] or call your pharmacy and they will forward the refill request to us. Please allow 3 business days for your refill to be completed.          Additional Information About Your Visit        [employer] Information     [employer] gives you secure access to your electronic health record. If you see a primary care provider, you can also send messages to your care team and make appointments. If you have questions, please call your primary care clinic.  If you do not have a primary care provider, please call [phone] and they will assist you.        Care EveryWhere ID     This is your Care EveryWhere ID. This could be used by other organizations to access your [place] medical records  [id number]        Your Vitals Were     Pulse Temperature Respirations Pulse Oximetry BMI (Body Mass Index)       81 98.2  F (36.8  C) (Oral) 16 95% 24.2 kg/m2        Blood Pressure from Last 3 Encounters:   05/31/18 104/66   05/15/18 114/82   05/07/18 122/82    Weight from Last 3 Encounters:   05/31/18 176 lb (79.8 kg)   05/07/18 180 lb 8 oz (81.9 kg)   05/03/18 180 lb (81.6 kg)              Today, you had the following     No orders found for display       Primary Care Provider Office Phone # Fax #    Ирина Susan Bishop, APRN [phone]       [address] [facility] [address]  [facility] [address]        Equal Access to Services     [facility]: Hadii reyes Smith, waaxda luqadaha, qaybta kaalmada adeegyada, terri avila. So [facility] [phone].    ATENCIÓN: Si habla español, tiene a becerra disposición servicios gratuitos de  asistencia lingüística. Chu al [phone].    We comply with applicable federal civil rights laws and [place] laws. We do not discriminate on the basis of race, color, national origin, age, disability, sex, sexual orientation, or gender identity.            Thank you!     Thank you for choosing [facility]  for your care. Our goal is always to provide you with excellent care. Hearing back from our patients is one way we can continue to improve our services. Please take a few minutes to complete the written survey that you may receive in the mail after your visit with us. Thank you!             Your Updated Medication List - Protect others around you: Learn how to safely use, store and throw away your medicines at www.disposemymeds.org.          This list is accurate as of 5/31/18  4:16 PM.  Always use your most recent med list.                   Brand Name Dispense Instructions for use Diagnosis    acetaminophen 325 MG tablet    TYLENOL    100 tablet    Take 2 tablets (650 mg) by mouth every 4 hours as needed for other (mild pain)    Urinary retention       * ADDERALL PO      Take 10 mg by mouth        * amphetamine-dextroamphetamine 10 MG per 24 hr capsule    ADDERALL XR    30 capsule    Take 1 capsule (10 mg) by mouth daily    Attention deficit hyperactivity disorder (ADHD), other type       * amphetamine-dextroamphetamine 10 MG per 24 hr capsule   Start taking on:  6/20/2018    ADDERALL XR    30 capsule    Take 1 capsule (10 mg) by mouth daily    Attention deficit hyperactivity disorder (ADHD), other type       busPIRone 10 MG tablet    BUSPAR    90 tablet    Take 1/2 tablet twice daily for one week then increase to 1 tablet twice daily    Adjustment disorder with mixed anxiety and depressed mood, Major depressive disorder, recurrent episode, mild (H)       cholecalciferol 400 UNIT/ML Liqd liquid    vitamin D/D-VI-SOL     Take 5,000 Units by mouth daily        DAILY HERBS  PROSTATE PO      Take 1 tablet by mouth daily        Fish Oil 500 MG Caps      Take 1 capsule by mouth daily        fluticasone 50 MCG/ACT spray    FLONASE    1 Bottle    Spray 2 sprays into both nostrils daily    Upper respiratory tract infection, unspecified type       ibuprofen 600 MG tablet    ADVIL/MOTRIN    30 tablet    Take 1 tablet (600 mg) by mouth every 6 hours as needed for other (For mild pain and temperature greater than 102F)    Urinary retention       lisinopril 10 MG tablet    PRINIVIL/ZESTRIL    90 tablet    Take 1 tablet (10 mg) by mouth daily    Benign essential hypertension       magnesium 250 MG tablet      Take 1 tablet by mouth nightly as needed (sleep)        MULTIVITAMIN TABS   OR      1 TABLET EVETRY DAY        mupirocin 2 % ointment    BACTROBAN    2 g    Apply to anterior nares BID X 2 month supply    Nasal vestibulitis       QUEtiapine 50 MG tablet    SEROQUEL    30 tablet    Take 1 tablet (50 mg) by mouth nightly as needed    Episodic mood disorder (H), Anxiety       tolterodine 2 MG tablet    DETROL    6 tablet    Take 1 tablet (2 mg) by mouth 2 times daily    Urinary retention       * Notice:  This list has 3 medication(s) that are the same as other medications prescribed for you. Read the directions carefully, and ask your doctor or other care provider to review them with you.

## 2018-05-31 NOTE — PROGRESS NOTES
"SUBJECTIVE:                                                    Govind Way is a 63 year old male with a diagnosis of hypertension, ADHD, Major depressive disorder, anxiety, alcohol abuse and BPH s/p Holep who presents to clinic today for the following health issues:  [facility]: Larry    Patient presents for primary care follow up. States he just returned from a long weekend at his friend's cabin, had a nice time, and was able to relax. A few of his friends went, they are all very wealthy, they had \"good drinks and good food.\" States it felt nice to get a break from his boyfriend Jarrod.     Still dealing with working full time, caring for his disabled daughter Swetha, and managing his relationship with Jarrod. He also care for his aging parents and plants their flowers. His son just returned from \[facility] [place] [facility]\"" with his friend Terell. Son is not currently working. States it is challenging getting his son to \"launch.\" Still dealing with adjusting to working with different personalities and people of different ethnic backgrounds at his job.     Patient is biking to and from [place] to work every day, about eight miles each way. He really enjoys biking. He is enjoying spending more time outdoors and gardening.    Taking Buspar daily, 7.5 mg in the morning. States he feels slightly calmer but still with significant anxiety and racing thoughts. Taking Adderall xr 10 mg as well, on work days only. Has tried multiple SSRIs in the past with side effects of \"flatness,\" and sexual dysfunction.    Underwent a colonoscopy 5/15. Results reviewed, unremarkable. Recommend repeat in ten years.        ADHD    Onset: since childhood diagnosed 3 years ago at [facility]    Description:   Easily distracted: YES  Short attention span: YES  Trouble following directions: no   Impulsive behavior: YES   Trouble completing tasks: YES    Accompanying Signs & Symptoms:        Change in sleep pattern: denies  Irritability at certain times of the day: " "no  Socially withdrawn: no  Depression symptoms: no  Anxiety symptoms: YES    History:  Caffeine intake: Small  Loss of appetite: no  Healthy diet: fair  Did you have problems in school or with previous employment: no  Family history of ADHD: no  Have you had an evaluation for ADHD in the past: YES  Do you use alcohol or drugs: YES    Therapies tried: Adderall  with moderate relief  Dr Ambriz first prescribed it - he retired      Hypertension Follow-up      Outpatient blood pressures are not being checked.    Low Salt Diet: low salt      Amount of exercise or physical activity: 4-5 days/week for an average of greater than 60 minutes    Problems taking medications regularly: No    Medication side effects: none    Diet: regular (no restrictions)        Mental Health Follow up Anxiety    Status since last visit: good, was able to relax on vacation. Will with anxiety as above    See PHQ-9 for current symptoms.  Other associated symptoms: None    Complicating factors: juggling multiple life stressors  Significant life event:  Came out as arnett six years ago, was  to a women 22 year prior. Daughter with hx lupus nephritis, donated his kidney.  Current substance abuse:  alcohol  Anxiety or Panic symptoms: significant anxiety    Sleep - adequate  Appetite - adequate  Exercise - most days    Smoking - denies  Alcohol - drinking a lot on vacation, \"overindulged,\" reports poor self control.    Street drugs - denies  Marijuana - denies  Caffeine - light, coffee    PHQ-9  English PHQ-9   Any Language         Tick Bite: Patient reports he was bitten by a tick a few days ago. He called the clinic. Completed Doxycycline, bite now healing well, denies sx.    He is seeing Audiology for bilateral sensorineural hearing loss. Plans to start using hearing aids.    Hx left testicular cyst. Working with Urology, they elected periodic US surveillance. Denies changes.    Reports small patch to his left lateral cheek. Present a few " months. Denies pruritis, pain or drainage/bleeding.    Social History   Substance Use Topics     Smoking status: Former Smoker     Quit date: 1/1/1982     Smokeless tobacco: Never Used      Comment: NO 2ND HAND SMOKE     Alcohol use 0.0 oz/week     0 Standard drinks or equivalent per week      Comment: 5/wk        Problem list and histories reviewed & adjusted, as indicated.  Additional history: as documented    Patient Active Problem List   Diagnosis     Adjustment disorder with mixed anxiety and depressed mood     Abdominal hernia     Hypertension goal BP (blood pressure) < 140/90     CARDIOVASCULAR SCREENING; LDL GOAL LESS THAN 160     History of hyperthyroidism     Right inguinal hernia     Irritable bowel syndrome     Donor of kidney for transplant     Hernia     Testis disorder     Screening for prostate cancer     Other hydrocele     Hallux rigidus, right foot     Advance care planning     Tendinopathy of right gluteus medius     Attention deficit hyperactivity disorder (ADHD), other type     Benign non-nodular prostatic hyperplasia with lower urinary tract symptoms     Anxiety     SBO (small bowel obstruction)     Major depressive disorder, recurrent episode, mild (H)     Alcohol use disorder (H)     Past Surgical History:   Procedure Laterality Date     C ECG MONITOR/ 24 HRS, ORIG ECG, W VIS SUPERIMPOS SCAN, COMPLETE  1/00    um holter monitor     C REMV KIDNEY/URETER,SAME INCIS  6/30/05    Nephrouretectomy/LEFT KIDNEY DONATED TO DAUGHTER/[facility]     COLONOSCOPY N/A 5/15/2018    Procedure: COLONOSCOPY;  colonoscopy;  Surgeon: Lobo Pelaez MD;  Location:  GI     CYSTOSCOPY, TRANSURETHRAL RESECTION (TUR) PROSTATE, COMBINED       HC REMOVAL OF TONSILS,<11 Y/O  age 7     HC VASECTOMY UNILAT/BILAT W POSTOP SEMEN  age 41     HERNIORRHAPHY INGUINAL  12/23/2011    Procedure:HERNIORRHAPHY INGUINAL; Surgeon:JULIA SIERRA; Location:[facility] OR     LAPAROSCOPIC HERNIORRHAPHY INGUINAL Right 6/29/2015     Procedure: LAPAROSCOPIC HERNIORRHAPHY INGUINAL;  Surgeon: García Ibarra MD;  [facility]: [facility]     LAPAROSCOPIC HERNIORRHAPHY VENTRAL  12/23/2011    Procedure:LAPAROSCOPIC HERNIORRHAPHY VENTRAL; Laparoscopic Ventral Hernia Repair with Mesh, Open Left Inguinal Hernia Repair with Mesh; Surgeon:JULIA SIERRA; Location:[facility] OR     LASER HOLMIUM ENUCLEATION PROSTATE N/A 10/19/2017    Procedure: LASER HOLMIUM ENUCLEATION PROSTATE;  Holmium Laser Enculeation of the Prostate;  Surgeon: Manolo Cardenas MD;  [facility]     LASIK CUSTOMVUE BILATERAL  11/11/2011    Procedure:LASIK CUSTOMVUE BILATERAL; BILATERAL CUSTOMVUE LASIK WITH INTRALASE; Surgeon:POP SIMON; Location:[facility]       Social History   Substance Use Topics     Smoking status: Former Smoker     Quit date: 1/1/1982     Smokeless tobacco: Never Used      Comment: NO 2ND HAND SMOKE     Alcohol use 0.0 oz/week     0 Standard drinks or equivalent per week      Comment: 5/wk     Family History   Problem Relation Age of Onset     DIABETES Mother      ADULT ONSET     Hypertension Mother      Coronary Artery Disease Mother      Also father and grandparent.  Father had coronary bypass and aortic valve replacement surgery     Heart Surgery Mother      Aortic valve     CANCER Other      prostate cancer     Thyroid Disease Other      Hypertension Father      Skin Cancer Other      Mother and father, grandfather     Arthritis Other      Mom, daughter, grandparents     Other - See Comments Other      Grandfather with kidney stone     Prostate Cancer Other      Grandfather     KIDNEY DISEASE Daughter      Lupus Daughter      Causing end-stage renal disease     Eye Disorder No family hx of      NONE KNOWN     Hyperlipidemia No family hx of      CEREBROVASCULAR DISEASE No family hx of      Breast Cancer No family hx of      Colon Cancer No family hx of      Other Cancer No family hx of      Depression No family hx of      Anxiety Disorder  No family hx of      MENTAL ILLNESS No family hx of      Substance Abuse No family hx of      Anesthesia Reaction No family hx of      Asthma No family hx of      OSTEOPOROSIS No family hx of      Genetic Disorder No family hx of      Obesity No family hx of      Unknown/Adopted No family hx of            Current Outpatient Prescriptions   Medication Sig Dispense Refill     acetaminophen (TYLENOL) 325 MG tablet Take 2 tablets (650 mg) by mouth every 4 hours as needed for other (mild pain) 100 tablet 0     Amphetamine-Dextroamphetamine (ADDERALL PO) Take 10 mg by mouth       amphetamine-dextroamphetamine (ADDERALL XR) 10 MG per 24 hr capsule Take 1 capsule (10 mg) by mouth daily 30 capsule 0     [START ON 6/20/2018] amphetamine-dextroamphetamine (ADDERALL XR) 10 MG per 24 hr capsule Take 1 capsule (10 mg) by mouth daily 30 capsule 0     busPIRone (BUSPAR) 10 MG tablet Take 1/2 tablet twice daily for one week then increase to 1 tablet twice daily 90 tablet 1     cholecalciferol (VITAMIN D/D-VI-SOL) 400 UNIT/ML LIQD liquid Take 5,000 Units by mouth daily       fluticasone (FLONASE) 50 MCG/ACT spray Spray 2 sprays into both nostrils daily 1 Bottle 11     ibuprofen (ADVIL/MOTRIN) 600 MG tablet Take 1 tablet (600 mg) by mouth every 6 hours as needed for other (For mild pain and temperature greater than 102F) 30 tablet 0     lisinopril (PRINIVIL/ZESTRIL) 10 MG tablet Take 1 tablet (10 mg) by mouth daily 90 tablet 1     magnesium 250 MG tablet Take 1 tablet by mouth nightly as needed (sleep)       Misc Natural Products (DAILY HERBS PROSTATE PO) Take 1 tablet by mouth daily       MULTIVITAMIN TABS   OR 1 TABLET EVETRY DAY  0     mupirocin (BACTROBAN) 2 % ointment Apply to anterior nares BID X 2 month supply 2 g 3     Omega-3 Fatty Acids (FISH OIL) 500 MG CAPS Take 1 capsule by mouth daily        QUEtiapine (SEROQUEL) 50 MG tablet Take 1 tablet (50 mg) by mouth nightly as needed 30 tablet 1     tolterodine (DETROL) 2 MG  tablet Take 1 tablet (2 mg) by mouth 2 times daily 6 tablet 0     Allergies   Allergen Reactions     Ambien [Zolpidem Tartrate]      irritability     Codeine Sulfate Itching     Recent Labs   Lab Test  05/18/18   0801  05/07/18   1002  11/06/17   1313   07/28/17   0617   07/31/14   1746  06/18/13   0750   04/26/11   1012   LDL  90   --    --    --    --    --    --   95   --   137*   HDL  64   --    --    --    --    --    --   70   --   47   TRIG  98   --    --    --    --    --    --   45   --   79   ALT   --   28  24   --   22   < >   --   33   < >  28   CR   --   0.97  0.88   < >  0.91   < >  1.13  1.10   < >  1.13   GFRESTIMATED   --   78  88   < >  85   < >  67  69   < >  68   GFRESTBLACK   --   >90  >90   < >  >90   GFR Calc     < >  81  84   < >  82   POTASSIUM   --   4.0  4.0   < >  3.9   < >  3.8  4.0   < >  4.6   TSH   --    --    --    --    --    --   2.83  2.99   --    --     < > = values in this interval not displayed.      BP Readings from Last 3 Encounters:   05/31/18 104/66   05/15/18 114/82   05/07/18 122/82    Wt Readings from Last 3 Encounters:   05/31/18 176 lb (79.8 kg)   05/07/18 180 lb 8 oz (81.9 kg)   05/03/18 180 lb (81.6 kg)        Labs reviewed in EPIC  Problem list, Medication list, Allergies, and Medical/Social/Surgical histories reviewed in [facility] and updated as appropriate.     ROS: Constitutional, neuro, ENT, endocrine, pulmonary, cardiac, gastrointestinal, genitourinary, musculoskeletal, integument and psychiatric systems are negative, except as otherwise noted above in the HPI.       OBJECTIVE:                                                    /66  Pulse 81  Temp 98.2  F (36.8  C) (Oral)  Resp 16  Wt 176 lb (79.8 kg)  SpO2 95%  BMI 24.2 kg/m2  Body mass index is 24.2 kg/(m^2).  GENERAL: healthy, alert, well nourished, well hydrated, no distress  EYES: Eyes grossly normal to inspection, extraocular movements - intact, and PERRL  HENT: ear canals- normal;  TMs- normal; Nose- normal; Mouth- no ulcers, no lesions  NECK: no tenderness, no adenopathy, no asymmetry, no masses, no stiffness; thyroid- normal to palpation  RESP: lungs clear to auscultation - no rales, no rhonchi, no wheezes  CV: regular rates and rhythm, normal S1 S2, no S3 or S4 and no murmur, no click or rub - no homans or cords  ABDOMEN: soft, no tenderness, no  hepatosplenomegaly, no masses, normal bowel sounds  MS: extremities- no gross deformities noted, no edema  SKIN: no suspicious lesions, no rashes, approx 4 mm rough tan patch to left lateral cheek. Approx 2-3 mm bite to right lateral midline back. No surrounding erythema, no purulence, nontender.  NEURO: strength and tone- normal, sensory exam- grossly normal, mentation- intact, speech- normal, reflexes- symmetric  Non focal no aphasia.   BACK: no CVA tenderness, no paralumbar tenderness  LYMPHATICS: ant. cervical- normal, post. cervical- normal, axillary- normal, supraclavicular- normal, inguinal- normal  Mental Status Assessment:  Appearance:   Appropriate   Eye Contact:   Good   Psychomotor Behavior: Restless   Attitude:   Cooperative   Orientation:   All  Speech   Rate / Production: Hyperverbal    Volume:  Normal   Mood:    Anxious   Affect:    Appropriate  Blunted   Thought Content:  Clear   Thought Form:  Coherent  Flight of Ideas   Insight:    Fair   Attention Span and Concentration:  limited  Recent and Remote Memory:  intact  Fund of Knowledge: appropriate  Muscle Strength and Tone: normal   Suicidal Ideation: reports no thoughts, no intention  Hallucination: No  Paranoid-No  Manic-No  Panic-No  Self harm-No      See [facility] notes     Diagnostic Test Results:  none      ASSESSMENT/PLAN:                                                    (I10) Hypertension goal BP (blood pressure) < 140/90  (primary encounter diagnosis)  Comment: BP at goal.  Continue Lisinopril 10 mg daily.   Discussed continuing to exercise daily, healthy eating, and limit  alcohol.   Plan: As above.     (F90.8) Attention deficit hyperactivity disorder (ADHD), other type  Comment: Doing well with Adderall XR 10 mg QAM.  Still with symptoms of impulsivity, disorganization.   To follow up with Dr. Kang next week.   Plan: As above. [facility] support given,  To work with Larry for adjunctive therapy.    (F33.0) Major depressive disorder, recurrent episode, mild (H)  Comment: Symptoms are primarily presenting as anxiety related.   Tolerating Buspar well at a low dose.   Plan: To continue Buspar. Currently taking 7.5 mg daily from an old bottle, advised to start 10 mg tablets when those run out. To follow up with Dr. Kang next week for consultation.    (F41.9) Anxiety  Comment: On Buspar as above. Still with significant anxiety.  Plan: [facility] support given. Anxiety reduction techniques discussed. Continue to monitor.     (F10.99) Alcohol use disorder (H)  Comment: Daily use, alcohol abuse. May not fully meet criteria for AUD but this should be explored. Does not seem ready to stop drinking.   Plan: continue to monitor, support given, encouraged to cut down/limit use.     (N40.0) Benign prostatic hyperplasia without lower urinary tract symptoms  Comment: Followed by Urology. S/p Turp. Biopsy last year showing hyperplasia. Denies current sx.   Plan: Continue to f/u with Urology. No concerns.    (L98.0) Skin lesion  -to right cheek, will monitor for now, may need to see Dermatology to r/o actinic keratosis  -sun protection of SPF 30 at least advised.    (S30.860) Tick Bite  -Completed Doxycycline, healing, denies significant pain, fever or systemic symptoms.    Patient Instructions:  Patient Instructions   Continue current doses of medications for now.  Consider adjunctive therapy with Larry between visits with Nhia.  Come back and see me in about 4-5 weeks.                      JEWELS [facility] PRIMARY CARE       I spent Greater than 40 minutes was spent face to  face with Govind Way, with greater than 50 % in counselling and consultation about anxiety, alcohol use, exercise and diet, skin care, ADHD, self care.

## 2018-05-31 NOTE — PROGRESS NOTES
[facility]   May 31, 2018      Behavioral Health Clinician Progress Note    Patient Name: Govind Way           Service Type:  Individual      Service Location:   Face to Face in Clinic     Session Start Time: 3:35 p.m.  Session End Time: 4:25 p.m.      Session Length: 38 - 52      Attendees: Patient and PCP    Visit Activities (Refresh list every visit): [facility] [employer]    Diagnostic Assessment Date: 9/7/17 by MIKE Bolton, PAULINA  Treatment Plan Review Date: to be completed  See Flowsheets for today's PHQ-9 and WAGNER-7 results  Previous PHQ-9:   PHQ-9 SCORE 4/16/2018 5/9/2018 5/29/2018   Total Score - - -   Total Score 9 5 0     Previous WAGNER-7:   WAGNER-7 SCORE 4/16/2018 5/9/2018 5/29/2018   Total Score - - -   Total Score 9 9 3       SHASHANK LEVEL:  SHASHANK Score (Last Two) 6/14/2012   SHASHANK Raw Score 50   Activation Score 86.3   SHASHANK Level 4       DATA  Extended Session (60+ minutes): No  Interactive Complexity: No  Crisis: No  [facility] Patient: No    Treatment Objective(s) Addressed in This Session:  Target Behavior(s): alcohol use and depression    Depressed Mood: Increase interest, engagement, and pleasure in doing things  Identify negative self-talk and behaviors: challenge core beliefs, myths, and actions  Improve concentration, focus, and mindfulness in daily activities   Feel less fidgety, restless or slow in daily activities / interpersonal interactions  Alcohol / Substance Use: will make healthier choices in regard to substance use    Current Stressors / Issues:    [facility] co-visit with patient and PCP in the clinic. Patient reports he is doing okay and enjoys spending time with friends. He reports primary stressors include: work environment, relationship with his partner, and continuing to parent adult children. He is not sure what's next with his partner and feels pressured to move in, though states he is tied to familial and work responsibilities. Patient is seeing [facility] therapist Xander Salas  "CATALINASW, bi-weekly and states this is helpful, though he is not retaining information and struggles to apply interventions outside of therapy. Discussed what adjunctive therapy could look like, and that this would be done in consultation with Nhia. Short-term therapy with the [facility] would focus on disordered eating, impulse control, coping, and skill development/reinforcement.  Patient presents as highly anxious today and thinking is tangential. He struggles to stay on task and is easily distracted.   He continues to exercise regularly by riding his bike, and riding to work when weather permits. Patient said \"I just need to find something that works to help with anxiety and keep me on track\". Requested patient schedule a return [facility] visit in two weeks.    Progress on Treatment Objective(s) / Homework:  New Objective established this session - PREPARATION (Decided to change - considering how); Intervened by negotiating a change plan and determining options / strategies for behavior change, identifying triggers, exploring social supports, and working towards setting a date to begin behavior change    Motivational Interviewing    MI Intervention: Expressed Empathy/Understanding, Supported Autonomy, Collaboration, Evocation, Permission to raise concern or advise, Open-ended questions and Reflections: simple and complex     Change Talk Expressed by the Patient: Desire to change Ability to change Reasons to change Need to change    Provider Response to Change Talk: E - Evoked more info from patient about behavior change, A - Affirmed patient's thoughts, decisions, or attempts at behavior change, R - Reflected patient's change talk and S - Summarized patient's change talk statements    Also provided psychoeducation about behavioral health condition, symptoms, and treatment options    Care Plan review completed: No    Medication Review:  No changes to current psychiatric medication(s) - patient scheduled with Dr. Kang for " "psychiatry on 6/8/18    Medication Compliance:  Yes    Changes in Health Issues:   Yes: please see medical note by PCP JEWELS Vásquez, NP    Chemical Use Review:   Substance Use: Problem use continues with no change since last session, Contemplation  Provided encouragement towards sobriety  Provided support and affirmation for steps taken towards sobriety   Consulted with PCP about current impact on health        Tobacco Use: No current tobacco use.      Assessment: Current Emotional / Mental Status (status of significant symptoms):  Risk status (Self / Other harm or suicidal ideation)  Patient has had a history of suicidal ideation: psychiatric hospitalization several years ago - \"nervous breakdown\"  Patient denies current fears or concerns for personal safety.  Patient denies current or recent suicidal ideation or behaviors.  Patient denies current or recent homicidal ideation or behaviors.  Patient denies current or recent self injurious behavior or ideation.  Patient denies other safety concerns.  A safety and risk management plan has not been developed at this time, however patient was encouraged to call [place] [phone] should there be a change in any of these risk factors.    Appearance:   Appropriate   Eye Contact:   Good   Psychomotor Behavior: Hyperactive   Attitude:   Cooperative   Orientation:   All  Speech   Rate / Production: Hyperverbal    Volume:  Normal   Mood:    Anxious  Depressed  Sad   Affect:    Labile   Thought Content:  Clear   Thought Form:  Tangential   Insight:    Fair     Diagnoses:  1. Major depressive disorder, recurrent episode, mild (H)    2. Attention deficit hyperactivity disorder (ADHD), other type        Collateral Reports Completed:  Communicated with: [facility] Therapist MIKE Bolton, LGSW to discuss adjunctive therapy - [facility] will focus on compulsive eating, coping, impulse control, skill development, in consultation with his long-term therapist.    Plan: (Homework, " other):  Patient was given information about behavioral services and encouraged to schedule a follow up appointment with the clinic [facility] in 2 weeks for adjunctive therapy.  He was also given information about mental health symptoms and treatment options  and deep Breathing Strategies to practice when experiencing anxiety.  CD Recommendations: Practice Harm Reduction: reduce # of drinks per day.   DANGELO Porras, [facility]

## 2018-06-01 ENCOUNTER — TELEPHONE (OUTPATIENT)
Dept: AUDIOLOGY | Facility: CLINIC | Age: 63
End: 2018-06-01

## 2018-06-01 NOTE — TELEPHONE ENCOUNTER
[facility] Call Center    Phone Message    May a detailed message be left on voicemail: yes    Reason for Call: Other: Patient is calling to [facility] base and see if his hearing aids have been ordered?  He has reached out to his insurance and knows his possible cost is around 1 thousand dollars, however he has heard about the [facility] and possible benefits for veterans that cover hearing aids.  Please call to follow up [phone] is his work number he works at [employer].      Action Taken: Message routed to:  [facility] Audiology

## 2018-06-01 NOTE — TELEPHONE ENCOUNTER
Spoke to patient and let him know that to use the benefits from the [facility] he would likely need to obtain them through the [facility].    Let him know that the hearing aids were ordered after the last appointment but that if he would like to pursue the hearing aids through the VA, the hearing aids could be returned and upcoming appointments could be cancelled.  Patient will speak with VA to see if he is eligible and let clinic know how he would like to proceed once he hears from the [facility].    Callie Bonilla  Audiologist  [place] License  #[id number]

## 2018-06-07 NOTE — PROGRESS NOTES
[facility] Initial Psychiatry Visit             Govind Way is a 63 year old male who was referred to me by his primary care provider for treatment and evaluation of anxiety and ADHD  Therapist: Xander Salas  PCP: Ирина Bishop     History was provided by patient who was a good and limited historian.     Chief Complaint                                                                                                            3 year old male who was referred to me by his primary care provider for treatment and evaluation of anxiety and ADHD    History of Present Illness                                                                                 4, 4      He reported a life long history of feeling different from other kids.    He was held back in the first grade and attended special ED classes for math and English.    He reported having an invalidating experience growing up (i.e. feeling othered).    He reported feeling stressed at work and in caring for his daughter who has ESRD. He donated a kidney to her. He reported dealing with his daughter's illness as a very difficult experience in his life as he was worried that he will lose her.      Recent Symptoms:   Inattention, irritability, decreased focus, circumstantiality, feeling distracted and like he cannot do enough, difficulty assembling meals at home and organizing. Feeling frozen.        Recent Substance Use:  He stated that he drinks up to 2 glasses of wine every night.      Substance Use History                                                                 He reported experimenting with LSD, cannabis, mescaline and speed when he was much younger. He stated that he drinks up to 2 glasses of wine every night.         Psychiatric History     He reported no prior psychiatric hospitalizations and no suicide attempts. He was diagnosed with ADHD about 3 years ago.       Psychiatric Medication Trials     Paxil (paroxetine),  "Celexa (citalopram) and Lexapro (escitalopram)  Seroquel (quetiapine)  Adderall (amphetamine salts) Ritalin    Social/ Family History               [per patient report]                                                  1ea, 1ea     FINANCIAL SUPPORT- works as an Instrument        CHILDREN- two adult children. His daughter has ESRD and lives with him with her boyfriend. His son lives with his ex-wife and has a substance use disorder       LIVING SITUATION- lives with his partner, his daughter and his daughter's boyfriend. He provides housing for his daughter.      EARLY HISTORY/ EDUCATION- He stated that he was not good in school and was held back once. He stated that he was in \"Dumb math, dumb english classes.\" He graduated highschool. He enrolled in the [employer] for about two years and then spent time serving in the [employer].     Medical / Surgical History                                                                                                                     Patient Active Problem List   Diagnosis     Adjustment disorder with mixed anxiety and depressed mood     Abdominal hernia     Hypertension goal BP (blood pressure) < 140/90     CARDIOVASCULAR SCREENING; LDL GOAL LESS THAN 160     History of hyperthyroidism     Right inguinal hernia     Irritable bowel syndrome     Donor of kidney for transplant     Hernia     Testis disorder     Screening for prostate cancer     Other hydrocele     Hallux rigidus, right foot     Advance care planning     Tendinopathy of right gluteus medius     Attention deficit hyperactivity disorder (ADHD), other type     Benign non-nodular prostatic hyperplasia with lower urinary tract symptoms     Anxiety     SBO (small bowel obstruction)     Major depressive disorder, recurrent episode, mild (H)     Alcohol use disorder (H)       Past Surgical History:   Procedure Laterality Date     C ECG MONITOR/ 24 HRS, ORIG ECG, W VIS SUPERIMPOS SCAN, COMPLETE  " 1/00    um holter monitor     C REMV KIDNEY/URETER,SAME INCIS  6/30/05    Nephrouretectomy/LEFT KIDNEY DONATED TO DAUGHTER/[facility]     COLONOSCOPY N/A 5/15/2018    Procedure: COLONOSCOPY;  colonoscopy;  Surgeon: Lobo Pelaez MD;  Location:  GI     CYSTOSCOPY, TRANSURETHRAL RESECTION (TUR) PROSTATE, COMBINED       HC REMOVAL OF TONSILS,<11 Y/O  age 7     HC VASECTOMY UNILAT/BILAT W POSTOP SEMEN  age 41     HERNIORRHAPHY INGUINAL  12/23/2011    Procedure:HERNIORRHAPHY INGUINAL; Surgeon:JULIA SIERRA; Location:[facility]     LAPAROSCOPIC HERNIORRHAPHY INGUINAL Right 6/29/2015    Procedure: LAPAROSCOPIC HERNIORRHAPHY INGUINAL;  Surgeon: García Ibarra MD;  [facility]     LAPAROSCOPIC HERNIORRHAPHY VENTRAL  12/23/2011    Procedure:LAPAROSCOPIC HERNIORRHAPHY VENTRAL; Laparoscopic Ventral Hernia Repair with Mesh, Open Left Inguinal Hernia Repair with Mesh; Surgeon:JULIA SIERRA; Location:[facility]     LASER HOLMIUM ENUCLEATION PROSTATE N/A 10/19/2017    Procedure: LASER HOLMIUM ENUCLEATION PROSTATE;  Holmium Laser Enculeation of the Prostate;  Surgeon: Manolo Cardenas MD;  [facility]     LASIK CUSTOMVUE BILATERAL  11/11/2011    Procedure:LASIK CUSTOMVUE BILATERAL; BILATERAL CUSTOMVUE LASIK WITH INTRALASE; Surgeon:POP SIMON; Location:[facility]        Medical Review of Systems                                                                                                     2, 10     A comprehensive review of systems was performed and is negative other than noted in the HPI.    Allergy                                Ambien [zolpidem tartrate] and Codeine sulfate  Current Medications                                                                                                         Current Outpatient Prescriptions   Medication Sig Dispense Refill     acetaminophen (TYLENOL) 325 MG tablet Take 2 tablets (650 mg) by mouth every 4 hours as needed for other (mild pain)  100 tablet 0     Amphetamine-Dextroamphetamine (ADDERALL PO) Take 10 mg by mouth       amphetamine-dextroamphetamine (ADDERALL XR) 10 MG per 24 hr capsule Take 1 capsule (10 mg) by mouth daily 30 capsule 0     [START ON 6/20/2018] amphetamine-dextroamphetamine (ADDERALL XR) 10 MG per 24 hr capsule Take 1 capsule (10 mg) by mouth daily 30 capsule 0     busPIRone (BUSPAR) 10 MG tablet Take 1/2 tablet twice daily for one week then increase to 1 tablet twice daily 90 tablet 1     cholecalciferol (VITAMIN D/D-VI-SOL) 400 UNIT/ML LIQD liquid Take 5,000 Units by mouth daily       fluticasone (FLONASE) 50 MCG/ACT spray Spray 2 sprays into both nostrils daily 1 Bottle 11     ibuprofen (ADVIL/MOTRIN) 600 MG tablet Take 1 tablet (600 mg) by mouth every 6 hours as needed for other (For mild pain and temperature greater than 102F) 30 tablet 0     lisinopril (PRINIVIL/ZESTRIL) 10 MG tablet Take 1 tablet (10 mg) by mouth daily 90 tablet 1     magnesium 250 MG tablet Take 1 tablet by mouth nightly as needed (sleep)       Misc Natural Products (DAILY HERBS PROSTATE PO) Take 1 tablet by mouth daily       MULTIVITAMIN TABS   OR 1 TABLET EVETRY DAY  0     mupirocin (BACTROBAN) 2 % ointment Apply to anterior nares BID X 2 month supply 2 g 3     Omega-3 Fatty Acids (FISH OIL) 500 MG CAPS Take 1 capsule by mouth daily        QUEtiapine (SEROQUEL) 50 MG tablet Take 1 tablet (50 mg) by mouth nightly as needed 30 tablet 1     tolterodine (DETROL) 2 MG tablet Take 1 tablet (2 mg) by mouth 2 times daily 6 tablet 0     Vitals                                                                                                                         3, 3     There were no vitals taken for this visit.     Mental Status Exam                                                                                      9, 14 cog gs     Alertness: alert   Appearance: well groomed  Behavior/Demeanor: cooperative, with good  eye contact   Speech: regular rate and  rhythm  Language: intact  Psychomotor: normal or unremarkable  Mood: depressed and anxious  Affect: full range; was congruent to mood; was not congruent to content  Thought Process/Associations: circumstantial and loose associations  Thought Content:  Reports none;  Denies suicidal ideation, violent ideation and delusions  Perception:  Reports none;  Denies auditory hallucinations and visual hallucinations  Insight: fair  Judgment: fair and poor  Cognition: (6) does  appear grossly intact; formal cognitive testing was not done  Gait and Station: unremarkable     Labs and Data                                                                                                                     PHQ9   PHQ-9 SCORE 4/16/2018 5/9/2018 5/29/2018   Total Score - - -   Total Score 9 5 0     Recent Labs   Lab Test  05/07/18   1002  11/06/17   1313  10/11/17   1555   CR  0.97  0.88  1.17   GFRESTIMATED  78  88  64     Recent Labs   Lab Test  05/07/18   1002  11/06/17   1313   AST  19  20   ALT  28  24   ALKPHOS  66  71       Diagnosis and Assessment                                                                             m2, h3     Today the following issues were addressed:    1) ADHD    [place] Prescription Monitoring Program [] review was not needed today.    PSYCHOTROPIC DRUG INTERACTIONS: none clinically relevant    Plan                                                                                                                     m2, h3     - Increase Adderall XR to 15mg by mouth once daily. May titrate up as tolerated  - Increase Buspirone to 7.5mg by mouth twice daily. Ok to stop at future visit if he reports that it is not helpful  - Stop Seroquel  - Continue psychotherapy. Suggest focusing on managing ADHD symptoms (planner, taking breaks), working on invalidating experiences he has had as a result of his ADHD (i.e being ridiculed and feeling apologetic) and difficulty in caring for his daughter.     RTC: with  primary care provider.     CRISIS NUMBERS:   Provided routinely in AVS.    Treatment Risk Statement:  The patient understands the risks, benefits, adverse effects and alternatives. Agrees to treatment with the capacity to do so. No medical contraindications to treatment. Agrees to call clinic for any problems. The patient understands to call 911 or go to the nearest ED if life threatening or urgent symptoms occur.     Time spent face to face with patient was 60 min. Greater than 50% or 40 min were spent in counseling and coordination of care. Patient was counseled about symptoms, life stressors and treatment options.      PROVIDER:  Saul Kang MD

## 2018-06-08 ENCOUNTER — OFFICE VISIT (OUTPATIENT)
Dept: PSYCHIATRY | Facility: CLINIC | Age: 63
End: 2018-06-08
Payer: COMMERCIAL

## 2018-06-08 DIAGNOSIS — F90.8 ATTENTION DEFICIT HYPERACTIVITY DISORDER (ADHD), OTHER TYPE: ICD-10-CM

## 2018-06-08 DIAGNOSIS — F33.0 MAJOR DEPRESSIVE DISORDER, RECURRENT EPISODE, MILD (H): ICD-10-CM

## 2018-06-08 DIAGNOSIS — F43.23 ADJUSTMENT DISORDER WITH MIXED ANXIETY AND DEPRESSED MOOD: ICD-10-CM

## 2018-06-08 PROCEDURE — 99215 OFFICE O/P EST HI 40 MIN: CPT | Performed by: PSYCHIATRY & NEUROLOGY

## 2018-06-08 RX ORDER — BUSPIRONE HYDROCHLORIDE 7.5 MG/1
7.5 TABLET ORAL 2 TIMES DAILY
Qty: 60 TABLET | Refills: 1 | Status: SHIPPED | OUTPATIENT
Start: 2018-06-08 | End: 2018-07-12

## 2018-06-08 RX ORDER — DEXTROAMPHETAMINE SACCHARATE, AMPHETAMINE ASPARTATE MONOHYDRATE, DEXTROAMPHETAMINE SULFATE AND AMPHETAMINE SULFATE 3.75; 3.75; 3.75; 3.75 MG/1; MG/1; MG/1; MG/1
15 CAPSULE, EXTENDED RELEASE ORAL DAILY
Qty: 30 CAPSULE | Refills: 0 | Status: SHIPPED | OUTPATIENT
Start: 2018-06-08 | End: 2018-07-08

## 2018-06-08 NOTE — MR AVS SNAPSHOT
MRN:[id number]                      After Visit Summary   6/8/2018    Govind Way    MRN: [id number]           Visit Information        Provider Department      6/8/2018 2:30 PM Saul Kang MD [facility]        Your next 10 appointments already scheduled     Jun 11, 2018  3:30 PM CDT   Return Visit with PAULINA Bolton   [facility] ([facility])    [facility]  [address]  [facility] [facility] [address]   [phone]            Jun 18, 2018  4:00 PM CDT   Return Visit with Larry Albright [facility] ([facility])    [address]  [address]  [place] [address] [phone]            Jun 25, 2018  3:30 PM CDT   Return Visit with PAULINA Bolton   [facility] ([facility]  [address]  [facility] [id number] [phone]            Jun 29, 2018  8:00 AM CDT   Hearing Aid Fitting with Callie Bonilla [facility] ([facility])    [address]  4th Floor  [place] [id number] [phone]            Jul 06, 2018  3:30 PM CDT   Return Visit with JEWELS Castano CNP   [facility] ([facility]    [address]  [address]  [place] [id number] [phone]            Jul 06, 2018  3:30 PM CDT   [facility]    [address]  [address]  [place] [id number] [phone]            Jul 23, 2018  8:00 AM CDT   (Arrive by 7:45 AM)   Initial Review Program with Callie Bonilla [facility] ([facility])    [address]  4th Floor  [place] [address]    [phone]              Care Instructions    - Increase Adderall XR to 15mg by mouth once daily.  - Increase Buspirone to 7.5mg by mouth twice daily.  24/7 Crisis Hotlines:    National Suicide Prevention Hotline   398-216-SQSO (7356)    Crisis Hotlines by County:    [facility] Crisis Line     [phone]  [place] Crisis Line   [phone]  [facility] Crisis Line     [phone]  [facility] COPE for Adults   [phone]  [place] for Children    [phone]  [place] for Adults    [phone]  [place] for Children    [phone]  [facility] Crisis Line    [phone]    [facility] Crisis Response Team  [phone] ([phone])  [facility] Crisis is available 24 hours a day. The team provides callers with a needs assessment and referrals to appropriate resources. If medically necessary, the Crisis Response Team assists individuals by traveling to their location to provide face-to-face interventions and assessments. Crisis services are available for adults and children in [facility] and [facility]. Adult Crisis beds are also available if appropriate.    Walk-In Centers and Mobile Teams  [facility] Walk-In Crisis Intervention  [phone]  [facility] (18+) Crisis Mobile Team    [phone]  [facility] Child (under 17) Crisis Mobile Team [phone]  [facility] UrgentCare for Adults Walk-In & Mobile Teams [phone]    Additional Crisis Hotlines:  Bridge for Youth      481.766.2213  Crisis Connection      [phone]  EMACS ([facility] Crisis Services)  [phone]  [facility] Social Service (S)    [phone]  Men s Crisis Line      643-101-REZJ ([phone])  National Suicide Prevention Hotline   285-700-BFGL (6212)  [facility] Crisis Line    [phone]  Suicide Hotline      752.907.1709  [facility] (formerly [facility] for Help)  Dial 2-1-1 (864-202-1123)    Domestic Violence, Rape and Sexual Abuse  Hotlines:  [place] Crisis Line     858.819.9962  [place]: [facility] Helpline  [phone]  Domestic Abuse Project (DAP)    1-956.677.8836*  [place] Crisis Line     868.539.7367  [facility]  8-770-551-5584*  [place] [facility]       703.619.4754 (1-840.502.8592*)  National Domestic Violence Hotline   3-935-606-ILSH  Rape/Sexual Abuse Center Crisis Line    491.406.9561   Sexual Violence Center (SVC)    [phone]  [facility] [employer]     431.767.1138 (1-902.871.9522*)           classmarketshart Information     [employer] gives you secure access to your electronic health record. If you see a primary care provider, you can also send messages to your care team and make appointments. If you have questions, please call your primary care clinic.  If you do not have a primary care provider, please call [phone] and they will assist you.        Care EveryWhere ID     This is your Care EveryWhere ID. This could be used by other organizations to access your [place] medical records  [id number]        Equal Access to Services     SHIVA VELA AH: David Smith, waeloise pierce, qazbigniewta kaalmadavid sales, terri avila. So [facility] [phone].    ATENCIÓN: Si habla español, tiene a becerra disposición servicios gratuitos de asistencia lingüística. Llame al [phone].    We comply with applicable federal civil rights laws and [place] laws. We do not discriminate on the basis of race, color, national origin, age, disability, sex, sexual orientation, or gender identity.

## 2018-06-08 NOTE — PATIENT INSTRUCTIONS
- Increase Adderall XR to 15mg by mouth once daily.  - Increase Buspirone to 7.5mg by mouth twice daily.  24/7 Crisis Hotlines:    National Suicide Prevention Hotline   246-884-JFWA (8282)    Crisis Hotlines by County:    [facility] Crisis Line     151.608.3206  [place] Crisis Line   [phone]  [facility] Crisis Line     [phone]  [facility] COPE for Adults   [phone]  [place] for Children    [phone]  [facility] for Adults    [phone]  [place] for Children    [phone]  [facility] Crisis Line    [phone]    [facility] Crisis Response Team  [phone] ([phone])  [facility] Crisis is available 24 hours a day. The team provides callers with a needs assessment and referrals to appropriate resources. If medically necessary, the Crisis Response Team assists individuals by traveling to their location to provide face-to-face interventions and assessments. Crisis services are available for adults and children in [facility] and [facility]. Adult Crisis beds are also available if appropriate.    Walk-In Centers and Mobile Teams  [facility] Walk-In Crisis Intervention  [phone]  [facility] COPE (18+) Crisis Mobile Team    [phone]  [facility] Child (under 17) Crisis Mobile Team [phone]  [facility] UrgentCare for Adults Walk-In & Mobile Teams [phone]    Additional Crisis Hotlines:  Bridge for Youth      986.885.2623  Crisis Connection      800.787.9837  EMACS ([facility] Crisis Services)  [phone]  [facility] Social Service (S)    [phone]  Men s Crisis Line      521-852-DFPC (514-208-9073)  National Suicide Prevention Hotline   733-259-BJDT (2102)  [facility] Crisis Line    [phone]  Suicide Hotline      271.470.6493  [facility] (formerly [facility] for Help)  Dial 2-1-4 (589-061-3736)    Domestic Violence, Rape and Sexual Abuse Hotlines:  [place] Crisis Line      975.581.6222  [place]: [facility] Helpline  [phone]  Domestic Abuse Project (DAP)    7-081-433-7296*  [place] Crisis Line     [phone]  [place] [facility]  1-516-495-7123*  [facility] One       742.425.5061 (1-643.480.2618*)  National Domestic Violence Hotline   1-405-566-HKYU  Rape/Sexual Abuse Center Crisis Line    828.850.6840   Sexual Violence Center (SVC)    712.774.9869  [facility] [employer]     [phone] ([phone]*)

## 2018-06-18 ENCOUNTER — OFFICE VISIT (OUTPATIENT)
Dept: BEHAVIORAL HEALTH | Facility: CLINIC | Age: 63
End: 2018-06-18
Payer: COMMERCIAL

## 2018-06-18 DIAGNOSIS — F33.0 MAJOR DEPRESSIVE DISORDER, RECURRENT EPISODE, MILD (H): Primary | ICD-10-CM

## 2018-06-18 DIAGNOSIS — F90.8 ATTENTION DEFICIT HYPERACTIVITY DISORDER (ADHD), OTHER TYPE: ICD-10-CM

## 2018-06-18 PROCEDURE — 90834 PSYTX W PT 45 MINUTES: CPT | Performed by: SOCIAL WORKER

## 2018-06-18 NOTE — MR AVS SNAPSHOT
After Visit Summary   6/18/2018    Govind Way    MRN: [id number]           Patient Information     Date Of Birth          1955        Visit Information        Provider Department      6/18/2018 4:00 PM Larry Albright [facility] Primary [facility]        Today's Diagnoses     Major depressive disorder, recurrent episode, mild (H)    -  1    Attention deficit hyperactivity disorder (ADHD), other type           Follow-ups after your visit        Your next 10 appointments already scheduled     Jun 25, 2018  3:30 PM CDT   Return Visit with PAULINA Bolton   [facility] ([facility])    [facility]  [address]  [facility] [address]   [phone]            Jun 29, 2018  8:00 AM CDT   Hearing Aid Fitting with [facility] ([facility])    [address]  [address] [facility] [id number] [phone]            Jul 09, 2018  3:30 PM CDT   Return Visit with PAULINA Bolton   [facility] ([facility]  [address]  [facility] [address]   [phone]            Jul 12, 2018  3:30 PM CDT   Return Visit with JEWELS Castano CNP   [facility] ([facility]    [address]  [address]  [facility] [address]   [phone]            Jul 12, 2018  3:30 PM CDT   Return Visit with Larry Albright [facility] ([facility])    [address]  [address]  [facility] [address]   [phone]            Jul 23, 2018  8:00 AM CDT   (Arrive by 7:45 AM)   Initial Review Program with [facility] ([facility])    [address]  [address] [facility] [id number] [phone]             Jul 23, 2018  3:30 PM CDT   Return Visit with PAULINA Bolton   [facility] ([facility]  [address]  [facility] [id number] [phone]              Who to contact     If you have questions or need follow up information about today's clinic visit or your schedule please contact [facility] directly at [phone].  Normal or non-critical lab and imaging results will be communicated to you by OraMetrixhart, letter or phone within 4 business days after the clinic has received the results. If you do not hear from us within 7 days, please contact the clinic through [employer] or phone. If you have a critical or abnormal lab result, we will notify you by phone as soon as possible.  Submit refill requests through spotdock or call your pharmacy and they will forward the refill request to us. Please allow 3 business days for your refill to be completed.          Additional Information About Your Visit        OraMetrixharAppbistro Information     spotdock gives you secure access to your electronic health record. If you see a primary care provider, you can also send messages to your care team and make appointments. If you have questions, please call your primary care clinic.  If you do not have a primary care provider, please call [phone] and they will assist you.        Care EveryWhere ID     This is your Care EveryWhere ID. This could be used by other organizations to access your [place] medical records  [id number]         Blood Pressure from Last 3 Encounters:   05/31/18 104/66   05/15/18 114/82   05/07/18 122/82    Weight from Last 3 Encounters:   05/31/18 176 lb (79.8 kg)   05/07/18 180 lb 8 oz (81.9 kg)   05/03/18 180 lb (81.6 kg)              Today, you had the following     No orders found for display       Primary Care Provider Office Phone # Fax #    JEWELS Castano [phone] [facility] [address]  [facility] [address]        Equal Access to Services     CARLOTAMIKIE DANE : Hadii reyes andersen marie Sobladimirali, waaxda luqadaha, qaybta kaalmada oliviaileanadavid, terri quinones leopoldosebastien soaresaquilinotravis avila. So [facility] [phone].    ATENCIÓN: Si habla español, tiene a becerra disposición servicios gratuitos de asistencia lingüística. [facility] [phone].    We comply with applicable federal civil rights laws and [place] laws. We do not discriminate on the basis of race, color, national origin, age, disability, sex, sexual orientation, or gender identity.            Thank you!     Thank you for choosing [facility]  for your care. Our goal is always to provide you with excellent care. Hearing back from our patients is one way we can continue to improve our services. Please take a few minutes to complete the written survey that you may receive in the mail after your visit with us. Thank you!             Your Updated Medication List - Protect others around you: Learn how to safely use, store and throw away your medicines at www.disposemymeds.org.          This list is accurate as of 6/18/18 11:59 PM.  Always use your most recent med list.                   Brand Name Dispense Instructions for use Diagnosis    acetaminophen 325 MG tablet    TYLENOL    100 tablet    Take 2 tablets (650 mg) by mouth every 4 hours as needed for other (mild pain)    Urinary retention       * ADDERALL PO      Take 10 mg by mouth        * amphetamine-dextroamphetamine 15 MG per 24 hr capsule    ADDERALL XR    30 capsule    Take 1 capsule (15 mg) by mouth daily    Attention deficit hyperactivity disorder (ADHD), other type       busPIRone 7.5 MG tablet    BUSPAR    60 tablet    Take 1 tablet (7.5 mg) by mouth 2 times daily Take 1/2 tablet twice daily for one week then increase to 1 tablet twice daily    Adjustment disorder with mixed anxiety and depressed mood, Major depressive disorder, recurrent episode, mild  (H)       cholecalciferol 400 UNIT/ML Liqd liquid    vitamin D/D-VI-SOL     Take 5,000 Units by mouth daily        DAILY HERBS PROSTATE PO      Take 1 tablet by mouth daily        Fish Oil 500 MG Caps      Take 1 capsule by mouth daily        fluticasone 50 MCG/ACT spray    FLONASE    1 Bottle    Spray 2 sprays into both nostrils daily    Upper respiratory tract infection, unspecified type       ibuprofen 600 MG tablet    ADVIL/MOTRIN    30 tablet    Take 1 tablet (600 mg) by mouth every 6 hours as needed for other (For mild pain and temperature greater than 102F)    Urinary retention       lisinopril 10 MG tablet    PRINIVIL/ZESTRIL    90 tablet    Take 1 tablet (10 mg) by mouth daily    Benign essential hypertension       magnesium 250 MG tablet      Take 1 tablet by mouth nightly as needed (sleep)        MULTIVITAMIN TABS   OR      1 TABLET EVETRY DAY        mupirocin 2 % ointment    BACTROBAN    2 g    Apply to anterior nares BID X 2 month supply    Nasal vestibulitis       tolterodine 2 MG tablet    DETROL    6 tablet    Take 1 tablet (2 mg) by mouth 2 times daily    Urinary retention       * Notice:  This list has 2 medication(s) that are the same as other medications prescribed for you. Read the directions carefully, and ask your doctor or other care provider to review them with you.

## 2018-06-18 NOTE — Clinical Note
"Liz, I saw Keagan this past Monday evening. He reports he's been taking new medications as prescribed, but is experiencing the following side effects, which he believes is from buspar: feeling \"heavy\", less energy, \"things are slowed down\", while riding his bike it feels like it takes longer to complete the commute, though he isn't sure if this is true, increased mood lability - tearfulness, increased frustration and irritability in general. On a positive note, he reports increased focus and ability to redirect himself when feeling anxious, and can \"get in the zone\" at work when needed. He continues to have 2-3 drinks while socializing. He is not using adderall on the weekends.  If you think it would be appropriate to follow up with him, I am sure he would welcome a call before the next appointment. He doesn't see Ирина again until 7/12. Thanks everyone, Larry"

## 2018-06-19 NOTE — PROGRESS NOTES
[facility] - Integrated Primary Care   June 18, 2018      Behavioral Health Clinician Progress Note    Patient Name: Govind Way           Service Type:  Individual      Service Location:   Face to Face in Clinic     Session Start Time: 4:05 p.m.  Session End Time: 4:55 p.m.      Session Length: 38 - 52      Attendees: Patient    Visit Activities (Refresh list every visit): [facility] Only    Diagnostic Assessment Date: 9/7/17 by MIKE Bolton, PAULINA  Treatment Plan Review Date: to be completed  See Flowsheets for today's PHQ-9 and WAGNER-7 results  Previous PHQ-9:   PHQ-9 SCORE 4/16/2018 5/9/2018 5/29/2018   Total Score - - -   Total Score 9 5 0     Previous WAGNER-7:   WAGNER-7 SCORE 4/16/2018 5/9/2018 5/29/2018   Total Score - - -   Total Score 9 9 3       SHASHANK LEVEL:  SHASHANK Score (Last Two) 6/14/2012   SHASHANK Raw Score 50   Activation Score 86.3   SHASHANK Level 4       DATA  Extended Session (60+ minutes): No  Interactive Complexity: No  Crisis: No  [facility] Patient: No    Treatment Objective(s) Addressed in This Session:  Target Behavior(s): alcohol use and depression    Depressed Mood: Increase interest, engagement, and pleasure in doing things  Identify negative self-talk and behaviors: challenge core beliefs, myths, and actions  Improve concentration, focus, and mindfulness in daily activities   Feel less fidgety, restless or slow in daily activities / interpersonal interactions  Alcohol / Substance Use: will make healthier choices in regard to substance use    Current Stressors / Issues:    [facility] visit with patient in the clinic. Patient reports he had a difficult week at work, and that he is considering alternative employment. Patient processed feelings of anger and shame, and acknowledged difficulty asserting himself when feeling overpowered. Patient has a history of being bullied in school and in the [employer], and believes current interpersonal dynamics at work may be triggering, and impacting his sense of autonomy and control.  "Patient plans to advocate for himself with trusted peers and managers, and will ask for his needs to be met. Patient continues to struggle with balancing life outside of work, and decided that moving his daughter toward independence is the priority. Patient will discuss options with his ex-wife, and will consider creating a timeline/plan with his daughter and her boyfriend, for them to try living independently.   Provided patient with a notebook to review and journal affirmations and intentions. He will keep it in his backpack, and set a goal to organize the pack prior to the next visit.  Patient is unsure about possible side-effects of buspar - lability of mood, increased irritability, feeling \"heavy\" and \"like things are slowed down\". He agreed that part of his discomfort may be due to being more present, and having to tolerate and problem-solve distress. Patient denied SI/SIB thoughts or urges, and agreed to call 911 or present to the ED if this occurs. Advised patient I will notify his care team and that he can call the clinic with questions about medication side-effects.     Progress on Treatment Objective(s) / Homework:  Minimal progress - ACTION (Actively working towards change); Intervened by reinforcing change plan / affirming steps taken    Motivational Interviewing    MI Intervention: Expressed Empathy/Understanding, Supported Autonomy, Collaboration, Evocation, Permission to raise concern or advise, Open-ended questions and Reflections: simple and complex     Change Talk Expressed by the Patient: Desire to change Ability to change Reasons to change Need to change    Provider Response to Change Talk: E - Evoked more info from patient about behavior change, A - Affirmed patient's thoughts, decisions, or attempts at behavior change, R - Reflected patient's change talk and S - Summarized patient's change talk statements    Also provided psychoeducation about behavioral health condition, symptoms, and " "treatment options    Care Plan review completed: No    Medication Review:  No changes to current psychiatric medication(s)    Medication Compliance:  Yes    Changes in Health Issues:   None reported    Chemical Use Review:   Substance Use: Problem use continues with no change since last session, Contemplation  Provided encouragement towards sobriety  Provided support and affirmation for steps taken towards sobriety          Tobacco Use: No current tobacco use.      Assessment: Current Emotional / Mental Status (status of significant symptoms):  Risk status (Self / Other harm or suicidal ideation)  Patient has had a history of suicidal ideation: psychiatric hospitalization several years ago - \"nervous breakdown\"  Patient denies current fears or concerns for personal safety.  Patient denies current or recent suicidal ideation or behaviors.  Patient denies current or recent homicidal ideation or behaviors.  Patient denies current or recent self injurious behavior or ideation.  Patient denies other safety concerns.  A safety and risk management plan has not been developed at this time, however patient was encouraged to call [place] [phone] should there be a change in any of these risk factors.    Appearance:   Appropriate   Eye Contact:   Good   Psychomotor Behavior: Hyperactive   Attitude:   Cooperative   Orientation:   All  Speech   Rate / Production: Hyperverbal    Volume:  Normal   Mood:    Anxious  Depressed  Sad   Affect:    Labile   Thought Content:  Clear   Thought Form:  Coherent  Goal Directed  Logical   Insight:    Fair     Diagnoses:  1. Major depressive disorder, recurrent episode, mild (H)    2. Attention deficit hyperactivity disorder (ADHD), other type        Collateral Reports Completed:  Routed note to Care Team Member(s)     Plan: (Homework, other):  Patient was given information about behavioral services and encouraged to schedule a follow up appointment with the clinic [facility] in 2 weeks for " adjunctive therapy.  He was also given information about mental health symptoms and treatment options  and deep Breathing Strategies to practice when experiencing anxiety.  CD Recommendations: Practice Harm Reduction: reduce # of drinks per day. Patient will use the notebook at least once prior to the next visit as a grounding exercise, to write affirmations, intentions, and goals. Patient will clean out and organize his backpack.   DANGELO Porras, [facility]

## 2018-06-21 ENCOUNTER — TELEPHONE (OUTPATIENT)
Dept: PSYCHIATRY | Facility: CLINIC | Age: 63
End: 2018-06-21

## 2018-06-21 NOTE — TELEPHONE ENCOUNTER
"Regarding message below from Larry Albright, please advise him to stop Buspar and report to PCP at his next appt. Call back if symptoms listed below do no resolve within 7-10 days.    Thanks,    Saul    //\"Liz,   I saw Keagan this past Monday evening. He reports he's been taking new medications as prescribed, but is experiencing the following side effects, which he believes is from buspar: feeling \"heavy\", less energy, \"things are slowed down\", while riding his bike it feels like it takes longer to complete the commute, though he isn't sure if this is true, increased mood lability - tearfulness, increased frustration and irritability in general. On a positive note, he reports increased focus and ability to redirect himself when feeling anxious, and can \"get in the zone\" at work when needed. He continues to have 2-3 drinks while socializing. He is not using adderall on the weekends.   If you think it would be appropriate to follow up with him, I am sure he would welcome a call before the next appointment. He doesn't see Ирина again until 7/12.   Thanks everyone,   Larry//   "

## 2018-06-25 ENCOUNTER — OFFICE VISIT (OUTPATIENT)
Dept: PSYCHOLOGY | Facility: CLINIC | Age: 63
End: 2018-06-25
Payer: COMMERCIAL

## 2018-06-25 DIAGNOSIS — F33.0 MAJOR DEPRESSIVE DISORDER, RECURRENT EPISODE, MILD (H): Primary | ICD-10-CM

## 2018-06-25 DIAGNOSIS — F90.8 ATTENTION DEFICIT HYPERACTIVITY DISORDER (ADHD), OTHER TYPE: ICD-10-CM

## 2018-06-25 PROCEDURE — 90834 PSYTX W PT 45 MINUTES: CPT | Performed by: SOCIAL WORKER

## 2018-06-25 ASSESSMENT — ANXIETY QUESTIONNAIRES
2. NOT BEING ABLE TO STOP OR CONTROL WORRYING: SEVERAL DAYS
5. BEING SO RESTLESS THAT IT IS HARD TO SIT STILL: NOT AT ALL
7. FEELING AFRAID AS IF SOMETHING AWFUL MIGHT HAPPEN: SEVERAL DAYS
GAD7 TOTAL SCORE: 5
3. WORRYING TOO MUCH ABOUT DIFFERENT THINGS: SEVERAL DAYS
6. BECOMING EASILY ANNOYED OR IRRITABLE: SEVERAL DAYS
1. FEELING NERVOUS, ANXIOUS, OR ON EDGE: SEVERAL DAYS

## 2018-06-25 ASSESSMENT — PATIENT HEALTH QUESTIONNAIRE - PHQ9: 5. POOR APPETITE OR OVEREATING: NOT AT ALL

## 2018-06-25 NOTE — PROGRESS NOTES
"                                             Progress Note    Client Name: Govind Way  Date: 6/25/2018         Service Type: Individual      Session Start Time: 3:30 pm  Session End Time: 4:15 pm      Session Length: 45 mins     Session #: 13     Attendees: Client attended alone    Treatment Plan Last Reviewed: 9/18/17, 3/23/2018  PHQ-9 / WAGNER-7 : 4/5     DATA      Progress Since Last Session (Related to Symptoms / Goals / Homework):   Symptoms: Improved- less worried    Homework: Partially completed-practiced being mindful and letting go of unuseful thoughts      Episode of Care Goals: Minimal progress - ACTION (Actively working towards change); Intervened by reinforcing change plan / affirming steps taken     Current / Ongoing Stressors and Concerns:   Client said he had situation at work where \"things blew up.\" He explained that two of his coworkers pointed out to their supervisor that client was looking at them while they were working, client stated he was only pointing out that coworkers were \"stealing from [place]\" because they weren't working and on their phones. He says managers aren't doing anything to address situation but he feels good being able to tell them what is going on. He has tried letting some of these events go, however still gets \"worked up about it.\"     Treatment Objective(s) Addressed in This Session:   Identify negative self-talk and behaviors: challenge core beliefs, myths, and actions  Improve concentration, focus, and mindfulness in daily activities , discussed role expectations of being an employee and being a father  Improve concentration and attention in daily activities       Intervention:   CBT: walked thru behavioral triangle and client's awareness on his experience with coworkers, identifying successful application of skills, identifying his thoughts and emotions in interactions with coworkers   DBT: validating the discomfort of being in a space where not everyone works " "the same,  client's thoughts and emotions and focus on the problem \"coworkers not working\" or \"coworkers lack of attention could impact a patient's life,\" reviewing if this is a problem for client and if it is within his role to take action  Motivational Interviewing: open-ended questions around making changes and seeing changes; affirming strength in ability to have difficult conversations and let go of worries that are no longer useful, reflecting on improvement in mood and ongoing challenges.         ASSESSMENT: Current Emotional / Mental Status (status of significant symptoms):   Risk status (Self / Other harm or suicidal ideation)   Client denies current fears or concerns for personal safety.   Client denies current or recent suicidal ideation or behaviors.   Client denies current or recent homicidal ideation or behaviors.   Client denies current or recent self injurious behavior or ideation.   Client denies other safety concerns.   A safety and risk management plan has not been developed at this time, however client was given the after-hours number / 911 should there be a change in any of these risk factors.     Appearance:   Appropriate    Eye Contact:   Good    Psychomotor Behavior: Hyperactive     Attitude:   Cooperative    Orientation:   All   Speech    Rate / Production: Hyperverbal     Volume:  Normal    Mood:    Anxious  Irritable    Affect:    Appropriate    Thought Content:  Clear  Perservative    Thought Form:  Coherent  Logical    Insight:    Fair      Medication Review:   No current psychiatric medications prescribed     Medication Compliance:   Yes small dosage, but noticing better improvement on focus     Changes in Health Issues:   None reported.      Chemical Use Review:   Substance Use: Chemical use reviewed, no active concerns identified      Tobacco Use: No current tobacco use.       Collateral Reports Completed:   Not Applicable    PLAN: (Client Tasks / Therapist Tasks / " Other)  Client:  Notice physiological response to stress, pay attention to client's thought patterns and emotions.    Practice bringing attention back to thoughts if his attention drifts elsewhere.    Continue to practice self-affirmations      MIKE Bolton [facility]                     June 25, 2018  Note reviewed and clinical supervision by MIKE Snowden [facility] 6/29/2018     ________________________________________________________________________    Treatment Plan    Client's Name: Govind Way  YOB: 1955    Date: 9/18/17    DSM-V Diagnoses: Attention-Deficit/Hyperactivity Disorder  314.01 (F90.2) Combined presentation, 296.31   (F33.0) Major Depressive Disorder, Recurrent Episode, Mild _ and With anxious distress  Substance-Related & Addictive Disorders Alcohol Use Disorder   303.90 (F10.20) Moderate  Psychosocial / Contextual Factors: Client is working full-time and splitting his time between taking care of his aging parents and disabled daughter, managing his relationship with his partner and taking care of his medical needs. He reports experiencing stress around work with the number of changes in management, current renovations and different types of personalities. His first partner back in high school completed suicide--client was the last one to see him. His daughter has a disability and was in and out of the hospital for 2 years; he gave her one of his kidneys in order to save her life.   WHODAS: 22    Referral / Collaboration:  Referral to another professional/service is not indicated at this time.    Anticipated number of session or this episode of care: 12      MeasurableTreatment Goal(s) related to diagnosis / functional impairment(s)  Goal 1: Client will develop coping skills to effectively manage attention issues.    I will know I've met my goal when I am not overwhelmed with making time with everyone.      Objective #A (Client Action)    Client will identify and use 3  strategies to improve organization.  Status: Continued - Date(s): 3/23/2018     Intervention(s)  Therapist will teach completing Eisenhowever scale, use of journal/calendar and writing things in a notebook.    Goal 2: Client will report a decrease in depressive symptoms with decrease PHQ 9 score from 14 below 5.    I will know I've met my goal when I can feel happy and at ease with where I am at.      Objective #A (Client Action)    Status: Continued - Date(s): 3/23/2018     Client will Increase interest, engagement, and pleasure in doing things  Decrease frequency and intensity of feeling down, depressed, hopeless.    Intervention(s)  Therapist will teach the client how to perform a behavioral chain analysis. Identifying and understanding negative thoughts and changing route of those thoughts into positives.    Objective #B  Client will Feel less tired and more energy during the day   Improve diet, appetite, mindful eating, and / or meal planning.    Status: Continued - Date(s): 3/23/2018     Intervention(s)  Therapist will assign homework Participate in mindful activity daily for at least 10 mins/day  teach distraction skills. stopping negative thoughts with mindful activity .    Objective #C  Client will improve communication with partner and family members.  Status: Continued - Date(s): 3/23/2018     Intervention(s)  Therapist will role-play effective communication skills  teach about healthy boundaries. Setting rules and expectations for self and others around you.      Goal 3: Client will develop skills to manage my drinking habits.    I will know I've met my goal when I no longer feel guilty about my drinking.      Objective #A (Client Action)    Status: Continued - Date(s): 3/23/2018     Client will increase understanding on the impacts of alcohol on mental and physical health.    Intervention(s)  Therapist will provide educational materials on alcohol on mental and physical health.    Objective #B  Client  will develop problem-solving skills to limit number of drinks consumed..    Status: Continued - Date(s): 3/23/2018     Intervention(s)  Therapist will role-play conflict management  teach rules for taking a timeout. Understanding the consequences for drinking and setting limits with self and others around drinking.      Client have not reviewed nor agreed to the above plan. Goals were set, however still need to review the objectives.      MIKE Bolton [facility]  October 3, 2017; March 23, 2018      Note reviewed and clinical supervision by [facility]22[facility], 6/29/2018

## 2018-06-25 NOTE — MR AVS SNAPSHOT
MRN:[id number]                      After Visit Summary   6/25/2018    Govind Way    MRN: [id number]           Visit Information        Provider Department      6/25/2018 3:30 PM Xander Salas LGSW [facility] Generic      Your next 10 appointments already scheduled     Jul 09, 2018  3:30 PM CDT   Return Visit with PAULINA Bolton   [facility] ([facility])    [facility]  [address]  [facility] [address]   [phone]            Jul 12, 2018  3:30 PM CDT   Return Visit with JEWELS Castano CNP   [facility] ([facility])    [address]  [address]  [facility] [address] [phone]            Jul 12, 2018  3:30 PM CDT   Return Visit with Larry Albright [facility] ([facility])    [address]  [address]  [facility] [address] [phone]            Jul 23, 2018  8:00 AM CDT   (Arrive by 7:45 AM)   Initial Review Program with Daisy Dodson [facility] ([facility] [place])    [address] [place] [id number] [phone]            Jul 23, 2018  3:30 PM CDT   Return Visit with PAULINA Bolton   [facility] ([facility])    [facility]  [address]  [facility] [address] [phone]              MyChart Information     [facility] gives you secure access to your electronic health record. If you see a primary care provider, you can also send messages to your care team and make appointments. If you have questions, please call your primary care clinic.  If you do not have a primary care provider, please call [phone] and they will assist you.        Care EveryWhere ID     This is your Care EveryWhere ID. This could be used  by other organizations to access your [place] medical records  [id number]        Equal Access to Services     SHIVA VELA : David Smith, jan pierce, terri simons. [facility] [phone].    ATENCIÓN: Si habla español, tiene a becerra disposición servicios gratuitos de asistencia lingüística. Llame al [phone].    We comply with applicable federal civil rights laws and [place] laws. We do not discriminate on the basis of race, color, national origin, age, disability, sex, sexual orientation, or gender identity.

## 2018-06-26 ASSESSMENT — ANXIETY QUESTIONNAIRES: GAD7 TOTAL SCORE: 5

## 2018-06-26 ASSESSMENT — PATIENT HEALTH QUESTIONNAIRE - PHQ9: SUM OF ALL RESPONSES TO PHQ QUESTIONS 1-9: 4

## 2018-07-03 ENCOUNTER — TELEPHONE (OUTPATIENT)
Dept: AUDIOLOGY | Facility: CLINIC | Age: 63
End: 2018-07-03

## 2018-07-03 NOTE — TELEPHONE ENCOUNTER
Left VM stating that the appointment on 7/23/18 will need to be cancelled as well. The appointment that he missed was a 60min appointment and the appointment on 7/23/18 is only for 30min and so there is not enough time to accomplish everything in 30min.  Will forward message to schedulers so they can contact him and get the fitting and follow-up appointments scheduled.    Callie Bonilla  Audiologist  [place] License  #[id number]

## 2018-07-03 NOTE — TELEPHONE ENCOUNTER
[facility] Call Center    Phone Message    May a detailed message be left on voicemail: yes    Reason for Call: Other: Pt missed his fitting appt. on 6/29/18 and has a review appt. set up for 7/23/18 - there currently isn't another appt. available until 1st week of Sept. with any or the audiologists     Action Taken: Message routed to:  [facility] ([facility]): please call Pt if you can squeeze him in three weeks apart that is much sooner than Sept. He will be out of town until 7/12/18

## 2018-07-09 ENCOUNTER — OFFICE VISIT (OUTPATIENT)
Dept: PSYCHOLOGY | Facility: CLINIC | Age: 63
End: 2018-07-09
Payer: COMMERCIAL

## 2018-07-09 DIAGNOSIS — F33.0 MAJOR DEPRESSIVE DISORDER, RECURRENT EPISODE, MILD (H): ICD-10-CM

## 2018-07-09 DIAGNOSIS — F10.90 ALCOHOL USE DISORDER: Primary | ICD-10-CM

## 2018-07-09 PROCEDURE — 90834 PSYTX W PT 45 MINUTES: CPT | Performed by: SOCIAL WORKER

## 2018-07-09 NOTE — MR AVS SNAPSHOT
MRN:[id number]                      After Visit Summary   7/9/2018    Govind Way    MRN: [id number]           Visit Information        Provider Department      7/9/2018 3:30 PM Xander Salas LGSW [facility] Generic      Your next 10 appointments already scheduled     Jul 20, 2018  3:30 PM CDT   Return Visit with Larry Albright [facility] ([facility])    [address]  [address]  [facility] [address]   [phone]            Jul 23, 2018  3:30 PM CDT   Return Visit with PAULINA Bolton   [facility] ([facility])    [facility]  [address]  [facility] [address]   [phone]            Aug 07, 2018  3:30 PM CDT   Return Visit with JEWELS Castano [facility] [place] [facility] ([facility])    [address]  [address]  [facility] [address]   [phone]            Aug 07, 2018  3:30 PM CDT   Return Visit with Larry Albright [facility] ([facility])    [address]  [address]  [facility] [address]   [phone]            Aug 08, 2018  3:30 PM CDT   Return Visit with PAULINA Bolton   [facility] ([facility])    [facility]  [address]  [facility] [address]   [phone]            Sep 17, 2018  1:30 PM CDT   Hearing Aid Fitting with Callie Bonilla [facility] ([facility])    [address] [place] [address]  [address] [facility] [address]   [phone]            Oct 08, 2018  2:00 PM CDT   (Arrive by 1:45 PM)   Initial Review Program with Callie Bonilla [facility] ([facility])    [address] [place] [address]  [id number] [phone]              [employer] Information     [employer] gives you secure access to your electronic health record. If you see a primary care provider, you can also send messages to your care team and make appointments. If you have questions, please call your primary care clinic.  If you do not have a primary care provider, please call [phone] and they will assist you.        Care EveryWhere ID     This is your Care EveryWhere ID. This could be used by other organizations to access your [place] medical records  [id number]        Equal Access to Services     [facility] Center[facility]ILLIAM : David Smith, jan pierce, lavern sales, terri avila. So [facility] [phone].    ATENCIÓN: Si habla español, tiene a becerra disposición servicios gratuitos de asistencia lingüística. Llame al [phone].    We comply with applicable federal civil rights laws and [place] laws. We do not discriminate on the basis of race, color, national origin, age, disability, sex, sexual orientation, or gender identity.

## 2018-07-09 NOTE — PROGRESS NOTES
"                                             Progress Note    Client Name: Govind Way  Date: 7/9/2018         Service Type: Individual      Session Start Time: 3:30 pm  Session End Time: 4:15 pm      Session Length: 45 mins     Session #: 14     Attendees: Client attended alone    Treatment Plan Last Reviewed: 9/18/17, 3/23/2018  PHQ-9 / WAGNER-7 : n/a     DATA      Progress Since Last Session (Related to Symptoms / Goals / Homework):   Symptoms: Improved- less worried, anxious    Homework: Partially completed-practiced being mindful and letting go of unuseful thoughts      Episode of Care Goals: Minimal progress - ACTION (Actively working towards change); Intervened by reinforcing change plan / affirming steps taken     Current / Ongoing Stressors and Concerns:   There are still conflicts at workplace. He says coworkers were making statements in the breakroom that people of color weren't being respected in the workplace, he believes part of the statement was targeted at him. He explained that one of his supervisor was present when the statement was made; they have addressed the concerns with the coworker. Client still feels it is difficult to work when his coworkers are \"constantly talking.\" He is annoyed that management is not enforcing phone use and behaviors so it makes him question if he wants to stay.      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  Improve concentration and attention in daily activities       Intervention:   CBT: walked thru behavioral triangle and client's awareness on his experience with coworkers, identifying successful application of skills, identifying his thoughts and emotions in interactions with coworkers   DBT: validating the discomfort of being " "in a space where not everyone works the same,  client's thoughts and emotions and focus on the problem \"coworkers not working\" or \"coworkers lack of attention could impact a patient's life,\" reviewing if this is a problem for client and if it is within his role to take action  Motivational Interviewing: open-ended questions around making changes and seeing changes; affirming strength in ability to have difficult conversations and let go of worries that are no longer useful, reflecting on improvement in mood and ongoing challenges.         ASSESSMENT: Current Emotional / Mental Status (status of significant symptoms):   Risk status (Self / Other harm or suicidal ideation)   Client denies current fears or concerns for personal safety.   Client denies current or recent suicidal ideation or behaviors.   Client denies current or recent homicidal ideation or behaviors.   Client denies current or recent self injurious behavior or ideation.   Client denies other safety concerns.   A safety and risk management plan has not been developed at this time, however client was given the after-hours number / 911 should there be a change in any of these risk factors.     Appearance:   Appropriate    Eye Contact:   Good    Psychomotor Behavior: Hyperactive     Attitude:   Cooperative    Orientation:   All   Speech    Rate / Production: Hyperverbal     Volume:  Normal    Mood:    Anxious  Irritable    Affect:    Appropriate    Thought Content:  Clear  Perservative    Thought Form:  Coherent  Logical    Insight:    Fair      Medication Review:   No current psychiatric medications prescribed     Medication Compliance:   Yes small dosage, but noticing better improvement on focus     Changes in Health Issues:   None reported.      Chemical Use Review:   Substance Use: Chemical use reviewed, no active concerns identified      Tobacco Use: No current tobacco use.       Collateral Reports Completed:   Not Applicable    PLAN: (Client " Tasks / Therapist Tasks / Other)  Client:  Notice physiological response to stress, pay attention to client's thought patterns and emotions.    Practice bringing attention back to thoughts if his attention drifts elsewhere.    Continue to practice self-affirmations  Next session: Focus on launching daughter      MIKE Bolton [facility] [place]                     July 9, 2018  Note reviewed and clinical supervision by Latonya Lewis MSMARILIN [facility] 7/16/2018   ________________________________________________________________________    Treatment Plan    Client's Name: Govind Way  YOB: 1955    Date: 9/18/17    DSM-V Diagnoses: Attention-Deficit/Hyperactivity Disorder  314.01 (F90.2) Combined presentation, 296.31   (F33.0) Major Depressive Disorder, Recurrent Episode, Mild _ and With anxious distress  Substance-Related & Addictive Disorders Alcohol Use Disorder   303.90 (F10.20) Moderate  Psychosocial / Contextual Factors: Client is working full-time and splitting his time between taking care of his aging parents and disabled daughter, managing his relationship with his partner and taking care of his medical needs. He reports experiencing stress around work with the number of changes in management, current renovations and different types of personalities. His first partner back in high school completed suicide--client was the last one to see him. His daughter has a disability and was in and out of the hospital for 2 years; he gave her one of his kidneys in order to save her life.   WHODAS: 22    Referral / Collaboration:  Referral to another professional/service is not indicated at this time.    Anticipated number of session or this episode of care: 12      MeasurableTreatment Goal(s) related to diagnosis / functional impairment(s)  Goal 1: Client will develop coping skills to effectively manage attention issues.    I will know I've met my goal when I am not overwhelmed with making time with everyone.       Objective #A (Client Action)    Client will identify and use 3 strategies to improve organization.  Status: Continued - Date(s): 3/23/2018     Intervention(s)  Therapist will teach completing Eisenhowever scale, use of journal/calendar and writing things in a notebook.    Goal 2: Client will report a decrease in depressive symptoms with decrease PHQ 9 score from 14 below 5.    I will know I've met my goal when I can feel happy and at ease with where I am at.      Objective #A (Client Action)    Status: Continued - Date(s): 3/23/2018     Client will Increase interest, engagement, and pleasure in doing things  Decrease frequency and intensity of feeling down, depressed, hopeless.    Intervention(s)  Therapist will teach the client how to perform a behavioral chain analysis. Identifying and understanding negative thoughts and changing route of those thoughts into positives.    Objective #B  Client will Feel less tired and more energy during the day   Improve diet, appetite, mindful eating, and / or meal planning.    Status: Continued - Date(s): 3/23/2018     Intervention(s)  Therapist will assign homework Participate in mindful activity daily for at least 10 mins/day  teach distraction skills. stopping negative thoughts with mindful activity .    Objective #C  Client will improve communication with partner and family members.  Status: Continued - Date(s): 3/23/2018     Intervention(s)  Therapist will role-play effective communication skills  teach about healthy boundaries. Setting rules and expectations for self and others around you.      Goal 3: Client will develop skills to manage my drinking habits.    I will know I've met my goal when I no longer feel guilty about my drinking.      Objective #A (Client Action)    Status: Continued - Date(s): 3/23/2018     Client will increase understanding on the impacts of alcohol on mental and physical health.    Intervention(s)  Therapist will provide educational materials  on alcohol on mental and physical health.    Objective #B  Client will develop problem-solving skills to limit number of drinks consumed..    Status: Continued - Date(s): 3/23/2018     Intervention(s)  Therapist will role-play conflict management  teach rules for taking a timeout. Understanding the consequences for drinking and setting limits with self and others around drinking.      Client have not reviewed nor agreed to the above plan. Goals were set, however still need to review the objectives.      MIKE Bolton SW  October 3, 2017; March 23, 2018      Note reviewed and clinical supervision by MIKE Snowden [facility] 12/22/2017, 6/29/2018

## 2018-07-12 ENCOUNTER — OFFICE VISIT (OUTPATIENT)
Dept: FAMILY MEDICINE | Facility: CLINIC | Age: 63
End: 2018-07-12
Payer: COMMERCIAL

## 2018-07-12 ENCOUNTER — OFFICE VISIT (OUTPATIENT)
Dept: BEHAVIORAL HEALTH | Facility: CLINIC | Age: 63
End: 2018-07-12
Payer: COMMERCIAL

## 2018-07-12 VITALS
SYSTOLIC BLOOD PRESSURE: 122 MMHG | TEMPERATURE: 98 F | DIASTOLIC BLOOD PRESSURE: 86 MMHG | RESPIRATION RATE: 16 BRPM | BODY MASS INDEX: 22.97 KG/M2 | OXYGEN SATURATION: 96 % | WEIGHT: 167 LBS

## 2018-07-12 DIAGNOSIS — Z52.4 DONOR OF KIDNEY FOR TRANSPLANT: ICD-10-CM

## 2018-07-12 DIAGNOSIS — F33.0 MAJOR DEPRESSIVE DISORDER, RECURRENT EPISODE, MILD (H): Primary | ICD-10-CM

## 2018-07-12 DIAGNOSIS — F10.90 ALCOHOL USE DISORDER: ICD-10-CM

## 2018-07-12 DIAGNOSIS — I10 HYPERTENSION GOAL BP (BLOOD PRESSURE) < 140/90: ICD-10-CM

## 2018-07-12 DIAGNOSIS — F43.23 ADJUSTMENT DISORDER WITH MIXED ANXIETY AND DEPRESSED MOOD: ICD-10-CM

## 2018-07-12 DIAGNOSIS — F41.9 ANXIETY: ICD-10-CM

## 2018-07-12 DIAGNOSIS — F90.8 ATTENTION DEFICIT HYPERACTIVITY DISORDER (ADHD), OTHER TYPE: Primary | ICD-10-CM

## 2018-07-12 DIAGNOSIS — N40.1 BENIGN NON-NODULAR PROSTATIC HYPERPLASIA WITH LOWER URINARY TRACT SYMPTOMS: ICD-10-CM

## 2018-07-12 PROCEDURE — 99214 OFFICE O/P EST MOD 30 MIN: CPT | Performed by: NURSE PRACTITIONER

## 2018-07-12 PROCEDURE — 90832 PSYTX W PT 30 MINUTES: CPT | Performed by: SOCIAL WORKER

## 2018-07-12 RX ORDER — DEXTROAMPHETAMINE SACCHARATE, AMPHETAMINE ASPARTATE, DEXTROAMPHETAMINE SULFATE AND AMPHETAMINE SULFATE 3.75; 3.75; 3.75; 3.75 MG/1; MG/1; MG/1; MG/1
15 TABLET ORAL DAILY
Qty: 30 TABLET | Refills: 0 | Status: SHIPPED | OUTPATIENT
Start: 2018-07-12 | End: 2018-09-18

## 2018-07-12 NOTE — PROGRESS NOTES
SUBJECTIVE:                                                    Govind Way is a 63 year old male diagnosis of hypertension, ADHD, Major depressive disorder, anxiety, alcohol abuse and BPH s/p Holep who presents to clinic today for the following health issues:  [facility], [place]: Don    Patient presents for follow up with primary care. Recently saw Dr. Kang for Psych consultation, Adderall was increased to 15 mg daily for ADHD. States he has noticed a small improvement in his focus and organization in his thoughts. He has been losing some weight but attributes that to biking to and from [place] every day. He denies any chest pain, SOB, palpitations or other side effects from the Adderall. He stopped his buspar because he felt it made his mood flat. States his mood has improved since stopping the Buspar.     Still reports difficult transitions at work and some conflicts with various co-workers. Still dealing with stress regarding caring for aging parents, his relationship with Jarrod and trying to get his disabled daughter Swetha to launch and find her own housing. Currently Swetha and her boyfriend live with them. He states he drinks at least three drinks nightly and more if he is with Jarrod. States his relationship with Jarrod largely revolves around alcohol.     Denies any illnesses or other changes to his physical health.     -    Wt Readings from Last 3 Encounters:   07/12/18 167 lb (75.8 kg)   05/31/18 176 lb (79.8 kg)   05/07/18 180 lb 8 oz (81.9 kg)     Hypertension Follow-up      Outpatient blood pressures are not being checked.    Low Salt Diet: low salt        Mental Health Follow up Anxiety, ADHD    Status since last visit: stable    See PHQ-9 for current symptoms.  Other associated symptoms: None    Complicating factors: multiple life stressors per HPI  Significant life event:  Came out as arnett after 22 years of marriage to a woman. Donate kidney to daughter Swetha  Current substance abuse:  None  Anxiety or Panic  "symptoms: sometimes gets panic at work    Thinks the buspar made depression worse      Sleep -sometimes it is hard to fall asleep  But overall sleeping well.  Appetite - good  Exercise - biking    Smoking - denies  Alcohol - still drinking  \"It does calm me down\"    \"Who would we be if we didn't drink,\" -regarding relationship with Jarrod.  Gets sick vomiting the next day often.    Street drugs - denies  Marijuana - denies  Caffeine - light, coffee    PHQ-9  English PHQ-9   Any Language                 Problems taking medications regularly: No    Medication side effects: none    Diet: regular (no restrictions)    Social History   Substance Use Topics     Smoking status: Former Smoker     Quit date: 1/1/1982     Smokeless tobacco: Never Used      Comment: NO 2ND HAND SMOKE     Alcohol use 0.0 oz/week     0 Standard drinks or equivalent per week      Comment: 5/wk        Problem list and histories reviewed & adjusted, as indicated.  Additional history: as documented    Patient Active Problem List   Diagnosis     Adjustment disorder with mixed anxiety and depressed mood     Abdominal hernia     Hypertension goal BP (blood pressure) < 140/90     CARDIOVASCULAR SCREENING; LDL GOAL LESS THAN 160     History of hyperthyroidism     Right inguinal hernia     Irritable bowel syndrome     Donor of kidney for transplant     Hernia     Testis disorder     Screening for prostate cancer     Other hydrocele     Hallux rigidus, right foot     Advance care planning     Tendinopathy of right gluteus medius     Attention deficit hyperactivity disorder (ADHD), other type     Benign non-nodular prostatic hyperplasia with lower urinary tract symptoms     Anxiety     SBO (small bowel obstruction)     Major depressive disorder, recurrent episode, mild (H)     Alcohol use disorder (H)     Past Surgical History:   Procedure Laterality Date     C ECG MONITOR/ 24 HRS, ORIG ECG, W VIS SUPERIMPOS SCAN, COMPLETE  1/00    um holter monitor     C REMV " KIDNEY/URETER,SAME INCIS  6/30/05    Nephrouretectomy/LEFT KIDNEY DONATED TO DAUGHTER/[facility]     COLONOSCOPY N/A 5/15/2018    Procedure: COLONOSCOPY;  colonoscopy;  Surgeon: Lobo Pelaez MD;  Location:  GI     CYSTOSCOPY, TRANSURETHRAL RESECTION (TUR) PROSTATE, COMBINED       HC REMOVAL OF TONSILS,<13 Y/O  age 7     HC VASECTOMY UNILAT/BILAT W POSTOP SEMEN  age 41     HERNIORRHAPHY INGUINAL  12/23/2011    Procedure:HERNIORRHAPHY INGUINAL; Surgeon:JULIA SIERRA; Location:[facility] OR     LAPAROSCOPIC HERNIORRHAPHY INGUINAL Right 6/29/2015    Procedure: LAPAROSCOPIC HERNIORRHAPHY INGUINAL;  Surgeon: García Ibarra MD;  Location:  OR     LAPAROSCOPIC HERNIORRHAPHY VENTRAL  12/23/2011    Procedure:LAPAROSCOPIC HERNIORRHAPHY VENTRAL; Laparoscopic Ventral Hernia Repair with Mesh, Open Left Inguinal Hernia Repair with Mesh; Surgeon:JULIA SIERRA; Location:[facility] OR     LASER HOLMIUM ENUCLEATION PROSTATE N/A 10/19/2017    Procedure: LASER HOLMIUM ENUCLEATION PROSTATE;  Holmium Laser Enculeation of the Prostate;  Surgeon: Manolo Cardenas MD;  Location: [facility] OR     LASIK CUSTOMVUE BILATERAL  11/11/2011    Procedure:LASIK CUSTOMVUE BILATERAL; BILATERAL CUSTOMVUE LASIK WITH INTRALASE; Surgeon:POP SIMON; Location:[facility]       Social History   Substance Use Topics     Smoking status: Former Smoker     Quit date: 1/1/1982     Smokeless tobacco: Never Used      Comment: NO 2ND HAND SMOKE     Alcohol use 0.0 oz/week     0 Standard drinks or equivalent per week      Comment: 5/wk     Family History   Problem Relation Age of Onset     Diabetes Mother      ADULT ONSET     Hypertension Mother      Coronary Artery Disease Mother      Also father and grandparent.  Father had coronary bypass and aortic valve replacement surgery     Heart Surgery Mother      Aortic valve     Cancer Other      prostate cancer     Thyroid Disease Other      Hypertension Father      Skin Cancer Other      Mother  and father, grandfather     Arthritis Other      Mom, daughter, grandparents     Other - See Comments Other      Grandfather with kidney stone     Prostate Cancer Other      Grandfather     KIDNEY DISEASE Daughter      Lupus Daughter      Causing end-stage renal disease     Eye Disorder No family hx of      NONE KNOWN     Hyperlipidemia No family hx of      Cerebrovascular Disease No family hx of      Breast Cancer No family hx of      Colon Cancer No family hx of      Other Cancer No family hx of      Depression No family hx of      Anxiety Disorder No family hx of      Mental Illness No family hx of      Substance Abuse No family hx of      Anesthesia Reaction No family hx of      Asthma No family hx of      Osteoperosis No family hx of      Genetic Disorder No family hx of      Obesity No family hx of      Unknown/Adopted No family hx of            Current Outpatient Prescriptions   Medication Sig Dispense Refill     acetaminophen (TYLENOL) 325 MG tablet Take 2 tablets (650 mg) by mouth every 4 hours as needed for other (mild pain) 100 tablet 0     amphetamine-dextroamphetamine (ADDERALL) 15 MG per tablet Take 1 tablet (15 mg) by mouth daily 30 tablet 0     cholecalciferol (VITAMIN D/D-VI-SOL) 400 UNIT/ML LIQD liquid Take 5,000 Units by mouth daily       fluticasone (FLONASE) 50 MCG/ACT spray Spray 2 sprays into both nostrils daily 1 Bottle 11     ibuprofen (ADVIL/MOTRIN) 600 MG tablet Take 1 tablet (600 mg) by mouth every 6 hours as needed for other (For mild pain and temperature greater than 102F) 30 tablet 0     lisinopril (PRINIVIL/ZESTRIL) 10 MG tablet Take 1 tablet (10 mg) by mouth daily 90 tablet 1     magnesium 250 MG tablet Take 1 tablet by mouth nightly as needed (sleep)       Misc Natural Products (DAILY HERBS PROSTATE PO) Take 1 tablet by mouth daily       MULTIVITAMIN TABS   OR 1 TABLET EVETRY DAY  0     mupirocin (BACTROBAN) 2 % ointment Apply to anterior nares BID X 2 month supply 2 g 3     Omega-3  Fatty Acids (FISH OIL) 500 MG CAPS Take 1 capsule by mouth daily        Allergies   Allergen Reactions     Ambien [Zolpidem Tartrate]      irritability     Codeine Sulfate Itching     Recent Labs   Lab Test  05/18/18   0801  05/07/18   1002  11/06/17   1313   07/28/17   0617   07/31/14   1746  06/18/13   0750   04/26/11   1012   LDL  90   --    --    --    --    --    --   95   --   137*   HDL  64   --    --    --    --    --    --   70   --   47   TRIG  98   --    --    --    --    --    --   45   --   79   ALT   --   28  24   --   22   < >   --   33   < >  28   CR   --   0.97  0.88   < >  0.91   < >  1.13  1.10   < >  1.13   GFRESTIMATED   --   78  88   < >  85   < >  67  69   < >  68   GFRESTBLACK   --   >90  >90   < >  >90   GFR Calc     < >  81  84   < >  82   POTASSIUM   --   4.0  4.0   < >  3.9   < >  3.8  4.0   < >  4.6   TSH   --    --    --    --    --    --   2.83  2.99   --    --     < > = values in this interval not displayed.      BP Readings from Last 3 Encounters:   07/12/18 122/86   05/31/18 104/66   05/15/18 114/82    Wt Readings from Last 3 Encounters:   07/12/18 167 lb (75.8 kg)   05/31/18 176 lb (79.8 kg)   05/07/18 180 lb 8 oz (81.9 kg)        Labs reviewed in EPIC  Problem list, Medication list, Allergies, and Medical/Social/Surgical histories reviewed in [facility] and updated as appropriate.     ROS: Constitutional, neuro, ENT, endocrine, pulmonary, cardiac, gastrointestinal, genitourinary, musculoskeletal, integument and psychiatric systems are negative, except as otherwise noted above in the HPI.       OBJECTIVE:                                                    /86  Temp 98  F (36.7  C) (Temporal)  Resp 16  Wt 167 lb (75.8 kg)  SpO2 96%  BMI 22.97 kg/m2  Body mass index is 22.97 kg/(m^2).  GENERAL: healthy, alert, well nourished, well hydrated, no distress    Mental Status Assessment:  Appearance:   Appropriate   Eye Contact:   Good   Psychomotor Behavior: Normal   restless  Attitude:   Cooperative   Orientation:   All  Speech   Rate / Production: Normal hyperverval   Volume:  Normal   Mood:    Normal, hyperactive  Affect:    Appropriate   Thought Content:  Clear   Thought Form:  Coherent  Logical   Insight:    Good /fair  Attention Span and Concentration:  limited  Recent and Remote Memory:  intact  Fund of Knowledge: appropriate  Muscle Strength and Tone: normal       See [facility] notes     Diagnostic Test Results:  none      ASSESSMENT/PLAN:                                                    (F90.8) Attention deficit hyperactivity disorder (ADHD), other type  (primary encounter diagnosis)  Comment: Adderall recently increased to 15 mg daily.  Refill done today.  Follow up with therapist and Psychiatry.  Plan: amphetamine-dextroamphetamine (ADDERALL) 15 MG         per tablet            (I10) Hypertension goal BP (blood pressure) < 140/90  Comment: BP at goal.  Plan: continue Lisinopril, healthy eating and exercise.    (F10.99) Alcohol use disorder (H)  Comment: meet criteria for alcohol abuse.  Plan: Discussed decreasing daily use. Warned of risks.     (F41.0) Anxiety  -did not tolerate Buspar.  -coping skills addressed. Follow up with Dr. Kang, and therapists.    (Z52.4) Donor of kidney for transplant  Comment: donated kidney to daughter  Plan: denies concerns, continue to monitor.    (N40.1) Benign non-nodular prostatic hyperplasia with lower urinary tract symptoms  Comment: Followed by Urology. S/p Turp. Biopsy last year showing hyperplasia. Denies current sx.   Plan: Continue to f/u with Urology. No concerns.        Patient Instructions:  Patient Instructions   Schedule with Larry for next week.  Follow up in 4-6 weeks for medication check.  Make sure to wear sunscreen when outdoors and drink plenty of water                JEWELS Hadley [facility]

## 2018-07-12 NOTE — PATIENT INSTRUCTIONS
Schedule with Larry for next week.  Follow up in 4-6 weeks for medication check.  Make sure to wear sunscreen when outdoors and drink plenty of water

## 2018-07-12 NOTE — PROGRESS NOTES
[facility]   July 12, 2018      Behavioral Health Clinician Progress Note    Patient Name: Govind Way           Service Type:  Individual      Service Location:   Face to Face in Clinic     Session Start Time: 3:42pm  Session End Time: 4:07pm      Session Length: 16 - 37      Attendees: Patient    Visit Activities (Refresh list every visit): [facility] [employer]    Diagnostic Assessment Date: 9/7/17 by MIKE Bolton, PAULINA  Treatment Plan Review Date: to be completed  See Flowsheets for today's PHQ-9 and WAGNER-7 results  Previous PHQ-9:   PHQ-9 SCORE 5/9/2018 5/29/2018 6/25/2018   Total Score - - -   Total Score 5 0 4     Previous WAGNER-7:   WAGNER-7 SCORE 5/9/2018 5/29/2018 6/25/2018   Total Score - - -   Total Score 9 3 5       SHASHANK LEVEL:  SHASHANK Score (Last Two) 6/14/2012   SHASHANK Raw Score 50   Activation Score 86.3   SHASHANK Level 4       DATA  Extended Session (60+ minutes): No  Interactive Complexity: No  Crisis: No  [facility] Patient: No    Treatment Objective(s) Addressed in This Session:  Target Behavior(s): alcohol use and depression    Depressed Mood: Increase interest, engagement, and pleasure in doing things  Identify negative self-talk and behaviors: challenge core beliefs, myths, and actions  Improve concentration, focus, and mindfulness in daily activities   Feel less fidgety, restless or slow in daily activities / interpersonal interactions  Alcohol / Substance Use: will make healthier choices in regard to substance use    Current Stressors / Issues:      The patient was seen by [facility] per the request of the PCP-he talked about having some recent difficult emotions (frustration and anxiety) and this occurred in the work place-he has some difficulty with anger-he attempts to use CBT skills and mindfulness to manage his thoughts and behaviors-he is believing that his medications are helpful-he gets a lot of exercise with riding his bike-he has friend (martina)-he has an outpatient therapist that  he meets with regularly-he is working on different things regarding his mental health and behaviors-he has passion about things that go on in the work environment-regarding sleeping the patient hard to fall asleep sometimes-he takes care of his parents-he has a new partner and he cares for his daughter-stressors with his son and his legal issues-regarding mood the patient reported improved mood since medication change-no suicidal thoughts-regarding anxiety he reported having some panic attacks-he has been worrying a lot-he consumes alcohol everyday-he is thinking about his alcohol consumption-       Progress on Treatment Objective(s) / Homework:  Minimal progress - ACTION (Actively working towards change); Intervened by reinforcing change plan / affirming steps taken    Motivational Interviewing    MI Intervention: Expressed Empathy/Understanding, Supported Autonomy, Collaboration, Evocation, Permission to raise concern or advise, Open-ended questions and Reflections: simple and complex     Change Talk Expressed by the Patient: Desire to change Ability to change Reasons to change Need to change Committment to change Taking steps    Provider Response to Change Talk: E - Evoked more info from patient about behavior change, A - Affirmed patient's thoughts, decisions, or attempts at behavior change, R - Reflected patient's change talk and S - Summarized patient's change talk statements    Also provided psychoeducation about behavioral health condition, symptoms, and treatment options    Care Plan review completed: No    Medication Review:  No changes to current psychiatric medication(s)    Medication Compliance:  Yes    Changes in Health Issues:   None reported    Chemical Use Review:   Substance Use: Problem use continues with no change since last session, Contemplation  Provided encouragement towards sobriety  Provided support and affirmation for steps taken towards sobriety          Tobacco Use: No current tobacco  "use.      Assessment: Current Emotional / Mental Status (status of significant symptoms):  Risk status (Self / Other harm or suicidal ideation)  Patient has had a history of suicidal ideation: psychiatric hospitalization several years ago - \"nervous breakdown\"  Patient denies current fears or concerns for personal safety.  Patient denies current or recent suicidal ideation or behaviors.  Patient denies current or recent homicidal ideation or behaviors.  Patient denies current or recent self injurious behavior or ideation.  Patient denies other safety concerns.  A safety and risk management plan has not been developed at this time, however patient was encouraged to call [place] [phone] should there be a change in any of these risk factors.    Appearance:   Appropriate   Eye Contact:   Good   Psychomotor Behavior: Hyperactive   Attitude:   Cooperative   Orientation:   All  Speech   Rate / Production: Hyperverbal    Volume:  Normal   Mood:    Anxious  Elevated  Hypomanic   Affect:    Expansive   Thought Content:  Clear   Thought Form:  Coherent  Goal Directed  Logical   Insight:    Fair     Diagnoses:  1. Major depressive disorder, recurrent episode, mild (H)    2. Adjustment disorder with mixed anxiety and depressed mood    3. Anxiety        Collateral Reports Completed:  Routed note to Care Team Member(s)     Plan: (Homework, other):  Patient was given information about behavioral services and encouraged to schedule a follow up appointment with the clinic [facility] as needed for adjunctive therapy.  He was also given information about mental health symptoms and treatment options .  CD Recommendations: Practice Harm Reduction: reduce # of drinks per day. Patient will use the notebook at least once prior to the next visit as a grounding exercise, to write affirmations, intentions, and goals. Patient will clean out and organize his backpack.   DANGELO Weinebrg, [facility]  "

## 2018-07-12 NOTE — MR AVS SNAPSHOT
After Visit Summary   7/12/2018    [address]    MRN: [id number]           Patient Information     Date Of Birth          1955        Visit Information        Provider Department      7/12/2018 3:30 PM Ирина Bishop APRN CNP [facility]        Today's Diagnoses     Attention deficit hyperactivity disorder (ADHD), other type    -  1    Hypertension goal BP (blood pressure) < 140/90        Donor of kidney for transplant        Benign non-nodular prostatic hyperplasia with lower urinary tract symptoms        Alcohol use disorder (H)          Care Instructions    Schedule with Larry for next week.  Follow up in 4-6 weeks for medication check.  Make sure to wear sunscreen when outdoors and drink plenty of water          Follow-ups after your visit        Your next 10 appointments already scheduled     Jul 23, 2018  3:30 PM CDT   Return Visit with PAULINA Bolton   [facility] ([address] [address]   [phone]            Aug 08, 2018  3:30 PM CDT   Return Visit with PAULINA Bolton   [facility] ([address] [address]   [phone]            Sep 17, 2018  1:30 PM CDT   Hearing Aid Fitting with Callie Bonilla [facility] ([facility])    [address] [place] [address] [phone]            Oct 08, 2018  2:00 PM CDT   (Arrive by 1:45 PM)   Initial Review Program with Callie Bonilla [facility] ([facility])    [address] [place] [id number] [phone]              Who to contact     If you have questions or need follow up information about today's clinic visit or your schedule please contact [facility]  [facility] directly at [phone].  Normal or non-critical lab and imaging results will be communicated to you by AwesomeTouchhart, letter or phone within 4 business days after the clinic has received the results. If you do not hear from us within 7 days, please contact the clinic through AwesomeTouchhart or phone. If you have a critical or abnormal lab result, we will notify you by phone as soon as possible.  Submit refill requests through [employer] or call your pharmacy and they will forward the refill request to us. Please allow 3 business days for your refill to be completed.          Additional Information About Your Visit        [employer] Information     [employer] gives you secure access to your electronic health record. If you see a primary care provider, you can also send messages to your care team and make appointments. If you have questions, please call your primary care clinic.  If you do not have a primary care provider, please call [phone] and they will assist you.        Care EveryWhere ID     This is your Care EveryWhere ID. This could be used by other organizations to access your [place] medical records  [id number]        Your Vitals Were     Temperature Respirations Pulse Oximetry BMI (Body Mass Index)          98  F (36.7  C) (Temporal) 16 96% 22.97 kg/m2         Blood Pressure from Last 3 Encounters:   07/12/18 122/86   05/31/18 104/66   05/15/18 114/82    Weight from Last 3 Encounters:   07/12/18 167 lb (75.8 kg)   05/31/18 176 lb (79.8 kg)   05/07/18 180 lb 8 oz (81.9 kg)              Today, you had the following     No orders found for display         Today's Medication Changes          These changes are accurate as of 7/12/18  4:11 PM.  If you have any questions, ask your nurse or doctor.               These medicines have changed or have updated prescriptions.        Dose/Directions    amphetamine-dextroamphetamine 15 MG per tablet   Commonly known as:  ADDERALL   This may have changed:    -  medication strength  - how much to take  - when to take this   Used for:  Attention deficit hyperactivity disorder (ADHD), other type   Changed by:  Ирина Bishop APRN CNP        Dose:  15 mg   Take 1 tablet (15 mg) by mouth daily   Quantity:  30 tablet   Refills:  0            Where to get your medicines      Some of these will need a paper prescription and others can be bought over the counter.  Ask your nurse if you have questions.     Bring a paper prescription for each of these medications     amphetamine-dextroamphetamine 15 MG per tablet                Primary Care Provider Office Phone # Fax #    JEWELS Castano [phone]       [address] [address] [facility] [address]  [facility] [address]        Equal Access to Services     MIKIE VELA : Hadii reyes perezo Sobladimirali, waaxda luqadaha, qaybta kaalmada adeegyada, terri ace . So [facility] [phone].    ATENCIÓN: Si habla español, tiene a becerra disposición servicios gratuitos de asistencia lingüística. Ll[facility] [phone].    We comply with applicable federal civil rights laws and [place] laws. We do not discriminate on the basis of race, color, national origin, age, disability, sex, sexual orientation, or gender identity.            Thank you!     Thank you for choosing [facility] PRIMARY CARE  for your care. Our goal is always to provide you with excellent care. Hearing back from our patients is one way we can continue to improve our services. Please take a few minutes to complete the written survey that you may receive in the mail after your visit with us. Thank you!             Your Updated Medication List - Protect others around you: Learn how to safely use, store and throw away your medicines at www.disposemymeds.org.          This list is accurate as of 7/12/18  4:11 PM.  Always use your most recent med list.                   Brand Name Dispense Instructions for use Diagnosis     acetaminophen 325 MG tablet    TYLENOL    100 tablet    Take 2 tablets (650 mg) by mouth every 4 hours as needed for other (mild pain)    Urinary retention       amphetamine-dextroamphetamine 15 MG per tablet    ADDERALL    30 tablet    Take 1 tablet (15 mg) by mouth daily    Attention deficit hyperactivity disorder (ADHD), other type       cholecalciferol 400 UNIT/ML Liqd liquid    vitamin D/D-VI-SOL     Take 5,000 Units by mouth daily        DAILY HERBS PROSTATE PO      Take 1 tablet by mouth daily        Fish Oil 500 MG Caps      Take 1 capsule by mouth daily        fluticasone 50 MCG/ACT spray    FLONASE    1 Bottle    Spray 2 sprays into both nostrils daily    Upper respiratory tract infection, unspecified type       ibuprofen 600 MG tablet    ADVIL/MOTRIN    30 tablet    Take 1 tablet (600 mg) by mouth every 6 hours as needed for other (For mild pain and temperature greater than 102F)    Urinary retention       lisinopril 10 MG tablet    PRINIVIL/ZESTRIL    90 tablet    Take 1 tablet (10 mg) by mouth daily    Benign essential hypertension       magnesium 250 MG tablet      Take 1 tablet by mouth nightly as needed (sleep)        MULTIVITAMIN TABS   OR      1 TABLET EVETRY DAY        mupirocin 2 % ointment    BACTROBAN    2 g    Apply to anterior nares BID X 2 month supply    Nasal vestibulitis

## 2018-07-12 NOTE — MR AVS SNAPSHOT
After Visit Summary   7/12/2018    [address]    MRN: [id number]           Patient Information     Date Of Birth          1955        Visit Information        Provider Department      7/12/2018 3:30 PM Jayce Meraz, [facility] Primary Care        Today's Diagnoses     Major depressive disorder, recurrent episode, mild (H)    -  1    Adjustment disorder with mixed anxiety and depressed mood        Anxiety           Follow-ups after your visit        Your next 10 appointments already scheduled     Jul 20, 2018  3:30 PM CDT   Return Visit with Larry Albright [facility] Primary [facility] ([facility])    [address]  [facility] [address]   [phone]            Jul 23, 2018  3:30 PM CDT   Return Visit with Xander Salas [facility] ([facility]  [address]  [facility] [address]   [phone]            Aug 07, 2018  3:30 PM CDT   Return Visit with JEWELS Castano [facility] Primary [facility] ([facility])    [address]  [address]  [facility] [address]   [phone]            Aug 07, 2018  3:30 PM CDT   Return Visit with Larry Albright [facility] Primary [facility] ([facility])    [address]  [address]  [facility] [address]   [phone]            Aug 08, 2018  3:30 PM CDT   Return Visit with PAULINA Bolton   [facility] ([facility]  [address]  [facility] [address]   [phone]            Sep 17, 2018  1:30 PM CDT   Hearing Aid Fitting with Daisy Dodson [facility] ([facility])    [address]  4th [address]  [facility] [address]    [phone]            Oct 08, 2018  2:00 PM CDT   (Arrive by 1:45 PM)   Initial Review Program with Callie Bonilla [facility] ([facility])    [address]  [address]th [facility] [address] [phone]              Who to contact     If you have questions or need follow up information about today's clinic visit or your schedule please contact [facility] PRIMARY CARE directly at [phone].  Normal or non-critical lab and imaging results will be communicated to you by GoodAppetitohart, letter or phone within 4 business days after the clinic has received the results. If you do not hear from us within 7 days, please contact the clinic through [employer] or phone. If you have a critical or abnormal lab result, we will notify you by phone as soon as possible.  Submit refill requests through [employer] or call your pharmacy and they will forward the refill request to us. Please allow 3 business days for your refill to be completed.          Additional Information About Your Visit        [employer] Information     [employer] gives you secure access to your electronic health record. If you see a primary care provider, you can also send messages to your care team and make appointments. If you have questions, please call your primary care clinic.  If you do not have a primary care provider, please call [phone] and they will assist you.        Care EveryWhere ID     This is your Care EveryWhere ID. This could be used by other organizations to access your [place] medical records  [id number]         Blood Pressure from Last 3 Encounters:   07/12/18 122/86   05/31/18 104/66   05/15/18 114/82    Weight from Last 3 Encounters:   07/12/18 167 lb (75.8 kg)   05/31/18 176 lb (79.8 kg)   05/07/18 180 lb 8 oz (81.9 kg)              Today, you had the following     No orders found for display         Today's Medication Changes          These changes are accurate as of  7/12/18 11:59 PM.  If you have any questions, ask your nurse or doctor.               These medicines have changed or have updated prescriptions.        Dose/Directions    amphetamine-dextroamphetamine 15 MG per tablet   Commonly known as:  ADDERALL   This may have changed:    - medication strength  - how much to take  - when to take this   Used for:  Attention deficit hyperactivity disorder (ADHD), other type   Changed by:  Ирина Bishop APRN CNP        Dose:  15 mg   Take 1 tablet (15 mg) by mouth daily   Quantity:  30 tablet   Refills:  0            Where to get your medicines      Some of these will need a paper prescription and others can be bought over the counter.  Ask your nurse if you have questions.     Bring a paper prescription for each of these medications     amphetamine-dextroamphetamine 15 MG per tablet                Primary Care Provider Office Phone # Fax #    JEWELS Castano [phone]       [address] [facility] [address]  [facility] [address]        Equal Access to Services     MIKIE VELA : Hadii reyes ku hadasho Soomaali, waaxda luqadaha, qaybta kaalmada adeegyada, waxay shamirin hayirena ace . So [facility] [phone].    ATENCIÓN: Si habla español, tiene a becerra disposición servicios gratuitos de asistencia lingüística. Solitarioame al [phone].    We comply with applicable federal civil rights laws and [place] laws. We do not discriminate on the basis of race, color, national origin, age, disability, sex, sexual orientation, or gender identity.            Thank you!     Thank you for choosing [facility] PRIMARY CARE  for your care. Our goal is always to provide you with excellent care. Hearing back from our patients is one way we can continue to improve our services. Please take a few minutes to complete the written survey that you may receive in the mail after your visit with us. Thank you!             Your Updated Medication List -  Protect others around you: Learn how to safely use, store and throw away your medicines at www.disposemymeds.org.          This list is accurate as of 7/12/18 11:59 PM.  Always use your most recent med list.                   Brand Name Dispense Instructions for use Diagnosis    acetaminophen 325 MG tablet    TYLENOL    100 tablet    Take 2 tablets (650 mg) by mouth every 4 hours as needed for other (mild pain)    Urinary retention       amphetamine-dextroamphetamine 15 MG per tablet    ADDERALL    30 tablet    Take 1 tablet (15 mg) by mouth daily    Attention deficit hyperactivity disorder (ADHD), other type       cholecalciferol 400 UNIT/ML Liqd liquid    vitamin D/D-VI-SOL     Take 5,000 Units by mouth daily        DAILY HERBS PROSTATE PO      Take 1 tablet by mouth daily        Fish Oil 500 MG Caps      Take 1 capsule by mouth daily        fluticasone 50 MCG/ACT spray    FLONASE    1 Bottle    Spray 2 sprays into both nostrils daily    Upper respiratory tract infection, unspecified type       ibuprofen 600 MG tablet    ADVIL/MOTRIN    30 tablet    Take 1 tablet (600 mg) by mouth every 6 hours as needed for other (For mild pain and temperature greater than 102F)    Urinary retention       lisinopril 10 MG tablet    PRINIVIL/ZESTRIL    90 tablet    Take 1 tablet (10 mg) by mouth daily    Benign essential hypertension       magnesium 250 MG tablet      Take 1 tablet by mouth nightly as needed (sleep)        MULTIVITAMIN TABS   OR      1 TABLET EVETRY DAY        mupirocin 2 % ointment    BACTROBAN    2 g    Apply to anterior nares BID X 2 month supply    Nasal vestibulitis

## 2018-07-20 ENCOUNTER — OFFICE VISIT (OUTPATIENT)
Dept: BEHAVIORAL HEALTH | Facility: CLINIC | Age: 63
End: 2018-07-20
Payer: COMMERCIAL

## 2018-07-20 DIAGNOSIS — F33.0 MAJOR DEPRESSIVE DISORDER, RECURRENT EPISODE, MILD (H): Primary | ICD-10-CM

## 2018-07-20 DIAGNOSIS — F41.9 ANXIETY: ICD-10-CM

## 2018-07-20 DIAGNOSIS — F90.8 ATTENTION DEFICIT HYPERACTIVITY DISORDER (ADHD), OTHER TYPE: ICD-10-CM

## 2018-07-20 PROCEDURE — 90834 PSYTX W PT 45 MINUTES: CPT | Performed by: SOCIAL WORKER

## 2018-07-20 NOTE — MR AVS SNAPSHOT
After Visit Summary   7/20/2018    [address]    MRN: [id number]           Patient Information     Date Of Birth          1955        Visit Information        Provider Department      7/20/2018 3:30 PM Larry Albright, [facility] Primary [facility]        Today's Diagnoses     Major depressive disorder, recurrent episode, mild (H)    -  1    Anxiety        Attention deficit hyperactivity disorder (ADHD), other type           Follow-ups after your visit        Your next 10 appointments already scheduled     Aug 07, 2018  3:30 PM CDT   Return Visit with JEWELS Castano [facility] ([facility])    [address]  [facility] [address] [phone]            Aug 07, 2018  3:30 PM CDT   [facility]    [address]  [address]  [facility] [address]   [phone]            Aug 08, 2018  3:30 PM CDT   Return Visit with PAULINA Bolton   [facility] ([facility])    [facility]  [address]  [facility] [address]   [phone]            Aug 22, 2018  3:30 PM CDT   Return Visit with PAULINA Bolton   [facility] ([facility])    [facility]  [address]  [facility] [address]   [phone]            Sep 17, 2018  1:30 PM CDT   Hearing Aid Fitting with Callie Bonilla [facility] Audiology ([facility])    [address]  [address]th [facility] [id number] [phone]            Oct 08, 2018  2:00 PM CDT   (Arrive by 1:45 PM)   Initial Review Program with Callie Bonilla [facility] ([facility])    [address]  [address] [facility] [address]    [phone]              Who to contact     If you have questions or need follow up information about today's clinic visit or your schedule please contact [facility] PRIMARY CARE directly at [phone].  Normal or non-critical lab and imaging results will be communicated to you by MyChart, letter or phone within 4 business days after the clinic has received the results. If you do not hear from us within 7 days, please contact the clinic through MyChart or phone. If you have a critical or abnormal lab result, we will notify you by phone as soon as possible.  Submit refill requests through [employer] or call your pharmacy and they will forward the refill request to us. Please allow 3 business days for your refill to be completed.          Additional Information About Your Visit        [employer] Information     [employer] gives you secure access to your electronic health record. If you see a primary care provider, you can also send messages to your care team and make appointments. If you have questions, please call your primary care clinic.  If you do not have a primary care provider, please call [phone] and they will assist you.        Care EveryWhere ID     This is your Care EveryWhere ID. This could be used by other organizations to access your [employer] medical records  [id number]         Blood Pressure from Last 3 Encounters:   07/12/18 122/86   05/31/18 104/66   05/15/18 114/82    Weight from Last 3 Encounters:   07/12/18 167 lb (75.8 kg)   05/31/18 176 lb (79.8 kg)   05/07/18 180 lb 8 oz (81.9 kg)              Today, you had the following     No orders found for display       Primary Care Provider Office Phone # Fax #    JEWELS Castano -[phone]       [address] [facility] [address]  [facility] [address]        Equal Access to Services     SHIVA VELA : David Smith, jan pierce, qaybta kaelodia sales, terri ace  ah. So [facility] [phone].    ATENCIÓN: Si loraine weems, tiene a becerra disposición servicios gratuitos de asistencia lingüística. Chu zafar [phone].    We comply with applicable federal civil rights laws and [place] laws. We do not discriminate on the basis of race, color, national origin, age, disability, sex, sexual orientation, or gender identity.            Thank you!     Thank you for choosing [facility] PRIMARY CARE  for your care. Our goal is always to provide you with excellent care. Hearing back from our patients is one way we can continue to improve our services. Please take a few minutes to complete the written survey that you may receive in the mail after your visit with us. Thank you!             Your Updated Medication List - Protect others around you: Learn how to safely use, store and throw away your medicines at www.disposemymeds.org.          This list is accurate as of 7/20/18 11:59 PM.  Always use your most recent med list.                   Brand Name Dispense Instructions for use Diagnosis    acetaminophen 325 MG tablet    TYLENOL    100 tablet    Take 2 tablets (650 mg) by mouth every 4 hours as needed for other (mild pain)    Urinary retention       amphetamine-dextroamphetamine 15 MG per tablet    ADDERALL    30 tablet    Take 1 tablet (15 mg) by mouth daily    Attention deficit hyperactivity disorder (ADHD), other type       cholecalciferol 400 Units/mL Liqd liquid    vitamin D/D-VI-SOL     Take 5,000 Units by mouth daily        DAILY HERBS PROSTATE PO      Take 1 tablet by mouth daily        Fish Oil 500 MG Caps      Take 1 capsule by mouth daily        fluticasone 50 MCG/ACT spray    FLONASE    1 Bottle    Spray 2 sprays into both nostrils daily    Upper respiratory tract infection, unspecified type       ibuprofen 600 MG tablet    ADVIL/MOTRIN    30 tablet    Take 1 tablet (600 mg) by mouth every 6 hours as needed for other (For mild pain and temperature greater than  102F)    Urinary retention       lisinopril 10 MG tablet    PRINIVIL/ZESTRIL    90 tablet    Take 1 tablet (10 mg) by mouth daily    Benign essential hypertension       magnesium 250 MG tablet      Take 1 tablet by mouth nightly as needed (sleep)        MULTIVITAMIN TABS   OR      1 TABLET EVETRY DAY        mupirocin 2 % ointment    BACTROBAN    2 g    Apply to anterior nares BID X 2 month supply    Nasal vestibulitis

## 2018-07-23 ENCOUNTER — OFFICE VISIT (OUTPATIENT)
Dept: PSYCHOLOGY | Facility: CLINIC | Age: 63
End: 2018-07-23
Payer: COMMERCIAL

## 2018-07-23 DIAGNOSIS — F33.0 MAJOR DEPRESSIVE DISORDER, RECURRENT EPISODE, MILD (H): Primary | ICD-10-CM

## 2018-07-23 DIAGNOSIS — F90.8 ATTENTION DEFICIT HYPERACTIVITY DISORDER (ADHD), OTHER TYPE: ICD-10-CM

## 2018-07-23 PROCEDURE — 90834 PSYTX W PT 45 MINUTES: CPT | Performed by: SOCIAL WORKER

## 2018-07-23 ASSESSMENT — ANXIETY QUESTIONNAIRES
1. FEELING NERVOUS, ANXIOUS, OR ON EDGE: SEVERAL DAYS
2. NOT BEING ABLE TO STOP OR CONTROL WORRYING: NOT AT ALL
3. WORRYING TOO MUCH ABOUT DIFFERENT THINGS: SEVERAL DAYS
5. BEING SO RESTLESS THAT IT IS HARD TO SIT STILL: NOT AT ALL
GAD7 TOTAL SCORE: 4
6. BECOMING EASILY ANNOYED OR IRRITABLE: SEVERAL DAYS
7. FEELING AFRAID AS IF SOMETHING AWFUL MIGHT HAPPEN: NOT AT ALL

## 2018-07-23 ASSESSMENT — PATIENT HEALTH QUESTIONNAIRE - PHQ9: 5. POOR APPETITE OR OVEREATING: SEVERAL DAYS

## 2018-07-23 NOTE — PROGRESS NOTES
"                                             Progress Note    Client Name: Govind Way  Date: 7/23/2018         Service Type: Individual      Session Start Time: 3:30 pm  Session End Time: 4:15 pm      Session Length: 45 mins     Session #: 15     Attendees: Client attended alone    Treatment Plan Last Reviewed: 9/18/17, 3/23/2018, 7/23/2018  PHQ-9 / WAGNER-7 : 3/4     DATA      Progress Since Last Session (Related to Symptoms / Goals / Homework):   Symptoms: Improved- less worried, anxious    Homework: Partially completed-practiced being mindful and letting go of unuseful thoughts      Episode of Care Goals: Minimal progress - ACTION (Actively working towards change); Intervened by reinforcing change plan / affirming steps taken     Current / Ongoing Stressors and Concerns:   Client talked about frustration at work with supervisor, felt supervisor \"ignored\" how he was feeling and \"didnt' even want to look at me.\" He explained feeling angry that \"no one was listening to me\" and \"shut down\" after the supervisor told him to change his \"verbage\" on how he was talking about people. Client said he has been working on culture/identify iceberg with Larry and is trying to avoid placing judgement on others. Writer reinforce the idea that there's more to a person than what can be seen. Client finds it difficult to control where he puts his attention and this can \"get me in trouble.\" He says it is difficult when people don't validate him.     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  Improve concentration and attention in daily activities       Intervention:   CBT: walked thru behavioral triangle and client's awareness on his experience with coworkers, identifying successful application " "of skills, identifying his thoughts and emotions in interactions with coworkers   DBT: validating the discomfort of being in a space where not everyone works the same,  client's thoughts and emotions and focus on the problem \"coworkers not working\" or \"coworkers lack of attention could impact a patient's life,\" reviewing if this is a problem for client and if it is within his role to take action  Motivational Interviewing: open-ended questions around making changes and seeing changes; affirming strength in ability to have difficult conversations and let go of worries that are no longer useful, reflecting on improvement in mood and ongoing challenges.         ASSESSMENT: Current Emotional / Mental Status (status of significant symptoms):   Risk status (Self / Other harm or suicidal ideation)   Client denies current fears or concerns for personal safety.   Client denies current or recent suicidal ideation or behaviors.   Client denies current or recent homicidal ideation or behaviors.   Client denies current or recent self injurious behavior or ideation.   Client denies other safety concerns.   A safety and risk management plan has not been developed at this time, however client was given the after-hours number / 911 should there be a change in any of these risk factors.     Appearance:   Appropriate    Eye Contact:   Good    Psychomotor Behavior: Hyperactive     Attitude:   Cooperative    Orientation:   All   Speech    Rate / Production: Hyperverbal     Volume:  Normal    Mood:    Anxious  Irritable    Affect:    Appropriate    Thought Content:  Clear  Perservative    Thought Form:  Coherent  Logical    Insight:    Fair      Medication Review:   No current psychiatric medications prescribed     Medication Compliance:   Yes small dosage, but noticing better improvement on focus     Changes in Health Issues:   None reported.      Chemical Use Review:   Substance Use: Chemical use reviewed, no active concerns " identified      Tobacco Use: No current tobacco use.       Collateral Reports Completed:   Not Applicable    PLAN: (Client Tasks / Therapist Tasks / Other)  Client:  Notice physiological response to stress, pay attention to client's thought patterns and emotions.    Practice bringing attention back to thoughts if his attention drifts elsewhere.    Continue to practice self-affirmations and pull back from judgement.         Xander Salas, MIKE LGSW                     July 23, 2018  ________________________________________________________________________    Treatment Plan    Client's Name: Govind Way  YOB: 1955    Date: 9/18/17, 3/23/2018, 7/23/2018    DSM-V Diagnoses: Attention-Deficit/Hyperactivity Disorder  314.01 (F90.2) Combined presentation, 296.31   (F33.0) Major Depressive Disorder, Recurrent Episode, Mild _ and With anxious distress  Substance-Related & Addictive Disorders Alcohol Use Disorder   303.90 (F10.20) Moderate  Psychosocial / Contextual Factors: Client is working full-time and splitting his time between taking care of his aging parents and disabled daughter, managing his relationship with his partner and taking care of his medical needs. He reports experiencing stress around work with the number of changes in management, current renovations and different types of personalities. His first partner back in high school completed suicide--client was the last one to see him. His daughter has a disability and was in and out of the hospital for 2 years; he gave her one of his kidneys in order to save her life.   WHODAS: 22    Referral / Collaboration:  Referral to another professional/service is not indicated at this time.    Anticipated number of session or this episode of care: 12      MeasurableTreatment Goal(s) related to diagnosis / functional impairment(s)  Goal 1: Client will develop coping skills to effectively manage attention issues.    I will know I've met my goal when I am not  overwhelmed with making time with everyone.      Objective #A (Client Action)    Client will identify and use 3 strategies to improve organization.  Status: Continued - Date(s): 3/23/2018, 7/23/2018    Intervention(s)  Therapist will teach completing Eisenhowever scale, use of journal/calendar and writing things in a notebook.    Goal 2: Client will report a decrease in depressive symptoms with decrease PHQ 9 score from 14 below 5.    I will know I've met my goal when I can feel happy and at ease with where I am at.      Objective #A (Client Action)    Status: Continued - Date(s): 3/23/2018, 7/23/2018    Client will Increase interest, engagement, and pleasure in doing things  Decrease frequency and intensity of feeling down, depressed, hopeless.    Intervention(s)  Therapist will teach the client how to perform a behavioral chain analysis. Identifying and understanding negative thoughts and changing route of those thoughts into positives.    Objective #B  Client will Feel less tired and more energy during the day   Improve diet, appetite, mindful eating, and / or meal planning.    Status: Continued - Date(s): 3/23/2018, 7/23/2018    Intervention(s)  Therapist will assign homework Participate in mindful activity daily for at least 10 mins/day  teach distraction skills. stopping negative thoughts with mindful activity .    Objective #C  Client will improve communication with partner and family members.  Status: Continued - Date(s): 3/23/2018 , 7/23/2018    Intervention(s)  Therapist will role-play effective communication skills  teach about healthy boundaries. Setting rules and expectations for self and others around you.      Goal 3: Client will develop skills to manage my drinking habits.    I will know I've met my goal when I no longer feel guilty about my drinking.      Objective #A (Client Action)    Status: Continued - Date(s): 3/23/2018, 7/23/2018    Client will increase understanding on the impacts of alcohol  on mental and physical health.    Intervention(s)  Therapist will provide educational materials on alcohol on mental and physical health.    Objective #B  Client will develop problem-solving skills to limit number of drinks consumed..    Status: Continued - Date(s): 3/23/2018, 7/23/2018    Intervention(s)  Therapist will role-play conflict management  teach rules for taking a timeout. Understanding the consequences for drinking and setting limits with self and others around drinking.      Client have not reviewed nor agreed to the above plan. Goals were set, however still need to review the objectives.      MIKE Bolton [facility]  October 3, 2017; March 23, 2018, July 23, 2018      Note reviewed and clinical supervision by MIKE [facility] 12/22/2017, 6/29/2018, 8/3/2018

## 2018-07-23 NOTE — MR AVS SNAPSHOT
MRN:[id number]                      After Visit Summary   7/23/2018    Govind Way    MRN: [id number]           Visit Information        Provider Department      7/23/2018 3:30 PM Xander Salas LGSW [facility] Generic      Your next 10 appointments already scheduled     Aug 07, 2018  3:30 PM CDT   Return Visit with JEWELS Castano CNP   [facility] ([facility])    [address]  [address]  [facility] [id number] [phone]            Aug 07, 2018  3:30 PM CDT   Return Visit with Larry Albright [facility] ([facility])    [address]  [address]  [facility] [address] [phone]            Aug 08, 2018  3:30 PM CDT   Return Visit with PAULINA Bolton   [facility]    [facility]  [address]  [facility] [address]   [phone]            Aug 22, 2018  3:30 PM CDT   Return Visit with PAULINA Bolton   [facility]    [facility]  [address]  [facility] [address]   [phone]            Sep 17, 2018  1:30 PM CDT   Hearing Aid Fitting with [facility] ([facility]    [address] [facility]  [address] [facility] [id number] [phone]            Oct 08, 2018  2:00 PM CDT   (Arrive by 1:45 PM)   Initial Review Program with [facility] ([facility])    [address] [facility]  [address] [facility] [id number] [phone]              MyChart Information     [facility] gives you secure access to your electronic health record. If you see a primary care provider, you can also send messages to your care team and make appointments. If you have  questions, please call your primary care clinic.  If you do not have a primary care provider, please call [phone] and they will assist you.        Care EveryWhere ID     This is your Care EveryWhere ID. This could be used by other organizations to access your [facility] medical records  [id number]        Equal Access to Services     SHIVA VELA : David Smith, jan pierce, terri simons. So [facility] [phone].    ATENCIÓN: Si habla español, tiene a becerra disposición servicios gratuitos de asistencia lingüística. Llame al [phone].    We comply with applicable federal civil rights laws and [place] laws. We do not discriminate on the basis of race, color, national origin, age, disability, sex, sexual orientation, or gender identity.

## 2018-07-24 ASSESSMENT — ANXIETY QUESTIONNAIRES: GAD7 TOTAL SCORE: 4

## 2018-07-24 ASSESSMENT — PATIENT HEALTH QUESTIONNAIRE - PHQ9: SUM OF ALL RESPONSES TO PHQ QUESTIONS 1-9: 3

## 2018-08-06 NOTE — PROGRESS NOTES
[facility] - Integrated [facility]   July 20, 2018      Behavioral Health Clinician Progress Note    Patient Name: Govind Way           Service Type:  Individual      Service Location:   Face to Face in Clinic     Session Start Time: 3:35 p.m.  Session End Time: 4:25 p.m.      Session Length: 38 - 52      Attendees: Patient    Visit Activities (Refresh list every visit): [facility] Only    Diagnostic Assessment Date: 9/7/17 by MIKE Bolton, PAULINA  Treatment Plan Review Date: to be completed  See Flowsheets for today's PHQ-9 and WAGNER-7 results  Previous PHQ-9:   PHQ-9 SCORE 5/29/2018 6/25/2018 7/23/2018   Total Score - - -   Total Score 0 4 3     Previous WAGNER-7:   WAGNER-7 SCORE 5/29/2018 6/25/2018 7/23/2018   Total Score - - -   Total Score 3 5 4       SHASHANK LEVEL:  SHASHANK Score (Last Two) 6/14/2012   SHASHANK Raw Score 50   Activation Score 86.3   SHASHANK Level 4       DATA  Extended Session (60+ minutes): No  Interactive Complexity: No  Crisis: No  [facility] Patient: No    Treatment Objective(s) Addressed in This Session:  Target Behavior(s): alcohol use and depression    Depressed Mood: Increase interest, engagement, and pleasure in doing things  Identify negative self-talk and behaviors: challenge core beliefs, myths, and actions  Improve concentration, focus, and mindfulness in daily activities   Feel less fidgety, restless or slow in daily activities / interpersonal interactions  Alcohol / Substance Use: will make healthier choices in regard to substance use    Current Stressors / Issues:    [facility] visit with patient in the clinic. Patient reports he continues to struggle with work and feeling invalidated. He feels disrespected by co-workers, and is uncertain about how he is perceived. Discussed the iceberg metaphor, and that 90% of who he is may not be recognized, which patient acknowledged is a source of anxiety and frustration.  Patient processed feelings of guilt, shame and resentment, and identified an underlying sense of  pride as a parent, partner, and employee. Patient will focus energy on people who appreciate and understand him, and allow this to be the foundation for self-worth and response to difficult situations.  Patient shared his homework: written self-affirmations and non-judgmental thoughts. Acknowledged patient's efforts to shift thinking and avoid negative thought patterns.  Patient reports his overall mood is depressed and irritable. He continues to use alcohol to cope with anxiety, and states he intends to continue trying to reduce consumption to allow himself to be present.    Progress on Treatment Objective(s) / Homework:  Minimal progress - ACTION (Actively working towards change); Intervened by reinforcing change plan / affirming steps taken    Motivational Interviewing    MI Intervention: Expressed Empathy/Understanding, Supported Autonomy, Collaboration, Evocation, Permission to raise concern or advise, Open-ended questions and Reflections: simple and complex     Change Talk Expressed by the Patient: Desire to change Ability to change Reasons to change Need to change    Provider Response to Change Talk: E - Evoked more info from patient about behavior change, A - Affirmed patient's thoughts, decisions, or attempts at behavior change, R - Reflected patient's change talk and S - Summarized patient's change talk statements    Also provided psychoeducation about behavioral health condition, symptoms, and treatment options    Care Plan review completed: No    Medication Review:  No changes to current psychiatric medication(s)    Medication Compliance:  Yes    Changes in Health Issues:   None reported    Chemical Use Review:   Substance Use: Problem use continues with no change since last session, Contemplation  Provided encouragement towards sobriety  Provided support and affirmation for steps taken towards sobriety          Tobacco Use: No current tobacco use.      Assessment: Current Emotional / Mental Status  "(status of significant symptoms):  Risk status (Self / Other harm or suicidal ideation)  Patient has had a history of suicidal ideation: psychiatric hospitalization several years ago - \"nervous breakdown\"  Patient denies current fears or concerns for personal safety.  Patient denies current or recent suicidal ideation or behaviors.  Patient denies current or recent homicidal ideation or behaviors.  Patient denies current or recent self injurious behavior or ideation.  Patient denies other safety concerns.  A safety and risk management plan has not been developed at this time, however patient was encouraged to call [place] [phone] should there be a change in any of these risk factors.    Appearance:   Appropriate   Eye Contact:   Good   Psychomotor Behavior: Agitated   Attitude:   Cooperative   Orientation:   All  Speech   Rate / Production: Normal    Volume:  Normal   Mood:    Anxious   Affect:    Labile   Thought Content:  Clear   Thought Form:  Coherent  Goal Directed  Logical   Insight:    Fair     Diagnoses:  1. Major depressive disorder, recurrent episode, mild (H)    2. Anxiety    3. Attention deficit hyperactivity disorder (ADHD), other type        Collateral Reports Completed:  Routed note to Care Team Member(s)     Plan: (Homework, other):  Patient was given information about behavioral services and encouraged to schedule a follow up appointment with the clinic [facility] in 2 weeks.  He was also given information about mental health symptoms and treatment options  and deep Breathing Strategies to practice when experiencing anxiety.  CD Recommendations: Practice Harm Reduction: reduce # of drinks per day. Patient will think about the iceberg metaphor to shift thinking about how he is perceived.   DANGELO Porras, [facility]  "

## 2018-08-07 ENCOUNTER — OFFICE VISIT (OUTPATIENT)
Dept: BEHAVIORAL HEALTH | Facility: CLINIC | Age: 63
End: 2018-08-07
Payer: COMMERCIAL

## 2018-08-07 ENCOUNTER — HOSPITAL ENCOUNTER (OUTPATIENT)
Dept: GENERAL RADIOLOGY | Facility: CLINIC | Age: 63
Discharge: HOME | End: 2018-08-07
Attending: NURSE PRACTITIONER | Admitting: NURSE PRACTITIONER
Payer: COMMERCIAL

## 2018-08-07 ENCOUNTER — OFFICE VISIT (OUTPATIENT)
Dept: FAMILY MEDICINE | Facility: CLINIC | Age: 63
End: 2018-08-07
Payer: COMMERCIAL

## 2018-08-07 VITALS
BODY MASS INDEX: 22.28 KG/M2 | OXYGEN SATURATION: 96 % | RESPIRATION RATE: 18 BRPM | DIASTOLIC BLOOD PRESSURE: 62 MMHG | HEART RATE: 79 BPM | SYSTOLIC BLOOD PRESSURE: 116 MMHG | WEIGHT: 162 LBS | TEMPERATURE: 98.3 F

## 2018-08-07 DIAGNOSIS — F90.8 ATTENTION DEFICIT HYPERACTIVITY DISORDER (ADHD), OTHER TYPE: ICD-10-CM

## 2018-08-07 DIAGNOSIS — M25.512 CHRONIC LEFT SHOULDER PAIN: ICD-10-CM

## 2018-08-07 DIAGNOSIS — I10 HYPERTENSION GOAL BP (BLOOD PRESSURE) < 140/90: Primary | ICD-10-CM

## 2018-08-07 DIAGNOSIS — F33.0 MAJOR DEPRESSIVE DISORDER, RECURRENT EPISODE, MILD (H): Primary | ICD-10-CM

## 2018-08-07 DIAGNOSIS — G89.29 CHRONIC LEFT SHOULDER PAIN: ICD-10-CM

## 2018-08-07 DIAGNOSIS — M25.531 RIGHT WRIST PAIN: ICD-10-CM

## 2018-08-07 DIAGNOSIS — F33.0 MAJOR DEPRESSIVE DISORDER, RECURRENT EPISODE, MILD (H): ICD-10-CM

## 2018-08-07 DIAGNOSIS — N40.1 BENIGN NON-NODULAR PROSTATIC HYPERPLASIA WITH LOWER URINARY TRACT SYMPTOMS: ICD-10-CM

## 2018-08-07 DIAGNOSIS — F10.90 ALCOHOL USE DISORDER: ICD-10-CM

## 2018-08-07 PROCEDURE — 90832 PSYTX W PT 30 MINUTES: CPT | Performed by: SOCIAL WORKER

## 2018-08-07 PROCEDURE — 73010 X-RAY EXAM OF SHOULDER BLADE: CPT | Mod: LT

## 2018-08-07 PROCEDURE — 99214 OFFICE O/P EST MOD 30 MIN: CPT | Performed by: NURSE PRACTITIONER

## 2018-08-07 NOTE — MR AVS SNAPSHOT
After Visit Summary   8/7/2018    Govind Way    MRN: [id number]           Patient Information     Date Of Birth          1955        Visit Information        Provider Department      8/7/2018 3:30 PM Larry Albright, [facility]        Today's Diagnoses     Major depressive disorder, recurrent episode, mild (H)    -  1    Attention deficit hyperactivity disorder (ADHD), other type           Follow-ups after your visit        Your next 10 appointments already scheduled     Aug 20, 2018  3:30 PM CDT   Return Visit with Larry Albright [facility] ([facility])    [address]  [address]  [facility] [address]   [phone]            Aug 22, 2018  3:30 PM CDT   Return Visit with PAULINA Bolton   [facility]  [address]  [facility] [address] [phone]            Sep 17, 2018  1:30 PM CDT   Hearing Aid Fitting with Daisy Dodson [facility] ([facility])    [address]  [address] [facility] [address] [phone]            Sep 18, 2018  3:30 PM CDT   Return Visit with Larry Albright [facility] ([facility])    [address]  [address]  [facility] [address]   [phone]            Sep 18, 2018  3:30 PM CDT   Return Visit with JEWELS Castano [facility] ([facility])    [address]  [address]  [facility] [address]   [phone]            Sep 18, 2018  4:30 PM CDT   Return Visit with PAULINA Bolton   [facility] ([facility]  [address]  [facility] [address] [phone]             Oct 01, 2018  3:30 PM CDT   Return Visit with PAULINA Bolton   [facility] ([facility])    [facility]  [address]  [facility] [id number] [phone]            Oct 08, 2018  2:00 PM CDT   (Arrive by 1:45 PM)   Initial Review Program with Callie Bonilla [facility] ([facility])    [address]  4th [address]  [facility] [id number] [phone]              Future tests that were ordered for you today     Open Future Orders        Priority Expected Expires Ordered    XR Scapula Left Routine 8/7/2018 8/7/2019 8/7/2018            Who to contact     If you have questions or need follow up information about today's clinic visit or your schedule please contact [facility] directly at [phone].  Normal or non-critical lab and imaging results will be communicated to you by MyChart, letter or phone within 4 business days after the clinic has received the results. If you do not hear from us within 7 days, please contact the clinic through [employer] or phone. If you have a critical or abnormal lab result, we will notify you by phone as soon as possible.  Submit refill requests through [employer] or call your pharmacy and they will forward the refill request to us. Please allow 3 business days for your refill to be completed.          Additional Information About Your Visit        [employer] Information     [employer] gives you secure access to your electronic health record. If you see a primary care provider, you can also send messages to your care team and make appointments. If you have questions, please call your primary care clinic.  If you do not have a primary care provider, please call [phone] and they will assist you.        Care EveryWhere ID     This is your Care EveryWhere ID. This could be used by other organizations to access your [facility] medical records  [id number]          Blood Pressure from Last 3 Encounters:   08/07/18 116/62   07/12/18 122/86   05/31/18 104/66    Weight from Last 3 Encounters:   08/07/18 162 lb (73.5 kg)   07/12/18 167 lb (75.8 kg)   05/31/18 176 lb (79.8 kg)              Today, you had the following     No orders found for display         Today's Medication Changes          These changes are accurate as of 8/7/18  4:58 PM.  If you have any questions, ask your nurse or doctor.               Start taking these medicines.        Dose/Directions    diclofenac 1 % Gel topical gel   Commonly known as:  VOLTAREN   Used for:  Left wrist pain   Started by:  Ирина Bishop APRN CNP        Apply 4 grams to shoulder or 2 grams to hands four times daily using enclosed dosing card.   Quantity:  100 g   Refills:  1            Where to get your medicines      These medications were sent to [facility] [address]  [address], [facility] [address]     Phone:  [phone]     diclofenac 1 % Gel topical gel                Primary Care Provider Office Phone # Fax #    JEWELS Castano [phone]       [address]   [facility] [address]        Equal Access to Services     SHIVA VELA AH: David perezo Soomaali, waaxda luqadaha, qaybta kaalmada adeegyada, terri quinones hayirena avila. So [facility] [phone].    ATENCIÓN: Si habla español, tiene a becerra disposición servicios gratuitos de asistencia lingüística. Llame al [phone].    We comply with applicable federal civil rights laws and [place] laws. We do not discriminate on the basis of race, color, national origin, age, disability, sex, sexual orientation, or gender identity.            Thank you!     Thank you for choosing [facility]  for your care. Our goal is always to provide you with excellent care. Hearing back from our patients is one way we can continue to improve our  services. Please take a few minutes to complete the written survey that you may receive in the mail after your visit with us. Thank you!             Your Updated Medication List - Protect others around you: Learn how to safely use, store and throw away your medicines at www.disposemymeds.org.          This list is accurate as of 8/7/18  4:58 PM.  Always use your most recent med list.                   Brand Name Dispense Instructions for use Diagnosis    acetaminophen 325 MG tablet    TYLENOL    100 tablet    Take 2 tablets (650 mg) by mouth every 4 hours as needed for other (mild pain)    Urinary retention       amphetamine-dextroamphetamine 15 MG per tablet    ADDERALL    30 tablet    Take 1 tablet (15 mg) by mouth daily    Attention deficit hyperactivity disorder (ADHD), other type       cholecalciferol 400 Units/mL Liqd liquid    vitamin D/D-VI-SOL     Take 5,000 Units by mouth daily        DAILY HERBS PROSTATE PO      Take 1 tablet by mouth daily        diclofenac 1 % Gel topical gel    VOLTAREN    100 g    Apply 4 grams to shoulder or 2 grams to hands four times daily using enclosed dosing card.    Left wrist pain       Fish Oil 500 MG Caps      Take 1 capsule by mouth daily        fluticasone 50 MCG/ACT spray    FLONASE    1 Bottle    Spray 2 sprays into both nostrils daily    Upper respiratory tract infection, unspecified type       ibuprofen 600 MG tablet    ADVIL/MOTRIN    30 tablet    Take 1 tablet (600 mg) by mouth every 6 hours as needed for other (For mild pain and temperature greater than 102F)    Urinary retention       lisinopril 10 MG tablet    PRINIVIL/ZESTRIL    90 tablet    Take 1 tablet (10 mg) by mouth daily    Benign essential hypertension       magnesium 250 MG tablet      Take 1 tablet by mouth nightly as needed (sleep)        MULTIVITAMIN TABS   OR      1 TABLET EVETRY DAY        mupirocin 2 % ointment    BACTROBAN    2 g    Apply to anterior nares BID X 2 month supply    Nasal  vestibulitis

## 2018-08-07 NOTE — PATIENT INSTRUCTIONS
Work on cutting down on alcohol.  Reschedule tomorrow's therapy appt- consider getting on Larry's schedule next week.  Enjoy your trip this weekend.  X-ray ordered left shoulder - we will be in touch regarding results.  Try wearing your wrist brace.  We can consider Physical Therapy.  Understanding De Quervain Tenosynovitis    De Quervain tenosynovitis is a condition that can cause wrist and thumb pain. Tendons connect muscles in your wrist and forearm to the bones in your thumb. The tendons have a protective cover (sheath). The sheath s lining makes a fluid that lets the tendons slide easily when you straighten your wrist and thumb. If any of these tendons are irritated or injured, they can become swollen and inflamed. This is called de Quervain tenosynovitis.  How to say it  [id number]-[id number]   What causes de Quervain tenosynovitis?  This condition is most often caused from overuse. For example, making the same wrist motions over and over can irritate the tendons. This includes doing things like unscrewing jar lids or grasping a tool. Activities such as typing, playing racquet sports, knitting, and texting can also lead to the condition.  Symptoms of de Quervain tenosynovitis  You may have pain, soreness, redness, and swelling along the side of your wrist and the base of your thumb. You may feel pain when you pinch or grasp things, turn or touch your wrist, or make a fist. Your thumb may catch or make a crackling sound when you move it.  Treatment for de Quervain tenosynovitis  Treatments may include:    Resting the wrist and thumb. This involves limiting movements that make your symptoms worse. You also may need to avoid certain hobbies, sports, and types of work for a time.    Cold packs. These help reduce pain and swelling.    Prescription or over-the-counter pain medicines. These help relieve pain and swelling.    Splint or brace. This helps keep the thumb and wrist from moving and gives time  for your tendons to heal.    Exercises or physical therapy. These help stretch, strengthen, and improve the range of motion in your wrist and thumb.    Shots of medicine into the area around the tendon. These may help relieve symptoms for a time.    Surgery. You may need surgery if other treatments don t relieve symptoms. During surgery, the surgeon releases the sheath that surrounds the tendons so the tendons can move more easily.  Possible complications of de Quervain tenosynovitis  Without proper care and treatment, healing may take longer than normal. Also, symptoms may continue or get worse. Over time, the problem may become long-term (chronic). This can make it hard to use your wrist and thumb for normal activities.  When to call your healthcare provider  Call your healthcare provider right away if you have any of these:    Fever of 100.4 F (38 C) or higher, or as directed    Symptoms that don t get better with treatment, or get worse    Pain, numbness, or coldness in the hand    New symptoms   Date Last Reviewed: 3/10/2016    1467-7777 [employer]. [address] [place] [address], [place] [address]. All rights reserved. This information is not intended as a substitute for professional medical care. Always follow your healthcare professional's instructions.

## 2018-08-07 NOTE — PROGRESS NOTES
"[facility]   August 7, 2018      Behavioral Health Clinician Progress Note    Patient Name: Govind Way           Service Type:  Individual      Service Location:   Face to Face in Clinic     Session Start Time: 3:30 p.m.  Session End Time: 4:00 p.m.      Session Length: 16 - 37      Attendees: Patient and PCP    Visit Activities (Refresh list every visit): [facility] [employer]    Diagnostic Assessment Date: 9/7/17 by MIKE Bolton, PAULINA  Treatment Plan Review Date: to be completed  See Flowsheets for today's PHQ-9 and WAGNER-7 results  Previous PHQ-9:   PHQ-9 SCORE 5/29/2018 6/25/2018 7/23/2018   Total Score - - -   Total Score 0 4 3     Previous WAGNER-7:   WAGNER-7 SCORE 5/29/2018 6/25/2018 7/23/2018   Total Score - - -   Total Score 3 5 4       SHASHANK LEVEL:  SHASHANK Score (Last Two) 6/14/2012   SHASHANK Raw Score 50   Activation Score 86.3   SHASHANK Level 4       DATA  Extended Session (60+ minutes): No  Interactive Complexity: No  Crisis: No  [facility] Patient: No    Treatment Objective(s) Addressed in This Session:  Target Behavior(s): alcohol use and depression    Depressed Mood: Increase interest, engagement, and pleasure in doing things  Identify negative self-talk and behaviors: challenge core beliefs, myths, and actions  Improve concentration, focus, and mindfulness in daily activities   Feel less fidgety, restless or slow in daily activities / interpersonal interactions  Alcohol / Substance Use: will make healthier choices in regard to substance use    Current Stressors / Issues:    [facility] co-visit with patient and PCP in the clinic. Patient reports he continues to struggle with the environment at work and that \"I feel unsafe. I dread going in to work. I don't know what to do. I've told my managers over and over about what's going on and they say HR will get involved, but nothing has been done yet\". Patient processed feeling pulled between work, family, and his partner. He is having trouble making " "decisions because \"everything is important and there's always something\". Patient is aware that depression and ADHD are complicating the situation, causing difficulty focusing and a general feeling of overwhelm. Patient endorsed feeling trapped and chaotic. He continues to drink to reduce anxiety and \"settle down so I can get things done at home\".   Patient will schedule a follow up [facility] visit for adjunctive therapy to set goals around a behavior or maladaptive coping he would like to change.  Patient denied active SI/SIB thoughts or urges; endorse passive thoughts of not being able to tolerate the current situation. He continues to bike and remains socially active.   Patient is taking medications as prescribed, and will follow up with PT regarding shoulder and hand pain.    Progress on Treatment Objective(s) / Homework:  Minimal progress - ACTION (Actively working towards change); Intervened by reinforcing change plan / affirming steps taken    Motivational Interviewing    MI Intervention: Expressed Empathy/Understanding, Supported Autonomy, Collaboration, Evocation, Permission to raise concern or advise, Open-ended questions and Reflections: simple and complex     Change Talk Expressed by the Patient: Desire to change Ability to change Reasons to change Need to change    Provider Response to Change Talk: E - Evoked more info from patient about behavior change, A - Affirmed patient's thoughts, decisions, or attempts at behavior change, R - Reflected patient's change talk and S - Summarized patient's change talk statements    Also provided psychoeducation about behavioral health condition, symptoms, and treatment options    Care Plan review completed: No    Medication Review:  No changes to current psychiatric medication(s)    Medication Compliance:  Yes    Changes in Health Issues:   Yes: Pain, Associated Psychological Distress  Please see medical note by PCP JEWELS Vásquez, CNP    Chemical Use " "Review:   Substance Use: Problem use continues with no change since last session, Contemplation  Provided encouragement towards sobriety         Tobacco Use: No current tobacco use.      Assessment: Current Emotional / Mental Status (status of significant symptoms):  Risk status (Self / Other harm or suicidal ideation)  Patient has had a history of suicidal ideation: psychiatric hospitalization several years ago - \"nervous breakdown\"  Patient denies current fears or concerns for personal safety.  Patient denies current or recent suicidal ideation or behaviors.  Patient denies current or recent homicidal ideation or behaviors.  Patient denies current or recent self injurious behavior or ideation.  Patient denies other safety concerns.  A safety and risk management plan has not been developed at this time, however patient was encouraged to call [place] [phone] should there be a change in any of these risk factors.    Appearance:   Appropriate   Eye Contact:   Good   Psychomotor Behavior: Restless   Attitude:   Cooperative   Orientation:   All  Speech   Rate / Production: Normal    Volume:  Normal   Mood:    Anxious   Affect:    Labile   Thought Content:  Clear   Thought Form:  Coherent  Goal Directed  Logical   Insight:    Fair     Diagnoses:  1. Major depressive disorder, recurrent episode, mild (H)    2. Attention deficit hyperactivity disorder (ADHD), other type        Collateral Reports Completed:  Routed note to Care Team Member(s)     Plan: (Homework, other):  Patient was given information about behavioral services and encouraged to schedule a follow up appointment with the clinic [facility] in 1 week.  He was also given information about mental health symptoms and treatment options  and deep Breathing Strategies to practice when experiencing anxiety.  CD Recommendations: Practice Harm Reduction: reduce # of drinks per day. Patient will relax during his vacation, and attempt to prioritize needs when he returns.   " Larry Albright, [facility], [facility]

## 2018-08-07 NOTE — PROGRESS NOTES
"SUBJECTIVE:                                                    Govind Way is a 63 year old male with a history of anxiety, ADHD, hypertension and BPH s/p holep who presents to clinic today for the following health issues:  [facility]: Larry    Patient presents for primary care follow up. Continues to report frustration and increased stress at his job. Reports that his co-workers of different ethnic backgrounds continue to be difficult to work with and entitled. He has been talking to his manager and has been told that  and the diversity committee will be getting involved, but states that has not happened yet. Patient has been working a lot of overtime in preparation to leave for vacation with his partner Jarrod and several other friends. He feels burnt out. States he does not always feel safe at his job, due to a hostile environment. States he has a \"boyscout\" mentality and does not want to give up.    Patient reports chronic left shoulder pain for the past few years. It has been more painful in the past few weeks. It is located in his posterior scapula and radiates medially and to part of his anterior scapula. There is no associated CP or SOB. Denies any specific injury. Denies numbness or tingling. States he has done Physical Therapy in the past and that has been helpful. States he is taking Tylenol sporadically but does not take Nsaids very often as he has a single kidney. He is active at his job lifting items overhead.    [facility]. 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 He is hoping his [facility] benefits will cover the cost of hearing aids.    Patient also reports pain and inflammation to the medial aspect of his right wrist and thumb. There is no numbness or tingling. Denies any specific injuries. States he has worked with a Physical therapist in the past for this and it helped.     Continues to experience stress regarding " aging parents (father has a leaky mitral valve, mother recently fell in the bathroom). Also reports anxiety regarding his relationship with Jarrod and where their relationship may be going. Jarrod wants to move in together but the patient feels unable to do so as he feels he must look after his daughter Swetha, who is disabled and lives with him. Patient is also unsure about his relationship in general with Jarrod, states he feels the seven year itch. Describes their drinking behavior problematic and a cornerstone to their relationship. Patient is unsure what their relationship would be without the alcohol use.     Patient has stayed active, biking to and from work daily. His vacation coming up is a biking trip.        Hypertension Follow-up      Outpatient blood pressures are not being checked.    Low Salt Diet: no added salt    BP Readings from Last 2 Encounters:   08/07/18 116/62   07/12/18 122/86     LDL Cholesterol Calculated (mg/dL)   Date Value   05/18/2018 90   06/18/2013 95       Amount of exercise or physical activity: 6-7 days/week for an average of greater than 60 minutes    Problems taking medications regularly: No    Medication side effects: Dizziness with Lisinopril . adderral and lisinopril don't take together.     Diet: regular (no restrictions)      Mental Health Follow up Depression  Anxiety    Status since last visit: Fair    See PHQ-9 for current symptoms.  Other associated symptoms: None    Complicating factors: Aging parents. Work.   Significant life event:  No   Current substance abuse:  None  Anxiety or Panic symptoms:  Work stressors, ongoing worry and anxiety    Sleep - fair  Appetite - stable  Exercise - biking daily    Smoking - denies  Alcohol - daily use about 3 drinks (wine)  Street drugs - denies  Marijuana - denies  Caffeine - light    PHQ-9  English PHQ-9   Any Language               Social History   Substance Use Topics     Smoking status: Former Smoker     Quit date: 1/1/1982      Smokeless tobacco: Never Used      Comment: NO 2ND HAND SMOKE     Alcohol use 0.0 oz/week     0 Standard drinks or equivalent per week      Comment: 5/wk        Problem list and histories reviewed & adjusted, as indicated.  Additional history: as documented    Patient Active Problem List   Diagnosis     Adjustment disorder with mixed anxiety and depressed mood     Abdominal hernia     Hypertension goal BP (blood pressure) < 140/90     CARDIOVASCULAR SCREENING; LDL GOAL LESS THAN 160     History of hyperthyroidism     Right inguinal hernia     Irritable bowel syndrome     Donor of kidney for transplant     Hernia     Testis disorder     Screening for prostate cancer     Other hydrocele     Hallux rigidus, right foot     Advance care planning     Tendinopathy of right gluteus medius     Attention deficit hyperactivity disorder (ADHD), other type     Benign non-nodular prostatic hyperplasia with lower urinary tract symptoms     Anxiety     SBO (small bowel obstruction)     Major depressive disorder, recurrent episode, mild (H)     Alcohol use disorder (H)     Past Surgical History:   Procedure Laterality Date     C ECG MONITOR/ 24 HRS, ORIG ECG, W VIS SUPERIMPOS SCAN, COMPLETE  1/00    um holter monitor     C REMV KIDNEY/URETER,SAME INCIS  6/30/05    Nephrouretectomy/LEFT KIDNEY DONATED TO DAUGHTER/[facility]     COLONOSCOPY N/A 5/15/2018    Procedure: COLONOSCOPY;  colonoscopy;  Surgeon: Lobo Pelaez MD;  Location:  GI     CYSTOSCOPY, TRANSURETHRAL RESECTION (TUR) PROSTATE, COMBINED       HC REMOVAL OF TONSILS,<13 Y/O  age 7     HC VASECTOMY UNILAT/BILAT W POSTOP SEMEN  age 41     HERNIORRHAPHY INGUINAL  12/23/2011    Procedure:HERNIORRHAPHY INGUINAL; Surgeon:JULIA SIERRA; Location:[facility] OR     LAPAROSCOPIC HERNIORRHAPHY INGUINAL Right 6/29/2015    Procedure: LAPAROSCOPIC HERNIORRHAPHY INGUINAL;  Surgeon: García Ibarra MD;  Location:  OR     LAPAROSCOPIC HERNIORRHAPHY VENTRAL  12/23/2011     Procedure:LAPAROSCOPIC HERNIORRHAPHY VENTRAL; Laparoscopic Ventral Hernia Repair with Mesh, Open Left Inguinal Hernia Repair with Mesh; Surgeon:JULIA SIERRA; Location:[facility] OR     LASER HOLMIUM ENUCLEATION PROSTATE N/A 10/19/2017    Procedure: LASER HOLMIUM ENUCLEATION PROSTATE;  Holmium Laser Enculeation of the Prostate;  Surgeon: aMnolo Cardenas MD;  Location: [facility] OR     LASIK CUSTOMVUE BILATERAL  11/11/2011    Procedure:LASIK CUSTOMVUE BILATERAL; BILATERAL CUSTOMVUE LASIK WITH INTRALASE; Surgeon:POP SIMON; Location:[facility]       Social History   Substance Use Topics     Smoking status: Former Smoker     Quit date: 1/1/1982     Smokeless tobacco: Never Used      Comment: NO 2ND HAND SMOKE     Alcohol use 0.0 oz/week     0 Standard drinks or equivalent per week      Comment: 5/wk     Family History   Problem Relation Age of Onset     Diabetes Mother      ADULT ONSET     Hypertension Mother      Coronary Artery Disease Mother      Also father and grandparent.  Father had coronary bypass and aortic valve replacement surgery     Heart Surgery Mother      Aortic valve     Cancer Other      prostate cancer     Thyroid Disease Other      Hypertension Father      Skin Cancer Other      Mother and father, grandfather     Arthritis Other      Mom, daughter, grandparents     Other - See Comments Other      Grandfather with kidney stone     Prostate Cancer Other      Grandfather     KIDNEY DISEASE Daughter      Lupus Daughter      Causing end-stage renal disease     Eye Disorder No family hx of      NONE KNOWN     Hyperlipidemia No family hx of      Cerebrovascular Disease No family hx of      Breast Cancer No family hx of      Colon Cancer No family hx of      Other Cancer No family hx of      Depression No family hx of      Anxiety Disorder No family hx of      Mental Illness No family hx of      Substance Abuse No family hx of      Anesthesia Reaction No family hx of      Asthma No family hx of       Osteoperosis No family hx of      Genetic Disorder No family hx of      Obesity No family hx of      Unknown/Adopted No family hx of            Current Outpatient Prescriptions   Medication Sig Dispense Refill     acetaminophen (TYLENOL) 325 MG tablet Take 2 tablets (650 mg) by mouth every 4 hours as needed for other (mild pain) 100 tablet 0     amphetamine-dextroamphetamine (ADDERALL) 15 MG per tablet Take 1 tablet (15 mg) by mouth daily 30 tablet 0     cholecalciferol (VITAMIN D/D-VI-SOL) 400 UNIT/ML LIQD liquid Take 5,000 Units by mouth daily       fluticasone (FLONASE) 50 MCG/ACT spray Spray 2 sprays into both nostrils daily 1 Bottle 11     ibuprofen (ADVIL/MOTRIN) 600 MG tablet Take 1 tablet (600 mg) by mouth every 6 hours as needed for other (For mild pain and temperature greater than 102F) 30 tablet 0     lisinopril (PRINIVIL/ZESTRIL) 10 MG tablet Take 1 tablet (10 mg) by mouth daily 90 tablet 1     magnesium 250 MG tablet Take 1 tablet by mouth nightly as needed (sleep)       Misc Natural Products (DAILY HERBS PROSTATE PO) Take 1 tablet by mouth daily       MULTIVITAMIN TABS   OR 1 TABLET EVETRY DAY  0     mupirocin (BACTROBAN) 2 % ointment Apply to anterior nares BID X 2 month supply 2 g 3     Omega-3 Fatty Acids (FISH OIL) 500 MG CAPS Take 1 capsule by mouth daily        Allergies   Allergen Reactions     Ambien [Zolpidem Tartrate]      irritability     Codeine Sulfate Itching     Recent Labs   Lab Test  05/18/18   0801  05/07/18   1002  11/06/17   1313   07/28/17   0617   07/31/14   1746  06/18/13   0750   04/26/11   1012   LDL  90   --    --    --    --    --    --   95   --   137*   HDL  64   --    --    --    --    --    --   70   --   47   TRIG  98   --    --    --    --    --    --   45   --   79   ALT   --   28  24   --   22   < >   --   33   < >  28   CR   --   0.97  0.88   < >  0.91   < >  1.13  1.10   < >  1.13   GFRESTIMATED   --   78  88   < >  85   < >  67  69   < >  68   GFRESTBLACK   --    >90  >90   < >  >90   GFR Calc     < >  81  84   < >  82   POTASSIUM   --   4.0  4.0   < >  3.9   < >  3.8  4.0   < >  4.6   TSH   --    --    --    --    --    --   2.83  2.99   --    --     < > = values in this interval not displayed.      BP Readings from Last 3 Encounters:   08/07/18 116/62   07/12/18 122/86   05/31/18 104/66    Wt Readings from Last 3 Encounters:   08/07/18 162 lb (73.5 kg)   07/12/18 167 lb (75.8 kg)   05/31/18 176 lb (79.8 kg)        Labs reviewed in EPIC  Problem list, Medication list, Allergies, and Medical/Social/Surgical histories reviewed in [facility] and updated as appropriate.     ROS: Constitutional, neuro, ENT, endocrine, pulmonary, cardiac, gastrointestinal, genitourinary, musculoskeletal, integument and psychiatric systems are negative, except as otherwise noted above in the HPI.      OBJECTIVE:                                                    /62  Pulse 79  Temp 98.3  F (36.8  C) (Oral)  Resp 18  Wt 162 lb (73.5 kg)  SpO2 96%  BMI 22.28 kg/m2  Body mass index is 22.28 kg/(m^2).  Physical Exam   Constitutional: He is well-developed, well-nourished, and in no distress. No distress.   HENT:   Head: Normocephalic and atraumatic.   Musculoskeletal:        Left shoulder: He exhibits tenderness. He exhibits normal range of motion, no swelling, no effusion, no crepitus, no deformity, no laceration, no spasm and normal pulse.        Right wrist: He exhibits tenderness and swelling. He exhibits no effusion, no crepitus, no deformity and no laceration.   Skin: He is not diaphoretic.     Mental Status Assessment:  Appearance:   Appropriate   Eye Contact:   Good   Psychomotor Behavior: Normal  Restless   Attitude:   Cooperative   Orientation:   All  Speech   Rate / Production: Hyperverbal  Normal    Volume:  Normal   Mood:    Anxious   Affect:    Appropriate   Thought Content:  clear  Thought Form:  Coherent  Tangential   Insight:    Fair   Attention Span and  Concentration:  limited  Recent and Remote Memory:  intact  Fund of Knowledge: appropriate  Muscle Strength and Tone: normal   Suicidal Ideation: reports no thoughts, no intention  Hallucination: No  Paranoid-No  Manic-No  Panic-No  Self harm-No      See [facility] notes     Diagnostic Test Results:  Results for orders placed or performed in visit on 05/18/18   Lipid panel reflex to direct LDL Fasting   Result Value Ref Range    Cholesterol 174 <200 mg/dL    Triglycerides 98 <150 mg/dL    HDL Cholesterol 64 >39 mg/dL    LDL Cholesterol Calculated 90 <100 mg/dL    Non HDL Cholesterol 109 <130 mg/dL        ASSESSMENT/PLAN:                                                    (I10) Hypertension goal BP (blood pressure) < 140/90  (primary encounter diagnosis)  Comment: blood pressure stable   BP Readings from Last 6 Encounters:   08/07/18 116/62   07/12/18 122/86   05/31/18 104/66   05/15/18 114/82   05/07/18 122/82   04/19/18 120/76     Plan: continue Lisinopril.  -continue working on exercise, diet.    (F90.8) Attention deficit hyperactivity disorder (ADHD), other type  Comment: some benefit from Adderall  Plan: continue current plan, follow up with Psychiatry if needed.    (N40.1) Benign non-nodular prostatic hyperplasia with lower urinary tract symptoms  Comment: s/p Holep  Plan: denies symptoms, follow up with Urologist as needed.    (F33.0) Major depressive disorder, recurrent episode, mild (H)  Comment: mood is labile. Would benefit from frequent therapy sessions.  Plan: encouraged him to follow up with Larry for therapy, along with his therapist Xander Salas.    (F10.99) Alcohol use disorder (H)  Comment: reports he would like to cut down on drinking  Plan: encouraged moderation/sobriety,    (M25.512,  G89.29) Chronic left shoulder pain  Comment: Xray done, showing no acute findings.  Plan: XR Scapula Left        Will obtain MRI to evaluate ligaments.    (M25.531) Right wrist pain  Comment: Encouraged to wear his splint,  follow up with Physical Therapy, and Voltaren gel ordered  Plan: as above.        Patient Instructions:  Patient Instructions     Work on cutting down on alcohol.  Reschedule tomorrow's therapy appt- consider getting on Larry's schedule next week.  Enjoy your trip this weekend.  X-ray ordered left shoulder - we will be in touch regarding results.  Try wearing your wrist brace.  We can consider Physical Therapy.  Understanding De Quervain Tenosynovitis    De Quervain tenosynovitis is a condition that can cause wrist and thumb pain. Tendons connect muscles in your wrist and forearm to the bones in your thumb. The tendons have a protective cover (sheath). The sheath s lining makes a fluid that lets the tendons slide easily when you straighten your wrist and thumb. If any of these tendons are irritated or injured, they can become swollen and inflamed. This is called de Quervain tenosynovitis.  How to say it  [id number]   What causes de Quervain tenosynovitis?  This condition is most often caused from overuse. For example, making the same wrist motions over and over can irritate the tendons. This includes doing things like unscrewing jar lids or grasping a tool. Activities such as typing, playing racquet sports, knitting, and texting can also lead to the condition.  Symptoms of de Quervain tenosynovitis  You may have pain, soreness, redness, and swelling along the side of your wrist and the base of your thumb. You may feel pain when you pinch or grasp things, turn or touch your wrist, or make a fist. Your thumb may catch or make a crackling sound when you move it.  Treatment for de Quervain tenosynovitis  Treatments may include:    Resting the wrist and thumb. This involves limiting movements that make your symptoms worse. You also may need to avoid certain hobbies, sports, and types of work for a time.    Cold packs. These help reduce pain and swelling.    Prescription or over-the-counter pain  medicines. These help relieve pain and swelling.    Splint or brace. This helps keep the thumb and wrist from moving and gives time for your tendons to heal.    Exercises or physical therapy. These help stretch, strengthen, and improve the range of motion in your wrist and thumb.    Shots of medicine into the area around the tendon. These may help relieve symptoms for a time.    Surgery. You may need surgery if other treatments don t relieve symptoms. During surgery, the surgeon releases the sheath that surrounds the tendons so the tendons can move more easily.  Possible complications of de Quervain tenosynovitis  Without proper care and treatment, healing may take longer than normal. Also, symptoms may continue or get worse. Over time, the problem may become long-term (chronic). This can make it hard to use your wrist and thumb for normal activities.  When to call your healthcare provider  Call your healthcare provider right away if you have any of these:    Fever of 100.4 F (38 C) or higher, or as directed    Symptoms that don t get better with treatment, or get worse    Pain, numbness, or coldness in the hand    New symptoms   Date Last Reviewed: 3/10/2016    6443-9308 [employer]. [address] [place] [address], [place] [address]. All rights reserved. This information is not intended as a substitute for professional medical care. Always follow your healthcare professional's instructions.                      JEWELS [facility] PRIMARY CARE

## 2018-08-07 NOTE — MR AVS SNAPSHOT
After Visit Summary   8/7/2018    [address]ind [address]    MRN: [id number]           Patient Information     Date Of Birth          1955        Visit Information        Provider Department      8/7/2018 3:30 PM Ирина Bishop APRN [facility] Integrated Primary [facility]        Today's Diagnoses     Left wrist pain    -  1    Hypertension goal BP (blood pressure) < 140/90        Attention deficit hyperactivity disorder (ADHD), other type        Benign non-nodular prostatic hyperplasia with lower urinary tract symptoms        Major depressive disorder, recurrent episode, mild (H)        Alcohol use disorder (H)        Chronic left shoulder pain        Right wrist pain          Care Instructions    Work on cutting down on alcohol.  Reschedule tomorrow's therapy appt- consider getting on Larry's schedule next week.  Enjoy your trip this weekend.  X-ray ordered left shoulder - we will be in touch regarding results.  Try wearing your wrist brace.  We can consider Physical Therapy.  Understanding De Quervain Tenosynovitis    De Quervain tenosynovitis is a condition that can cause wrist and thumb pain. Tendons connect muscles in your wrist and forearm to the bones in your thumb. The tendons have a protective cover (sheath). The sheath s lining makes a fluid that lets the tendons slide easily when you straighten your wrist and thumb. If any of these tendons are irritated or injured, they can become swollen and inflamed. This is called de Quervain tenosynovitis.  How to say it  ti-[id number]-[id number] ten-oh-sin-oh-VY-tis   What causes de Quervain tenosynovitis?  This condition is most often caused from overuse. For example, making the same wrist motions over and over can irritate the tendons. This includes doing things like unscrewing jar lids or grasping a tool. Activities such as typing, playing racquet sports, knitting, and texting can also lead to the condition.  Symptoms of de Quervain  tenosynovitis  You may have pain, soreness, redness, and swelling along the side of your wrist and the base of your thumb. You may feel pain when you pinch or grasp things, turn or touch your wrist, or make a fist. Your thumb may catch or make a crackling sound when you move it.  Treatment for de Quervain tenosynovitis  Treatments may include:    Resting the wrist and thumb. This involves limiting movements that make your symptoms worse. You also may need to avoid certain hobbies, sports, and types of work for a time.    Cold packs. These help reduce pain and swelling.    Prescription or over-the-counter pain medicines. These help relieve pain and swelling.    Splint or brace. This helps keep the thumb and wrist from moving and gives time for your tendons to heal.    Exercises or physical therapy. These help stretch, strengthen, and improve the range of motion in your wrist and thumb.    Shots of medicine into the area around the tendon. These may help relieve symptoms for a time.    Surgery. You may need surgery if other treatments don t relieve symptoms. During surgery, the surgeon releases the sheath that surrounds the tendons so the tendons can move more easily.  Possible complications of de Quervain tenosynovitis  Without proper care and treatment, healing may take longer than normal. Also, symptoms may continue or get worse. Over time, the problem may become long-term (chronic). This can make it hard to use your wrist and thumb for normal activities.  When to call your healthcare provider  Call your healthcare provider right away if you have any of these:    Fever of 100.4 F (38 C) or higher, or as directed    Symptoms that don t get better with treatment, or get worse    Pain, numbness, or coldness in the hand    New symptoms   Date Last Reviewed: 3/10/2016    9095-8599 [employer]. [address] [place] [address], [place] [address]. All rights reserved. This information is not intended as a substitute  for professional medical care. Always follow your healthcare professional's instructions.                Follow-ups after your visit        Follow-up notes from your care team     Return in about 6 weeks (around 9/18/2018) for Physical Exam, Routine Visit.      Your next 10 appointments already scheduled     Aug 08, 2018  3:30 PM CDT   Return Visit with Xander Salas [facility]  [address] [facility]  [facility] [address]   [phone]            Aug 22, 2018  3:30 PM CDT   Return Visit with Xander Salas [facility]  [address] [facility]  [facility] [address]   [phone]            Sep 17, 2018  1:30 PM CDT   Hearing Aid Fitting with Callie Bonilla [facility] ([facility] [place])    [address] [place] [address]   [phone]            Sep 18, 2018  4:30 PM CDT   Return Visit with Xander Salas [facility]  [address]  [facility] [address]   [phone]            Oct 01, 2018  3:30 PM CDT   Return Visit with Xander Salas [facility]  [address] [facility]  [facility] [address]   [phone]            Oct 08, 2018  2:00 PM CDT   (Arrive by 1:45 PM)   Initial Review Program with Callie Bonilla [facility] ([facility] [place])    [address] [place] [address]   [phone]              Future tests that were ordered for you today     Open Future Orders        Priority Expected Expires Ordered    XR Scapula Left Routine 8/7/2018 8/7/2019 8/7/2018            Who to contact     If you have questions or need follow up information about today's clinic  visit or your schedule please contact [facility] directly at [phone].  Normal or non-critical lab and imaging results will be communicated to you by MyChart, letter or phone within 4 business days after the clinic has received the results. If you do not hear from us within 7 days, please contact the clinic through [facility] or phone. If you have a critical or abnormal lab result, we will notify you by phone as soon as possible.  Submit refill requests through [employer] or call your pharmacy and they will forward the refill request to us. Please allow 3 business days for your refill to be completed.          Additional Information About Your Visit        [facility] Information     [employer] gives you secure access to your electronic health record. If you see a primary care provider, you can also send messages to your care team and make appointments. If you have questions, please call your primary care clinic.  If you do not have a primary care provider, please call [phone] and they will assist you.        Care EveryWhere ID     This is your Care EveryWhere ID. This could be used by other organizations to access your [place] medical records  [id number]        Your Vitals Were     Pulse Temperature Respirations Pulse Oximetry BMI (Body Mass Index)       79 98.3  F (36.8  C) (Oral) 18 96% 22.28 kg/m2        Blood Pressure from Last 3 Encounters:   08/07/18 116/62   07/12/18 122/86   05/31/18 104/66    Weight from Last 3 Encounters:   08/07/18 162 lb (73.5 kg)   07/12/18 167 lb (75.8 kg)   05/31/18 176 lb (79.8 kg)                 Today's Medication Changes          These changes are accurate as of 8/7/18  4:07 PM.  If you have any questions, ask your nurse or doctor.               Start taking these medicines.        Dose/Directions    diclofenac 1 % Gel topical gel   Commonly known as:  VOLTAREN   Used for:  Left wrist pain   Started by:  Ирина Bishop APRN CNP        Apply 4  grams to shoulder or 2 grams to hands four times daily using enclosed dosing card.   Quantity:  100 g   Refills:  1            Where to get your medicines      These medications were sent to [facility] [place], [place] - [address]  [address], [facility] [address]     Phone:  [phone]     diclofenac 1 % Gel topical gel                Primary Care Provider Office Phone # Fax #    Ирина Bishop, JEWELS [phone]       [address]   [facility] [address]        Equal Access to Services     [facility] Center[facility] : Hadii aad ku hadasho Soomaali, waaxda luqadaha, qaybta kaalmada adeegyada, terri quinones hayirena ace . So [facility] [phone].    ATENCIÓN: Si habla español, tiene a becerra disposición servicios gratuitos de asistencia lingüística. Llame al [phone].    We comply with applicable federal civil rights laws and [place] laws. We do not discriminate on the basis of race, color, national origin, age, disability, sex, sexual orientation, or gender identity.            Thank you!     Thank you for choosing [facility]  for your care. Our goal is always to provide you with excellent care. Hearing back from our patients is one way we can continue to improve our services. Please take a few minutes to complete the written survey that you may receive in the mail after your visit with us. Thank you!             Your Updated Medication List - Protect others around you: Learn how to safely use, store and throw away your medicines at www.disposemymeds.org.          This list is accurate as of 8/7/18  4:07 PM.  Always use your most recent med list.                   Brand Name Dispense Instructions for use Diagnosis    acetaminophen 325 MG tablet    TYLENOL    100 tablet    Take 2 tablets (650 mg) by mouth every 4 hours as needed for other (mild pain)    Urinary retention       amphetamine-dextroamphetamine 15 MG per  tablet    ADDERALL    30 tablet    Take 1 tablet (15 mg) by mouth daily    Attention deficit hyperactivity disorder (ADHD), other type       cholecalciferol 400 Units/mL Liqd liquid    vitamin D/D-VI-SOL     Take 5,000 Units by mouth daily        DAILY HERBS PROSTATE PO      Take 1 tablet by mouth daily        diclofenac 1 % Gel topical gel    VOLTAREN    100 g    Apply 4 grams to shoulder or 2 grams to hands four times daily using enclosed dosing card.    Left wrist pain       Fish Oil 500 MG Caps      Take 1 capsule by mouth daily        fluticasone 50 MCG/ACT spray    FLONASE    1 Bottle    Spray 2 sprays into both nostrils daily    Upper respiratory tract infection, unspecified type       ibuprofen 600 MG tablet    ADVIL/MOTRIN    30 tablet    Take 1 tablet (600 mg) by mouth every 6 hours as needed for other (For mild pain and temperature greater than 102F)    Urinary retention       lisinopril 10 MG tablet    PRINIVIL/ZESTRIL    90 tablet    Take 1 tablet (10 mg) by mouth daily    Benign essential hypertension       magnesium 250 MG tablet      Take 1 tablet by mouth nightly as needed (sleep)        MULTIVITAMIN TABS   OR      1 TABLET EVETRY DAY        mupirocin 2 % ointment    BACTROBAN    2 g    Apply to anterior nares BID X 2 month supply    Nasal vestibulitis

## 2018-08-13 ENCOUNTER — TELEPHONE (OUTPATIENT)
Dept: FAMILY MEDICINE | Facility: CLINIC | Age: 63
End: 2018-08-13

## 2018-08-15 ENCOUNTER — HOSPITAL ENCOUNTER (OUTPATIENT)
Dept: MRI IMAGING | Facility: CLINIC | Age: 63
Discharge: HOME | End: 2018-08-15
Attending: NURSE PRACTITIONER | Admitting: NURSE PRACTITIONER
Payer: OTHER MISCELLANEOUS

## 2018-08-15 DIAGNOSIS — M25.512 CHRONIC LEFT SHOULDER PAIN: ICD-10-CM

## 2018-08-15 DIAGNOSIS — G89.29 CHRONIC LEFT SHOULDER PAIN: ICD-10-CM

## 2018-08-15 PROCEDURE — 73221 MRI JOINT UPR EXTREM W/O DYE: CPT | Mod: LT

## 2018-08-20 ENCOUNTER — OFFICE VISIT (OUTPATIENT)
Dept: BEHAVIORAL HEALTH | Facility: CLINIC | Age: 63
End: 2018-08-20
Payer: COMMERCIAL

## 2018-08-20 DIAGNOSIS — F33.0 MAJOR DEPRESSIVE DISORDER, RECURRENT EPISODE, MILD (H): Primary | ICD-10-CM

## 2018-08-20 PROCEDURE — 90834 PSYTX W PT 45 MINUTES: CPT | Performed by: SOCIAL WORKER

## 2018-08-20 NOTE — PROGRESS NOTES
"[facility] - Integrated Primary Care   August 20, 2018      Behavioral Health Clinician Progress Note    Patient Name: Govind Way           Service Type:  Individual      Service Location:   Face to Face in Clinic     Session Start Time: 3:30 p.m.  Session End Time: 4:20 p.m.      Session Length: 38 - 52      Attendees: Patient    Visit Activities (Refresh list every visit): [facility] Only    Diagnostic Assessment Date: 9/7/17 by MIKE Bolton, PAULINA  Treatment Plan Review Date: to be completed  See Flowsheets for today's PHQ-9 and WAGNER-7 results  Previous PHQ-9:   PHQ-9 SCORE 5/29/2018 6/25/2018 7/23/2018   Total Score - - -   Total Score 0 4 3     Previous WAGNER-7:   WAGNER-7 SCORE 5/29/2018 6/25/2018 7/23/2018   Total Score - - -   Total Score 3 5 4       SHASHANK LEVEL:  SHASHANK Score (Last Two) 6/14/2012   SHASHANK Raw Score 50   Activation Score 86.3   SHASHANK Level 4       DATA  Extended Session (60+ minutes): No  Interactive Complexity: No  Crisis: No  [facility] Patient: No    Treatment Objective(s) Addressed in This Session:  Target Behavior(s): alcohol use and depression    Depressed Mood: Increase interest, engagement, and pleasure in doing things  Identify negative self-talk and behaviors: challenge core beliefs, myths, and actions  Improve concentration, focus, and mindfulness in daily activities   Feel less fidgety, restless or slow in daily activities / interpersonal interactions  Alcohol / Substance Use: will make healthier choices in regard to substance use    Current Stressors / Issues:    [facility] visit with patient in the clinic. Patient reports things have settled down at work somewhat, and he is less anxious during the day. Patient was unable to recall what we discussed during the last session. Worked with patient to recall that we talked about how he can \"take a break\" from work, and whether he would consider a day program for anxiety, depression, and grief. Patient states he has been overwhelmed and busy with family " "and his partner, and \"I guess maybe I'm in denial about it or something. I completely pushed it out of my thinking because it's too much\". Patient shared that they had a training recently, and he was given feedback about keeping himself busy to avoid addressing difficult issues. Patient continues to vacillate about the relationship with his partner or making decisions about his daughter, work, and condo.   Patient processed feelings of sadness and gratitude about the almost losing his daughter, and reflected upon his attachment and need to protect her.  Discussed \"the well\" metaphor, and taking time to fill it. Patient will practice drawing or writing to describe his well, and think about people who take or replenish it.  Patient will think about day treatment and will discuss with [facility] Therapist, PAULINA Bolton.    Progress on Treatment Objective(s) / Homework:  New Objective established this session - PREPARATION (Decided to change - considering how); Intervened by negotiating a change plan and determining options / strategies for behavior change, identifying triggers, exploring social supports, and working towards setting a date to begin behavior change    Motivational Interviewing    MI Intervention: Expressed Empathy/Understanding, Supported Autonomy, Collaboration, Evocation, Permission to raise concern or advise, Open-ended questions and Reflections: simple and complex     Change Talk Expressed by the Patient: Desire to change Ability to change Reasons to change Need to change    Provider Response to Change Talk: E - Evoked more info from patient about behavior change, A - Affirmed patient's thoughts, decisions, or attempts at behavior change, R - Reflected patient's change talk and S - Summarized patient's change talk statements    Also provided psychoeducation about behavioral health condition, symptoms, and treatment options    Care Plan review completed: No    Medication Review:  No changes to current " "psychiatric medication(s)    Medication Compliance:  Yes    Changes in Health Issues:   None reported - patient requested PCP review shoulder imaging and make recommendations    Chemical Use Review:   Substance Use: Problem use continues with no change since last session, Contemplation  Provided encouragement towards sobriety         Tobacco Use: No current tobacco use.      Assessment: Current Emotional / Mental Status (status of significant symptoms):  Risk status (Self / Other harm or suicidal ideation)  Patient has had a history of suicidal ideation: psychiatric hospitalization several years ago - \"nervous breakdown\"  Patient denies current fears or concerns for personal safety.  Patient denies current or recent suicidal ideation or behaviors.  Patient denies current or recent homicidal ideation or behaviors.  Patient denies current or recent self injurious behavior or ideation.  Patient denies other safety concerns.  A safety and risk management plan has not been developed at this time, however patient was encouraged to call [place] [phone] should there be a change in any of these risk factors.    Appearance:   Appropriate   Eye Contact:   Good   Psychomotor Behavior: Normal   Attitude:   Cooperative   Orientation:   All  Speech   Rate / Production: Normal    Volume:  Normal   Mood:    Anxious   Affect:    Appropriate   Thought Content:  Clear   Thought Form:  Coherent  Goal Directed  Logical   Insight:    Fair     Diagnoses:  1. Major depressive disorder, recurrent episode, mild (H)        Collateral Reports Completed:  Routed note to Care Team Member(s)     Plan: (Homework, other):  Patient was given information about behavioral services and encouraged to schedule a follow up appointment with the clinic [facility] in 2 weeks.  He was also given information about mental health symptoms and treatment options  and deep Breathing Strategies to practice when experiencing anxiety.  CD Recommendations: Practice Harm " "Reduction: reduce # of drinks per day. Patient will consider day treatment and practice mindfulness around \"the well\" exercise.   DANGELO Porras, [facility]  "

## 2018-08-20 NOTE — MR AVS SNAPSHOT
After Visit Summary   8/20/2018    [address]ovind [address]    MRN: [id number]           Patient Information     Date Of Birth          1955        Visit Information        Provider Department      8/20/2018 3:30 PM Larry Albright, [facility]        Today's Diagnoses     Major depressive disorder, recurrent episode, mild (H)    -  1       Follow-ups after your visit        Your next 10 appointments already scheduled     Aug 22, 2018  3:30 PM CDT   Return Visit with PAULINA Bolton   [facility]  [address]  [facility] [address]   [phone]            Sep 06, 2018  3:30 PM CDT   Return Visit with DANGELO Mcclure   [facility] ([facility])    [address]  [address]  [facility] [address] [phone]            Sep 17, 2018  1:30 PM CDT   Hearing Aid Fitting with Daisy Dodson [facility] ([facility])    [address] [place] [address] [phone]            Sep 18, 2018  3:30 PM CDT   Return Visit with Larry Albright [facility] ([facility])    [address]  [address]  [facility] [address] [phone]            Sep 18, 2018  3:30 PM CDT   Return Visit with JEWELS Castano CNP   [facility] ([facility])    [address]  [address]  [facility] [address] [phone]            Sep 18, 2018  4:30 PM CDT   Return Visit with PAULINA Bolton   [facility]  [address]  [facility] [address]   [phone]            Oct 01, 2018  3:30 PM CDT   Return Visit with PAULINA Bolton    [facility]    [facility]  [address]  [facility] [address] [phone]            Oct 08, 2018  2:00 PM CDT   (Arrive by 1:45 PM)   Initial Review Program with Callie Bonilla [facility] ([facility])    [address]  4th [address]  [facility] [address] [phone]              Who to contact     If you have questions or need follow up information about today's clinic visit or your schedule please contact [facility] PRIMARY CARE directly at [phone].  Normal or non-critical lab and imaging results will be communicated to you by MyChart, letter or phone within 4 business days after the clinic has received the results. If you do not hear from us within 7 days, please contact the clinic through Mingle[employer] or phone. If you have a critical or abnormal lab result, we will notify you by phone as soon as possible.  Submit refill requests through [employer] or call your pharmacy and they will forward the refill request to us. Please allow 3 business days for your refill to be completed.          Additional Information About Your Visit        MyChart Information     [employer] gives you secure access to your electronic health record. If you see a primary care provider, you can also send messages to your care team and make appointments. If you have questions, please call your primary care clinic.  If you do not have a primary care provider, please call [phone] and they will assist you.        Care EveryWhere ID     This is your Care EveryWhere ID. This could be used by other organizations to access your [place] medical records  [id number]         Blood Pressure from Last 3 Encounters:   08/07/18 116/62   07/12/18 122/86   05/31/18 104/66    Weight from Last 3 Encounters:   08/07/18 162 lb (73.5 kg)   07/12/18 167 lb (75.8 kg)   05/31/18 176 lb (79.8 kg)              Today,  you had the following     No orders found for display       Primary Care Provider Office Phone # Fax #    Ирина Bishop, APRN [phone]       [address] [facility] [address]  [facility] [address]        Equal Access to Services     CARLOTAMIKIE DANE : Hadii aad ku hadabelo Soomaali, waaxda luqadaha, qaybta kaalmada adeegyada, waxay idiin hayaan adeeg khsil malka . So [facility] [phone].    ATENCIÓN: Si habla español, tiene a becerra disposición servicios gratuitos de asistencia lingüística. Llame al [phone].    We comply with applicable federal civil rights laws and [place] laws. We do not discriminate on the basis of race, color, national origin, age, disability, sex, sexual orientation, or gender identity.            Thank you!     Thank you for choosing [facility] PRIMARY CARE  for your care. Our goal is always to provide you with excellent care. Hearing back from our patients is one way we can continue to improve our services. Please take a few minutes to complete the written survey that you may receive in the mail after your visit with us. Thank you!             Your Updated Medication List - Protect others around you: Learn how to safely use, store and throw away your medicines at www.disposemymeds.org.          This list is accurate as of 8/20/18  5:00 PM.  Always use your most recent med list.                   Brand Name Dispense Instructions for use Diagnosis    acetaminophen 325 MG tablet    TYLENOL    100 tablet    Take 2 tablets (650 mg) by mouth every 4 hours as needed for other (mild pain)    Urinary retention       amphetamine-dextroamphetamine 15 MG per tablet    ADDERALL    30 tablet    Take 1 tablet (15 mg) by mouth daily    Attention deficit hyperactivity disorder (ADHD), other type       cholecalciferol 400 UNIT/ML Liqd liquid    vitamin D/D-VI-SOL     Take 5,000 Units by mouth daily        DAILY HERBS PROSTATE PO      Take 1 tablet by mouth daily         diclofenac 1 % Gel topical gel    VOLTAREN    100 g    Apply 4 grams to shoulder or 2 grams to hands four times daily using enclosed dosing card.        Fish Oil 500 MG Caps      Take 1 capsule by mouth daily        fluticasone 50 MCG/ACT spray    FLONASE    1 Bottle    Spray 2 sprays into both nostrils daily    Upper respiratory tract infection, unspecified type       ibuprofen 600 MG tablet    ADVIL/MOTRIN    30 tablet    Take 1 tablet (600 mg) by mouth every 6 hours as needed for other (For mild pain and temperature greater than 102F)    Urinary retention       lisinopril 10 MG tablet    PRINIVIL/ZESTRIL    90 tablet    Take 1 tablet (10 mg) by mouth daily    Benign essential hypertension       magnesium 250 MG tablet      Take 1 tablet by mouth nightly as needed (sleep)        MULTIVITAMIN TABS   OR      1 TABLET EVETRY DAY        mupirocin 2 % ointment    BACTROBAN    2 g    Apply to anterior nares BID X 2 month supply    Nasal vestibulitis

## 2018-08-21 ENCOUNTER — TELEPHONE (OUTPATIENT)
Dept: FAMILY MEDICINE | Facility: CLINIC | Age: 63
End: 2018-08-21

## 2018-08-21 DIAGNOSIS — M75.102 TEAR OF LEFT ROTATOR CUFF, UNSPECIFIED TEAR EXTENT: Primary | ICD-10-CM

## 2018-08-21 NOTE — TELEPHONE ENCOUNTER
Called pt to inform of message below from provider, no answer, and no VM so unable to leave message at this time. Will try again at a later time.  .Emily Ray RN

## 2018-08-21 NOTE — TELEPHONE ENCOUNTER
----- Message from JEWELS Castano CNP sent at 8/21/2018  9:43 AM CDT -----  Hello. Can we please call Keagan and let him know that his MRI showed a right rotator cuff tear. He needs to see Ortho to discuss next steps. I did an Ortho referral and he should hear from them in the next few days. If he does not hear please have him call us.

## 2018-08-22 NOTE — TELEPHONE ENCOUNTER
Spoke with Bony, gave him the results info from MRI as directed, He will call Ortho if he has not heard from them by this afternoon, gave him phone contact info.  Yue Summers RN

## 2018-08-23 ENCOUNTER — OFFICE VISIT (OUTPATIENT)
Dept: BEHAVIORAL HEALTH | Facility: CLINIC | Age: 63
End: 2018-08-23
Payer: COMMERCIAL

## 2018-08-23 ENCOUNTER — PRE VISIT (OUTPATIENT)
Dept: ORTHOPEDICS | Facility: CLINIC | Age: 63
End: 2018-08-23

## 2018-08-23 DIAGNOSIS — F33.0 MAJOR DEPRESSIVE DISORDER, RECURRENT EPISODE, MILD (H): Primary | ICD-10-CM

## 2018-08-23 DIAGNOSIS — F90.8 ATTENTION DEFICIT HYPERACTIVITY DISORDER (ADHD), OTHER TYPE: ICD-10-CM

## 2018-08-23 PROCEDURE — 90832 PSYTX W PT 30 MINUTES: CPT | Performed by: SOCIAL WORKER

## 2018-08-23 NOTE — TELEPHONE ENCOUNTER
FUTURE VISIT INFORMATION      FUTURE VISIT INFORMATION:    Date: 8/29    Time: 7:15    Location: [facility]  REFERRAL INFORMATION:    Referring provider:  self    Referring providers clinic:      Reason for visit/diagnosis   Tear of left rotator cuff, unspecified tear extent    RECORDS REQUESTED FROM:       Clinic name Comments Records Status Imaging Status                                         RECORDS STATUS      All records internal

## 2018-08-23 NOTE — MR AVS SNAPSHOT
After Visit Summary   8/23/2018    [address]ind Way    MRN: [id number]           Patient Information     Date Of Birth          1955        Visit Information        Provider Department      8/23/2018 9:30 AM Larry Albright, [facility]        Today's Diagnoses     Major depressive disorder, recurrent episode, mild (H)    -  1    Attention deficit hyperactivity disorder (ADHD), other type           Follow-ups after your visit        Your next 10 appointments already scheduled     Aug 29, 2018  7:15 AM CDT   (Arrive by 7:00 AM)   NEW SHOULDER with Nicole Ortiz MD   [facility] ([facility])    [address]  [address] [facility] [address] [phone]            Sep 06, 2018  3:30 PM CDT   Return Visit with Larry Albright [facility] ([facility])    [address]  [address]  [facility] [address] [phone]            Sep 17, 2018  1:30 PM CDT   Hearing Aid Fitting with Daisy Dodson [facility] ([facility])    [address]  4th Floor  [facility] [address] [phone]            Sep 18, 2018  3:30 PM CDT   Return Visit with Larry Albright [facility] ([facility])    [address]  [address]  [facility] [address] [phone]            Sep 18, 2018  3:30 PM CDT   Return Visit with JEWELS Castano [facility] ([facility])    [address]  [address]  [facility] [address] [phone]            Sep 18, 2018  4:30 PM CDT   Return Visit with PAULINA Bolton   [facility] ([facility]  [address]  [facility] [address]    [phone]            Oct 01, 2018  3:30 PM CDT   Return Visit with PAULINA Bolton   [facility]    [facility]  [address]  [facility] [place] [id number] [phone]            Oct 08, 2018  2:00 PM CDT   (Arrive by 1:45 PM)   Initial Review Program with Callie Bonilla [facility] ([facility])    [address]  4th [address]  [facility] [place] [address] [phone]              Who to contact     If you have questions or need follow up information about today's clinic visit or your schedule please contact [facility] PRIMARY CARE directly at [phone].  Normal or non-critical lab and imaging results will be communicated to you by [employer], letter or phone within 4 business days after the clinic has received the results. If you do not hear from us within 7 days, please contact the clinic through [employer] or phone. If you have a critical or abnormal lab result, we will notify you by phone as soon as possible.  Submit refill requests through [employer] or call your pharmacy and they will forward the refill request to us. Please allow 3 business days for your refill to be completed.          Additional Information About Your Visit        Intelliohart Information     [employer] gives you secure access to your electronic health record. If you see a primary care provider, you can also send messages to your care team and make appointments. If you have questions, please call your primary care clinic.  If you do not have a primary care provider, please call [phone] and they will assist you.        Care EveryWhere ID     This is your Care EveryWhere ID. This could be used by other organizations to access your [place] medical records  [id number]         Blood Pressure from Last 3 Encounters:   08/07/18 116/62   07/12/18 122/86   05/31/18 104/66    Weight from Last 3 Encounters:   08/07/18 162  lb (73.5 kg)   07/12/18 167 lb (75.8 kg)   05/31/18 176 lb (79.8 kg)              Today, you had the following     No orders found for display       Primary Care Provider Office Phone # Fax JEWELS Navarrete [phone]       [address] [facility] [address]  [facility] [address]        Equal Access to Services     SHIVA VELA : Hadii aad ku hadasho Soomaali, waaxda luqadaha, qaybta kaalmada adeegyada, waxay idiin hayaan adeeg kharash la'irena . So [facility] [place] [phone].    ATENCIÓN: Si antoniettala faustina, tiene a becerra disposición servicios gratuitos de asistencia lingüística. Solitarioame al [phone].    We comply with applicable federal civil rights laws and [place] laws. We do not discriminate on the basis of race, color, national origin, age, disability, sex, sexual orientation, or gender identity.            Thank you!     Thank you for choosing [facility] PRIMARY CARE  for your care. Our goal is always to provide you with excellent care. Hearing back from our patients is one way we can continue to improve our services. Please take a few minutes to complete the written survey that you may receive in the mail after your visit with us. Thank you!             Your Updated Medication List - Protect others around you: Learn how to safely use, store and throw away your medicines at www.disposemymeds.org.          This list is accurate as of 8/23/18 11:28 AM.  Always use your most recent med list.                   Brand Name Dispense Instructions for use Diagnosis    acetaminophen 325 MG tablet    TYLENOL    100 tablet    Take 2 tablets (650 mg) by mouth every 4 hours as needed for other (mild pain)    Urinary retention       amphetamine-dextroamphetamine 15 MG per tablet    ADDERALL    30 tablet    Take 1 tablet (15 mg) by mouth daily    Attention deficit hyperactivity disorder (ADHD), other type       cholecalciferol 400 UNIT/ML Liqd liquid    vitamin D/D-VI-SOL     Take 5,000 Units by  mouth daily        DAILY HERBS PROSTATE PO      Take 1 tablet by mouth daily        diclofenac 1 % Gel topical gel    VOLTAREN    100 g    Apply 4 grams to shoulder or 2 grams to hands four times daily using enclosed dosing card.        Fish Oil 500 MG Caps      Take 1 capsule by mouth daily        fluticasone 50 MCG/ACT spray    FLONASE    1 Bottle    Spray 2 sprays into both nostrils daily    Upper respiratory tract infection, unspecified type       ibuprofen 600 MG tablet    ADVIL/MOTRIN    30 tablet    Take 1 tablet (600 mg) by mouth every 6 hours as needed for other (For mild pain and temperature greater than 102F)    Urinary retention       lisinopril 10 MG tablet    PRINIVIL/ZESTRIL    90 tablet    Take 1 tablet (10 mg) by mouth daily    Benign essential hypertension       magnesium 250 MG tablet      Take 1 tablet by mouth nightly as needed (sleep)        MULTIVITAMIN TABS   OR      1 TABLET EVETRY DAY        mupirocin 2 % ointment    BACTROBAN    2 g    Apply to anterior nares BID X 2 month supply    Nasal vestibulitis

## 2018-08-23 NOTE — PROGRESS NOTES
"[facility] - Integrated Primary Care   August 23, 2018      Behavioral Health Clinician Progress Note    Patient Name: Govind Way           Service Type:  Individual      Service Location:   Face to Face in Clinic     Session Start Time: 9:35 a.m.  Session End Time: 10:00 a.m.      Session Length: 16 - 37      Attendees: Patient    Visit Activities (Refresh list every visit): [facility] Only    Diagnostic Assessment Date: 9/7/17 by MIKE Bolton, PAULINA  Treatment Plan Review Date: to be completed  See Flowsheets for today's PHQ-9 and WAGNER-7 results  Previous PHQ-9:   PHQ-9 SCORE 5/29/2018 6/25/2018 7/23/2018   Total Score - - -   Total Score 0 4 3     Previous WAGNER-7:   WAGNER-7 SCORE 5/29/2018 6/25/2018 7/23/2018   Total Score - - -   Total Score 3 5 4       SHASHANK LEVEL:  SHASHANK Score (Last Two) 6/14/2012   SHASHANK Raw Score 50   Activation Score 86.3   SHASHANK Level 4       DATA  Extended Session (60+ minutes): No  Interactive Complexity: No  Crisis: No  [facility] Patient: No    Treatment Objective(s) Addressed in This Session:  Target Behavior(s): alcohol use and depression    Depressed Mood: Increase interest, engagement, and pleasure in doing things  Identify negative self-talk and behaviors: challenge core beliefs, myths, and actions  Improve concentration, focus, and mindfulness in daily activities   Feel less fidgety, restless or slow in daily activities / interpersonal interactions  Alcohol / Substance Use: will make healthier choices in regard to substance use    Current Stressors / Issues:    [facility] visit with patient in the clinic as he was unable to see his primary therapist this week. Patient states \"so much has happened and I'm not sure what to do next\". Patient processed feeling overwhelmed with work and personal demands, and identified difficulty prioritizing needs to help inform decision-making.  Patient has been experiencing conflict with his partner and daughter, as \"I'm not as available as they need me to be. I " "don't have a cell phone because I just don't get them, I keep being asked to work overtime\". Patient scheduled an ortho appt to determine whether he needs surgery. He has been thinking about pursuing a higher level of care for depression, and agreed to continue gathering information about programming options while waiting for the outcome of the ortho evaluation and recommendations.  Patient processed the necessity of working overtime this week - it causes additional stress to his shoulder, and he may be using work to avoid dealing with relationship issues. Patient states he does not financially need to work overtime, and will consider saying no for this week and weekend. Patient shared that it is difficult to face the relationship with his partner as he is unsure about the future. Patient was able to practice accessing rational mind - cannot predict the future, focus on the present and prioritize needs.    Progress on Treatment Objective(s) / Homework:  New Objective established this session - PREPARATION (Decided to change - considering how); Intervened by negotiating a change plan and determining options / strategies for behavior change, identifying triggers, exploring social supports, and working towards setting a date to begin behavior change    Motivational Interviewing    MI Intervention: Expressed Empathy/Understanding, Supported Autonomy, Collaboration, Evocation, Permission to raise concern or advise, Open-ended questions and Reflections: simple and complex     Change Talk Expressed by the Patient: Desire to change Ability to change Reasons to change Need to change    Provider Response to Change Talk: E - Evoked more info from patient about behavior change, A - Affirmed patient's thoughts, decisions, or attempts at behavior change, R - Reflected patient's change talk and S - Summarized patient's change talk statements    Also provided psychoeducation about behavioral health condition, symptoms, and " "treatment options    Care Plan review completed: No    Medication Review:  No changes to current psychiatric medication(s)    Medication Compliance:  Yes    Changes in Health Issues:   None reported - patient has an appt next week with ortho to discuss need for shoulder surgery    Chemical Use Review:   Substance Use: Problem use continues with no change since last session, Contemplation  Provided encouragement towards sobriety         Tobacco Use: No current tobacco use.      Assessment: Current Emotional / Mental Status (status of significant symptoms):  Risk status (Self / Other harm or suicidal ideation)  Patient has had a history of suicidal ideation: psychiatric hospitalization several years ago - \"nervous breakdown\"  Patient denies current fears or concerns for personal safety.  Patient denies current or recent suicidal ideation or behaviors.  Patient denies current or recent homicidal ideation or behaviors.  Patient denies current or recent self injurious behavior or ideation.  Patient denies other safety concerns.  A safety and risk management plan has not been developed at this time, however patient was encouraged to call [facility] should there be a change in any of these risk factors.    Appearance:   Appropriate   Eye Contact:   Good   Psychomotor Behavior: Normal   Attitude:   Cooperative   Orientation:   All  Speech   Rate / Production: Normal    Volume:  Normal   Mood:    Anxious   Affect:    Appropriate   Thought Content:  Clear   Thought Form:  Coherent  Goal Directed  Logical   Insight:    Fair     Diagnoses:  1. Major depressive disorder, recurrent episode, mild (H)    2. Attention deficit hyperactivity disorder (ADHD), other type        Collateral Reports Completed:  Routed note to Care Team Member(s) - MIKE Bolton, LGSW; JEWELS Vásquez, CNP    Plan: (Homework, other):  Patient was given information about behavioral services and encouraged to schedule a follow up appointment with " the clinic [facility] in 2 weeks.  He was also given information about mental health symptoms and treatment options  and deep Breathing Strategies to practice when experiencing anxiety.  CD Recommendations: Practice Harm Reduction: reduce # of drinks per day. Patient will access rational thinking to prioritize needs regarding work, his own health, relationships, and family.   DANGELO Porras, [facility]

## 2018-08-28 ASSESSMENT — ENCOUNTER SYMPTOMS
MYALGIAS: 0
DECREASED CONCENTRATION: 0
MUSCLE CRAMPS: 0
LIGHT-HEADEDNESS: 1
NERVOUS/ANXIOUS: 1
LEG PAIN: 0
JOINT SWELLING: 0
NECK PAIN: 1
STIFFNESS: 1
SLEEP DISTURBANCES DUE TO BREATHING: 0
HYPOTENSION: 0
HYPERTENSION: 0
SYNCOPE: 0
DEPRESSION: 1
PANIC: 0
ORTHOPNEA: 0
BACK PAIN: 0
ARTHRALGIAS: 1
INSOMNIA: 0
MUSCLE WEAKNESS: 1
EXERCISE INTOLERANCE: 0
PALPITATIONS: 0

## 2018-08-29 ENCOUNTER — OFFICE VISIT (OUTPATIENT)
Dept: ORTHOPEDICS | Facility: CLINIC | Age: 63
End: 2018-08-29
Payer: COMMERCIAL

## 2018-08-29 ENCOUNTER — RADIANT APPOINTMENT (OUTPATIENT)
Dept: GENERAL RADIOLOGY | Facility: CLINIC | Age: 63
End: 2018-08-29
Attending: ORTHOPAEDIC SURGERY
Payer: COMMERCIAL

## 2018-08-29 VITALS — WEIGHT: 163.9 LBS | HEIGHT: 71 IN | BODY MASS INDEX: 22.94 KG/M2

## 2018-08-29 DIAGNOSIS — M25.512 CHRONIC LEFT SHOULDER PAIN: ICD-10-CM

## 2018-08-29 DIAGNOSIS — M25.512 CHRONIC LEFT SHOULDER PAIN: Primary | ICD-10-CM

## 2018-08-29 DIAGNOSIS — M67.919 DISORDER OF BURSAE AND TENDONS IN SHOULDER REGION: ICD-10-CM

## 2018-08-29 DIAGNOSIS — G89.29 CHRONIC LEFT SHOULDER PAIN: Primary | ICD-10-CM

## 2018-08-29 DIAGNOSIS — G89.29 CHRONIC LEFT SHOULDER PAIN: ICD-10-CM

## 2018-08-29 DIAGNOSIS — M71.9 DISORDER OF BURSAE AND TENDONS IN SHOULDER REGION: ICD-10-CM

## 2018-08-29 DIAGNOSIS — M75.112 INCOMPLETE TEAR OF LEFT ROTATOR CUFF: Primary | ICD-10-CM

## 2018-08-29 NOTE — NURSING NOTE
"Reason For Visit:   Chief Complaint   Patient presents with     Consult     Left shoulder pain.  Possible left shoulder rotator cuff tear.        PCP: Ирина Bishop  Ref: Ирина Bishop    ?  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  Currently working? Yes.  Work status?  Full time.  Date of injury: unknown, possible injury many years ago    Date of surgery: NA  Type of surgery: NA.  Smoker: No  Request smoking cessation information: No    Left hand dominant    SANE score  Affected shoulder: Left  Right shoulder SANE: 100  Left shoulder SANE: 100 with pain    Ht 1.793 m (5' 10.59\")  Wt 74.3 kg (163 lb 14.4 oz)  BMI 23.13 kg/m2      Pain Assessment  Patient Currently in Pain: Yes  0-10 Pain Scale: 0  Primary Pain Location: Shoulder  Pain Orientation: Left  Pain Descriptors: Aching, Other (comment) (stiff)  Alleviating Factors: Rest, Pain medication (voltaren gel)  Aggravating Factors: Movement, Lying, Other (comment) (weather changes)    Sandie Lee ATC        "

## 2018-08-29 NOTE — LETTER
8/29/2018       RE: Govind Way  [address]  [facility] [address]     Dear Colleague,    Thank you for referring your patient, Govind Way, to the [facility] at [facility]. Please see a copy of my visit note below.    CHIEF CONCERN:  Left shoulder pain    HISTORY OF PRESENT ILLNESS: Mr. Way is a 63-year-old left-hand-dominant gentleman I am seeing today for left shoulder pain at the request of his primary care physician.  The patient states that he has had discomfort in his left shoulder for quite some time on and off but it is been worse recently with increased physical demands at work.  He has worked for [employer] for 32 years and is in [employer] on [place].  He has to lift heavy pans at work and he has found that that has caused more discomfort.  He notes more pain when he tries to sleep at night. When he has to lift away from body in abduction he has pain.     Past Medical History:   Diagnosis Date     Adjustment disorder      Anxiety      Depressive disorder      Dysthymic disorder      Enlarged prostate      Hepatitis      Ileus (H)      Irregular heart beat     intermittant palpitations     Palpitations     intermittent     PONV (postoperative nausea and vomiting)      Positive PPD      Pure hypercholesterolemia 1/00     Renal disease     single R kidney;donated left     Screening examination for pulmonary tuberculosis     positive mantoux     Unspecified essential hypertension 1/00       Past Surgical History:   Procedure Laterality Date     C ECG MONITOR/ 24 HRS, ORIG ECG, W VIS SUPERIMPOS SCAN, COMPLETE  1/00    um holter monitor     C REMV KIDNEY/URETER,SAME INCIS  6/30/05    Nephrouretectomy/LEFT KIDNEY DONATED TO DAUGHTER/[facility]     COLONOSCOPY N/A 5/15/2018    Procedure: COLONOSCOPY;  colonoscopy;  Surgeon: Lobo Pelaez MD;  Location:  GI     CYSTOSCOPY, TRANSURETHRAL RESECTION (TUR) PROSTATE, COMBINED        HC REMOVAL OF TONSILS,<11 Y/O  age 7     HC VASECTOMY UNILAT/BILAT W POSTOP SEMEN  age 41     HERNIORRHAPHY INGUINAL  12/23/2011    Procedure:HERNIORRHAPHY INGUINAL; Surgeon:JULIA SIERRA; [facility]     LAPAROSCOPIC HERNIORRHAPHY INGUINAL Right 6/29/2015    Procedure: LAPAROSCOPIC HERNIORRHAPHY INGUINAL;  Surgeon: García Ibarra MD;  [facility]     LAPAROSCOPIC HERNIORRHAPHY VENTRAL  12/23/2011    Procedure:LAPAROSCOPIC HERNIORRHAPHY VENTRAL; Laparoscopic Ventral Hernia Repair with Mesh, Open Left Inguinal Hernia Repair with Mesh; Surgeon:JULIA SIERRA; Location[facility]     LASER HOLMIUM ENUCLEATION PROSTATE N/A 10/19/2017    Procedure: LASER HOLMIUM ENUCLEATION PROSTATE;  Holmium Laser Enculeation of the Prostate;  Surgeon: Manolo Cardenas MD;  [facility]     LASIK CUSTOMVUE BILATERAL  11/11/2011    Procedure:LASIK CUSTOMVUE BILATERAL; BILATERAL CUSTOMVUE LASIK WITH INTRALASE; Surgeon:POP SIMON; Location:[facility]       Current Outpatient Prescriptions   Medication Sig Dispense Refill     acetaminophen (TYLENOL) 325 MG tablet Take 2 tablets (650 mg) by mouth every 4 hours as needed for other (mild pain) 100 tablet 0     amphetamine-dextroamphetamine (ADDERALL) 15 MG per tablet Take 1 tablet (15 mg) by mouth daily 30 tablet 0     cholecalciferol (VITAMIN D/D-VI-SOL) 400 UNIT/ML LIQD liquid Take 5,000 Units by mouth daily       diclofenac (VOLTAREN) 1 % GEL topical gel Apply 4 grams to shoulder or 2 grams to hands four times daily using enclosed dosing card. 100 g 1     fluticasone (FLONASE) 50 MCG/ACT spray Spray 2 sprays into both nostrils daily 1 Bottle 11     ibuprofen (ADVIL/MOTRIN) 600 MG tablet Take 1 tablet (600 mg) by mouth every 6 hours as needed for other (For mild pain and temperature greater than 102F) 30 tablet 0     lisinopril (PRINIVIL/ZESTRIL) 10 MG tablet Take 1 tablet (10 mg) by mouth daily 90 tablet 1     magnesium 250 MG tablet  Take 1 tablet by mouth nightly as needed (sleep)       Misc Natural Products (DAILY HERBS PROSTATE PO) Take 1 tablet by mouth daily       MULTIVITAMIN TABS   OR 1 TABLET EVETRY DAY  0     mupirocin (BACTROBAN) 2 % ointment Apply to anterior nares BID X 2 month supply 2 g 3     Omega-3 Fatty Acids (FISH OIL) 500 MG CAPS Take 1 capsule by mouth daily             Allergies   Allergen Reactions     Ambien [Zolpidem Tartrate]      irritability     Codeine Sulfate Itching       SOCIAL HISTORY:    Social History     Social History     Marital status: Legally      Spouse name: N/A     Number of children: N/A     Years of education: N/A     Occupational History     Not on file.     Social History Main Topics     Smoking status: Former Smoker     Quit date: 1/1/1982     Smokeless tobacco: Never Used      Comment: NO 2ND HAND SMOKE     Alcohol use 0.0 oz/week     0 Standard drinks or equivalent per week      Comment: 5/wk     Drug use: No     Sexual activity: Yes     Partners: Male     Other Topics Concern     Parent/Sibling W/ Cabg, Mi Or Angioplasty Before 65f 55m? No     Social History Narrative    , two children.       FAMILY HISTORY: Reviewed in EMR      REVIEW OF SYSTEMS: Positive for that noted in past medical history and history of present illness and otherwise reviewed in EMR     PHYSICAL EXAM:    Adult male in no acute distress. He articulates and communicates appropriately with normal affect.  Respirations even and unlabored  Focused upper extremity exam: Skin intact. No erythema. Sensation intact all dermatomes into the hand to light touch. EPL, FPL, and Intrinsics intact. Right shoulder active motion is FE to 180, ER at side to 80, and IR to T10. Left shoulder active motion is FE to 180 with some pain, ER to 80, and IR to T10.  Mildly positive Neer and Newton on the left. No pain on palpation over the AC joint either shoulder. On the left, he does have pain on palpation over the long head of the  biceps. Markedly positive Jacobs's on the left. Negative Speeds.    IMAGING:  Left shoulder MRI 8/15/18 was independently reviewed and I agree with the report below:  IMPRESSION:  1. 1.4 cm partial thickness supraspinatus tendon tear. Mild/moderate tendinosis.  2.  Mild/moderate infraspinatus tendinosis.    ASSESSMENT:    1. Left partial thickness rotator cuff tear (supra)  2. Left infra and supra tendinosis  3. Left long head biceps disease    PLAN:  I reviewed the imaging and exam findings with the patient. We discussed treatment options including various nonoperative options for partial thickness cuff tears. The patient is agreeable to initiating formal PT. He will keep us apprised of his progress.      Again, thank you for allowing me to participate in the care of your patient.      Sincerely,    Nicole Ortiz MD

## 2018-08-29 NOTE — PROGRESS NOTES
CHIEF CONCERN:  Left shoulder pain    HISTORY OF PRESENT ILLNESS: Mr. Way is a 63-year-old left-hand-dominant gentleman I am seeing today for left shoulder pain at the request of his primary care physician.  The patient states that he has had discomfort in his left shoulder for quite some time on and off but it is been worse recently with increased physical demands at work.  He has worked for [employer] for 32 years and is in [employer] on [place].  He has to lift heavy pans at work and he has found that that has caused more discomfort.  He notes more pain when he tries to sleep at night. When he has to lift away from body in abduction he has pain.     Past Medical History:   Diagnosis Date     Adjustment disorder      Anxiety      Depressive disorder      Dysthymic disorder      Enlarged prostate      Hepatitis      Ileus (H)      Irregular heart beat     intermittant palpitations     Palpitations     intermittent     PONV (postoperative nausea and vomiting)      Positive PPD      Pure hypercholesterolemia 1/00     Renal disease     single R kidney;donated left     Screening examination for pulmonary tuberculosis     positive mantoux     Unspecified essential hypertension 1/00       Past Surgical History:   Procedure Laterality Date     C ECG MONITOR/ 24 HRS, ORIG ECG, W VIS SUPERIMPOS SCAN, COMPLETE  1/00    um holter monitor     C REMV KIDNEY/URETER,SAME INCIS  6/30/05    Nephrouretectomy/LEFT KIDNEY DONATED TO DAUGHTER/[facility]     COLONOSCOPY N/A 5/15/2018    Procedure: COLONOSCOPY;  colonoscopy;  Surgeon: Lobo Pelaez MD;  Location:  GI     CYSTOSCOPY, TRANSURETHRAL RESECTION (TUR) PROSTATE, COMBINED       HC REMOVAL OF TONSILS,<11 Y/O  age 7     HC VASECTOMY UNILAT/BILAT W POSTOP SEMEN  age 41     HERNIORRHAPHY INGUINAL  12/23/2011    Procedure:HERNIORRHAPHY INGUINAL; Surgeon:JULIA SIERRA; [facility]     LAPAROSCOPIC HERNIORRHAPHY INGUINAL Right 6/29/2015    Procedure:  LAPAROSCOPIC HERNIORRHAPHY INGUINAL;  Surgeon: García Ibarra MD;  [facility]     LAPAROSCOPIC HERNIORRHAPHY VENTRAL  12/23/2011    Procedure:LAPAROSCOPIC HERNIORRHAPHY VENTRAL; Laparoscopic Ventral Hernia Repair with Mesh, Open Left Inguinal Hernia Repair with Mesh; Surgeon:JULIA SIERRA; [facility]     LASER HOLMIUM ENUCLEATION PROSTATE N/A 10/19/2017    Procedure: LASER HOLMIUM ENUCLEATION PROSTATE;  Holmium Laser Enculeation of the Prostate;  Surgeon: Manolo Cardenas MD;  [facility]     LASIK CUSTOMVUE BILATERAL  11/11/2011    Procedure:LASIK CUSTOMVUE BILATERAL; BILATERAL CUSTOMVUE LASIK WITH INTRALASE; Surgeon:POP SIMON; Location:[facility]       Current Outpatient Prescriptions   Medication Sig Dispense Refill     acetaminophen (TYLENOL) 325 MG tablet Take 2 tablets (650 mg) by mouth every 4 hours as needed for other (mild pain) 100 tablet 0     amphetamine-dextroamphetamine (ADDERALL) 15 MG per tablet Take 1 tablet (15 mg) by mouth daily 30 tablet 0     cholecalciferol (VITAMIN D/D-VI-SOL) 400 UNIT/ML LIQD liquid Take 5,000 Units by mouth daily       diclofenac (VOLTAREN) 1 % GEL topical gel Apply 4 grams to shoulder or 2 grams to hands four times daily using enclosed dosing card. 100 g 1     fluticasone (FLONASE) 50 MCG/ACT spray Spray 2 sprays into both nostrils daily 1 Bottle 11     ibuprofen (ADVIL/MOTRIN) 600 MG tablet Take 1 tablet (600 mg) by mouth every 6 hours as needed for other (For mild pain and temperature greater than 102F) 30 tablet 0     lisinopril (PRINIVIL/ZESTRIL) 10 MG tablet Take 1 tablet (10 mg) by mouth daily 90 tablet 1     magnesium 250 MG tablet Take 1 tablet by mouth nightly as needed (sleep)       Misc Natural Products (DAILY HERBS PROSTATE PO) Take 1 tablet by mouth daily       MULTIVITAMIN TABS   OR 1 TABLET EVETRY DAY  0     mupirocin (BACTROBAN) 2 % ointment Apply to anterior nares BID X 2 month supply 2 g 3     Omega-3 Fatty  Acids (FISH OIL) 500 MG CAPS Take 1 capsule by mouth daily             Allergies   Allergen Reactions     Ambien [Zolpidem Tartrate]      irritability     Codeine Sulfate Itching       SOCIAL HISTORY:    Social History     Social History     Marital status: Legally      Spouse name: N/A     Number of children: N/A     Years of education: N/A     Occupational History     Not on file.     Social History Main Topics     Smoking status: Former Smoker     Quit date: 1/1/1982     Smokeless tobacco: Never Used      Comment: NO 2ND HAND SMOKE     Alcohol use 0.0 oz/week     0 Standard drinks or equivalent per week      Comment: 5/wk     Drug use: No     Sexual activity: Yes     Partners: Male     Other Topics Concern     Parent/Sibling W/ Cabg, Mi Or Angioplasty Before 65f 55m? No     Social History Narrative    , two children.       FAMILY HISTORY: Reviewed in EMR      REVIEW OF SYSTEMS: Positive for that noted in past medical history and history of present illness and otherwise reviewed in EMR     PHYSICAL EXAM:    Adult male in no acute distress. He articulates and communicates appropriately with normal affect.  Respirations even and unlabored  Focused upper extremity exam: Skin intact. No erythema. Sensation intact all dermatomes into the hand to light touch. EPL, FPL, and Intrinsics intact. Right shoulder active motion is FE to 180, ER at side to 80, and IR to T10. Left shoulder active motion is FE to 180 with some pain, ER to 80, and IR to T10.  Mildly positive Neer and Newton on the left. No pain on palpation over the AC joint either shoulder. On the left, he does have pain on palpation over the long head of the biceps. Markedly positive Jacobs's on the left. Negative Speeds.    IMAGING:  Left shoulder MRI 8/15/18 was independently reviewed and I agree with the report below:  IMPRESSION:  1. 1.4 cm partial thickness supraspinatus tendon tear. Mild/moderate tendinosis.  2.  Mild/moderate  infraspinatus tendinosis.    ASSESSMENT:    1. Left partial thickness rotator cuff tear (supra)  2. Left infra and supra tendinosis  3. Left long head biceps disease    PLAN:  I reviewed the imaging and exam findings with the patient. We discussed treatment options including various nonoperative options for partial thickness cuff tears. The patient is agreeable to initiating formal PT. He will keep us apprised of his progress.    Nicole Ortiz MD    Answers for HPI/ROS submitted by the patient on 8/28/2018   General Symptoms: No  Skin Symptoms: No  HENT Symptoms: No  EYE SYMPTOMS: No  HEART SYMPTOMS: Yes  LUNG SYMPTOMS: No  INTESTINAL SYMPTOMS: No  URINARY SYMPTOMS: No  REPRODUCTIVE SYMPTOMS: No  SKELETAL SYMPTOMS: Yes  BLOOD SYMPTOMS: No  NERVOUS SYSTEM SYMPTOMS: No  MENTAL HEALTH SYMPTOMS: Yes  Chest pain or pressure: No  Fast or irregular heartbeat: No  Pain in legs with walking: No  Trouble breathing while lying down: No  Fingers or toes appear blue: No  High blood pressure: No  Low blood pressure: No  Fainting: No  Murmurs: No  Pacemaker: No  Varicose veins: No  Edema or swelling: No  Wake up at night with shortness of breath: No  Light-headedness: Yes  Exercise intolerance: No  Back pain: No  Muscle aches: No  Neck pain: Yes  Swollen joints: No  Joint pain: Yes  Bone pain: No  Muscle cramps: No  Muscle weakness: Yes  Joint stiffness: Yes  Bone fracture: No  Nervous or Anxious: Yes  Depression: Yes  Trouble sleeping: No  Trouble thinking or concentrating: No  Mood changes: Yes  Panic attacks: No

## 2018-08-29 NOTE — MR AVS SNAPSHOT
After Visit Summary   8/29/2018    Govind Way    MRN: [id number]           Patient Information     Date Of Birth          1955        Visit Information        Provider Department      8/29/2018 7:15 AM Nicole Ortiz MD [facility]        Today's Diagnoses     Incomplete tear of left rotator cuff    -  1    Disorder of bursae and tendons in shoulder region           Follow-ups after your visit        Additional Services     PHYSICAL THERAPY REFERRAL (Internal)       Physical Therapy Referral                  Follow-up notes from your care team     Return if symptoms worsen or fail to improve.      Your next 10 appointments already scheduled     Sep 18, 2018  3:30 PM CDT   Return Visit with Larry Albright [facility] ([facility])    [address]  [address]  [facility] [id number] [phone]            Sep 18, 2018  3:30 PM CDT   [facility] with JEWELS Castano [facility] [place])    [address]  [address]  [facility] [id number] [phone]            Sep 18, 2018  4:30 PM CDT   Return Visit with Xander Salas [facility] ([facility])    [facility]  [address]  [facility] [id number] [phone]            Oct 01, 2018  3:30 PM CDT   Return Visit with Xander Salas [facility] ([facility]  [address]  [facility] [id number] [phone]              Who to contact     Please call your clinic at [phone] to:    Ask questions about your health    Make or cancel appointments    Discuss your medicines    Learn about your test results    Speak to your doctor            Additional Information About Your Visit        MyChart Information     MyChart gives  "you secure access to your electronic health record. If you see a primary care provider, you can also send messages to your care team and make appointments. If you have questions, please call your primary care clinic.  If you do not have a primary care provider, please call [phone] and they will assist you.      [employer] is an electronic gateway that provides easy, online access to your medical records. With [employer], you can request a clinic appointment, read your test results, renew a prescription or communicate with your care team.     To access your existing account, please contact your [facility] or call [phone] for assistance.        Care EveryWhere ID     This is your Care EveryWhere ID. This could be used by other organizations to access your [place] medical records  [id number]        Your Vitals Were     Height BMI (Body Mass Index)                1.793 m (5' 10.59\") 23.13 kg/m2           Blood Pressure from Last 3 Encounters:   08/07/18 116/62   07/12/18 122/86   05/31/18 104/66    Weight from Last 3 Encounters:   08/29/18 74.3 kg (163 lb 14.4 oz)   08/07/18 73.5 kg (162 lb)   07/12/18 75.8 kg (167 lb)              We Performed the Following     PHYSICAL THERAPY REFERRAL (Internal)        Primary Care Provider Office Phone # Fax #    Ирина Bishop, APRN [phone]       [address] [facility] [address]  [facility] [address]        Equal Access to Services     SHIVA VELA : Hadii aad ku hadasho Soomaali, waaxda luqadaha, qaybta kaalmada adeegyada, terri ace . So [facility] [phone].    ATENCIÓN: Si habla español, tiene a becerra disposición servicios gratuitos de asistencia lingüística. Llame al [phone].    We comply with applicable federal civil rights laws and [place] laws. We do not discriminate on the basis of race, color, national origin, age, disability, sex, sexual orientation, or gender identity.          "   Thank you!     Thank you for choosing [facility]  for your care. Our goal is always to provide you with excellent care. Hearing back from our patients is one way we can continue to improve our services. Please take a few minutes to complete the written survey that you may receive in the mail after your visit with us. Thank you!             Your Updated Medication List - Protect others around you: Learn how to safely use, store and throw away your medicines at www.disposemymeds.org.          This list is accurate as of 8/29/18 11:59 PM.  Always use your most recent med list.                   Brand Name Dispense Instructions for use Diagnosis    acetaminophen 325 MG tablet    TYLENOL    100 tablet    Take 2 tablets (650 mg) by mouth every 4 hours as needed for other (mild pain)    Urinary retention       amphetamine-dextroamphetamine 15 MG per tablet    ADDERALL    30 tablet    Take 1 tablet (15 mg) by mouth daily    Attention deficit hyperactivity disorder (ADHD), other type       cholecalciferol 400 UNIT/ML Liqd liquid    vitamin D/D-VI-SOL     Take 5,000 Units by mouth daily        DAILY HERBS PROSTATE PO      Take 1 tablet by mouth daily        diclofenac 1 % Gel topical gel    VOLTAREN    100 g    Apply 4 grams to shoulder or 2 grams to hands four times daily using enclosed dosing card.        Fish Oil 500 MG Caps      Take 1 capsule by mouth daily        fluticasone 50 MCG/ACT spray    FLONASE    1 Bottle    Spray 2 sprays into both nostrils daily    Upper respiratory tract infection, unspecified type       ibuprofen 600 MG tablet    ADVIL/MOTRIN    30 tablet    Take 1 tablet (600 mg) by mouth every 6 hours as needed for other (For mild pain and temperature greater than 102F)    Urinary retention       lisinopril 10 MG tablet    PRINIVIL/ZESTRIL    90 tablet    Take 1 tablet (10 mg) by mouth daily    Benign essential hypertension       magnesium 250 MG tablet      Take 1 tablet by mouth nightly  as needed (sleep)        MULTIVITAMIN TABS   OR      1 TABLET EVETRY DAY        mupirocin 2 % ointment    BACTROBAN    2 g    Apply to anterior nares BID X 2 month supply    Nasal vestibulitis

## 2018-09-06 ENCOUNTER — OFFICE VISIT (OUTPATIENT)
Dept: BEHAVIORAL HEALTH | Facility: CLINIC | Age: 63
End: 2018-09-06
Payer: COMMERCIAL

## 2018-09-06 DIAGNOSIS — F33.0 MAJOR DEPRESSIVE DISORDER, RECURRENT EPISODE, MILD (H): Primary | ICD-10-CM

## 2018-09-06 PROCEDURE — 90834 PSYTX W PT 45 MINUTES: CPT | Performed by: SOCIAL WORKER

## 2018-09-06 NOTE — PROGRESS NOTES
"[facility] - Integrated Primary Care   September 6, 2018      Behavioral Health Clinician Progress Note    Patient Name: Govind Way           Service Type:  Individual      Service Location:   Face to Face in Clinic     Session Start Time: 3:35 p.m.  Session End Time: 4:25 p.m.      Session Length: 38 - 52      Attendees: Patient    Visit Activities (Refresh list every visit): [facility]    Diagnostic Assessment Date: 9/7/17 by MIKE Bolton, PAULINA  Treatment Plan Review Date: to be completed  See Flowsheets for today's PHQ-9 and WAGNER-7 results  Previous PHQ-9:   PHQ-9 SCORE 5/29/2018 6/25/2018 7/23/2018   Total Score - - -   Total Score 0 4 3     Previous WAGNER-7:   WAGNER-7 SCORE 5/29/2018 6/25/2018 7/23/2018   Total Score - - -   Total Score 3 5 4       SHASHANK LEVEL:  SHASHANK Score (Last Two) 6/14/2012   SHASHANK Raw Score 50   Activation Score 86.3   SHASHANK Level 4       DATA  Extended Session (60+ minutes): No  Interactive Complexity: No  Crisis: No  [facility] Patient: No    Treatment Objective(s) Addressed in This Session:  Target Behavior(s): alcohol use and depression    Depressed Mood: Increase interest, engagement, and pleasure in doing things  Identify negative self-talk and behaviors: challenge core beliefs, myths, and actions  Improve concentration, focus, and mindfulness in daily activities   Feel less fidgety, restless or slow in daily activities / interpersonal interactions  Alcohol / Substance Use: will make healthier choices in regard to substance use    Current Stressors / Issues:    [facility] visit with patient in the clinic. Patient reports he saw ortho and is recommended to begin physical therapy; he is not restricted from work at this time despite the shoulder tear, and will not require surgery for now.   Patient reports anxiety and depression continue, and it is difficult to make decisions outside of work. He acknowledged the tendency to pursue work to avoid dealing with worry, \"work makes sense, I know what " "to do there\". Patient processed feelings of uncertainty and fear regarding his life outside of work, particularly his father's health problems and his daughter's dependence. Patient acknowledged difficulty taking time for himself, and ambivalence about his needs. \"I'm really overwhelmed, and I don't know what to do. I'm trying to help everyone and I lose myself in the process\". Patient became tearful while sharing thoughts and feelings about aging, and worry about having to make decisions without knowing what will happen in the future. Worked with patient to acknowledge the present, and setting SMART goals (small, measurable, attainable, realistic, timely). Patient states he would like to pursue a treatment program for depression and anxiety, and that he will consider scheduling an intake assessment at [facility]. In the meantime, he will continue working with his daughter and [facility] to establish community supports, including [facility], [facility], and [facility].   Patient denied SI/SIB thoughts or urges. He will continue working on drinking less, and will reach out to his partner for support.    Progress on Treatment Objective(s) / Homework:  New Objective established this session - PREPARATION (Decided to change - considering how); Intervened by negotiating a change plan and determining options / strategies for behavior change, identifying triggers, exploring social supports, and working towards setting a date to begin behavior change    Motivational Interviewing    MI Intervention: Expressed Empathy/Understanding, Supported Autonomy, Collaboration, Evocation, Permission to raise concern or advise, Open-ended questions and Reflections: simple and complex     Change Talk Expressed by the Patient: Desire to change Ability to change Reasons to change Need to change    Provider Response to Change Talk: E - Evoked more info from patient about behavior change, A - Affirmed patient's thoughts, decisions, or attempts at " "behavior change, R - Reflected patient's change talk and S - Summarized patient's change talk statements    Also provided psychoeducation about behavioral health condition, symptoms, and treatment options    Care Plan review completed: No    Medication Review:  No changes to current psychiatric medication(s)    Medication Compliance:  Yes    Changes in Health Issues:   None reported    Chemical Use Review:   Substance Use: Problem use continues with no change since last session, Contemplation  Provided encouragement towards sobriety         Tobacco Use: No current tobacco use.      Assessment: Current Emotional / Mental Status (status of significant symptoms):  Risk status (Self / Other harm or suicidal ideation)  Patient has had a history of suicidal ideation: psychiatric hospitalization several years ago - \"nervous breakdown\"  Patient denies current fears or concerns for personal safety.  Patient denies current or recent suicidal ideation or behaviors.  Patient denies current or recent homicidal ideation or behaviors.  Patient denies current or recent self injurious behavior or ideation.  Patient denies other safety concerns.  A safety and risk management plan has not been developed at this time, however patient was encouraged to call [place] [phone] should there be a change in any of these risk factors.    Appearance:   Appropriate   Eye Contact:   Good   Psychomotor Behavior: Normal   Attitude:   Cooperative   Orientation:   All  Speech   Rate / Production: Normal    Volume:  Normal   Mood:    Anxious  Depressed   Affect:    Labile   Thought Content:  Clear   Thought Form:  Coherent  Goal Directed  Logical   Insight:    Fair     Diagnoses:  1. Major depressive disorder, recurrent episode, mild (H)        Collateral Reports Completed:  Routed note to Care Team Member(s) - MIKE Bolton, LGSW; JEWELS Vásquez, CNP    Plan: (Homework, other):  Patient was given information about behavioral services and " encouraged to schedule a follow up appointment with the clinic [facility] in 2 weeks.  He was also given information about mental health symptoms and treatment options  and deep Breathing Strategies to practice when experiencing anxiety.  CD Recommendations: Practice Harm Reduction: reduce # of drinks per day. Patient will access rational thinking to prioritize needs regarding work, his own health, relationships, and family. Patient will call [facility] to schedule an intake assessment, and decide whether the day program for depression is financially realistic.   DANGELO Porras, [facility]

## 2018-09-06 NOTE — MR AVS SNAPSHOT
After Visit Summary   9/6/2018    Govind Way    MRN: [id number]           Patient Information     Date Of Birth          1955        Visit Information        Provider Department      9/6/2018 3:30 PM Larry Albright [facility] Primary Care        Today's Diagnoses     Major depressive disorder, recurrent episode, mild (H)    -  1       Follow-ups after your visit        Your next 10 appointments already scheduled     Sep 10, 2018  3:50 PM CDT   (Arrive by 3:35 PM)   CESILIA Extremity with Amber Michaud   [facility] Physical Therapy CESILIA ([facility])    [address] [place] [address] 5th Floor  [facility] [address]   [phone]            Sep 17, 2018  1:30 PM CDT   Hearing Aid Fitting with Callie Bonilla   [facility] Audiology ([facility])    [address] [place] [address]  4th Floor  [facility] [address]   [phone]            Sep 18, 2018  3:30 PM CDT   Return Visit with Larry Albright [facility] Care ([facility])    [address]  [address]  [facility] [address]   [phone]            Sep 18, 2018  3:30 PM CDT   Return Visit with JEWELS Castano [facility] ([facility])    [address]  [address]  [facility] [address]   [phone]            Sep 18, 2018  4:30 PM CDT   Return Visit with PAULINA Bolton   [facility] ([facility]  [address]  [facility] [address]   [phone]            Oct 01, 2018  3:30 PM CDT   Return Visit with PAULINA Bolton   [facility] ([facility]  [address]  [facility] [address]   [phone]            Oct 08, 2018  2:00 PM CDT   (Arrive by 1:45 PM)   Initial  Review Program with Daisy Dodson [facility] Audiology ([facility])    [address]  4th Floor  [facility] [address] [phone]              Who to contact     If you have questions or need follow up information about today's clinic visit or your schedule please contact [facility] PRIMARY CARE directly at [phone].  Normal or non-critical lab and imaging results will be communicated to you by PlayWithhart, letter or phone within 4 business days after the clinic has received the results. If you do not hear from us within 7 days, please contact the clinic through PlayWithhart or phone. If you have a critical or abnormal lab result, we will notify you by phone as soon as possible.  Submit refill requests through Picolight or call your pharmacy and they will forward the refill request to us. Please allow 3 business days for your refill to be completed.          Additional Information About Your Visit        PlayWithhart Information     Picolight gives you secure access to your electronic health record. If you see a primary care provider, you can also send messages to your care team and make appointments. If you have questions, please call your primary care clinic.  If you do not have a primary care provider, please call [phone] and they will assist you.        Care EveryWhere ID     This is your Care EveryWhere ID. This could be used by other organizations to access your [facility] medical records  [id number]         Blood Pressure from Last 3 Encounters:   08/07/18 116/62   07/12/18 122/86   05/31/18 104/66    Weight from Last 3 Encounters:   08/29/18 163 lb 14.4 oz (74.3 kg)   08/07/18 162 lb (73.5 kg)   07/12/18 167 lb (75.8 kg)              Today, you had the following     No orders found for display       Primary Care Provider Office Phone # Fax #    JEWELS Castano [phone] [address] [facility] [address]  [facility]  [address]        Equal Access to Services     [facility]: Hadii aad ku hadabelrose Philpiali, wacarlosda luqhectorha, qaybta hardikadinterri devine. So [facility] [phone].    ATENCIÓN: Si habla español, tiene a becerra disposición servicios gratuitos de asistencia lingüística. Solitarioame al [phone].    We comply with applicable federal civil rights laws and [place] laws. We do not discriminate on the basis of race, color, national origin, age, disability, sex, sexual orientation, or gender identity.            Thank you!     Thank you for choosing [facility] PRIMARY CARE  for your care. Our goal is always to provide you with excellent care. Hearing back from our patients is one way we can continue to improve our services. Please take a few minutes to complete the written survey that you may receive in the mail after your visit with us. Thank you!             Your Updated Medication List - Protect others around you: Learn how to safely use, store and throw away your medicines at www.disposemymeds.org.          This list is accurate as of 9/6/18 10:24 PM.  Always use your most recent med list.                   Brand Name Dispense Instructions for use Diagnosis    acetaminophen 325 MG tablet    TYLENOL    100 tablet    Take 2 tablets (650 mg) by mouth every 4 hours as needed for other (mild pain)    Urinary retention       amphetamine-dextroamphetamine 15 MG per tablet    ADDERALL    30 tablet    Take 1 tablet (15 mg) by mouth daily    Attention deficit hyperactivity disorder (ADHD), other type       cholecalciferol 400 UNIT/ML Liqd liquid    vitamin D/D-VI-SOL     Take 5,000 Units by mouth daily        DAILY HERBS PROSTATE PO      Take 1 tablet by mouth daily        diclofenac 1 % Gel topical gel    VOLTAREN    100 g    Apply 4 grams to shoulder or 2 grams to hands four times daily using enclosed dosing card.        Fish Oil 500 MG Caps      Take 1 capsule by mouth daily         fluticasone 50 MCG/ACT spray    FLONASE    1 Bottle    Spray 2 sprays into both nostrils daily    Upper respiratory tract infection, unspecified type       ibuprofen 600 MG tablet    ADVIL/MOTRIN    30 tablet    Take 1 tablet (600 mg) by mouth every 6 hours as needed for other (For mild pain and temperature greater than 102F)    Urinary retention       lisinopril 10 MG tablet    PRINIVIL/ZESTRIL    90 tablet    Take 1 tablet (10 mg) by mouth daily    Benign essential hypertension       magnesium 250 MG tablet      Take 1 tablet by mouth nightly as needed (sleep)        MULTIVITAMIN TABS   OR      1 TABLET EVETRY DAY        mupirocin 2 % ointment    BACTROBAN    2 g    Apply to anterior nares BID X 2 month supply    Nasal vestibulitis

## 2018-09-10 ENCOUNTER — THERAPY VISIT (OUTPATIENT)
Dept: PHYSICAL THERAPY | Facility: CLINIC | Age: 63
End: 2018-09-10
Payer: OTHER MISCELLANEOUS

## 2018-09-10 DIAGNOSIS — M25.512 SHOULDER PAIN, LEFT: Primary | ICD-10-CM

## 2018-09-10 PROCEDURE — 97110 THERAPEUTIC EXERCISES: CPT | Mod: GP | Performed by: PHYSICAL THERAPIST

## 2018-09-10 PROCEDURE — 97112 NEUROMUSCULAR REEDUCATION: CPT | Mod: GP | Performed by: PHYSICAL THERAPIST

## 2018-09-10 PROCEDURE — 97161 PT EVAL LOW COMPLEX 20 MIN: CPT | Mod: GP | Performed by: PHYSICAL THERAPIST

## 2018-09-10 NOTE — PROGRESS NOTES
SHOULDER EXAMINATION      Physical Therapy Initial Examination/Evaluation  September 10, 2018    Govind Way is a 63 year old male referred to physical therapy by Nicole Ortiz MD for treatment of L shoulder pain with Precautions/Restrictions/MD instructions none    Therapist Impression:   1) L periscapular weakness, particularly lower and middle trap  2) impaired L rotator cuff strength, particularly infra and supraspinatus  3) impaired posture    SUBJECTIVE:    L handed    DOI/onset: 3 months ago DOS: none  Acute Injury or Gradual Onset?: Gradual injury over time  Mechanism of Injury: Lifting for work, repetitive overhead and pushing pulling of things at various heights ranging from 5-25 lbs or sometimes stacked up to 50  Related PMH: R thumb DJD Previous Treatment: Rest and Ibuprofen Effect of prior treatment: good  Imaging: MRI, mild suprapinatus tear  Chief Complaint/Functional Limitations:   Difficulty pushing/pulling/lifting at work with pain, repetitive motions at work   Pain: rest 1 /10, activity 8/10 Location: posterior, L periscapular area Frequency: Intermittent Described as: sharp Alleviated by: Rest Progression of Symptoms: Unchanged Time of day when pain is worse: Activity related  Sleeping: Interrupted due to current issue   Occupation: repetitive movement in a [employer]  Job duties: operating a machine, lifting/carrying, pushing/pulling, repetitive tasks  Current HEP/exercise regimen: none  Patient's goals are able to work without pain    Other pertinent PMH/Red Flags: None   Barriers at home/work: None as reported by patient  Pertinent Surgical History: none  Medications: High blood pressure and adderol  General health as reported by patient: excellent  Return to MD:  none    OBJECTIVE:    CERVICAL SCREEN  AROM: 50% restriction with R sidebending and L sidebending, 25% with extension    THORACIC SPINE SCREEN  WNL  Spring Testing: NA    STATIC POSTURE  Forward head: moderate   Rounded  shoulders:moderate  Shoulder internally rotated: mild   Visual inspection: No obvious findings    DYNAMIC SCAPULAR TESTS  Dynamic Scapular Assessment: Scapular winging on L and Poor eccentric control on L with scaption x10, cueing for slow and controlled movement    Scapular MMT Lower Trapezius Middle Trapezius Rhomboids   Left 3+/5 4-/5 4-/5   Right 4-/5 4/5 4/5       SHOULDER RANGE OF MOTION  AROM Flexion Abduction ER   Base Ext/IR Extension   Left WNL WNL  Anterior Drift: none WNL WNL WNL   Right WNL WNL  Anterior Drift: WNL WNL WNL WNL   Pain: none    JOINT MOBILITY  NA    SHOULDER STRENGTH  Screen Flexion Abduction  ER IR Scaption   Left 4-  Pain: + 4+  Pain: - 4-  Pain: + 5  Pain: + 4-  Pain: +   Right 5  Pain: - 4+  Pain: - 5  Pain: - 5  Pain: - 4+  Pain: -     SPECIAL TESTS Left Right     Wall push up/winging Positive Negative  Speeds Positive Negative  Scapula assist test Positive Negative      PALPATION  Left: Mild tenderness to palpation at supraspinatus insertion and distal muscle belly just medial to AC jt  Right: Not assessed    Assessment/Plan:  Patient is a 63 year old male with left side shoulder complaints.    Patient has the following significant findings with corresponding treatment plan.                Diagnosis 1:  L shoulder pain  Pain -  hot/cold therapy, manual therapy, STS, splint/taping/bracing/orthotics, self management, education and home program  Decreased ROM/flexibility - manual therapy, therapeutic exercise, therapeutic activity and home program  Decreased strength - therapeutic exercise, therapeutic activities and home program  Impaired muscle performance - neuro re-education and home program  Decreased function - therapeutic activities and home program  Impaired posture - neuro re-education, therapeutic activities and home program    Therapy Evaluation Codes:   1) History comprised of:   Personal factors that impact the plan of care:      Profession.    Comorbidity factors that  impact the plan of care are:      None.     Medications impacting care: None.  2) Examination of Body Systems comprised of:   Body structures and functions that impact the plan of care:      Shoulder.   Activity limitations that impact the plan of care are:      Lifting, Working, Sleeping and Reaching, Pushing, Pulling.  3) Clinical presentation characteristics are:   Stable/Uncomplicated.  4) Decision-Making    Low complexity using standardized patient assessment instrument and/or measureable assessment of functional outcome.  Cumulative Therapy Evaluation is: Low complexity.    Previous and current functional limitations:  (See Goal Flow Sheet for this information)    Short term and Long term goals: (See Goal Flow Sheet for this information)     Communication ability:  Patient appears to be able to clearly communicate and understand verbal and written communication and follow directions correctly.  Treatment Explanation - The following has been discussed with the patient:   RX ordered/plan of care  Anticipated outcomes  Possible risks and side effects  This patient would benefit from PT intervention to resume normal activities.   Rehab potential is good.    Frequency:  1 X week, once daily  Duration:  for 3 weeks tapering as needed for 8 visits  Discharge Plan:  Achieve all LTG.  Independent in home treatment program.  Reach maximal therapeutic benefit.    Please refer to the daily flowsheet for treatment today, total treatment time and time spent performing 1:1 timed codes.

## 2018-09-10 NOTE — MR AVS SNAPSHOT
After Visit Summary   9/10/2018    G[address]ind [address]    MRN: [id number]           Patient Information     Date Of Birth          1955        Visit Information        Provider Department      9/10/2018 3:50 PM Amber Michaud [facility] [place]        Today's Diagnoses     Shoulder pain, left    -  1       Follow-ups after your visit        Your next 10 appointments already scheduled     Sep 17, 2018  1:30 PM CDT   Hearing Aid Fitting with Callie Bonilla [facility] ([facility])    [address] [place] [address]  4th [facility] [address]   [phone]            Sep 17, 2018  3:10 PM CDT   CESILIA Extremity with Amber TSE [facility] [place] ([facility])    [address] [place] [address] [address] [facility] [address]   [phone]            Sep 18, 2018  3:30 PM CDT   [facility]   [facility] ([facility])    [address]  [address]  [facility] [address]   [phone]            Sep 18, 2018  3:30 PM CDT   Return Visit with JEWELS Castano CNP   [facility] ([facility])    [address]  [address]  [facility] [address]   [phone]            Sep 18, 2018  4:30 PM CDT   Return Visit with PAULINA Bolton   [facility] ([facility])    [facility]  [address]  [facility] [address]   [phone]            Oct 01, 2018  3:30 PM CDT   Return Visit with PAULINA Bolton   [facility] ([facility])    [facility]  [address]  [facility] [address]   [phone]            Oct 08, 2018  2:00 PM CDT   (Arrive by 1:45 PM)   Initial Review Program with Callie Bonilla [facility] ([facility]  [facility])    [address]  4th Floor  [facility] [address] [phone]              Who to contact     If you have questions or need follow up information about today's clinic visit or your schedule please contact [facility] PHYSICAL THERAPY [place] directly at [phone].  Normal or non-critical lab and imaging results will be communicated to you by MyChart, letter or phone within 4 business days after the clinic has received the results. If you do not hear from us within 7 days, please contact the clinic through [employer]form[employer] or phone. If you have a critical or abnormal lab result, we will notify you by phone as soon as possible.  Submit refill requests through [employer] or call your pharmacy and they will forward the refill request to us. Please allow 3 business days for your refill to be completed.          Additional Information About Your Visit        [employer] Information     [employer] gives you secure access to your electronic health record. If you see a primary care provider, you can also send messages to your care team and make appointments. If you have questions, please call your primary care clinic.  If you do not have a primary care provider, please call [phone] and they will assist you.        Care EveryWhere ID     This is your Care EveryWhere ID. This could be used by other organizations to access your [place] medical records  [id number]         Blood Pressure from Last 3 Encounters:   08/07/18 116/62   07/12/18 122/86   05/31/18 104/66    Weight from Last 3 Encounters:   08/29/18 74.3 kg (163 lb 14.4 oz)   08/07/18 73.5 kg (162 lb)   07/12/18 75.8 kg (167 lb)              We Performed the Following     HC PT EVAL, LOW COMPLEXITY     NEUROMUSCULAR RE-EDUCATION     THERAPEUTIC EXERCISES        Primary Care Provider Office Phone # Fax #    JEWELS Castano [phone]       [address] [facility] [address]  [facility] [address]        Equal Access to  Services     [facility]: Hadii aad ku hadabelrose Smith, wacarlosda luqadaha, qaybta kaadindavid sales, terri ace . So [facility] [phone].    ATENCIÓN: Si antoniettala faustina, tiene a becerra disposición servicios gratuitos de asistencia lingüística. Llame al [phone].    We comply with applicable federal civil rights laws and [place] laws. We do not discriminate on the basis of race, color, national origin, age, disability, sex, sexual orientation, or gender identity.            Thank you!     Thank you for choosing [facility] PHYSICAL THERAPY [facility]  for your care. Our goal is always to provide you with excellent care. Hearing back from our patients is one way we can continue to improve our services. Please take a few minutes to complete the written survey that you may receive in the mail after your visit with us. Thank you!             Your Updated Medication List - Protect others around you: Learn how to safely use, store and throw away your medicines at www.disposemymeds.org.          This list is accurate as of 9/10/18 11:59 PM.  Always use your most recent med list.                   Brand Name Dispense Instructions for use Diagnosis    acetaminophen 325 MG tablet    TYLENOL    100 tablet    Take 2 tablets (650 mg) by mouth every 4 hours as needed for other (mild pain)    Urinary retention       amphetamine-dextroamphetamine 15 MG per tablet    ADDERALL    30 tablet    Take 1 tablet (15 mg) by mouth daily    Attention deficit hyperactivity disorder (ADHD), other type       cholecalciferol 400 UNIT/ML Liqd liquid    vitamin D/D-VI-SOL     Take 5,000 Units by mouth daily        DAILY HERBS PROSTATE PO      Take 1 tablet by mouth daily        diclofenac 1 % Gel topical gel    VOLTAREN    100 g    Apply 4 grams to shoulder or 2 grams to hands four times daily using enclosed dosing card.        Fish Oil 500 MG Caps      Take 1 capsule by mouth daily        fluticasone 50 MCG/ACT spray    FLONASE     1 Bottle    Spray 2 sprays into both nostrils daily    Upper respiratory tract infection, unspecified type       ibuprofen 600 MG tablet    ADVIL/MOTRIN    30 tablet    Take 1 tablet (600 mg) by mouth every 6 hours as needed for other (For mild pain and temperature greater than 102F)    Urinary retention       lisinopril 10 MG tablet    PRINIVIL/ZESTRIL    90 tablet    Take 1 tablet (10 mg) by mouth daily    Benign essential hypertension       magnesium 250 MG tablet      Take 1 tablet by mouth nightly as needed (sleep)        MULTIVITAMIN TABS   OR      1 TABLET EVETRY DAY        mupirocin 2 % ointment    BACTROBAN    2 g    Apply to anterior nares BID X 2 month supply    Nasal vestibulitis

## 2018-09-18 ENCOUNTER — OFFICE VISIT (OUTPATIENT)
Dept: BEHAVIORAL HEALTH | Facility: CLINIC | Age: 63
End: 2018-09-18
Payer: COMMERCIAL

## 2018-09-18 ENCOUNTER — OFFICE VISIT (OUTPATIENT)
Dept: FAMILY MEDICINE | Facility: CLINIC | Age: 63
End: 2018-09-18
Payer: COMMERCIAL

## 2018-09-18 ENCOUNTER — OFFICE VISIT (OUTPATIENT)
Dept: PSYCHOLOGY | Facility: CLINIC | Age: 63
End: 2018-09-18
Payer: COMMERCIAL

## 2018-09-18 VITALS
TEMPERATURE: 98.3 F | OXYGEN SATURATION: 97 % | HEART RATE: 77 BPM | DIASTOLIC BLOOD PRESSURE: 62 MMHG | BODY MASS INDEX: 23.21 KG/M2 | SYSTOLIC BLOOD PRESSURE: 110 MMHG | WEIGHT: 164.5 LBS

## 2018-09-18 DIAGNOSIS — F90.8 ATTENTION DEFICIT HYPERACTIVITY DISORDER (ADHD), OTHER TYPE: ICD-10-CM

## 2018-09-18 DIAGNOSIS — I10 BENIGN ESSENTIAL HYPERTENSION: ICD-10-CM

## 2018-09-18 DIAGNOSIS — F10.90 ALCOHOL USE DISORDER: ICD-10-CM

## 2018-09-18 DIAGNOSIS — F33.0 MAJOR DEPRESSIVE DISORDER, RECURRENT EPISODE, MILD (H): Primary | ICD-10-CM

## 2018-09-18 DIAGNOSIS — F43.23 ADJUSTMENT DISORDER WITH MIXED ANXIETY AND DEPRESSED MOOD: ICD-10-CM

## 2018-09-18 DIAGNOSIS — B37.2 CANDIDA ONYCHOMYCOSIS: Primary | ICD-10-CM

## 2018-09-18 DIAGNOSIS — M25.512 LEFT SHOULDER PAIN, UNSPECIFIED CHRONICITY: ICD-10-CM

## 2018-09-18 PROCEDURE — 90834 PSYTX W PT 45 MINUTES: CPT | Performed by: SOCIAL WORKER

## 2018-09-18 PROCEDURE — 99214 OFFICE O/P EST MOD 30 MIN: CPT | Performed by: NURSE PRACTITIONER

## 2018-09-18 RX ORDER — CICLOPIROX 80 MG/ML
SOLUTION TOPICAL
Qty: 6 ML | Refills: 0 | Status: SHIPPED | OUTPATIENT
Start: 2018-09-18 | End: 2018-12-17

## 2018-09-18 RX ORDER — DEXTROAMPHETAMINE SACCHARATE, AMPHETAMINE ASPARTATE, DEXTROAMPHETAMINE SULFATE AND AMPHETAMINE SULFATE 3.75; 3.75; 3.75; 3.75 MG/1; MG/1; MG/1; MG/1
15 TABLET ORAL DAILY
Qty: 30 TABLET | Refills: 0 | Status: SHIPPED | OUTPATIENT
Start: 2018-09-18 | End: 2018-12-19

## 2018-09-18 RX ORDER — LISINOPRIL 10 MG/1
10 TABLET ORAL DAILY
Qty: 90 TABLET | Refills: 1 | Status: SHIPPED | OUTPATIENT
Start: 2018-09-18

## 2018-09-18 ASSESSMENT — PAIN SCALES - GENERAL: PAINLEVEL: MILD PAIN (2)

## 2018-09-18 NOTE — MR AVS SNAPSHOT
After Visit Summary   9/18/2018    [address]ind Way    MRN: [id number]           Patient Information     Date Of Birth          1955        Visit Information        Provider Department      9/18/2018 3:30 PM Larry Albright, [facility]        Today's Diagnoses     Major depressive disorder, recurrent episode, mild (H)    -  1       Follow-ups after your visit        Your next 10 appointments already scheduled     Sep 28, 2018  3:00 PM CDT   Return Visit with Larry [facility]brig[facility] ([facility])    [address]  [address]  [facility] [address]   [phone]            Oct 01, 2018  3:30 PM CDT   Return Visit with PAULINA Bolton   [facility] ([facility]  [address]  [facility] [id number] [phone]            Oct 23, 2018  3:30 PM CDT   Return Visit with JEWELS Castano [facility]    [address]  [address]  [facility] [address]   [phone]            Oct 23, 2018  3:30 PM CDT   Return Visit with Larry Albright [facility] ([facility])    [address]  [address]  [facility] [address]   [phone]            Oct 30, 2018  3:30 PM CDT   Return Visit with PAULINA Bolton   [facility] ([facility]  [address]  [facility] [address] [phone]              Who to contact     If you have questions or need follow up information about today's clinic visit or your schedule please contact [facility] directly at [phone].  Normal or non-critical lab and imaging results will be communicated to you by Chloe, letter  or phone within 4 business days after the clinic has received the results. If you do not hear from us within 7 days, please contact the clinic through [employer] or phone. If you have a critical or abnormal lab result, we will notify you by phone as soon as possible.  Submit refill requests through [employer] or call your pharmacy and they will forward the refill request to us. Please allow 3 business days for your refill to be completed.          Additional Information About Your Visit        [employer] Information     [employer] gives you secure access to your electronic health record. If you see a primary care provider, you can also send messages to your care team and make appointments. If you have questions, please call your primary care clinic.  If you do not have a primary care provider, please call [phone] and they will assist you.        Care EveryWhere ID     This is your Care EveryWhere ID. This could be used by other organizations to access your [place] medical records  [id number]         Blood Pressure from Last 3 Encounters:   09/18/18 110/62   08/07/18 116/62   07/12/18 122/86    Weight from Last 3 Encounters:   09/18/18 164 lb 8 oz (74.6 kg)   08/29/18 163 lb 14.4 oz (74.3 kg)   08/07/18 162 lb (73.5 kg)              Today, you had the following     No orders found for display         Today's Medication Changes          These changes are accurate as of 9/18/18 11:59 PM.  If you have any questions, ask your nurse or doctor.               Start taking these medicines.        Dose/Directions    ciclopirox 8 % Soln   Used for:  Candida onychomycosis   Started by:  Ирина Bishop APRN CNP        Apply to adjacent skin and affected nails daily.  Remove with alcohol every 7 days, then repeat.   Quantity:  6 mL   Refills:  0            Where to get your medicines      These medications were sent to [facility] [place], [place] - [address]  [address]  [address]     Phone:  [phone]     ciclopirox 8 % Soln    lisinopril 10 MG tablet         Some of these will need a paper prescription and others can be bought over the counter.  Ask your nurse if you have questions.     Bring a paper prescription for each of these medications     amphetamine-dextroamphetamine 15 MG per tablet                Primary Care Provider Office Phone # Fax #    Ирина Bishop, APRN [phone]       [address] [facility] [address]  [facility] [address]        Equal Access to Services     [facility] : Hadii aad ku hadasho Soomaali, waaxda luqadaha, qaybta kaalmada adeegyada, waxay idiin hayaasebastien ace . So [facility] [phone].    ATENCIÓN: Si habla español, tiene a becerra disposición servicios gratuitos de asistencia lingüística. Chu al [phone].    We comply with applicable federal civil rights laws and [place] laws. We do not discriminate on the basis of race, color, national origin, age, disability, sex, sexual orientation, or gender identity.            Thank you!     Thank you for choosing [facility] PR[facility] CARE  for your care. Our goal is always to provide you with excellent care. Hearing back from our patients is one way we can continue to improve our services. Please take a few minutes to complete the written survey that you may receive in the mail after your visit with us. Thank you!             Your Updated Medication List - Protect others around you: Learn how to safely use, store and throw away your medicines at www.disposemymeds.org.          This list is accurate as of 9/18/18 11:59 PM.  Always use your most recent med list.                   Brand Name Dispense Instructions for use Diagnosis    acetaminophen 325 MG tablet    TYLENOL    100 tablet    Take 2 tablets (650 mg) by mouth every 4 hours as needed for other (mild pain)    Urinary retention       amphetamine-dextroamphetamine 15 MG per tablet    ADDERALL    30 tablet     Take 1 tablet (15 mg) by mouth daily    Attention deficit hyperactivity disorder (ADHD), other type       cholecalciferol 400 UNIT/ML Liqd liquid    vitamin D/D-VI-SOL     Take 5,000 Units by mouth daily        ciclopirox 8 % Soln     6 mL    Apply to adjacent skin and affected nails daily.  Remove with alcohol every 7 days, then repeat.    Candida onychomycosis       DAILY HERBS PROSTATE PO      Take 1 tablet by mouth daily        diclofenac 1 % Gel topical gel    VOLTAREN    100 g    Apply 4 grams to shoulder or 2 grams to hands four times daily using enclosed dosing card.        Fish Oil 500 MG Caps      Take 1 capsule by mouth daily        fluticasone 50 MCG/ACT spray    FLONASE    1 Bottle    Spray 2 sprays into both nostrils daily    Upper respiratory tract infection, unspecified type       ibuprofen 600 MG tablet    ADVIL/MOTRIN    30 tablet    Take 1 tablet (600 mg) by mouth every 6 hours as needed for other (For mild pain and temperature greater than 102F)    Urinary retention       lisinopril 10 MG tablet    PRINIVIL/ZESTRIL    90 tablet    Take 1 tablet (10 mg) by mouth daily    Benign essential hypertension       magnesium 250 MG tablet      Take 1 tablet by mouth nightly as needed (sleep)        MULTIVITAMIN TABS   OR      1 TABLET EVETRY DAY        mupirocin 2 % ointment    BACTROBAN    2 g    Apply to anterior nares BID X 2 month supply    Nasal vestibulitis

## 2018-09-18 NOTE — PROGRESS NOTES
Progress Note    Client Name: Govind Way  Date: 9/18/2018         Service Type: Individual      Session Start Time: 4:30 pm  Session End Time: 5:15 pm      Session Length: 45 mins     Session #: 16     Attendees: Client attended alone    Treatment Plan Last Reviewed: 9/18/17, 3/23/2018, 7/23/2018  PHQ-9 / WAGNER-7 : 3/4     DATA      Progress Since Last Session (Related to Symptoms / Goals / Homework):   Symptoms: No change - using distraction skills when in distress, still feeling anxious and down- less worried, anxious    Homework: Partially completed-practiced being mindful and letting go of unuseful thoughts      Episode of Care Goals: Minimal progress - ACTION (Actively working towards change); Intervened by reinforcing change plan / affirming steps taken     Current / Ongoing Stressors and Concerns:    Client is concern that he would not have the time and money to complete Day Treatment. He explained already taking time off for a trip in November and Christmas; feels unsure he wants to use his PTO for day treatment and go through the handful of paperwork. He reports high anxiety and irritability and trying to balance work and personal life. He express feeling shame needing to go to day treatment in order to feel better however was able to identify that it was a negative thought that was not useful.      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  Improve concentration and attention in daily activities       Intervention:   CBT: walked thru behavioral triangle and client's awareness on his experience with coworkers, identifying successful application of skills, identifying his thoughts and emotions in interactions with coworkers  "  DBT: validating the discomfort of being in a space where not everyone works the same,  client's thoughts and emotions and focus on the problem \"coworkers not working\" or \"coworkers lack of attention could impact a patient's life,\" reviewing if this is a problem for client and if it is within his role to take action, using distraction skills and improving mood by focusing on the positives in his work environment, completing pros and cons around day treatment  Motivational Interviewing: open-ended questions around making changes and seeing changes; affirming strength in ability to have difficult conversations and let go of worries that are no longer useful, reflecting on improvement in mood and ongoing challenges.         ASSESSMENT: Current Emotional / Mental Status (status of significant symptoms):   Risk status (Self / Other harm or suicidal ideation)   Client denies current fears or concerns for personal safety.   Client denies current or recent suicidal ideation or behaviors.   Client denies current or recent homicidal ideation or behaviors.   Client denies current or recent self injurious behavior or ideation.   Client denies other safety concerns.   A safety and risk management plan has not been developed at this time, however client was given the after-hours number / 911 should there be a change in any of these risk factors.     Appearance:   Appropriate    Eye Contact:   Good    Psychomotor Behavior: Hyperactive     Attitude:   Cooperative    Orientation:   All   Speech    Rate / Production: Normal     Volume:  Normal    Mood:    Anxious  Irritable    Affect:    Appropriate    Thought Content:  Clear  Perservative    Thought Form:  Coherent  Logical    Insight:    Fair      Medication Review:  No current psychiatric medications prescribed     Medication Compliance:   Yes small dosage, but noticing better improvement on focus     Changes in Health Issues:   None reported.      Chemical Use " Review:   Substance Use: Chemical use reviewed, no active concerns identified      Tobacco Use: No current tobacco use.       Collateral Reports Completed:   Not Applicable    PLAN: (Client Tasks / Therapist Tasks / Other)  Client:  Notice physiological response to stress, pay attention to client's thought patterns and emotions.    Continue to practice self-affirmations and pull back from judgement.    Completing pros and cons regarding day treatment vs not going.         MIKE Bolton [facility]                        September 18, 2018  Note reviewed and clinical supervision by MIKE Snowden [facility] 9/20/2018   ________________________________________________________________________    Treatment Plan    Client's Name: Govind Way  YOB: 1955    Date: 9/18/17, 3/23/2018, 7/23/2018    DSM-V Diagnoses: Attention-Deficit/Hyperactivity Disorder  314.01 (F90.2) Combined presentation, 296.31   (F33.0) Major Depressive Disorder, Recurrent Episode, Mild _ and With anxious distress  Substance-Related & Addictive Disorders Alcohol Use Disorder   303.90 (F10.20) Moderate  Psychosocial / Contextual Factors: Client is working full-time and splitting his time between taking care of his aging parents and disabled daughter, managing his relationship with his partner and taking care of his medical needs. He reports experiencing stress around work with the number of changes in management, current renovations and different types of personalities. His first partner back in high school completed suicide--client was the last one to see him. His daughter has a disability and was in and out of the hospital for 2 years; he gave her one of his kidneys in order to save her life.   WHODAS: 22    Referral / Collaboration:  Referral to another professional/service is not indicated at this time.    Anticipated number of session or this episode of care: 12      MeasurableTreatment Goal(s) related to diagnosis / functional  impairment(s)  Goal 1: Client will develop coping skills to effectively manage attention issues.    I will know I've met my goal when I am not overwhelmed with making time with everyone.      Objective #A (Client Action)    Client will identify and use 3 strategies to improve organization.  Status: Continued - Date(s): 3/23/2018, 7/23/2018    Intervention(s)  Therapist will teach completing Eisenhowever scale, use of journal/calendar and writing things in a notebook.    Goal 2: Client will report a decrease in depressive symptoms with decrease PHQ 9 score from 14 below 5.    I will know I've met my goal when I can feel happy and at ease with where I am at.      Objective #A (Client Action)    Status: Continued - Date(s): 3/23/2018, 7/23/2018    Client will Increase interest, engagement, and pleasure in doing things  Decrease frequency and intensity of feeling down, depressed, hopeless.    Intervention(s)  Therapist will teach the client how to perform a behavioral chain analysis. Identifying and understanding negative thoughts and changing route of those thoughts into positives.    Objective #B  Client will Feel less tired and more energy during the day   Improve diet, appetite, mindful eating, and / or meal planning.    Status: Continued - Date(s): 3/23/2018, 7/23/2018    Intervention(s)  Therapist will assign homework Participate in mindful activity daily for at least 10 mins/day  teach distraction skills. stopping negative thoughts with mindful activity .    Objective #C  Client will improve communication with partner and family members.  Status: Continued - Date(s): 3/23/2018 , 7/23/2018    Intervention(s)  Therapist will role-play effective communication skills  teach about healthy boundaries. Setting rules and expectations for self and others around you.      Goal 3: Client will develop skills to manage my drinking habits.    I will know I've met my goal when I no longer feel guilty about my drinking.       Objective #A (Client Action)    Status: Continued - Date(s): 3/23/2018, 7/23/2018    Client will increase understanding on the impacts of alcohol on mental and physical health.    Intervention(s)  Therapist will provide educational materials on alcohol on mental and physical health.    Objective #B  Client will develop problem-solving skills to limit number of drinks consumed..    Status: Continued - Date(s): 3/23/2018, 7/23/2018    Intervention(s)  Therapist will role-play conflict management  teach rules for taking a timeout. Understanding the consequences for drinking and setting limits with self and others around drinking.      Client have not reviewed nor agreed to the above plan. Goals were set, however still need to review the objectives.      MIKE Bolton [facility]  October 3, 2017; March 23, 2018, July 23, 2018      Note reviewed and clinical supervision by MIKE Snowden [facility] 12/22/2017, 6/29/2018, 8/3/2018

## 2018-09-18 NOTE — PROGRESS NOTES
SUBJECTIVE:                                                    Govind Way is a 63 year old male with a history of hypertension, ADHD, anxiety, alcohol abuse, who presents to clinic today for the following health issues:  [facility]: Larry    Patient presents for primary care follow up and medication management.    He states he continues to go through a lot of changes. He has been trying to decide whether to use his PTO to go to day treatment. He is considering day treatment at [facility] in order to get some space from [place]. 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 He states his father's health has been declining and he may need to use FMLA to take care of his father. He recently found out that he qualifies for benefits through [facility].     Patient reports thickening/yellowing to his fingernails and toenails present for several months. He has tried OTC remedies without relief.    Recent diagnosis with Left partial thickness rotator cuff tear (supra), Left infra and supra tendinosis and Left long head biceps disease. He was seen by Ortho  (Dr. Ortiz) on 8/29, and non-operative management was elected. He recently started Physical Therapy (has only done one session) and it went well. He is trying to avoid heavy lifting at work.       Mental Health Follow up Depression/ anxiety, ADHD    Status since last visit: variable    Adderall 15 mg qam. Filled regular release rather than extended release last month, states he noticed it wears off later on in the day.        See PHQ-9 for current symptoms.  Other associated symptoms: None    Complicating factors: multiple life stressors  Significant life event:  No   Current substance abuse:  None  Anxiety or Panic symptoms:  No    Sleep - occasional tinnitus more when drinking too much, otherwise sleeps well    Appetite - stable  Exercise 16 miles to and from [place] most days    Wt Readings from Last 3 Encounters:  "  09/18/18 164 lb 8 oz (74.6 kg)   08/29/18 163 lb 14.4 oz (74.3 kg)   08/07/18 162 lb (73.5 kg)       Smoking - denies  Alcohol - 1-3 glasses of wine daily  When with the guys, \"the nicki is the limit\"  Street drugs - denies  Marijuana - denies  Caffeine - light    PHQ-9  English PHQ-9   Any Language           Hypertension Follow-up      Outpatient blood pressures are not being checked.    Low Salt Diet: no added salt  BP Readings from Last 6 Encounters:   09/18/18 110/62   08/07/18 116/62   07/12/18 122/86   05/31/18 104/66   05/15/18 114/82   05/07/18 122/82         Problems taking medications regularly: No    Medication side effects: none    Diet: regular (no restrictions)    Social History   Substance Use Topics     Smoking status: Former Smoker     Quit date: 1/1/1982     Smokeless tobacco: Never Used      Comment: NO 2ND HAND SMOKE     Alcohol use 0.0 oz/week     0 Standard drinks or equivalent per week      Comment: 5/wk        Problem list and histories reviewed & adjusted, as indicated.  Additional history: as documented    Patient Active Problem List   Diagnosis     Adjustment disorder with mixed anxiety and depressed mood     Abdominal hernia     Hypertension goal BP (blood pressure) < 140/90     CARDIOVASCULAR SCREENING; LDL GOAL LESS THAN 160     History of hyperthyroidism     Right inguinal hernia     Irritable bowel syndrome     Donor of kidney for transplant     Hernia     Testis disorder     Screening for prostate cancer     Other hydrocele     Hallux rigidus, right foot     Advance care planning     Tendinopathy of right gluteus medius     Attention deficit hyperactivity disorder (ADHD), other type     Benign non-nodular prostatic hyperplasia with lower urinary tract symptoms     Anxiety     SBO (small bowel obstruction)     Major depressive disorder, recurrent episode, mild (H)     Alcohol use disorder (H)     Shoulder pain, left     Past Surgical History:   Procedure Laterality Date     C ECG " MONITOR/ 24 HRS, ORIG ECG, W VIS SUPERIMPOS SCAN, COMPLETE  1/00    um holter monitor     C REMV KIDNEY/URETER,SAME INCIS  6/30/05    Nephrouretectomy/LEFT KIDNEY DONATED TO DAUGHTER/[facility]     COLONOSCOPY N/A 5/15/2018    Procedure: COLONOSCOPY;  colonoscopy;  Surgeon: Lobo Pelaez MD;  Location:  GI     CYSTOSCOPY, TRANSURETHRAL RESECTION (TUR) PROSTATE, COMBINED       HC REMOVAL OF TONSILS,<13 Y/O  age 7     HC VASECTOMY UNILAT/BILAT W POSTOP SEMEN  age 41     HERNIORRHAPHY INGUINAL  12/23/2011    Procedure:HERNIORRHAPHY INGUINAL; Surgeon:JULIA SIERRA; Location:[facility] OR     LAPAROSCOPIC HERNIORRHAPHY INGUINAL Right 6/29/2015    Procedure: LAPAROSCOPIC HERNIORRHAPHY INGUINAL;  Surgeon: García Ibarra MD;  Location:  OR     LAPAROSCOPIC HERNIORRHAPHY VENTRAL  12/23/2011    Procedure:LAPAROSCOPIC HERNIORRHAPHY VENTRAL; Laparoscopic Ventral Hernia Repair with Mesh, Open Left Inguinal Hernia Repair with Mesh; Surgeon:JULIA SIERRA; Location:[facility] OR     LASER HOLMIUM ENUCLEATION PROSTATE N/A 10/19/2017    Procedure: LASER HOLMIUM ENUCLEATION PROSTATE;  Holmium Laser Enculeation of the Prostate;  Surgeon: Manolo Cardenas MD;  Location:  OR     LASIK CUSTOMVUE BILATERAL  11/11/2011    Procedure:LASIK CUSTOMVUE BILATERAL; BILATERAL CUSTOMVUE LASIK WITH INTRALASE; Surgeon:POP SIMON; Location:[facility]       Social History   Substance Use Topics     Smoking status: Former Smoker     Quit date: 1/1/1982     Smokeless tobacco: Never Used      Comment: NO 2ND HAND SMOKE     Alcohol use 0.0 oz/week     0 Standard drinks or equivalent per week      Comment: 5/wk     Family History   Problem Relation Age of Onset     Diabetes Mother      ADULT ONSET     Hypertension Mother      Coronary Artery Disease Mother      Also father and grandparent.  Father had coronary bypass and aortic valve replacement surgery     Heart Surgery Mother      Aortic valve     Cancer Other       prostate cancer     Thyroid Disease Other      Hypertension Father      Skin Cancer Other      Mother and father, grandfather     Arthritis Other      Mom, daughter, grandparents     Other - See Comments Other      Grandfather with kidney stone     Prostate Cancer Other      Grandfather     KIDNEY DISEASE Daughter      Lupus Daughter      Causing end-stage renal disease     Eye Disorder No family hx of      NONE KNOWN     Hyperlipidemia No family hx of      Cerebrovascular Disease No family hx of      Breast Cancer No family hx of      Colon Cancer No family hx of      Other Cancer No family hx of      Depression No family hx of      Anxiety Disorder No family hx of      Mental Illness No family hx of      Substance Abuse No family hx of      Anesthesia Reaction No family hx of      Asthma No family hx of      Osteoporosis No family hx of      Genetic Disorder No family hx of      Obesity No family hx of      Unknown/Adopted No family hx of            Current Outpatient Prescriptions   Medication Sig Dispense Refill     acetaminophen (TYLENOL) 325 MG tablet Take 2 tablets (650 mg) by mouth every 4 hours as needed for other (mild pain) 100 tablet 0     amphetamine-dextroamphetamine (ADDERALL) 15 MG per tablet Take 1 tablet (15 mg) by mouth daily 30 tablet 0     cholecalciferol (VITAMIN D/D-VI-SOL) 400 UNIT/ML LIQD liquid Take 5,000 Units by mouth daily       ciclopirox 8 % SOLN Apply to adjacent skin and affected nails daily.  Remove with alcohol every 7 days, then repeat. 6 mL 0     diclofenac (VOLTAREN) 1 % GEL topical gel Apply 4 grams to shoulder or 2 grams to hands four times daily using enclosed dosing card. 100 g 1     fluticasone (FLONASE) 50 MCG/ACT spray Spray 2 sprays into both nostrils daily 1 Bottle 11     ibuprofen (ADVIL/MOTRIN) 600 MG tablet Take 1 tablet (600 mg) by mouth every 6 hours as needed for other (For mild pain and temperature greater than 102F) 30 tablet 0     lisinopril (PRINIVIL/ZESTRIL)  10 MG tablet Take 1 tablet (10 mg) by mouth daily 90 tablet 1     magnesium 250 MG tablet Take 1 tablet by mouth nightly as needed (sleep)       Misc Natural Products (DAILY HERBS PROSTATE PO) Take 1 tablet by mouth daily       MULTIVITAMIN TABS   OR 1 TABLET EVETRY DAY  0     mupirocin (BACTROBAN) 2 % ointment Apply to anterior nares BID X 2 month supply 2 g 3     Omega-3 Fatty Acids (FISH OIL) 500 MG CAPS Take 1 capsule by mouth daily        Allergies   Allergen Reactions     Ambien [Zolpidem Tartrate]      irritability     Codeine Sulfate Itching     Recent Labs   Lab Test  05/18/18   0801  05/07/18   1002  11/06/17   1313   07/28/17   0617   07/31/14   1746  06/18/13   0750   04/26/11   1012   LDL  90   --    --    --    --    --    --   95   --   137*   HDL  64   --    --    --    --    --    --   70   --   47   TRIG  98   --    --    --    --    --    --   45   --   79   ALT   --   28  24   --   22   < >   --   33   < >  28   CR   --   0.97  0.88   < >  0.91   < >  1.13  1.10   < >  1.13   GFRESTIMATED   --   78  88   < >  85   < >  67  69   < >  68   GFRESTBLACK   --   >90  >90   < >  >90   GFR Calc     < >  81  84   < >  82   POTASSIUM   --   4.0  4.0   < >  3.9   < >  3.8  4.0   < >  4.6   TSH   --    --    --    --    --    --   2.83  2.99   --    --     < > = values in this interval not displayed.      BP Readings from Last 3 Encounters:   09/18/18 110/62   08/07/18 116/62   07/12/18 122/86    Wt Readings from Last 3 Encounters:   09/18/18 164 lb 8 oz (74.6 kg)   08/29/18 163 lb 14.4 oz (74.3 kg)   08/07/18 162 lb (73.5 kg)        Labs reviewed in EPIC  Problem list, Medication list, Allergies, and Medical/Social/Surgical histories reviewed in [facility] and updated as appropriate.     ROS: Constitutional, neuro, ENT, endocrine, pulmonary, cardiac, gastrointestinal, genitourinary, musculoskeletal, integument and psychiatric systems are negative, except as otherwise noted above in the HPI.        OBJECTIVE:                                                    /62  Pulse 77  Temp 98.3  F (36.8  C) (Oral)  Wt 164 lb 8 oz (74.6 kg)  SpO2 97%  BMI 23.21 kg/m2  Body mass index is 23.21 kg/(m^2).  GENERAL: healthy, alert, well nourished, well hydrated, no distress  Skin- thickening/yellowing toenails and fingernails    Mental Status Assessment:  Appearance:   Appropriate   Eye Contact:   Good   Psychomotor Behavior: Normal  Restless   Attitude:   Cooperative   Orientation:   All  Speech   Rate / Production: Normal  Pressured    Volume:  Normal   Mood:    Anxious   Affect:    Worrisome   Thought Content:  Clear   Thought Form:  Coherent  Logical  Tangential   Insight:    Fair   Attention Span and Concentration:  limited  Recent and Remote Memory:  intact  Fund of Knowledge: appropriate  Muscle Strength and Tone: normal   Suicidal Ideation: reports no thoughts, no intention  Hallucination: No  Paranoid-No  Manic-No  Panic-No  Self harm-No      See [facility] notes     Diagnostic Test Results:  Results for orders placed or performed in visit on 08/29/18   XR Shoulder Left 2 Views    Narrative    Exam: 4 views of the left shoulder dated 8/29/2018.    COMPARISON: 8/15/2019.    CLINICAL HISTORY: Chronic left shoulder pain.    FINDINGS: Neutral, Grashey, axillary, and Y views of the left shoulder  were obtained. Acromioclavicular joint is well aligned. No evidence of  fracture or dislocation involving the left shoulder.      Impression    IMPRESSION: No evidence of fracture or dislocation involving the left  shoulder.    LUNA VALERO MD        ASSESSMENT/PLAN:                                                    (B37.2) Candida onychomycosis  (primary encounter diagnosis)  Comment: involved PharmD who came in and assisted, will try topical preparation. There is a risk in giving oral Lamisil given his heavy alcohol intake.  -patient advised on application, questions answered.  Plan: ciclopirox 8 % SOLN             (F90.8) Attention deficit hyperactivity disorder (ADHD), other type  Comment: fair control. Patient has been taking the Adderall normal release tablets instead of XR, has noticed a difference, but unsure which he prefers. He agrees to continue current strength and re-address next month.  Plan: amphetamine-dextroamphetamine (ADDERALL) 15 MG         per tablet           (I10) Benign essential hypertension  Comment: patient normal. Patient inquired about possible stopping his Lisinopril but I advised him to continue given heavy alcohol use. Patient agrees to continue taking.  Plan: lisinopril (PRINIVIL/ZESTRIL) 10 MG tablet            (F43.23) Adjustment disorder with mixed anxiety and depressed mood  Comment: patient still presents with significant symptoms of anxiety.  He has tried multiple medications in the past, did not like side effects.  Plan: continue therapy, may consider re-consulting Psychiatry consult if symptoms remain bothersome.     (F10.99) Alcohol use disorder (H)  Comment: discussed risks of heavy alcohol intake, and a few strategies for cutting down.  Patient seems ambivalent about cutting down his use.  Plan: continued to advise on his intake. recommend he continue to discuss with with [facility].     (M25.512) Left shoulder pain, unspecified chronicity  Comment: recent diagnosis of Left partial thickness rotator cuff tear (supra), Left infra and supra tendinosis and Left long head biceps disease  Plan: patient saw Ortho, Physical Therapy/conservative measures recommended.  -recommended patient continue with Physical Therapy, limit overuse at work.        Patient Instructions:  Patient Instructions     Schedule with Larry next week.  Cicloprox: Apply to adjacent skin and affected nails daily.  Remove with alcohol every 7 days, then repeat.  Schedule intake for day treatment within [facility].  Try cutting down on alcohol intake when getting together with friends.  Keep Adderall the same for now and  we will talk about it more next time.    Understanding Tinea Unguium  Tinea unguium is a type of fungal infection. The fungus infects the fingernails and, more commonly, the toenails. It s more common in men, older adults, and people who have diabetes, peripheral vascular disease, or another health problem that weakens the immune system.  How to say it  [id number]-ee-uh [id number]   What causes tinea unguium?  Tinea unguium is caused by a fungus. Several different types of fungus can grow on the nails.  The condition is much more likely to occur on the toenails. It can spread from one nail to another. You are more likely to get tinea unguium if you:    Have another fungal infection, such as athlete s foot    Have sweaty feet    Share nail clippers with a person who has a fungal infection    Swim often    Walk barefoot in damp areas, such as locker rooms    Use communal or shared showers  What are the symptoms of tinea unguium?  If you have an infected nail, it may become:    Brittle    Thick    Hard    Discolored, yellow to brown    Irregular in shape  The nail may also have crumbling white or colored material under it.  If left untreated, the fungus may spread to the nail bed, which is the skin under the nail. The nail may  also fall off.  How is tinea unguium treated?  With proper treatment, tinea unguium may be cured. But it often takes several months as the nail grows.  Treatments include:    Good hygiene. Keep feet and nails clean and dry. If the infection is on the toenails, check that your shoes fit properly.    Medicine. Over-the-counter antifungal products, such as creams, may kill the fungus and ease symptoms. If you have a severe infection, or the infection won t go away, you may need to take another medicine by mouth.  Some of these oral medicines can have side effects and need to be used for 3 months.    Surgery.  The nail may be removed. But there is a high chance the fungus will return.    Laser. A  special type of laser directed at the nail itself can kill the fungus.  When should I call my healthcare provider?  Call your healthcare provider right away if you have any of these:    Pain that gets worse    Symptoms that don t get better, or get worse    New symptoms   Date Last Reviewed: 3/30/2016    [id number] [facility]. [address] [place] [address], [place] [address]. All rights reserved. This information is not intended as a substitute for professional medical care. Always follow your healthcare professional's instructions.                      JEWELS Hadley [facility] PRIMARY [facility]

## 2018-09-18 NOTE — MR AVS SNAPSHOT
After Visit Summary   9/18/2018    [address]    MRN: [id number]           Patient Information     Date Of Birth          1955        Visit Information        Provider Department      9/18/2018 3:30 PM Ирина Bishop APRN [facility]        Today's Diagnoses     Candida onychomycosis    -  1    Attention deficit hyperactivity disorder (ADHD), other type        Benign essential hypertension          Care Instructions    Schedule with Larry next week.  Cicloprox: Apply to adjacent skin and affected nails daily.  Remove with alcohol every 7 days, then repeat.  Schedule intake for day treatment within [facility].  Try cutting down on alcohol intake when getting together with friends.  Keep Adderall the same for now and we will talk about it more next time.    Understanding Tinea Unguium  Tinea unguium is a type of fungal infection. The fungus infects the fingernails and, more commonly, the toenails. It s more common in men, older adults, and people who have diabetes, peripheral vascular disease, or another health problem that weakens the immune system.  How to say it  TIN-ee-uh [id number]-PLOH-ens   What causes tinea unguium?  Tinea unguium is caused by a fungus. Several different types of fungus can grow on the nails.  The condition is much more likely to occur on the toenails. It can spread from one nail to another. You are more likely to get tinea unguium if you:    Have another fungal infection, such as athlete s foot    Have sweaty feet    Share nail clippers with a person who has a fungal infection    Swim often    Walk barefoot in damp areas, such as locker rooms    Use communal or shared showers  What are the symptoms of tinea unguium?  If you have an infected nail, it may become:    Brittle    Thick    Hard    Discolored, yellow to brown    Irregular in shape  The nail may also have crumbling white or colored material under it.  If left  untreated, the fungus may spread to the nail bed, which is the skin under the nail. The nail may  also fall off.  How is tinea unguium treated?  With proper treatment, tinea unguium may be cured. But it often takes several months as the nail grows.  Treatments include:    Good hygiene. Keep feet and nails clean and dry. If the infection is on the toenails, check that your shoes fit properly.    Medicine. Over-the-counter antifungal products, such as creams, may kill the fungus and ease symptoms. If you have a severe infection, or the infection won t go away, you may need to take another medicine by mouth.  Some of these oral medicines can have side effects and need to be used for 3 months.    Surgery.  The nail may be removed. But there is a high chance the fungus will return.    Laser. A special type of laser directed at the nail itself can kill the fungus.  When should I call my healthcare provider?  Call your healthcare provider right away if you have any of these:    Pain that gets worse    Symptoms that don t get better, or get worse    New symptoms   Date Last Reviewed: 3/30/2016    3615-5757 [employer]. [address] [place] [address]. All rights reserved. This information is not intended as a substitute for professional medical care. Always follow your healthcare professional's instructions.                Follow-ups after your visit        Follow-up notes from your care team     Return in about 4 weeks (around 10/16/2018) for Physical Exam, Routine Visit.      Your next 10 appointments already scheduled     Oct 01, 2018  3:30 PM CDT   Return Visit with PAULINA Bolton   [facility] ([address] [id number] [phone]              Who to contact     If you have questions or need follow up information about today's clinic visit or your schedule please contact [facility]  PRIMARY CARE directly at [phone].  Normal or non-critical lab and imaging results will be communicated to you by MyChart, letter or phone within 4 business days after the clinic has received the results. If you do not hear from us within 7 days, please contact the clinic through Social [employer] or phone. If you have a critical or abnormal lab result, we will notify you by phone as soon as possible.  Submit refill requests through [employer] or call your pharmacy and they will forward the refill request to us. Please allow 3 business days for your refill to be completed.          Additional Information About Your Visit        [employer] Information     [employer] gives you secure access to your electronic health record. If you see a primary care provider, you can also send messages to your care team and make appointments. If you have questions, please call your primary care clinic.  If you do not have a primary care provider, please call [phone] and they will assist you.        Care EveryWhere ID     This is your Care EveryWhere ID. This could be used by other organizations to access your [place] medical records  [id number]        Your Vitals Were     Pulse Temperature Pulse Oximetry BMI (Body Mass Index)          77 98.3  F (36.8  C) (Oral) 97% 23.21 kg/m2         Blood Pressure from Last 3 Encounters:   09/18/18 110/62   08/07/18 116/62   07/12/18 122/86    Weight from Last 3 Encounters:   09/18/18 164 lb 8 oz (74.6 kg)   08/29/18 163 lb 14.4 oz (74.3 kg)   08/07/18 162 lb (73.5 kg)              Today, you had the following     No orders found for display       Primary Care Provider Office Phone # Fax #    JEWELS Castano [phone]       [address]  [facility] [address]        Equal Access to Services     SHIVA VELA : David Smith, waaxda luqadaha, qaybta kaalmada henrry, terri avila. So [facility] [phone].    ATENCIÓN: Si  loraine weems, tiene a becerra disposición servicios gratuitos de asistencia lingüística. Chu zafar [phone].    We comply with applicable federal civil rights laws and [place] laws. We do not discriminate on the basis of race, color, national origin, age, disability, sex, sexual orientation, or gender identity.            Thank you!     Thank you for choosing [facility] PRIMARY CARE  for your care. Our goal is always to provide you with excellent care. Hearing back from our patients is one way we can continue to improve our services. Please take a few minutes to complete the written survey that you may receive in the mail after your visit with us. Thank you!             Your Updated Medication List - Protect others around you: Learn how to safely use, store and throw away your medicines at www.disposemymeds.org.          This list is accurate as of 9/18/18  4:30 PM.  Always use your most recent med list.                   Brand Name Dispense Instructions for use Diagnosis    acetaminophen 325 MG tablet    TYLENOL    100 tablet    Take 2 tablets (650 mg) by mouth every 4 hours as needed for other (mild pain)    Urinary retention       amphetamine-dextroamphetamine 15 MG per tablet    ADDERALL    30 tablet    Take 1 tablet (15 mg) by mouth daily    Attention deficit hyperactivity disorder (ADHD), other type       cholecalciferol 400 UNIT/ML Liqd liquid    vitamin D/D-VI-SOL     Take 5,000 Units by mouth daily        DAILY HERBS PROSTATE PO      Take 1 tablet by mouth daily        diclofenac 1 % Gel topical gel    VOLTAREN    100 g    Apply 4 grams to shoulder or 2 grams to hands four times daily using enclosed dosing card.        Fish Oil 500 MG Caps      Take 1 capsule by mouth daily        fluticasone 50 MCG/ACT spray    FLONASE    1 Bottle    Spray 2 sprays into both nostrils daily    Upper respiratory tract infection, unspecified type       ibuprofen 600 MG tablet    ADVIL/MOTRIN    30 tablet     Take 1 tablet (600 mg) by mouth every 6 hours as needed for other (For mild pain and temperature greater than 102F)    Urinary retention       lisinopril 10 MG tablet    PRINIVIL/ZESTRIL    90 tablet    Take 1 tablet (10 mg) by mouth daily    Benign essential hypertension       magnesium 250 MG tablet      Take 1 tablet by mouth nightly as needed (sleep)        MULTIVITAMIN TABS   OR      1 TABLET EVETRY DAY        mupirocin 2 % ointment    BACTROBAN    2 g    Apply to anterior nares BID X 2 month supply    Nasal vestibulitis

## 2018-09-18 NOTE — MR AVS SNAPSHOT
MRN:[id number]                      After Visit Summary   9/18/2018    [address]    MRN: [id number]           Visit Information        Provider Department      9/18/2018 4:30 PM Xander Salas LGSW [facility]      Your next 10 appointments already scheduled     Sep 28, 2018  3:00 PM CDT   Return Visit with Larry Albright [facility] ([facility])    [address]  [address]  [facility] [address]   [phone]            Oct 01, 2018  3:30 PM CDT   Return Visit with PAULINA Bolton   [facility] ([facility])    [facility]  [address]  [facility] [address]   [phone]            Oct 23, 2018  3:30 PM CDT   Return Visit with JEWELS Castano [facility] ([facility])    [address]  [address]  [facility] [address]   [phone]            Oct 23, 2018  3:30 PM CDT   Return Visit with Larry Albright [facility] ([facility])    [address]  [address]  [facility] [address]   [phone]            Oct 30, 2018  3:30 PM CDT   Return Visit with PAULINA Bolton   [facility] ([facility])    [facility]  [address]  [facility] [address]   [phone]              MyChart Information     [employer] gives you secure access to your electronic health record. If you see a primary care provider, you can also send messages to your care team and make appointments. If you have questions, please call your primary care clinic.  If you do not have a primary care provider, please call [phone] and they will assist you.        Care EveryWhere ID     This is your Care EveryWhere ID. This could be used by other  organizations to access your [place] medical records  [id number]        Equal Access to Services     SHIVA VELA : David Smith, jan pierce, terri simons. So [facility] [phone].    ATENCIÓN: Si habla español, tiene a becerra disposición servicios gratuitos de asistencia lingüística. Llame al [phone].    We comply with applicable federal civil rights laws and [place] laws. We do not discriminate on the basis of race, color, national origin, age, disability, sex, sexual orientation, or gender identity.

## 2018-09-18 NOTE — PATIENT INSTRUCTIONS
Schedule with Larry next week.  Cicloprox: Apply to adjacent skin and affected nails daily.  Remove with alcohol every 7 days, then repeat.  Schedule intake for day treatment within [facility].  Try cutting down on alcohol intake when getting together with friends.  Keep Adderall the same for now and we will talk about it more next time.    Understanding Tinea Unguium  Tinea unguium is a type of fungal infection. The fungus infects the fingernails and, more commonly, the toenails. It s more common in men, older adults, and people who have diabetes, peripheral vascular disease, or another health problem that weakens the immune system.  How to say it  TIN-ee-uh [id number]   What causes tinea unguium?  Tinea unguium is caused by a fungus. Several different types of fungus can grow on the nails.  The condition is much more likely to occur on the toenails. It can spread from one nail to another. You are more likely to get tinea unguium if you:    Have another fungal infection, such as athlete s foot    Have sweaty feet    Share nail clippers with a person who has a fungal infection    Swim often    Walk barefoot in damp areas, such as locker rooms    Use communal or shared showers  What are the symptoms of tinea unguium?  If you have an infected nail, it may become:    Brittle    Thick    Hard    Discolored, yellow to brown    Irregular in shape  The nail may also have crumbling white or colored material under it.  If left untreated, the fungus may spread to the nail bed, which is the skin under the nail. The nail may  also fall off.  How is tinea unguium treated?  With proper treatment, tinea unguium may be cured. But it often takes several months as the nail grows.  Treatments include:    Good hygiene. Keep feet and nails clean and dry. If the infection is on the toenails, check that your shoes fit properly.    Medicine. Over-the-counter antifungal products, such as creams, may kill the fungus and ease symptoms.  If you have a severe infection, or the infection won t go away, you may need to take another medicine by mouth.  Some of these oral medicines can have side effects and need to be used for 3 months.    Surgery.  The nail may be removed. But there is a high chance the fungus will return.    Laser. A special type of laser directed at the nail itself can kill the fungus.  When should I call my healthcare provider?  Call your healthcare provider right away if you have any of these:    Pain that gets worse    Symptoms that don t get better, or get worse    New symptoms   Date Last Reviewed: 3/30/2016    4194-4301 [employer]. [address] [place] [address], [place] [address]. All rights reserved. This information is not intended as a substitute for professional medical care. Always follow your healthcare professional's instructions.

## 2018-09-21 NOTE — PROGRESS NOTES
[facility] - Integrated Primary [facility]   September 18, 2018      Behavioral Health Clinician Progress Note    Patient Name: Govind Way           Service Type:  Individual      Service Location:   Face to Face in Clinic     Session Start Time: 3:35 p.m.  Session End Time: 3:50 p.m.      Session Length: 15 minutes     Attendees: Patient and PCP    Visit Activities (Refresh list every visit): [facility]    Diagnostic Assessment Date: 9/7/17 by MIKE Bolton, PAULINA  Treatment Plan Review Date: to be completed  See Flowsheets for today's PHQ-9 and WAGNER-7 results  Previous PHQ-9:   PHQ-9 SCORE 5/29/2018 6/25/2018 7/23/2018   Total Score - - -   Total Score 0 4 3     Previous WAGNER-7:   WAGNER-7 SCORE 5/29/2018 6/25/2018 7/23/2018   Total Score - - -   Total Score 3 5 4       SHASHANK LEVEL:  SHASHANK Score (Last Two) 6/14/2012   SHASHANK Raw Score 50   Activation Score 86.3   SHASHANK Level 4       DATA  Extended Session (60+ minutes): No  Interactive Complexity: No  Crisis: No  [facility] Patient: No    Treatment Objective(s) Addressed in This Session:  Target Behavior(s): alcohol use and depression    Depressed Mood: Increase interest, engagement, and pleasure in doing things  Identify negative self-talk and behaviors: challenge core beliefs, myths, and actions  Improve concentration, focus, and mindfulness in daily activities   Feel less fidgety, restless or slow in daily activities / interpersonal interactions  Alcohol / Substance Use: will make healthier choices in regard to substance use    Current Stressors / Issues:    [facility] co-visit with patient and PCP in the clinic. Patient has an individual therapy visit with his [facility] therapist today. Patient reports several recent changes including new systems and processes at work, a recent decline in his father's health, and uncertainty about whether he can feasibly use PTO for day treatment, as his partner is asking him to go on a month-long vacation, and he is helping to care for his father.  Encouraged patient to make his own informed decisions about what he needs, especially in regard to day treatment readiness.    Patient continues to work on setting boundaries and expectations for his daughter. Patient reports his overall mood is anxious, as he is unsure how to prioritize responsibilities and is easily overwhelmed. Encouraged patient to focus on one thing at a time and to stay in the present moment. Patient agreed to practice this and will continue to father information about day treatment options.  Patient denied SI/SIB thoughts or urges. He will continue working on drinking less, and will reach out to his partner for support.    Progress on Treatment Objective(s) / Homework:  New Objective established this session - PREPARATION (Decided to change - considering how); Intervened by negotiating a change plan and determining options / strategies for behavior change, identifying triggers, exploring social supports, and working towards setting a date to begin behavior change    Motivational Interviewing    MI Intervention: Expressed Empathy/Understanding, Supported Autonomy, Collaboration, Evocation, Permission to raise concern or advise, Open-ended questions and Reflections: simple and complex     Change Talk Expressed by the Patient: Desire to change Ability to change Reasons to change Need to change    Provider Response to Change Talk: E - Evoked more info from patient about behavior change, A - Affirmed patient's thoughts, decisions, or attempts at behavior change, R - Reflected patient's change talk and S - Summarized patient's change talk statements    Also provided psychoeducation about behavioral health condition, symptoms, and treatment options    Care Plan review completed: No    Medication Review:  No changes to current psychiatric medication(s)    Medication Compliance:  Yes    Changes in Health Issues:   Yes: Pain, Associated Psychological Distress - please see medical note by PCP Ирина  "JEWELS Bishop, CNP    Chemical Use Review:   Substance Use: Problem use continues with no change since last session, Contemplation  Provided encouragement towards sobriety         Tobacco Use: No current tobacco use.      Assessment: Current Emotional / Mental Status (status of significant symptoms):  Risk status (Self / Other harm or suicidal ideation)  Patient has had a history of suicidal ideation: psychiatric hospitalization several years ago - \"nervous breakdown\"  Patient denies current fears or concerns for personal safety.  Patient denies current or recent suicidal ideation or behaviors.  Patient denies current or recent homicidal ideation or behaviors.  Patient denies current or recent self injurious behavior or ideation.  Patient denies other safety concerns.  A safety and risk management plan has not been developed at this time, however patient was encouraged to call [place] [phone] should there be a change in any of these risk factors.    Appearance:   Appropriate   Eye Contact:   Good   Psychomotor Behavior: Normal   Attitude:   Cooperative   Orientation:   All  Speech   Rate / Production: Normal    Volume:  Normal   Mood:    Anxious  Depressed   Affect:    Labile   Thought Content:  Clear   Thought Form:  Coherent  Goal Directed  Logical   Insight:    Fair     Diagnoses:  1. Major depressive disorder, recurrent episode, mild (H)        Collateral Reports Completed:  Routed note to Care Team Member(s) - MIKE Botlon, LGSW; JEWELS Vásquez, CNP    Plan: (Homework, other):  Patient was given information about behavioral services and encouraged to schedule a follow up appointment with the clinic [facility] in 2 weeks.  He was also given information about mental health symptoms and treatment options  and deep Breathing Strategies to practice when experiencing anxiety.  CD Recommendations: Practice Harm Reduction: reduce # of drinks per day. Patient will access rational thinking to prioritize needs regarding " work, his own health, relationships, and family. Patient will call [facility] to schedule an intake assessment, and decide whether the day program for depression is financially realistic.   DANGELO Porras, [facility] [place]

## 2018-09-28 ENCOUNTER — TELEPHONE (OUTPATIENT)
Dept: BEHAVIORAL HEALTH | Facility: CLINIC | Age: 63
End: 2018-09-28

## 2018-10-01 ENCOUNTER — OFFICE VISIT (OUTPATIENT)
Dept: PSYCHOLOGY | Facility: CLINIC | Age: 63
End: 2018-10-01
Payer: COMMERCIAL

## 2018-10-01 DIAGNOSIS — F33.0 MAJOR DEPRESSIVE DISORDER, RECURRENT EPISODE, MILD (H): Primary | ICD-10-CM

## 2018-10-01 DIAGNOSIS — F90.8 ATTENTION DEFICIT HYPERACTIVITY DISORDER (ADHD), OTHER TYPE: ICD-10-CM

## 2018-10-01 PROCEDURE — 90834 PSYTX W PT 45 MINUTES: CPT | Performed by: SOCIAL WORKER

## 2018-10-01 ASSESSMENT — ANXIETY QUESTIONNAIRES
5. BEING SO RESTLESS THAT IT IS HARD TO SIT STILL: NOT AT ALL
3. WORRYING TOO MUCH ABOUT DIFFERENT THINGS: SEVERAL DAYS
GAD7 TOTAL SCORE: 2
7. FEELING AFRAID AS IF SOMETHING AWFUL MIGHT HAPPEN: NOT AT ALL
1. FEELING NERVOUS, ANXIOUS, OR ON EDGE: NOT AT ALL
6. BECOMING EASILY ANNOYED OR IRRITABLE: NOT AT ALL
2. NOT BEING ABLE TO STOP OR CONTROL WORRYING: NOT AT ALL

## 2018-10-01 ASSESSMENT — PATIENT HEALTH QUESTIONNAIRE - PHQ9: 5. POOR APPETITE OR OVEREATING: SEVERAL DAYS

## 2018-10-01 NOTE — PROGRESS NOTES
Progress Note    Client Name: Govind Way  Date: 10/1/2018         Service Type: Individual      Session Start Time: 3:30 pm  Session End Time: 4:15 pm      Session Length: 45 mins     Session #: 17     Attendees: Client attended alone    Treatment Plan Last Reviewed: 9/18/17, 3/23/2018, 7/23/2018  PHQ-9 / WAGNER-7 : 4/2     DATA      Progress Since Last Session (Related to Symptoms / Goals / Homework):   Symptoms: No change - using distraction skills when in distress, still feeling anxious and down    Homework: Partially completed-practiced being mindful and letting go of unuseful thoughts      Episode of Care Goals: Minimal progress - ACTION (Actively working towards change); Intervened by reinforcing change plan / affirming steps taken     Current / Ongoing Stressors and Concerns:   Client has been practicing letting go of judgmental thoughts towards coworkers by using distractions and refocusing on what tasks he needs to complete. He finds it helpful being [place] to receive positive affirmations from supervisors and coworkers, this has helped client feel confident in his work. Client reports trying to accumulate more positive experiences in his daily activities rather than focusing on what he still needs to do or perseverating on what's going wrong. Client is considering doing day treatment however still researching which program will best fit his needs. Client talked about setting boundaries around committing to do things for others and practice putting his needs first. He will be attending a mindfulness seminar with [employer] and looking forward to this.      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  Improve concentration and  "attention in daily activities       Intervention:   CBT: walked thru behavioral triangle and client's awareness on his experience with coworkers, identifying successful application of skills, identifying his thoughts and emotions in interactions with coworkers   DBT: validating the discomfort of being in a space where not everyone works the same,  client's thoughts and emotions and focus on the problem \"coworkers not working\" or \"coworkers lack of attention could impact a patient's life,\" reviewing if this is a problem for client and if it is within his role to take action, using distraction skills and improving mood by focusing on the positives in his work environment, completing pros and cons around day treatment  Motivational Interviewing: open-ended questions around making changes and seeing changes; affirming strength in ability to have difficult conversations and let go of worries that are no longer useful, reflecting on improvement in mood and ongoing challenges.         ASSESSMENT: Current Emotional / Mental Status (status of significant symptoms):   Risk status (Self / Other harm or suicidal ideation)   Client denies current fears or concerns for personal safety.   Client denies current or recent suicidal ideation or behaviors.   Client denies current or recent homicidal ideation or behaviors.   Client denies current or recent self injurious behavior or ideation.   Client denies other safety concerns.   A safety and risk management plan has not been developed at this time, however client was given the after-hours number / 911 should there be a change in any of these risk factors.     Appearance:   Appropriate    Eye Contact:   Good    Psychomotor Behavior: Normal     Attitude:   Cooperative    Orientation:   All   Speech    Rate / Production: Normal     Volume:  Normal    Mood:    Normal   Affect:    Appropriate    Thought Content:  Clear    Thought Form:  Coherent  Logical    Insight:    Fair "      Medication Review:  No current psychiatric medications prescribed     Medication Compliance:   Yes small dosage, but noticing better improvement on focus     Changes in Health Issues:   None reported.      Chemical Use Review:   Substance Use: Chemical use reviewed, no active concerns identified      Tobacco Use: No current tobacco use.       Collateral Reports Completed:   Not Applicable    PLAN: (Client Tasks / Therapist Tasks / Other)  Client:  Notice physiological response to stress, pay attention to client's thought patterns and emotions.    Continue to practice self-affirmations and pull back from judgement; practice mindfulness in daily activities such as cooking.   Brainstorming ways of how he can practice self-care (sleep, diet, joyful activities, relaxation).      MIKE Bolton [facility]                       October 1, 2018  Note reviewed and clinical supervision by MIKE Snowden [facility] 10/4/2018   ________________________________________________________________________    Treatment Plan    Client's Name: Govind Way  YOB: 1955    Date: 9/18/17, 3/23/2018, 7/23/2018    DSM-V Diagnoses: Attention-Deficit/Hyperactivity Disorder  314.01 (F90.2) Combined presentation, 296.31   (F33.0) Major Depressive Disorder, Recurrent Episode, Mild _ and With anxious distress  Substance-Related & Addictive Disorders Alcohol Use Disorder   303.90 (F10.20) Moderate  Psychosocial / Contextual Factors: Client is working full-time and splitting his time between taking care of his aging parents and disabled daughter, managing his relationship with his partner and taking care of his medical needs. He reports experiencing stress around work with the number of changes in management, current renovations and different types of personalities. His first partner back in high school completed suicide--client was the last one to see him. His daughter has a disability and was in and out of the hospital for 2  years; he gave her one of his kidneys in order to save her life.   WHODAS: 22    Referral / Collaboration:  Referral to another professional/service is not indicated at this time.    Anticipated number of session or this episode of care: 12      MeasurableTreatment Goal(s) related to diagnosis / functional impairment(s)  Goal 1: Client will develop coping skills to effectively manage attention issues.    I will know I've met my goal when I am not overwhelmed with making time with everyone.      Objective #A (Client Action)    Client will identify and use 3 strategies to improve organization.  Status: Continued - Date(s): 3/23/2018, 7/23/2018    Intervention(s)  Therapist will teach completing Eisenhowever scale, use of journal/calendar and writing things in a notebook.    Goal 2: Client will report a decrease in depressive symptoms with decrease PHQ 9 score from 14 below 5.    I will know I've met my goal when I can feel happy and at ease with where I am at.      Objective #A (Client Action)    Status: Continued - Date(s): 3/23/2018, 7/23/2018    Client will Increase interest, engagement, and pleasure in doing things  Decrease frequency and intensity of feeling down, depressed, hopeless.    Intervention(s)  Therapist will teach the client how to perform a behavioral chain analysis. Identifying and understanding negative thoughts and changing route of those thoughts into positives.    Objective #B  Client will Feel less tired and more energy during the day   Improve diet, appetite, mindful eating, and / or meal planning.    Status: Continued - Date(s): 3/23/2018, 7/23/2018    Intervention(s)  Therapist will assign homework Participate in mindful activity daily for at least 10 mins/day  teach distraction skills. stopping negative thoughts with mindful activity .    Objective #C  Client will improve communication with partner and family members.  Status: Continued - Date(s): 3/23/2018 ,  7/23/2018    Intervention(s)  Therapist will role-play effective communication skills  teach about healthy boundaries. Setting rules and expectations for self and others around you.      Goal 3: Client will develop skills to manage my drinking habits.    I will know I've met my goal when I no longer feel guilty about my drinking.      Objective #A (Client Action)    Status: Continued - Date(s): 3/23/2018, 7/23/2018    Client will increase understanding on the impacts of alcohol on mental and physical health.    Intervention(s)  Therapist will provide educational materials on alcohol on mental and physical health.    Objective #B  Client will develop problem-solving skills to limit number of drinks consumed..    Status: Continued - Date(s): 3/23/2018, 7/23/2018    Intervention(s)  Therapist will role-play conflict management  teach rules for taking a timeout. Understanding the consequences for drinking and setting limits with self and others around drinking.      Client have not reviewed nor agreed to the above plan. Goals were set, however still need to review the objectives.      MIKE Bolton [facility]  October 3, 2017; March 23, 2018, July 23, 2018      Note reviewed and clinical supervision by MIKE Snowden [facility] 12/22/2017, 6/29/2018, 8/3/2018

## 2018-10-01 NOTE — MR AVS SNAPSHOT
MRN:[id number]                      After Visit Summary   10/1/2018    Govind Way    MRN: [id number]           Visit Information        Provider Department      10/1/2018 3:30 PM Xander Salas LGSW [facility]      Your next 10 appointments already scheduled     Oct 04, 2018  4:00 PM CDT   Return Visit with Larry Albright [facility] ([facility])    [address]  [address]  [facility] [address]   [phone]            Oct 19, 2018  3:50 PM CDT   CESILIA Extremity with Amber Keily   [facility] CESILIA ([facility])    [address] [facility] [id number] [phone]            Oct 23, 2018  3:30 PM CDT   Return Visit with JEWELS Castano [facility] ([facility])    [address]  [address]  [facility] [address] [phone]            Oct 23, 2018  3:30 PM CDT   Return Visit with Larry Albright [facility] ([facility])    [address]  [address]  [facility] [address] [phone]            Oct 30, 2018  3:30 PM CDT   Return Visit with PAULINA Bolton   [facility] ([facility])    [facility]  [address]  [facility] [address] [phone]              MyChart Information     [employer] gives you secure access to your electronic health record. If you see a primary care provider, you can also send messages to your care team and make appointments. If you have questions, please call your primary care clinic.  If you do not have a primary care provider, please call [phone] and they will assist you.        Care EveryWhere ID     This is your Care EveryWhere ID. This could be used by  other organizations to access your [facility] medical records  [id number]        Equal Access to Services     SHIVA VELA : David Smith, jan pierce, terri simons. [facility] [phone].    ATENCIÓN: Si habla español, tiene a becerra disposición servicios gratuitos de asistencia lingüística. Llame al [phone].    We comply with applicable federal civil rights laws and [place] laws. We do not discriminate on the basis of race, color, national origin, age, disability, sex, sexual orientation, or gender identity.

## 2018-10-02 ASSESSMENT — ANXIETY QUESTIONNAIRES: GAD7 TOTAL SCORE: 2

## 2018-10-02 ASSESSMENT — PATIENT HEALTH QUESTIONNAIRE - PHQ9: SUM OF ALL RESPONSES TO PHQ QUESTIONS 1-9: 4

## 2018-10-03 ENCOUNTER — THERAPY VISIT (OUTPATIENT)
Dept: PHYSICAL THERAPY | Facility: CLINIC | Age: 63
End: 2018-10-03
Payer: OTHER MISCELLANEOUS

## 2018-10-03 DIAGNOSIS — M25.512 LEFT SHOULDER PAIN, UNSPECIFIED CHRONICITY: ICD-10-CM

## 2018-10-03 PROCEDURE — 97530 THERAPEUTIC ACTIVITIES: CPT | Mod: GP | Performed by: PHYSICAL THERAPIST

## 2018-10-03 PROCEDURE — 97110 THERAPEUTIC EXERCISES: CPT | Mod: GP | Performed by: PHYSICAL THERAPIST

## 2018-10-03 PROCEDURE — 97112 NEUROMUSCULAR REEDUCATION: CPT | Mod: GP | Performed by: PHYSICAL THERAPIST

## 2018-10-04 ENCOUNTER — OFFICE VISIT (OUTPATIENT)
Dept: BEHAVIORAL HEALTH | Facility: CLINIC | Age: 63
End: 2018-10-04
Payer: COMMERCIAL

## 2018-10-04 DIAGNOSIS — F33.0 MAJOR DEPRESSIVE DISORDER, RECURRENT EPISODE, MILD (H): Primary | ICD-10-CM

## 2018-10-04 PROCEDURE — 90834 PSYTX W PT 45 MINUTES: CPT | Performed by: SOCIAL WORKER

## 2018-10-04 NOTE — MR AVS SNAPSHOT
After Visit Summary   10/4/2018    Govind Way    MRN: [id number]           Patient Information     Date Of Birth          1955        Visit Information        Provider Department      10/4/2018 4:00 PM Larry Albright [facility]        Today's Diagnoses     Major depressive disorder, recurrent episode, mild (H)    -  1       Follow-ups after your visit        Your next 10 appointments already scheduled     Oct 16, 2018  3:00 PM CDT   Return Visit with Larry Albright [facility] ([facility])    [address]  [address]  [facility] [address]   [phone]            Oct 19, 2018  3:50 PM CDT   CESILIA Extremity with Amber Michaud   [facility] CESILIA ([facility])    [address] [place] [id number] [phone]            Oct 23, 2018  3:30 PM CDT   Return Visit with JEWELS Castano CNP   [facility] ([facility])    [address]  [address]  [facility] [address] [phone]            Oct 23, 2018  3:30 PM CDT   [facility]    [address]  [address]  [facility] [address] [phone]            Oct 30, 2018  3:30 PM CDT   Return Visit with PAULINA Bolton   [facility] ([facility]  [address]  [facility] [address] [phone]              Who to contact     If you have questions or need follow up information about today's clinic visit or your schedule please contact [facility] directly at [phone].  Normal or non-critical lab and imaging results will be communicated to you by Chloe  letter or phone within 4 business days after the clinic has received the results. If you do not hear from us within 7 days, please contact the clinic through [employer] or phone. If you have a critical or abnormal lab result, we will notify you by phone as soon as possible.  Submit refill requests through [employer] or call your pharmacy and they will forward the refill request to us. Please allow 3 business days for your refill to be completed.          Additional Information About Your Visit        [employer] Information     [employer] gives you secure access to your electronic health record. If you see a primary care provider, you can also send messages to your care team and make appointments. If you have questions, please call your primary care clinic.  If you do not have a primary care provider, please call [phone] and they will assist you.        Care EveryWhere ID     This is your Care EveryWhere ID. This could be used by other organizations to access your [facility] medical records  [id number]         Blood Pressure from Last 3 Encounters:   09/18/18 110/62   08/07/18 116/62   07/12/18 122/86    Weight from Last 3 Encounters:   09/18/18 164 lb 8 oz (74.6 kg)   08/29/18 163 lb 14.4 oz (74.3 kg)   08/07/18 162 lb (73.5 kg)              Today, you had the following     No orders found for display       Primary Care Provider Office Phone # Fax #    Ирина Bishop, JEWELS [phone]       [address]  [facility] [address]        Equal Access to Services     SHIVA VELA : Hadii aad ku hadasho Soomaali, waaxda luqadaha, qaybta kaalmada adeegyada, waxay idiin haygladysn olivia dennison'irena . So [facility] [phone].    ATENCIÓN: Si habla español, tiene a becerra disposición servicios gratuitos de asistencia lingüística. Llame al [phone].    We comply with applicable federal civil rights laws and [place] laws. We do not discriminate on the basis of race, color, national origin, age,  disability, sex, sexual orientation, or gender identity.            Thank you!     Thank you for choosing [facility]  for your care. Our goal is always to provide you with excellent care. Hearing back from our patients is one way we can continue to improve our services. Please take a few minutes to complete the written survey that you may receive in the mail after your visit with us. Thank you!             Your Updated Medication List - Protect others around you: Learn how to safely use, store and throw away your medicines at www.disposemymeds.org.          This list is accurate as of 10/4/18 11:59 PM.  Always use your most recent med list.                   Brand Name Dispense Instructions for use Diagnosis    acetaminophen 325 MG tablet    TYLENOL    100 tablet    Take 2 tablets (650 mg) by mouth every 4 hours as needed for other (mild pain)    Urinary retention       amphetamine-dextroamphetamine 15 MG per tablet    ADDERALL    30 tablet    Take 1 tablet (15 mg) by mouth daily    Attention deficit hyperactivity disorder (ADHD), other type       cholecalciferol 400 UNIT/ML Liqd liquid    vitamin D/D-VI-SOL     Take 5,000 Units by mouth daily        ciclopirox 8 % Soln     6 mL    Apply to adjacent skin and affected nails daily.  Remove with alcohol every 7 days, then repeat.    Candida onychomycosis       DAILY HERBS PROSTATE PO      Take 1 tablet by mouth daily        diclofenac 1 % Gel topical gel    VOLTAREN    100 g    Apply 4 grams to shoulder or 2 grams to hands four times daily using enclosed dosing card.        Fish Oil 500 MG Caps      Take 1 capsule by mouth daily        fluticasone 50 MCG/ACT spray    FLONASE    1 Bottle    Spray 2 sprays into both nostrils daily    Upper respiratory tract infection, unspecified type       ibuprofen 600 MG tablet    ADVIL/MOTRIN    30 tablet    Take 1 tablet (600 mg) by mouth every 6 hours as needed for other (For mild pain and temperature  greater than 102F)    Urinary retention       lisinopril 10 MG tablet    PRINIVIL/ZESTRIL    90 tablet    Take 1 tablet (10 mg) by mouth daily    Benign essential hypertension       magnesium 250 MG tablet      Take 1 tablet by mouth nightly as needed (sleep)        MULTIVITAMIN TABS   OR      1 TABLET EVETRY DAY        mupirocin 2 % ointment    BACTROBAN    2 g    Apply to anterior nares BID X 2 month supply    Nasal vestibulitis

## 2018-10-04 NOTE — PROGRESS NOTES
[facility] - Integrated Primary Care   October 4, 2018      Behavioral Health Clinician Progress Note    Patient Name: Govind Way           Service Type:  Individual      Service Location:   Face to Face in Clinic     Session Start Time: 4:05 p.m.  Session End Time: 4:50 p.m.      Session Length: 38 - 52      Attendees: Patient and PCP    Visit Activities (Refresh list every visit): [facility] [employer]    Diagnostic Assessment Date: 9/7/17 by MIKE Bolton, PAULINA  Treatment Plan Review Date: to be completed  See Flowsheets for today's PHQ-9 and WAGNER-7 results  Previous PHQ-9:   PHQ-9 SCORE 6/25/2018 7/23/2018 10/1/2018   Total Score - - -   Total Score 4 3 4     Previous WAGNER-7:   WAGNER-7 SCORE 6/25/2018 7/23/2018 10/1/2018   Total Score - - -   Total Score 5 4 2       SHASHANK LEVEL:  SHASHANK Score (Last Two) 6/14/2012   SHASHANK Raw Score 50   Activation Score 86.3   SHASHANK Level 4       DATA  Extended Session (60+ minutes): No  Interactive Complexity: No  Crisis: No  [facility] Patient: No    Treatment Objective(s) Addressed in This Session:  Target Behavior(s): alcohol use and depression    Depressed Mood: Increase interest, engagement, and pleasure in doing things  Identify negative self-talk and behaviors: challenge core beliefs, myths, and actions  Improve concentration, focus, and mindfulness in daily activities   Feel less fidgety, restless or slow in daily activities / interpersonal interactions  Alcohol / Substance Use: will make healthier choices in regard to substance use    Current Stressors / Issues:    [facility] visit with patient in the clinic. Patient reports anxiety and depression have increased due to issues at work and a change in management. He is unsure about the future and is struggling to stay centered and focused. Patient is open to beginning a day treatment program/PHP. Discussed options and [facility] with a step-down to the mindfulness based program. Patient completed a MoCA as  "[facility] by the [facility]. During the exam, he became extremely anxious, specifically during the subtraction portion, stating \"math is so hard for me. It brings me back to when I was a kid and my teacher and father shamed me. I get so panicked, I just can't do it\". Worked to reassure patient and he was able to finish the test. Patient reports his overall memory is good and that he tends to lose track of things when feeling overwhelmed and overbooked. He will try to make lists, which helps. He has never been lost in a familiar place. Patient is thinking about getting minutes on his cell phone to increase accessibility; he will be mindful of who she shares the number with to avoid feeling overwhelmed. Advised patient I will [facility] with the [facility] on [facility], and they should be contacting him to schedule an intake. Patient signed a release of information for the [facility].      [place] Cognitive Assessment:    Visuospatial/Executive : 5  Naming: 3  Attention - Digits: 1  Attention - Letters: 1  Attention - Subtraction: 1 (patient experienced severe anxiety and panic during this portion of the test, citing past trauma around math performance)  Language - Repeat: 2  Language - Fluency : 1  Abstraction: 2  Delayed Recall: 2 (it is unclear whether problems with recall are due to cognitive deficits or ADHD)  Orientation: 6  Education: 0 - high school graduate  MOCA Score: 24  Administered by: MIKE Porras, [facility]      [place] Cognitive Assessment Score:  24       [place] Cognitive Assessment Norms:   Normal: 26 - 30  Mild Cognitive Impairment 21-25  Moderate 15 - 20  Severe 0 -14    Progress on Treatment Objective(s) / Homework:  New Objective established this session - PREPARATION (Decided to change - considering how); Intervened by negotiating a change plan and determining options / strategies for behavior change, identifying triggers, exploring social supports, and working towards setting a date to begin behavior " "change    Motivational Interviewing    MI Intervention: Expressed Empathy/Understanding, Supported Autonomy, Collaboration, Evocation, Permission to raise concern or advise, Open-ended questions and Reflections: simple and complex     Change Talk Expressed by the Patient: Desire to change Ability to change Reasons to change Need to change    Provider Response to Change Talk: E - Evoked more info from patient about behavior change, A - Affirmed patient's thoughts, decisions, or attempts at behavior change, R - Reflected patient's change talk and S - Summarized patient's change talk statements    Also provided psychoeducation about behavioral health condition, symptoms, and treatment options    Care Plan review completed: No    Medication Review:  No changes to current psychiatric medication(s)    Medication Compliance:  Yes    Changes in Health Issues:   Yes: Pain, Associated Psychological Distress - please see medical chart    Chemical Use Review:   Substance Use: Problem use continues with no change since last session, Contemplation  Provided encouragement towards sobriety         Tobacco Use: No current tobacco use.      Assessment: Current Emotional / Mental Status (status of significant symptoms):  Risk status (Self / Other harm or suicidal ideation)  Patient has had a history of suicidal ideation: psychiatric hospitalization several years ago - \"nervous breakdown\"  Patient denies current fears or concerns for personal safety.  Patient denies current or recent suicidal ideation or behaviors.  Patient denies current or recent homicidal ideation or behaviors.  Patient denies current or recent self injurious behavior or ideation.  Patient denies other safety concerns.  A safety and risk management plan has not been developed at this time, however patient was encouraged to call [facility] should there be a change in any of these risk factors.    Appearance:   Appropriate   Eye Contact:   Good   Psychomotor " Behavior: Hyperactive   Attitude:   Cooperative   Orientation:   All  Speech   Rate / Production: Normal    Volume:  Normal   Mood:    Anxious   Affect:    Appropriate  Bright   Thought Content:  Clear   Thought Form:  Coherent  Goal Directed  Logical   Insight:    Fair     Diagnoses:  1. Major depressive disorder, recurrent episode, mild (H)        Collateral Reports Completed:  Authorization for Release of Information Completed for 's Administration     Plan: (Homework, other):  Patient was given information about behavioral services and encouraged to schedule a follow up appointment with the clinic [facility] in 2 weeks.  He was also given information about mental health symptoms and treatment options  and deep Breathing Strategies to practice when experiencing anxiety.  CD Recommendations: Practice Harm Reduction: reduce # of drinks per day. Patient will access rational thinking to prioritize needs regarding work, his own health, relationships, and family.    DANGELO Porras, [facility]

## 2018-10-16 ENCOUNTER — OFFICE VISIT (OUTPATIENT)
Dept: BEHAVIORAL HEALTH | Facility: CLINIC | Age: 63
End: 2018-10-16
Payer: COMMERCIAL

## 2018-10-16 DIAGNOSIS — F33.0 MAJOR DEPRESSIVE DISORDER, RECURRENT EPISODE, MILD (H): Primary | ICD-10-CM

## 2018-10-16 PROCEDURE — 90832 PSYTX W PT 30 MINUTES: CPT | Performed by: SOCIAL WORKER

## 2018-10-16 NOTE — PROGRESS NOTES
[facility] - Integrated Primary Care   October 16, 2018      Behavioral Health Clinician Progress Note    Patient Name: Govind Way           Service Type:  Individual      Service Location:   Face to Face in Clinic     Session Start Time: 3:05 p.m.  Session End Time: 3:35 p.m.      Session Length: 16 - 37      Attendees: Patient    Visit Activities (Refresh list every visit): [facility] Only    Diagnostic Assessment Date: 9/7/17 by MIKE Bolton, PAULINA  Treatment Plan Review Date: to be completed  See Flowsheets for today's PHQ-9 and WAGNER-7 results  Previous PHQ-9:   PHQ-9 SCORE 6/25/2018 7/23/2018 10/1/2018   Total Score - - -   Total Score 4 3 4     Previous WAGNER-7:   WAGNER-7 SCORE 6/25/2018 7/23/2018 10/1/2018   Total Score - - -   Total Score 5 4 2       SHASHANK LEVEL:  SHASHANK Score (Last Two) 6/14/2012   SHASHANK Raw Score 50   Activation Score 86.3   SHASHANK Level 4       DATA  Extended Session (60+ minutes): No  Interactive Complexity: No  Crisis: No  [facility] Patient: No    Treatment Objective(s) Addressed in This Session:  Target Behavior(s): alcohol use and depression    Depressed Mood: Increase interest, engagement, and pleasure in doing things  Identify negative self-talk and behaviors: challenge core beliefs, myths, and actions  Improve concentration, focus, and mindfulness in daily activities   Feel less fidgety, restless or slow in daily activities / interpersonal interactions  Alcohol / Substance Use: will make healthier choices in regard to substance use    Current Stressors / Issues:    [facility] visit with patient in the clinic. Patient reports his primary concerns for today's visit are continued anxiety and uncertainty about what to do in regard to work and treatment. Patient plans to pursue the PHP/Day program at [facility] and is waiting for a call to schedule an intake. He continues to feel stressed at work and states this is carrying over into his personal life. Patient reports increased irritability and lack of  interest in doing things he normally enjoys. Patient reports awareness that the gray weather has an impact, as he hasn't been able to ride his bike. He is overwhelmed and frustrated with his sense of responsibility to others, and continues to work on setting boundaries and communicating his needs.  Patient is working overtime this week despite the stressful conditions, as he needs the extra income. He states he is somewhat worried about taking a leave to participate in mental health treatment. Patient agreed to follow up with HR to discuss his benefits.     Patient denied SI/SIB thoughts or urges.    Progress on Treatment Objective(s) / Homework:  New Objective established this session - PREPARATION (Decided to change - considering how); Intervened by negotiating a change plan and determining options / strategies for behavior change, identifying triggers, exploring social supports, and working towards setting a date to begin behavior change    Motivational Interviewing    MI Intervention: Expressed Empathy/Understanding, Supported Autonomy, Collaboration, Evocation, Permission to raise concern or advise, Open-ended questions and Reflections: simple and complex     Change Talk Expressed by the Patient: Desire to change Ability to change Reasons to change Need to change    Provider Response to Change Talk: E - Evoked more info from patient about behavior change, A - Affirmed patient's thoughts, decisions, or attempts at behavior change, R - Reflected patient's change talk and S - Summarized patient's change talk statements    Also provided psychoeducation about behavioral health condition, symptoms, and treatment options    Care Plan review completed: No    Medication Review:  No changes to current psychiatric medication(s)    Medication Compliance:  Yes    Changes in Health Issues:   None reported - please see medical chart    Chemical Use Review:   Substance Use: Problem use continues with no change since last  "session, Contemplation  Provided encouragement towards sobriety         Tobacco Use: No current tobacco use.      Assessment: Current Emotional / Mental Status (status of significant symptoms):  Risk status (Self / Other harm or suicidal ideation)  Patient has had a history of suicidal ideation: psychiatric hospitalization several years ago - \"nervous breakdown\"  Patient denies current fears or concerns for personal safety.  Patient denies current or recent suicidal ideation or behaviors.  Patient denies current or recent homicidal ideation or behaviors.  Patient denies current or recent self injurious behavior or ideation.  Patient denies other safety concerns.  A safety and risk management plan has not been developed at this time, however patient was encouraged to call [place] [phone] should there be a change in any of these risk factors.    Appearance:   Appropriate   Eye Contact:   Good   Psychomotor Behavior: Agitated   Attitude:   Cooperative   Orientation:   All  Speech   Rate / Production: Normal    Volume:  Normal   Mood:    Anxious   Affect:    Appropriate   Thought Content:  Clear   Thought Form:  Coherent  Goal Directed  Logical   Insight:    Fair     Diagnoses:  1. Major depressive disorder, recurrent episode, mild (H)        Collateral Reports Completed:  Not Applicable     Plan: (Homework, other):  Patient was given information about behavioral services and encouraged to schedule a follow up appointment with the clinic [facility] in 2 weeks.  He was also given information about mental health symptoms and treatment options  and deep Breathing Strategies to practice when experiencing anxiety.  CD Recommendations: Practice Harm Reduction: reduce # of drinks per day. Patient will access rational thinking to prioritize needs regarding work, his own health, relationships, and family.    DANGELO Porras, [facility]  "

## 2018-10-16 NOTE — MR AVS SNAPSHOT
After Visit Summary   10/16/2018    Govind Way    MRN: [id number]           Patient Information     Date Of Birth          1955        Visit Information        Provider Department      10/16/2018 3:00 PM Larry Albright, [facility]        Today's Diagnoses     Major depressive disorder, recurrent episode, mild (H)    -  1       Follow-ups after your visit        Your next 10 appointments already scheduled     Oct 23, 2018  3:30 PM CDT   Return Visit with JEWELS Castano CNP   [facility]    [address]  [address]  [facility] [address]   [phone]            Oct 23, 2018  3:30 PM CDT   Return Visit with Larry Albright [facility] ([facility]    [address]  [address]  [facility] [address]   [phone]            Oct 30, 2018  3:30 PM CDT   Return Visit with Xander Salas PHILL   [facility] ([facility]  [address]  [facility] [address] [phone]              Who to contact     If you have questions or need follow up information about today's clinic visit or your schedule please contact [facility] directly at [phone].  Normal or non-critical lab and imaging results will be communicated to you by MyChart, letter or phone within 4 business days after the clinic has received the results. If you do not hear from us within 7 days, please contact the clinic through MyChart or phone. If you have a critical or abnormal lab result, we will notify you by phone as soon as possible.  Submit refill requests through [employer] or call your pharmacy and they will forward the refill request to us. Please allow 3 business days for your refill to be completed.          Additional  Information About Your Visit        [employer] Information     [employer] gives you secure access to your electronic health record. If you see a primary care provider, you can also send messages to your care team and make appointments. If you have questions, please call your primary care clinic.  If you do not have a primary care provider, please call [phone] and they will assist you.        Care EveryWhere ID     This is your Care EveryWhere ID. This could be used by other organizations to access your [facility] medical records  [id number]         Blood Pressure from Last 3 Encounters:   09/18/18 110/62   08/07/18 116/62   07/12/18 122/86    Weight from Last 3 Encounters:   09/18/18 74.6 kg (164 lb 8 oz)   08/29/18 74.3 kg (163 lb 14.4 oz)   08/07/18 73.5 kg (162 lb)              Today, you had the following     No orders found for display       Primary Care Provider Office Phone # Fax #    Ирина Susan Bishop, APRN [phone]       [address] [facility] [address] [place] [address]        Equal Access to Services     [facility] : Hadii aad ku hadasho Soomaali, waaxda luqadaha, qaybta kaalmada adekateyyada, terri ace . So [facility] [phone].    ATENCIÓN: Si habla español, tiene a becerra disposición servicios gratuitos de asistencia lingüística. [facility] [phone].    We comply with applicable federal civil rights laws and [place] laws. We do not discriminate on the basis of race, color, national origin, age, disability, sex, sexual orientation, or gender identity.            Thank you!     Thank you for choosing [facility]  for your care. Our goal is always to provide you with excellent care. Hearing back from our patients is one way we can continue to improve our services. Please take a few minutes to complete the written survey that you may receive in the mail after your visit with us. Thank you!             Your Updated Medication List -  Protect others around you: Learn how to safely use, store and throw away your medicines at www.disposemymeds.org.          This list is accurate as of 10/16/18 11:59 PM.  Always use your most recent med list.                   Brand Name Dispense Instructions for use Diagnosis    acetaminophen 325 MG tablet    TYLENOL    100 tablet    Take 2 tablets (650 mg) by mouth every 4 hours as needed for other (mild pain)    Urinary retention       amphetamine-dextroamphetamine 15 MG per tablet    ADDERALL    30 tablet    Take 1 tablet (15 mg) by mouth daily    Attention deficit hyperactivity disorder (ADHD), other type       cholecalciferol 400 UNIT/ML Liqd liquid    vitamin D/D-VI-SOL     Take 5,000 Units by mouth daily        ciclopirox 8 % Soln     6 mL    Apply to adjacent skin and affected nails daily.  Remove with alcohol every 7 days, then repeat.    Candida onychomycosis       DAILY HERBS PROSTATE PO      Take 1 tablet by mouth daily        diclofenac 1 % Gel topical gel    VOLTAREN    100 g    Apply 4 grams to shoulder or 2 grams to hands four times daily using enclosed dosing card.        Fish Oil 500 MG Caps      Take 1 capsule by mouth daily        fluticasone 50 MCG/ACT spray    FLONASE    1 Bottle    Spray 2 sprays into both nostrils daily    Upper respiratory tract infection, unspecified type       ibuprofen 600 MG tablet    ADVIL/MOTRIN    30 tablet    Take 1 tablet (600 mg) by mouth every 6 hours as needed for other (For mild pain and temperature greater than 102F)    Urinary retention       lisinopril 10 MG tablet    PRINIVIL/ZESTRIL    90 tablet    Take 1 tablet (10 mg) by mouth daily    Benign essential hypertension       magnesium 250 MG tablet      Take 1 tablet by mouth nightly as needed (sleep)        MULTIVITAMIN TABS   OR      1 TABLET EVETRY DAY        mupirocin 2 % ointment    BACTROBAN    2 g    Apply to anterior nares BID X 2 month supply    Nasal vestibulitis

## 2018-10-19 ENCOUNTER — THERAPY VISIT (OUTPATIENT)
Dept: PHYSICAL THERAPY | Facility: CLINIC | Age: 63
End: 2018-10-19
Payer: OTHER MISCELLANEOUS

## 2018-10-19 DIAGNOSIS — M25.512 SHOULDER PAIN, LEFT: ICD-10-CM

## 2018-10-19 PROCEDURE — 97110 THERAPEUTIC EXERCISES: CPT | Mod: GP | Performed by: PHYSICAL THERAPIST

## 2018-10-19 PROCEDURE — 97530 THERAPEUTIC ACTIVITIES: CPT | Mod: GP | Performed by: PHYSICAL THERAPIST

## 2018-10-19 NOTE — MR AVS SNAPSHOT
After Visit Summary   10/19/2018    Govind Way    MRN: [id number]           Patient Information     Date Of Birth          1955        Visit Information        Provider Department      10/19/2018 3:50 PM Amber Michaud [facility]        Today's Diagnoses     Shoulder pain, left           Follow-ups after your visit        Your next 10 appointments already scheduled     Oct 23, 2018  3:30 PM CDT   Return Visit with JEWELS Castano CNP   [facility] ([facility])    [address]  [address]  [facility] [address] [phone]            Oct 23, 2018  3:30 PM CDT   Return Visit with Larry Albright [facility] ([facility])    [address]  [address]  [facility] [address] [phone]            Oct 30, 2018  3:30 PM CDT   Return Visit with Xander Salas PHILL   [facility] ([facility]  [address]  [address]  [facility] [id number] [phone]              Who to contact     If you have questions or need follow up information about today's clinic visit or your schedule please contact [facility] PHYSICAL THERAPY [place] directly at [phone].  Normal or non-critical lab and imaging results will be communicated to you by MyChart, letter or phone within 4 business days after the clinic has received the results. If you do not hear from us within 7 days, please contact the clinic through MyChart or phone. If you have a critical or abnormal lab result, we will notify you by phone as soon as possible.  Submit refill requests through [employer] or call your pharmacy and they will forward the refill request to us. Please allow 3 business days for your refill to be completed.          Additional Information About Your Visit        MyChart Information     [employer] gives you  secure access to your electronic health record. If you see a primary care provider, you can also send messages to your care team and make appointments. If you have questions, please call your primary care clinic.  If you do not have a primary care provider, please call [phone] and they will assist you.        Care EveryWhere ID     This is your Care EveryWhere ID. This could be used by other organizations to access your [place] medical records  [id number]         Blood Pressure from Last 3 Encounters:   09/18/18 110/62   08/07/18 116/62   07/12/18 122/86    Weight from Last 3 Encounters:   09/18/18 74.6 kg (164 lb 8 oz)   08/29/18 74.3 kg (163 lb 14.4 oz)   08/07/18 73.5 kg (162 lb)              We Performed the Following     THERAPEUTIC ACTIVITIES     THERAPEUTIC EXERCISES        Primary Care Provider Office Phone # Fax #    Ирина Wallacehleen Dario, APRN [phone]       [address] [address]        Equal Access to Services     [facility]: Hadii aad ku hadasho Soomaali, waaxda luqadaha, qaybta kaalmada adeegyada, waxay idiin hayirena ace . So [facility] [phone].    ATENCIÓN: Si habla español, tiene a becerra disposición servicios gratuitos de asistencia lingüística. Llame al [phone].    We comply with applicable federal civil rights laws and [place] laws. We do not discriminate on the basis of race, color, national origin, age, disability, sex, sexual orientation, or gender identity.            Thank you!     Thank you for choosing [facility] PHYSICAL THERAPY [place]  for your care. Our goal is always to provide you with excellent care. Hearing back from our patients is one way we can continue to improve our services. Please take a few minutes to complete the written survey that you may receive in the mail after your visit with us. Thank you!             Your Updated Medication List - Protect others around you: Learn how to safely use, store and throw  away your medicines at www.disposemymeds.org.          This list is accurate as of 10/19/18  5:34 PM.  Always use your most recent med list.                   Brand Name Dispense Instructions for use Diagnosis    acetaminophen 325 MG tablet    TYLENOL    100 tablet    Take 2 tablets (650 mg) by mouth every 4 hours as needed for other (mild pain)    Urinary retention       amphetamine-dextroamphetamine 15 MG per tablet    ADDERALL    30 tablet    Take 1 tablet (15 mg) by mouth daily    Attention deficit hyperactivity disorder (ADHD), other type       cholecalciferol 400 UNIT/ML Liqd liquid    vitamin D/D-VI-SOL     Take 5,000 Units by mouth daily        ciclopirox 8 % Soln     6 mL    Apply to adjacent skin and affected nails daily.  Remove with alcohol every 7 days, then repeat.    Candida onychomycosis       DAILY HERBS PROSTATE PO      Take 1 tablet by mouth daily        diclofenac 1 % Gel topical gel    VOLTAREN    100 g    Apply 4 grams to shoulder or 2 grams to hands four times daily using enclosed dosing card.        Fish Oil 500 MG Caps      Take 1 capsule by mouth daily        fluticasone 50 MCG/ACT spray    FLONASE    1 Bottle    Spray 2 sprays into both nostrils daily    Upper respiratory tract infection, unspecified type       ibuprofen 600 MG tablet    ADVIL/MOTRIN    30 tablet    Take 1 tablet (600 mg) by mouth every 6 hours as needed for other (For mild pain and temperature greater than 102F)    Urinary retention       lisinopril 10 MG tablet    PRINIVIL/ZESTRIL    90 tablet    Take 1 tablet (10 mg) by mouth daily    Benign essential hypertension       magnesium 250 MG tablet      Take 1 tablet by mouth nightly as needed (sleep)        MULTIVITAMIN TABS   OR      1 TABLET EVETRY DAY        mupirocin 2 % ointment    BACTROBAN    2 g    Apply to anterior nares BID X 2 month supply    Nasal vestibulitis

## 2018-10-19 NOTE — PROGRESS NOTES
PROGRESS REPORT    Govind has been in therapy from September 10, 2018 to Oct 19, 2018 for treatment of L shoulder pain.    Therapist Impression:  Continues to show gradual improvement. Benefits from replication of work set up to discuss proper body mechanics and lifting mechancis to prevent exacerbation of symptoms at work. Will benefit from further therapy to address existing periscapular and postural weakness, as well as rotator cuff weakness.    Subjective:  Feeling ok, however has had very busy week at work and therefore feels it has been a little overused this week making it achy  Overall feels it is improving  Does not feel that work is making shoulder worse or is slowing progress    Objective:  ROM WNL  Weak periscapular and rotator cuff musculature  Impaired posture     ASSESSMENT/PLAN  Updated problem list and treatment plan: The encounter diagnosis was Shoulder pain, left. Pain - HEP  Decreased ROM/flexibility - HEP  Decreased function - HEP  Decreased strength - HEP  Impaired posture - HEP  STG/LTGs have been met or progress has been made towards goals:  Yes (See Goal flow sheet completed today.)  Assessment of Progress: The patient's condition is improving.  The patient's progress has slowed.  Self Management Plans:  Patient has been instructed in a home treatment program.  Patient  has been instructed in self management of symptoms.  I have re-evaluated this patient and find that the nature, scope, duration and intensity of the therapy is appropriate for the medical condition of the patient.  Govind continues to require the following intervention to meet STG and LTG's:  PT    Recommendations:  This patient would benefit from continued therapy.     Frequency:  2 X month, once daily  Duration:  for 2 months              Please refer to the daily flowsheet for treatment today, total treatment time and time spent performing 1:1 timed codes.

## 2018-10-23 ENCOUNTER — OFFICE VISIT (OUTPATIENT)
Dept: BEHAVIORAL HEALTH | Facility: CLINIC | Age: 63
End: 2018-10-23
Payer: COMMERCIAL

## 2018-10-23 ENCOUNTER — OFFICE VISIT (OUTPATIENT)
Dept: FAMILY MEDICINE | Facility: CLINIC | Age: 63
End: 2018-10-23
Payer: COMMERCIAL

## 2018-10-23 VITALS
SYSTOLIC BLOOD PRESSURE: 118 MMHG | BODY MASS INDEX: 23.28 KG/M2 | HEART RATE: 74 BPM | RESPIRATION RATE: 16 BRPM | WEIGHT: 165 LBS | OXYGEN SATURATION: 96 % | TEMPERATURE: 98.4 F | DIASTOLIC BLOOD PRESSURE: 78 MMHG

## 2018-10-23 DIAGNOSIS — G89.29 CHRONIC LEFT SHOULDER PAIN: ICD-10-CM

## 2018-10-23 DIAGNOSIS — R09.82 POST-NASAL DRIP: ICD-10-CM

## 2018-10-23 DIAGNOSIS — I10 HYPERTENSION GOAL BP (BLOOD PRESSURE) < 140/90: ICD-10-CM

## 2018-10-23 DIAGNOSIS — F33.0 MAJOR DEPRESSIVE DISORDER, RECURRENT EPISODE, MILD (H): Primary | ICD-10-CM

## 2018-10-23 DIAGNOSIS — M25.512 CHRONIC LEFT SHOULDER PAIN: ICD-10-CM

## 2018-10-23 DIAGNOSIS — R52 BODY ACHES: Primary | ICD-10-CM

## 2018-10-23 DIAGNOSIS — F90.8 ATTENTION DEFICIT HYPERACTIVITY DISORDER (ADHD), OTHER TYPE: ICD-10-CM

## 2018-10-23 DIAGNOSIS — F43.23 ADJUSTMENT DISORDER WITH MIXED ANXIETY AND DEPRESSED MOOD: ICD-10-CM

## 2018-10-23 DIAGNOSIS — B35.1 ONYCHOMYCOSIS: ICD-10-CM

## 2018-10-23 LAB
ERYTHROCYTE [DISTWIDTH] IN BLOOD BY AUTOMATED COUNT: 13.1 % (ref 10–15)
ERYTHROCYTE [SEDIMENTATION RATE] IN BLOOD BY WESTERGREN METHOD: 8 MM/H (ref 0–20)
HCT VFR BLD AUTO: 42.2 % (ref 40–53)
HGB BLD-MCNC: 14.5 G/DL (ref 13.3–17.7)
MCH RBC QN AUTO: 32.4 PG (ref 26.5–33)
MCHC RBC AUTO-ENTMCNC: 34.4 G/DL (ref 31.5–36.5)
MCV RBC AUTO: 94 FL (ref 78–100)
PLATELET # BLD AUTO: 263 10E9/L (ref 150–450)
RBC # BLD AUTO: 4.48 10E12/L (ref 4.4–5.9)
WBC # BLD AUTO: 7.5 10E9/L (ref 4–11)

## 2018-10-23 PROCEDURE — 80053 COMPREHEN METABOLIC PANEL: CPT | Performed by: NURSE PRACTITIONER

## 2018-10-23 PROCEDURE — 86140 C-REACTIVE PROTEIN: CPT | Performed by: NURSE PRACTITIONER

## 2018-10-23 PROCEDURE — 85027 COMPLETE CBC AUTOMATED: CPT | Performed by: NURSE PRACTITIONER

## 2018-10-23 PROCEDURE — 86038 ANTINUCLEAR ANTIBODIES: CPT | Performed by: NURSE PRACTITIONER

## 2018-10-23 PROCEDURE — 86431 RHEUMATOID FACTOR QUANT: CPT | Performed by: NURSE PRACTITIONER

## 2018-10-23 PROCEDURE — 90832 PSYTX W PT 30 MINUTES: CPT | Performed by: SOCIAL WORKER

## 2018-10-23 PROCEDURE — 99214 OFFICE O/P EST MOD 30 MIN: CPT | Performed by: NURSE PRACTITIONER

## 2018-10-23 PROCEDURE — 85652 RBC SED RATE AUTOMATED: CPT | Performed by: NURSE PRACTITIONER

## 2018-10-23 PROCEDURE — 36415 COLL VENOUS BLD VENIPUNCTURE: CPT | Performed by: NURSE PRACTITIONER

## 2018-10-23 NOTE — PROGRESS NOTES
SUBJECTIVE:                                                    Govind Way is a 63 year old male with a history of anxiety, alcohol abuse, ADHD and hypertension who presents to clinic today for the following health issues:  [facility]: Larry    Patient presents today for primary care follow up. Patient states he is doing well overall. Planning to start day treatment at [facility] within the next week. Still getting his [place] paperwork straightened out. He is apprehensive about day treatment. Worried about breaking his routine, worried his family members will . He acknowledges a lot of his identity if tied to his occupation. Continue to work long hours. Boyfriend Jarrod hidalgo on vacation, patient is house-sitting, enjoying the time to himself.     Doing Physical Therapy for his left shoulder partial thickness rotator cuff tear. Feels it is starting to improve. Still has trouble at times limiting his use on the job. Reports generalized myalgias for the past few weeks. Denies fevers, chills, or fatigue. Daughter has lupus, patient worried about potential rheumatoid disease. Also notes symptoms seem tied to weather change.         BP Readings from Last 6 Encounters:   10/23/18 118/78   09/18/18 110/62   08/07/18 116/62   07/12/18 122/86   05/31/18 104/66   05/15/18 114/82     Wt Readings from Last 3 Encounters:   10/23/18 165 lb (74.8 kg)   09/18/18 164 lb 8 oz (74.6 kg)   08/29/18 163 lb 14.4 oz (74.3 kg)           Mental Health Follow up Anxiety/ADHD    Status since last visit: stable    Doing well with Adderall 15 mg qam.     See PHQ-9 for current symptoms.  Other associated symptoms: None    Complicating factors: multiple life stressors  Significant life event:  Donated kidney to daughter  Current substance abuse:  None  Anxiety or Panic symptoms:  No    Sleep - fair  Appetite - good  Exercise - moderate, biking decrease due to weather    Smoking - denies  Alcohol - moderate  Street drugs - denies  Marijuana -  denies  Caffeine - light    PHQ-9  English PHQ-9   Any Language                 Problems taking medications regularly: No    Medication side effects: none    Diet: regular (no restrictions)    Social History   Substance Use Topics     Smoking status: Former Smoker     Quit date: 1/1/1982     Smokeless tobacco: Never Used      Comment: NO 2ND HAND SMOKE     Alcohol use 0.0 oz/week     0 Standard drinks or equivalent per week      Comment: 5/wk        Problem list and histories reviewed & adjusted, as indicated.  Additional history: as documented    Patient Active Problem List   Diagnosis     Adjustment disorder with mixed anxiety and depressed mood     Abdominal hernia     Hypertension goal BP (blood pressure) < 140/90     CARDIOVASCULAR SCREENING; LDL GOAL LESS THAN 160     History of hyperthyroidism     Right inguinal hernia     Irritable bowel syndrome     Donor of kidney for transplant     Hernia     Testis disorder     Screening for prostate cancer     Other hydrocele     Hallux rigidus, right foot     Advance care planning     Tendinopathy of right gluteus medius     Attention deficit hyperactivity disorder (ADHD), other type     Benign non-nodular prostatic hyperplasia with lower urinary tract symptoms     Anxiety     SBO (small bowel obstruction) (H)     Major depressive disorder, recurrent episode, mild (H)     Alcohol use disorder     Shoulder pain, left     Past Surgical History:   Procedure Laterality Date     C ECG MONITOR/ 24 HRS, ORIG ECG, W VIS SUPERIMPOS SCAN, COMPLETE  1/00    um holter monitor     C REMV KIDNEY/URETER,SAME INCIS  6/30/05    Nephrouretectomy/LEFT KIDNEY DONATED TO DAUGHTER/[facility]     COLONOSCOPY N/A 5/15/2018    Procedure: COLONOSCOPY;  colonoscopy;  Surgeon: Lobo Pelaez MD;  Location:  GI     CYSTOSCOPY, TRANSURETHRAL RESECTION (TUR) PROSTATE, COMBINED       HC REMOVAL OF TONSILS,<13 Y/O  age 7     HC VASECTOMY UNILAT/BILAT W POSTOP SEMEN  age 41     HERNIORRHAPHY INGUINAL   12/23/2011    Procedure:HERNIORRHAPHY INGUINAL; Surgeon:JULIA SIERRA; Location:[facility]     LAPAROSCOPIC HERNIORRHAPHY INGUINAL Right 6/29/2015    Procedure: LAPAROSCOPIC HERNIORRHAPHY INGUINAL;  Surgeon: García Ibarra MD;  [facility]: [facility]     LAPAROSCOPIC HERNIORRHAPHY VENTRAL  12/23/2011    Procedure:LAPAROSCOPIC HERNIORRHAPHY VENTRAL; Laparoscopic Ventral Hernia Repair with Mesh, Open Left Inguinal Hernia Repair with Mesh; Surgeon:JULIA SIERRA; Location:[facility]     LASER HOLMIUM ENUCLEATION PROSTATE N/A 10/19/2017    Procedure: LASER HOLMIUM ENUCLEATION PROSTATE;  Holmium Laser Enculeation of the Prostate;  Surgeon: Manolo Cardenas MD;  Location:  OR     LASIK CUSTOMVUE BILATERAL  11/11/2011    Procedure:LASIK CUSTOMVUE BILATERAL; BILATERAL CUSTOMVUE LASIK WITH INTRALASE; Surgeon:POP SIMON; Location[facility]       Social History   Substance Use Topics     Smoking status: Former Smoker     Quit date: 1/1/1982     Smokeless tobacco: Never Used      Comment: NO 2ND HAND SMOKE     Alcohol use 0.0 oz/week     0 Standard drinks or equivalent per week      Comment: 5/wk     Family History   Problem Relation Age of Onset     Diabetes Mother      ADULT ONSET     Hypertension Mother      Coronary Artery Disease Mother      Also father and grandparent.  Father had coronary bypass and aortic valve replacement surgery     Heart Surgery Mother      Aortic valve     Cancer Other      prostate cancer     Thyroid Disease Other      Hypertension Father      Skin Cancer Other      Mother and father, grandfather     Arthritis Other      Mom, daughter, grandparents     Other - See Comments Other      Grandfather with kidney stone     Prostate Cancer Other      Grandfather     KIDNEY DISEASE Daughter      Lupus Daughter      Causing end-stage renal disease     Eye Disorder No family hx of      NONE KNOWN     Hyperlipidemia No family hx of      Cerebrovascular Disease No family hx of       Breast Cancer No family hx of      Colon Cancer No family hx of      Other Cancer No family hx of      Depression No family hx of      Anxiety Disorder No family hx of      Mental Illness No family hx of      Substance Abuse No family hx of      Anesthesia Reaction No family hx of      Asthma No family hx of      Osteoporosis No family hx of      Genetic Disorder No family hx of      Obesity No family hx of      Unknown/Adopted No family hx of            Current Outpatient Prescriptions   Medication Sig Dispense Refill     acetaminophen (TYLENOL) 325 MG tablet Take 2 tablets (650 mg) by mouth every 4 hours as needed for other (mild pain) 100 tablet 0     amphetamine-dextroamphetamine (ADDERALL) 15 MG per tablet Take 1 tablet (15 mg) by mouth daily 30 tablet 0     cholecalciferol (VITAMIN D/D-VI-SOL) 400 UNIT/ML LIQD liquid Take 5,000 Units by mouth daily       ciclopirox 8 % SOLN Apply to adjacent skin and affected nails daily.  Remove with alcohol every 7 days, then repeat. 6 mL 0     diclofenac (VOLTAREN) 1 % GEL topical gel Apply 4 grams to shoulder or 2 grams to hands four times daily using enclosed dosing card. 100 g 1     fluticasone (FLONASE) 50 MCG/ACT spray Spray 2 sprays into both nostrils daily 1 Bottle 11     ibuprofen (ADVIL/MOTRIN) 600 MG tablet Take 1 tablet (600 mg) by mouth every 6 hours as needed for other (For mild pain and temperature greater than 102F) 30 tablet 0     lisinopril (PRINIVIL/ZESTRIL) 10 MG tablet Take 1 tablet (10 mg) by mouth daily 90 tablet 1     magnesium 250 MG tablet Take 1 tablet by mouth nightly as needed (sleep)       Misc Natural Products (DAILY HERBS PROSTATE PO) Take 1 tablet by mouth daily       MULTIVITAMIN TABS   OR 1 TABLET EVETRY DAY  0     mupirocin (BACTROBAN) 2 % ointment Apply to anterior nares BID X 2 month supply 2 g 3     Omega-3 Fatty Acids (FISH OIL) 500 MG CAPS Take 1 capsule by mouth daily        Allergies   Allergen Reactions     Ambien [Zolpidem  Tartrate]      irritability     Codeine Sulfate Itching     Recent Labs   Lab Test  10/23/18   1622  05/18/18   0801  05/07/18   1002  11/06/17   1313   07/31/14   1746  06/18/13   0750   04/26/11   1012   LDL   --   90   --    --    --    --   95   --   137*   HDL   --   64   --    --    --    --   70   --   47   TRIG   --   98   --    --    --    --   45   --   79   ALT  31   --   28  24   < >   --   33   < >  28   CR  0.95   --   0.97  0.88   < >  1.13  1.10   < >  1.13   GFRESTIMATED  80   --   78  88   < >  67  69   < >  68   GFRESTBLACK  >90   --   >90  >90   < >  81  84   < >  82   POTASSIUM  4.0   --   4.0  4.0   < >  3.8  4.0   < >  4.6   TSH   --    --    --    --    --   2.83  2.99   --    --     < > = values in this interval not displayed.      BP Readings from Last 3 Encounters:   10/23/18 118/78   09/18/18 110/62   08/07/18 116/62    Wt Readings from Last 3 Encounters:   10/23/18 165 lb (74.8 kg)   09/18/18 164 lb 8 oz (74.6 kg)   08/29/18 163 lb 14.4 oz (74.3 kg)        Labs reviewed in EPIC  Problem list, Medication list, Allergies, and Medical/Social/Surgical histories reviewed in [facility] and updated as appropriate.     ROS: Constitutional, neuro, ENT, endocrine, pulmonary, cardiac, gastrointestinal, genitourinary, musculoskeletal, integument and psychiatric systems are negative, except as otherwise noted above in the HPI.       OBJECTIVE:                                                    /78  Pulse 74  Temp 98.4  F (36.9  C) (Temporal)  Resp 16  Wt 165 lb (74.8 kg)  SpO2 96%  BMI 23.28 kg/m2  Body mass index is 23.28 kg/(m^2).  GENERAL: healthy, alert, well nourished, well hydrated, no distress  EYES: Eyes grossly normal to inspection, extraocular movements - intact, and PERRL  HENT: ear canals- normal; TMs- normal; Nose- normal; + postnasal drip, swollen turbinates, Mouth- no ulcers, no lesions  NECK: no tenderness, no adenopathy, no asymmetry, no masses, no stiffness; thyroid- normal to  palpation  RESP: lungs clear to auscultation - no rales, no rhonchi, no wheezes  CV: regular rates and rhythm, normal S1 S2, no S3 or S4 and no murmur, no click or rub - no homans or cords  ABDOMEN: soft, no tenderness, no  hepatosplenomegaly, no masses, normal bowel sounds  MS: extremities- no gross deformities noted, no edema  SKIN: no suspicious lesions, no rashes  NEURO: strength and tone- normal, sensory exam- grossly normal, mentation- intact, speech- normal, reflexes- symmetric  Non focal no aphasia. No facial asymmetry. Finger to nose, rapid alteration, finger thumb opposition, heel knee shin are normal.  BACK: no CVA tenderness, no paralumbar tenderness  LYMPHATICS: ant. cervical- normal, post. cervical- normal, axillary- normal, supraclavicular- normal, inguinal- normal  Mental Status Assessment:  Appearance:   Appropriate   Eye Contact:   Good   Psychomotor Behavior: Normal   Attitude:   Cooperative   Orientation:   All  Speech   Rate / Production: Hyperverbal  Normal    Volume:  Normal   Mood:    Anxious  Normal  Affect:    Appropriate   Thought Content:  Clear   Thought Form:  Coherent  Logical   Insight:    Fair   Attention Span and Concentration:  limited  Recent and Remote Memory:  intact  Fund of Knowledge: appropriate  Muscle Strength and Tone: normal   Suicidal Ideation: reports no thoughts, no intention  Hallucination: No  Paranoid-No  Manic-No  Panic-No  Self harm-No      See [facility] notes     Diagnostic Test Results:  Results for orders placed or performed in visit on 10/23/18   CRP, inflammation   Result Value Ref Range    CRP Inflammation <2.9 0.0 - 8.0 mg/L   ESR: Erythrocyte sedimentation rate   Result Value Ref Range    Sed Rate 8 0 - 20 mm/h   CBC with platelets   Result Value Ref Range    WBC 7.5 4.0 - 11.0 10e9/L    RBC Count 4.48 4.4 - 5.9 10e12/L    Hemoglobin 14.5 13.3 - 17.7 g/dL    Hematocrit 42.2 40.0 - 53.0 %    MCV 94 78 - 100 fl    MCH 32.4 26.5 - 33.0 pg    MCHC 34.4 31.5 - 36.5  g/dL    RDW 13.1 10.0 - 15.0 %    Platelet Count 263 150 - 450 10e9/L   Comprehensive metabolic panel (BMP + Alb, Alk Phos, ALT, AST, Total. Bili, TP)   Result Value Ref Range    Sodium 136 133 - 144 mmol/L    Potassium 4.0 3.4 - 5.3 mmol/L    Chloride 104 94 - 109 mmol/L    Carbon Dioxide 23 20 - 32 mmol/L    Anion Gap 9 3 - 14 mmol/L    Glucose 82 70 - 99 mg/dL    Urea Nitrogen 24 7 - 30 mg/dL    Creatinine 0.95 0.66 - 1.25 mg/dL    GFR Estimate 80 >60 mL/min/1.7m2    GFR Estimate If Black >90 >60 mL/min/1.7m2    Calcium 9.4 8.5 - 10.1 mg/dL    Bilirubin Total 0.7 0.2 - 1.3 mg/dL    Albumin 4.0 3.4 - 5.0 g/dL    Protein Total 7.3 6.8 - 8.8 g/dL    Alkaline Phosphatase 72 40 - 150 U/L    ALT 31 0 - 70 U/L    AST 25 0 - 45 U/L        ASSESSMENT/PLAN:                                                    (R52) Body aches  (primary encounter diagnosis)  Comment: I suspect his is due to generalized arthritis, and overuse due to frequent biking and activity at his job.  Patient is concerned due to hx of rheumatologic disorders in his family so will check inflammatory panel, as well as CBC to rule out viral illness.   Plan: Anti Nuclear Jennifer IgG by IFA with Reflex,         Rheumatoid factor, CRP, inflammation, ESR:         Erythrocyte sedimentation rate, CBC with         platelets, Comprehensive metabolic panel (BMP +        Alb, Alk Phos, ALT, AST, Total. Bili, TP)            (R09.82) Post-nasal drip  Comment: chronic issue, recommend using flonase.   Plan: as above.    (I10) Hypertension goal BP (blood pressure) < 140/90  Comment: blood pressure at goal  BP Readings from Last 6 Encounters:   10/23/18 118/78   09/18/18 110/62   08/07/18 116/62   07/12/18 122/86   05/31/18 104/66   05/15/18 114/82     Plan: continue Lisinopril.    (F43.23) Adjustment disorder with mixed anxiety and depressed mood  Comment: patient starting day treatment within the next week.   Plan: support given. Patient is apprehensive.     (F90.8)  Attention deficit hyperactivity disorder (ADHD), other type  Comment: doing well with current dose of Adderall.   Plan: continue current therapy. Taking mainly on work days.     (M25.512,  G89.29) Chronic left shoulder pain  Comment: undergoing Physical Therapy.   Plan: encouraged to proceed    (B35.1) Onychomycosis  -using Cicloprox. Can consider Podiatry referral if no improvement.    Patient Instructions:  Patient Instructions   Labs today.   Good luck with day treatment.  Let us know if you want us to do a referral to Podiatry.   Try flonase for post-nasal drip.                JEWELS [facility] PRIMARY [facility]

## 2018-10-23 NOTE — MR AVS SNAPSHOT
After Visit Summary   10/23/2018    G[address]ind [address]    MRN: [id number]           Patient Information     Date Of Birth          1955        Visit Information        Provider Department      10/23/2018 3:30 PM Larry Albright, [facility]        Today's Diagnoses     Major depressive disorder, recurrent episode, mild (H)    -  1       Follow-ups after your visit        Who to contact     If you have questions or need follow up information about today's clinic visit or your schedule please contact [facility] directly at [phone].  Normal or non-critical lab and imaging results will be communicated to you by MojoPageshart, letter or phone within 4 business days after the clinic has received the results. If you do not hear from us within 7 days, please contact the clinic through MojoPageshart or phone. If you have a critical or abnormal lab result, we will notify you by phone as soon as possible.  Submit refill requests through Moogsoft or call your pharmacy and they will forward the refill request to us. Please allow 3 business days for your refill to be completed.          Additional Information About Your Visit        MyChart Information     Moogsoft gives you secure access to your electronic health record. If you see a primary care provider, you can also send messages to your care team and make appointments. If you have questions, please call your primary care clinic.  If you do not have a primary care provider, please call [phone] and they will assist you.        Care EveryWhere ID     This is your Care EveryWhere ID. This could be used by other organizations to access your [facility] medical records  [id number]         Blood Pressure from Last 3 Encounters:   10/23/18 118/78   09/18/18 110/62   08/07/18 116/62    Weight from Last 3 Encounters:   10/23/18 74.8 kg (165 lb)   09/18/18 74.6 kg (164 lb 8 oz)   08/29/18 74.3 kg (163  lb 14.4 oz)              Today, you had the following     No orders found for display       Primary Care Provider Office Phone # Fax #    Ирина Bishop, JEWELS [phone]       [address] [facility] [address]  [facility] [address]        Equal Access to Services     SHIVA VELA : Hadii aad ku hadasho Soomaali, waaxda luqadaha, qaybta kaalmada adeegyada, waxay idiin hayaan adekatey khaquilinotravis lapierre . So [facility] [place] [facility] [phone].    ATENCIÓN: Si habla español, tiene a becerra disposición servicios gratuitos de asistencia lingüística. Chu al [phone].    We comply with applicable federal civil rights laws and [place] laws. We do not discriminate on the basis of race, color, national origin, age, disability, sex, sexual orientation, or gender identity.            Thank you!     Thank you for choosing [facility] PRIMARY CARE  for your care. Our goal is always to provide you with excellent care. Hearing back from our patients is one way we can continue to improve our services. Please take a few minutes to complete the written survey that you may receive in the mail after your visit with us. Thank you!             Your Updated Medication List - Protect others around you: Learn how to safely use, store and throw away your medicines at www.disposemymeds.org.          This list is accurate as of 10/23/18 11:59 PM.  Always use your most recent med list.                   Brand Name Dispense Instructions for use Diagnosis    acetaminophen 325 MG tablet    TYLENOL    100 tablet    Take 2 tablets (650 mg) by mouth every 4 hours as needed for other (mild pain)    Urinary retention       amphetamine-dextroamphetamine 15 MG per tablet    ADDERALL    30 tablet    Take 1 tablet (15 mg) by mouth daily    Attention deficit hyperactivity disorder (ADHD), other type       cholecalciferol 400 UNIT/ML Liqd liquid    vitamin D/D-VI-SOL     Take 5,000 Units by mouth daily        ciclopirox 8 % Soln     6 mL    Apply  to adjacent skin and affected nails daily.  Remove with alcohol every 7 days, then repeat.    Candida onychomycosis       DAILY HERBS PROSTATE PO      Take 1 tablet by mouth daily        diclofenac 1 % Gel topical gel    VOLTAREN    100 g    Apply 4 grams to shoulder or 2 grams to hands four times daily using enclosed dosing card.        Fish Oil 500 MG Caps      Take 1 capsule by mouth daily        fluticasone 50 MCG/ACT spray    FLONASE    1 Bottle    Spray 2 sprays into both nostrils daily    Upper respiratory tract infection, unspecified type       ibuprofen 600 MG tablet    ADVIL/MOTRIN    30 tablet    Take 1 tablet (600 mg) by mouth every 6 hours as needed for other (For mild pain and temperature greater than 102F)    Urinary retention       lisinopril 10 MG tablet    PRINIVIL/ZESTRIL    90 tablet    Take 1 tablet (10 mg) by mouth daily    Benign essential hypertension       magnesium 250 MG tablet      Take 1 tablet by mouth nightly as needed (sleep)        MULTIVITAMIN TABS   OR      1 TABLET EVETRY DAY        mupirocin 2 % ointment    BACTROBAN    2 g    Apply to anterior nares BID X 2 month supply    Nasal vestibulitis

## 2018-10-23 NOTE — MR AVS SNAPSHOT
After Visit Summary   10/23/2018    [address]ind Way    MRN: [id number]           Patient Information     Date Of Birth          1955        Visit Information        Provider Department      10/23/2018 3:30 PM Ирина Bishop APRN CNP [facility] [place] [facility]        Today's Diagnoses     Body aches    -  1    Post-nasal drip          Care Instructions    Labs today.   Good luck with day treatment.  Let us know if you want us to do a referral to Podiatry.   Try flonase for post-nasal drip.          Follow-ups after your visit        Follow-up notes from your care team     Return in about 6 weeks (around 12/4/2018) for Routine Visit, Keep your next scheduled appointment.      Your next 10 appointments already scheduled     Oct 30, 2018  3:30 PM CDT   Return Visit with PAULINA Bolton   [facility]  [address] [place], [place] [address]  [facility] [address] [phone]              Who to contact     If you have questions or need follow up information about today's clinic visit or your schedule please contact [facility] directly at [phone].  Normal or non-critical lab and imaging results will be communicated to you by MyChart, letter or phone within 4 business days after the clinic has received the results. If you do not hear from us within 7 days, please contact the clinic through MyChart or phone. If you have a critical or abnormal lab result, we will notify you by phone as soon as possible.  Submit refill requests through Wave Telecom or call your pharmacy and they will forward the refill request to us. Please allow 3 business days for your refill to be completed.          Additional Information About Your Visit        MyChart Information     Wave Telecom gives you secure access to your electronic health record. If you see a primary care provider, you can also send messages to your  care team and make appointments. If you have questions, please call your primary care clinic.  If you do not have a primary care provider, please call [phone] and they will assist you.        Care EveryWhere ID     This is your Care EveryWhere ID. This could be used by other organizations to access your [facility] medical records  [id number]        Your Vitals Were     Pulse Temperature Respirations Pulse Oximetry BMI (Body Mass Index)       74 98.4  F (36.9  C) (Temporal) 16 96% 23.28 kg/m2        Blood Pressure from Last 3 Encounters:   10/23/18 118/78   09/18/18 110/62   08/07/18 116/62    Weight from Last 3 Encounters:   10/23/18 165 lb (74.8 kg)   09/18/18 164 lb 8 oz (74.6 kg)   08/29/18 163 lb 14.4 oz (74.3 kg)              We Performed the Following     Anti Nuclear Jennifer IgG by IFA with Reflex     CBC with platelets     Comprehensive metabolic panel (BMP + Alb, Alk Phos, ALT, AST, Total. Bili, TP)     CRP, inflammation     ESR: Erythrocyte sedimentation rate     Rheumatoid factor        Primary Care Provider Office Phone # Fax #    Ирина Bishop, APRN [phone]       [address] [place], [place] [address] [address]        Equal Access to Services     SHIVA VELA : Hadii aad ku hadasho Soomaali, waaxda luqadaha, qaybta kaalmada adeegyada, waxay idiin haygladysn olivia ace . So [facility] [phone].    ATENCIÓN: Si habla español, tiene a becerra disposición servicios gratuitos de asistencia lingüística. Llame al [phone].    We comply with applicable federal civil rights laws and [place] laws. We do not discriminate on the basis of race, color, national origin, age, disability, sex, sexual orientation, or gender identity.            Thank you!     Thank you for choosing [facility] PRIMARY CARE  for your care. Our goal is always to provide you with excellent care. Hearing back from our patients is one way we can continue to improve our services. Please take a  few minutes to complete the written survey that you may receive in the mail after your visit with us. Thank you!             Your Updated Medication List - Protect others around you: Learn how to safely use, store and throw away your medicines at www.disposemymeds.org.          This list is accurate as of 10/23/18  4:20 PM.  Always use your most recent med list.                   Brand Name Dispense Instructions for use Diagnosis    acetaminophen 325 MG tablet    TYLENOL    100 tablet    Take 2 tablets (650 mg) by mouth every 4 hours as needed for other (mild pain)    Urinary retention       amphetamine-dextroamphetamine 15 MG per tablet    ADDERALL    30 tablet    Take 1 tablet (15 mg) by mouth daily    Attention deficit hyperactivity disorder (ADHD), other type       cholecalciferol 400 UNIT/ML Liqd liquid    vitamin D/D-VI-SOL     Take 5,000 Units by mouth daily        ciclopirox 8 % Soln     6 mL    Apply to adjacent skin and affected nails daily.  Remove with alcohol every 7 days, then repeat.    Candida onychomycosis       DAILY HERBS PROSTATE PO      Take 1 tablet by mouth daily        diclofenac 1 % Gel topical gel    VOLTAREN    100 g    Apply 4 grams to shoulder or 2 grams to hands four times daily using enclosed dosing card.        Fish Oil 500 MG Caps      Take 1 capsule by mouth daily        fluticasone 50 MCG/ACT spray    FLONASE    1 Bottle    Spray 2 sprays into both nostrils daily    Upper respiratory tract infection, unspecified type       ibuprofen 600 MG tablet    ADVIL/MOTRIN    30 tablet    Take 1 tablet (600 mg) by mouth every 6 hours as needed for other (For mild pain and temperature greater than 102F)    Urinary retention       lisinopril 10 MG tablet    PRINIVIL/ZESTRIL    90 tablet    Take 1 tablet (10 mg) by mouth daily    Benign essential hypertension       magnesium 250 MG tablet      Take 1 tablet by mouth nightly as needed (sleep)        MULTIVITAMIN TABS   OR      1 TABLET EVETRY DAY         mupirocin 2 % ointment    BACTROBAN    2 g    Apply to anterior nares BID X 2 month supply    Nasal vestibulitis

## 2018-10-23 NOTE — PROGRESS NOTES
[facility] - Integrated Primary Care   October 23, 2018      Behavioral Health Clinician Progress Note    Patient Name: Govind Way           Service Type:  Individual      Service Location:   Face to Face in Clinic     Session Start Time: 3:35 p.m.  Session End Time: 4:15 p.m.      Session Length: 38 - 52      Attendees: Patient and PCP    Visit Activities (Refresh list every visit): [facility]    Diagnostic Assessment Date: 9/7/17 by MIKE Bolton, PAULINA  Treatment Plan Review Date: to be completed  See Flowsheets for today's PHQ-9 and WAGNER-7 results  Previous PHQ-9:   PHQ-9 SCORE 6/25/2018 7/23/2018 10/1/2018   Total Score - - -   Total Score 4 3 4     Previous WAGNER-7:   WAGNER-7 SCORE 6/25/2018 7/23/2018 10/1/2018   Total Score - - -   Total Score 5 4 2       SHASHANK LEVEL:  SHASHANK Score (Last Two) 6/14/2012   SHASHANK Raw Score 50   Activation Score 86.3   SHASHANK Level 4       DATA  Extended Session (60+ minutes): No  Interactive Complexity: No  Crisis: No  [facility] Patient: No    Treatment Objective(s) Addressed in This Session:  Target Behavior(s): alcohol use and depression    Depressed Mood: Increase interest, engagement, and pleasure in doing things  Identify negative self-talk and behaviors: challenge core beliefs, myths, and actions  Improve concentration, focus, and mindfulness in daily activities   Feel less fidgety, restless or slow in daily activities / interpersonal interactions  Alcohol / Substance Use: will make healthier choices in regard to substance use    Current Stressors / Issues:    [facility] co-visit with patient and PCP in the clinic. Patient processed apprehension and worry about the PHP to treat depression, as he was called and notified that he can start sooner than expected, on Monday the 29th. He endorsed worry about how family will perceive him, and feels an obligation to tell his parents and daughter about treatment. Worked in session to explore where this sense of duty comes from, and patient was  "able to affirm that his medical information is private, and he can control what is shared or not. Patient will be mindful of how to bring family in to support him through the process. Patient requested a letter explaining why he cannot participate in the [facility], stating he would feel inhibited to fully engage in treatment underneath the umbrella of his employer. He will talk with his insurer and notify the clinic if a letter is needed.  Patient reports his partner and family are supportive. He is working overtime this week out of fear of losing income, and reports increased anxiety and depression as a result. \"My work environment is really bad. My manager is leaving and I just have to adjust, but it's very hard\".  Patient will attend future follow up visits and will reschedule therapy visits when he knows the parameters and expectations of treatment.   Assured patient that he can decide whether this program is helpful, and that he will not disappoint anyone if he determines it's not a good fit. Patient agreed to focus on \"care for self\" and setting aside the expectations of others.    Patient denied SI/SIB thoughts or urges.    Progress on Treatment Objective(s) / Homework:  New Objective established this session - ACTION (Actively working towards change); Intervened by reinforcing change plan / affirming steps taken    Motivational Interviewing    MI Intervention: Expressed Empathy/Understanding, Supported Autonomy, Collaboration, Evocation, Permission to raise concern or advise, Open-ended questions and Reflections: simple and complex     Change Talk Expressed by the Patient: Desire to change Ability to change Reasons to change Need to change    Provider Response to Change Talk: E - Evoked more info from patient about behavior change, A - Affirmed patient's thoughts, decisions, or attempts at behavior change, R - Reflected patient's change talk and S - Summarized patient's change talk statements    Also " "provided psychoeducation about behavioral health condition, symptoms, and treatment options    Care Plan review completed: No    Medication Review:  No changes to current psychiatric medication(s)    Medication Compliance:  Yes    Changes in Health Issues:   None reported - please see medical chart    Chemical Use Review:   Substance Use: Problem use continues with no change since last session, Contemplation  Provided encouragement towards sobriety         Tobacco Use: No current tobacco use.      Assessment: Current Emotional / Mental Status (status of significant symptoms):  Risk status (Self / Other harm or suicidal ideation)  Patient has had a history of suicidal ideation: psychiatric hospitalization several years ago - \"nervous breakdown\"  Patient denies current fears or concerns for personal safety.  Patient denies current or recent suicidal ideation or behaviors.  Patient denies current or recent homicidal ideation or behaviors.  Patient denies current or recent self injurious behavior or ideation.  Patient denies other safety concerns.  A safety and risk management plan has not been developed at this time, however patient was encouraged to call [place] [phone] should there be a change in any of these risk factors.    Appearance:   Appropriate   Eye Contact:   Good   Psychomotor Behavior: Agitated   Attitude:   Cooperative   Orientation:   All  Speech   Rate / Production: Normal    Volume:  Normal   Mood:    Anxious   Affect:    Appropriate   Thought Content:  Clear   Thought Form:  Coherent  Goal Directed  Logical   Insight:    Fair     Diagnoses:  1. Major depressive disorder, recurrent episode, mild (H)        Collateral Reports Completed:  Communicated with: [facility] therapist PAULINA Bolton, regarding PHP start date     Plan: (Homework, other):  Patient was given information about behavioral services and encouraged to schedule a follow up appointment with the clinic [facility] in 2 weeks.  He was also given " information about mental health symptoms and treatment options  and deep Breathing Strategies to practice when experiencing anxiety.  CD Recommendations: Practice Harm Reduction: reduce # of drinks per day. Patient will access rational thinking to prioritize needs regarding work, his own health, relationships, and family.    DANGELO Porras, [facility]

## 2018-10-23 NOTE — PATIENT INSTRUCTIONS
Labs today.   Good luck with day treatment.  Let us know if you want us to do a referral to Podiatry.   Try flonase for post-nasal drip.

## 2018-10-24 ENCOUNTER — TELEPHONE (OUTPATIENT)
Dept: FAMILY MEDICINE | Facility: CLINIC | Age: 63
End: 2018-10-24

## 2018-10-24 LAB
ALBUMIN SERPL-MCNC: 4 G/DL (ref 3.4–5)
ALP SERPL-CCNC: 72 U/L (ref 40–150)
ALT SERPL W P-5'-P-CCNC: 31 U/L (ref 0–70)
ANION GAP SERPL CALCULATED.3IONS-SCNC: 9 MMOL/L (ref 3–14)
AST SERPL W P-5'-P-CCNC: 25 U/L (ref 0–45)
BILIRUB SERPL-MCNC: 0.7 MG/DL (ref 0.2–1.3)
BUN SERPL-MCNC: 24 MG/DL (ref 7–30)
CALCIUM SERPL-MCNC: 9.4 MG/DL (ref 8.5–10.1)
CHLORIDE SERPL-SCNC: 104 MMOL/L (ref 94–109)
CO2 SERPL-SCNC: 23 MMOL/L (ref 20–32)
CREAT SERPL-MCNC: 0.95 MG/DL (ref 0.66–1.25)
CRP SERPL-MCNC: <2.9 MG/L (ref 0–8)
GFR SERPL CREATININE-BSD FRML MDRD: 80 ML/MIN/1.7M2
GLUCOSE SERPL-MCNC: 82 MG/DL (ref 70–99)
POTASSIUM SERPL-SCNC: 4 MMOL/L (ref 3.4–5.3)
PROT SERPL-MCNC: 7.3 G/DL (ref 6.8–8.8)
SODIUM SERPL-SCNC: 136 MMOL/L (ref 133–144)

## 2018-10-24 NOTE — TELEPHONE ENCOUNTER
Completed part of form and placed to in providers folder for additional information    Shanon Espinoza, CMA

## 2018-10-24 NOTE — TELEPHONE ENCOUNTER
Reason for Call:  Form, our goal is to have forms completed with 72 hours, however, some forms may require a visit or additional information.    Type of letter, form or note:  FMLA    Who is the form from?: Patient    Where did the form come from: Patient or family brought in       What clinic location was the form placed at?: [facility]    Where the form was placed: 's Box    What number is listed as a contact on the form?: PLEASSE FAX FORM         Additional comments:     Call taken on 10/24/2018 at 3:42 PM by Ely Rosa

## 2018-10-25 LAB
ANA SER QL IF: NEGATIVE
RHEUMATOID FACT SER NEPH-ACNC: <20 IU/ML (ref 0–20)

## 2018-11-02 ENCOUNTER — OFFICE VISIT (OUTPATIENT)
Dept: BEHAVIORAL HEALTH | Facility: CLINIC | Age: 63
End: 2018-11-02
Payer: COMMERCIAL

## 2018-11-02 DIAGNOSIS — F33.0 MAJOR DEPRESSIVE DISORDER, RECURRENT EPISODE, MILD (H): Primary | ICD-10-CM

## 2018-11-02 PROCEDURE — 90832 PSYTX W PT 30 MINUTES: CPT | Performed by: SOCIAL WORKER

## 2018-11-02 NOTE — MR AVS SNAPSHOT
After Visit Summary   11/2/2018    Govind Way    MRN: [id number]           Patient Information     Date Of Birth          1955        Visit Information        Provider Department      11/2/2018 4:00 PM Larry Albright, [facility] [place] [facility]        Today's Diagnoses     Major depressive disorder, recurrent episode, mild (H)    -  1       Follow-ups after your visit        Your next 10 appointments already scheduled     Nov 16, 2018 10:00 AM CST   [facility]    [address] [facility]  [address]  [facility] [address] [phone]              Who to contact     If you have questions or need follow up information about today's clinic visit or your schedule please contact [facility] directly at [phone].  Normal or non-critical lab and imaging results will be communicated to you by MyChart, letter or phone within 4 business days after the clinic has received the results. If you do not hear from us within 7 days, please contact the clinic through MobFoxhart or phone. If you have a critical or abnormal lab result, we will notify you by phone as soon as possible.  Submit refill requests through Bottle or call your pharmacy and they will forward the refill request to us. Please allow 3 business days for your refill to be completed.          Additional Information About Your Visit        MyChart Information     Bottle gives you secure access to your electronic health record. If you see a primary care provider, you can also send messages to your care team and make appointments. If you have questions, please call your primary care clinic.  If you do not have a primary care provider, please call [phone] and they will assist you.        Care EveryWhere ID     This is your Care EveryWhere ID. This could be used by other  organizations to access your [facility] medical records  [id number]         Blood Pressure from Last 3 Encounters:   10/23/18 118/78   09/18/18 110/62   08/07/18 116/62    Weight from Last 3 Encounters:   10/23/18 74.8 kg (165 lb)   09/18/18 74.6 kg (164 lb 8 oz)   08/29/18 74.3 kg (163 lb 14.4 oz)              Today, you had the following     No orders found for display       Primary Care Provider Office Phone # Fax #    Ирина Bishop, APRN [phone]       [address] [facility] [address]  [facility] [address]        Equal Access to Services     SHIVA VELA : Hadii reyes andersen hadasho Sobladimirali, waaxda luqadaha, qaybta kaalmada adeegyada, terri ace . So [facility] [phone].    ATENCIÓN: Si habla español, tiene a becerra disposición servicios gratuitos de asistencia lingüística. Llame al [phone].    We comply with applicable federal civil rights laws and [place] laws. We do not discriminate on the basis of race, color, national origin, age, disability, sex, sexual orientation, or gender identity.            Thank you!     Thank you for choosing [facility] PRIMARY CARE  for your care. Our goal is always to provide you with excellent care. Hearing back from our patients is one way we can continue to improve our services. Please take a few minutes to complete the written survey that you may receive in the mail after your visit with us. Thank you!             Your Updated Medication List - Protect others around you: Learn how to safely use, store and throw away your medicines at www.disposemymeds.org.          This list is accurate as of 11/2/18 11:59 PM.  Always use your most recent med list.                   Brand Name Dispense Instructions for use Diagnosis    acetaminophen 325 MG tablet    TYLENOL    100 tablet    Take 2 tablets (650 mg) by mouth every 4 hours as needed for other (mild pain)    Urinary retention       amphetamine-dextroamphetamine 15 MG  per tablet    ADDERALL    30 tablet    Take 1 tablet (15 mg) by mouth daily    Attention deficit hyperactivity disorder (ADHD), other type       cholecalciferol 400 UNIT/ML Liqd liquid    vitamin D/D-VI-SOL     Take 5,000 Units by mouth daily        ciclopirox 8 % Soln     6 mL    Apply to adjacent skin and affected nails daily.  Remove with alcohol every 7 days, then repeat.    Candida onychomycosis       DAILY HERBS PROSTATE PO      Take 1 tablet by mouth daily        diclofenac 1 % Gel topical gel    VOLTAREN    100 g    Apply 4 grams to shoulder or 2 grams to hands four times daily using enclosed dosing card.        Fish Oil 500 MG Caps      Take 1 capsule by mouth daily        fluticasone 50 MCG/ACT spray    FLONASE    1 Bottle    Spray 2 sprays into both nostrils daily    Upper respiratory tract infection, unspecified type       ibuprofen 600 MG tablet    ADVIL/MOTRIN    30 tablet    Take 1 tablet (600 mg) by mouth every 6 hours as needed for other (For mild pain and temperature greater than 102F)    Urinary retention       lisinopril 10 MG tablet    PRINIVIL/ZESTRIL    90 tablet    Take 1 tablet (10 mg) by mouth daily    Benign essential hypertension       magnesium 250 MG tablet      Take 1 tablet by mouth nightly as needed (sleep)        MULTIVITAMIN TABS   OR      1 TABLET EVETRY DAY        mupirocin 2 % ointment    BACTROBAN    2 g    Apply to anterior nares BID X 2 month supply    Nasal vestibulitis

## 2018-11-02 NOTE — PROGRESS NOTES
"[facility] - Integrated Primary Care   November 2, 2018      Behavioral Health Clinician Progress Note    Patient Name: Govind Way           Service Type:  Individual      Service Location:   Face to Face in Clinic     Session Start Time: 4:05 p.m.  Session End Time: 4:45 p.m.      Session Length: 38 - 52      Attendees: Patient    Visit Activities (Refresh list every visit): [facility] Only    Diagnostic Assessment Date: 9/7/17 by MIKE Bolton, PAULINA  Treatment Plan Review Date: to be completed  See Flowsheets for today's PHQ-9 and WAGNER-7 results  Previous PHQ-9:   PHQ-9 SCORE 6/25/2018 7/23/2018 10/1/2018   Total Score - - -   Total Score 4 3 4     Previous WAGNER-7:   WAGNER-7 SCORE 6/25/2018 7/23/2018 10/1/2018   Total Score - - -   Total Score 5 4 2       SHASHANK LEVEL:  SHASHANK Score (Last Two) 6/14/2012   SHASHANK Raw Score 50   Activation Score 86.3   SHASHANK Level 4       DATA  Extended Session (60+ minutes): No  Interactive Complexity: No  Crisis: No  [facility] Patient: No    Treatment Objective(s) Addressed in This Session:  Target Behavior(s): alcohol use and depression    Depressed Mood: Increase interest, engagement, and pleasure in doing things  Identify negative self-talk and behaviors: challenge core beliefs, myths, and actions  Improve concentration, focus, and mindfulness in daily activities   Feel less fidgety, restless or slow in daily activities / interpersonal interactions  Alcohol / Substance Use: will make healthier choices in regard to substance use    Current Stressors / Issues:    [facility] visit with patient in the clinc. Patient reports day treatment is going \"okay\", and that the most challenging part is the group milieu, which is younger. He states it is difficult to connect or feel supported when \"our problems and priorities are so different\". Patient reports the group facilitators are aware, and he will continue to discuss and evaluate fit. Patient processed feelings of guilt about not being 100% invested in " "treatment; identified how his sense of \"duty\" as a core value impacts decision-making with regard to relationships, commitments, and responsibilities. Patient acknowledged a tendency to sacrifice his own needs to fulfill commitments made to others. Reiterated to patient that there is no obligation to complete treatment at [facility], and that there are other programs that may be better suited to his needs. Patient will discuss with group facilitators and see how things go over the next week. He is feeling supported by his partner and family, and was able to allow his daughter to attend a medical appointment on her own.  Patient is thinking more about how to approach the work situation, and whether he will be able to return to previous job duties. Patient reports his overall mood is improved somewhat, and that not having the pressure of work is making a difference. He feels calmer and clearer in his thinking, and more in control of what happens day to day.    Patient denied SI/SIB thoughts or urges.    Progress on Treatment Objective(s) / Homework:  New Objective established this session - ACTION (Actively working towards change); Intervened by reinforcing change plan / affirming steps taken    Motivational Interviewing    MI Intervention: Expressed Empathy/Understanding, Supported Autonomy, Collaboration, Evocation, Permission to raise concern or advise, Open-ended questions and Reflections: simple and complex     Change Talk Expressed by the Patient: Desire to change Ability to change Reasons to change Need to change    Provider Response to Change Talk: E - Evoked more info from patient about behavior change, A - Affirmed patient's thoughts, decisions, or attempts at behavior change, R - Reflected patient's change talk and S - Summarized patient's change talk statements    Also provided psychoeducation about behavioral health condition, symptoms, and treatment options    Care Plan review completed: " "No    Medication Review:  No changes to current psychiatric medication(s)    Medication Compliance:  Yes    Changes in Health Issues:   None reported - please see medical chart    Chemical Use Review:   Substance Use: Problem use continues with no change since last session, Contemplation  Provided encouragement towards sobriety         Tobacco Use: No current tobacco use.      Assessment: Current Emotional / Mental Status (status of significant symptoms):  Risk status (Self / Other harm or suicidal ideation)  Patient has had a history of suicidal ideation: psychiatric hospitalization several years ago - \"nervous breakdown\"  Patient denies current fears or concerns for personal safety.  Patient denies current or recent suicidal ideation or behaviors.  Patient denies current or recent homicidal ideation or behaviors.  Patient denies current or recent self injurious behavior or ideation.  Patient denies other safety concerns.  A safety and risk management plan has not been developed at this time, however patient was encouraged to call [place] [phone] should there be a change in any of these risk factors.    Appearance:   Appropriate   Eye Contact:   Good   Psychomotor Behavior: Normal   Attitude:   Cooperative   Orientation:   All  Speech   Rate / Production: Normal    Volume:  Normal   Mood:    Anxious   Affect:    Appropriate   Thought Content:  Clear   Thought Form:  Coherent  Goal Directed  Logical   Insight:    Fair     Diagnoses:  1. Major depressive disorder, recurrent episode, mild (H)        Collateral Reports Completed:  Not Applicable     Plan: (Homework, other):  Patient was given information about behavioral services and encouraged to schedule a follow up appointment with the clinic [facility] in 2 weeks.  He was also given information about mental health symptoms and treatment options  and deep Breathing Strategies to practice when experiencing anxiety.  CD Recommendations: Practice Harm Reduction: reduce # " of drinks per day. Patient will continue the PHP for depression at [facility].   Larry Albright, TYRON, [facility]

## 2018-11-16 ENCOUNTER — OFFICE VISIT (OUTPATIENT)
Dept: BEHAVIORAL HEALTH | Facility: CLINIC | Age: 63
End: 2018-11-16
Payer: COMMERCIAL

## 2018-11-16 DIAGNOSIS — F33.0 MAJOR DEPRESSIVE DISORDER, RECURRENT EPISODE, MILD (H): Primary | ICD-10-CM

## 2018-11-16 PROCEDURE — 90834 PSYTX W PT 45 MINUTES: CPT | Performed by: SOCIAL WORKER

## 2018-11-16 NOTE — PROGRESS NOTES
"[facility] - Integrated Primary Care   November 16, 2018      Behavioral Health Clinician Progress Note    Patient Name: Govind Way           Service Type:  Individual      Service Location:   Face to Face in Clinic     Session Start Time: 10:10 am  Session End Time: 11:00 am      Session Length: 38 - 52      Attendees: Patient    Visit Activities (Refresh list every visit): [facility] Only    Diagnostic Assessment Date: 9/7/17 by MIKE Bolton, PAULINA  Treatment Plan Review Date: to be completed  See Flowsheets for today's PHQ-9 and WAGNER-7 results  Previous PHQ-9:   PHQ-9 SCORE 6/25/2018 7/23/2018 10/1/2018   Total Score - - -   Total Score 4 3 4     Previous WAGNER-7:   WAGNER-7 SCORE 6/25/2018 7/23/2018 10/1/2018   Total Score - - -   Total Score 5 4 2       SHASHANK LEVEL:  SHASHANK Score (Last Two) 6/14/2012   SHASHANK Raw Score 50   Activation Score 86.3   SHASHANK Level 4       DATA  Extended Session (60+ minutes): No  Interactive Complexity: No  Crisis: No  [facility] Patient: No    Treatment Objective(s) Addressed in This Session:  Target Behavior(s): alcohol use and depression    Depressed Mood: Increase interest, engagement, and pleasure in doing things  Identify negative self-talk and behaviors: challenge core beliefs, myths, and actions  Improve concentration, focus, and mindfulness in daily activities   Feel less fidgety, restless or slow in daily activities / interpersonal interactions  Alcohol / Substance Use: will make healthier choices in regard to substance use    Current Stressors / Issues:    [facility] visit with patient in the clinc. Patient processed continued ambivalence about the milieu in treatment, and states that \"it's hard to relate at times because they're so young and are facing such different challenges. I'm trying to take what I need from treatment, and if anything, I'm focused on taking space for myself to figure out where I'm headed\". Patient shared that several changes are in process, and that his daughter is more " "independent and self-sufficient, and he is re-examining his relationship to family and his partner. He has been able to focus on self-care, and is identifying his needs in relation to others.   Patient expressed gratitude for the work done in therapy, and states he feels encouraged to continue in this process of self-discovery and acceptance. He reports feeling well-supported by family, friends, and his partner, and while his mood is generally depressed, he feels better overall.   Patient is grieving the loss of a friend's son, and states this has given him perspective about what's important. Patient processed his beliefs about life and death, and continues to question his sense of duty and obligation \"you finish what you start. I made a commitment\". Patient agreed to be mindful of his needs as he continues in treatment, and he will talk with counselors about goals and goodness of fit.    Patient denied SI/SIB thoughts or urges. He will schedule a follow up visit in 2-4 weeks, and will call for support if needed.    Progress on Treatment Objective(s) / Homework:  New Objective established this session - ACTION (Actively working towards change); Intervened by reinforcing change plan / affirming steps taken    Motivational Interviewing    MI Intervention: Expressed Empathy/Understanding, Supported Autonomy, Collaboration, Evocation, Permission to raise concern or advise, Open-ended questions and Reflections: simple and complex     Change Talk Expressed by the Patient: Desire to change Ability to change Reasons to change Need to change    Provider Response to Change Talk: E - Evoked more info from patient about behavior change, A - Affirmed patient's thoughts, decisions, or attempts at behavior change, R - Reflected patient's change talk and S - Summarized patient's change talk statements    Also provided psychoeducation about behavioral health condition, symptoms, and treatment options    Care Plan review completed: " "No    Medication Review:  No changes to current psychiatric medication(s)    Medication Compliance:  Yes    Changes in Health Issues:   None reported - please see medical chart    Chemical Use Review:   Substance Use: Problem use continues with no change since last session, Contemplation  Provided encouragement towards sobriety         Tobacco Use: No current tobacco use.      Assessment: Current Emotional / Mental Status (status of significant symptoms):  Risk status (Self / Other harm or suicidal ideation)  Patient has had a history of suicidal ideation: psychiatric hospitalization several years ago - \"nervous breakdown\"  Patient denies current fears or concerns for personal safety.  Patient denies current or recent suicidal ideation or behaviors.  Patient denies current or recent homicidal ideation or behaviors.  Patient denies current or recent self injurious behavior or ideation.  Patient denies other safety concerns.  A safety and risk management plan has not been developed at this time, however patient was encouraged to call [place] [phone] should there be a change in any of these risk factors.    Appearance:   Appropriate   Eye Contact:   Good   Psychomotor Behavior: Normal   Attitude:   Cooperative   Orientation:   All  Speech   Rate / Production: Normal    Volume:  Normal   Mood:    Anxious  Sad   Affect:    Appropriate   Thought Content:  Clear   Thought Form:  Coherent  Goal Directed  Logical   Insight:    Fair     Diagnoses:  1. Major depressive disorder, recurrent episode, mild (H)        Collateral Reports Completed:  Not Applicable     Plan: (Homework, other):  Patient was given information about behavioral services and encouraged to schedule a follow up appointment with the clinic [facility] 2-4 weeks.  He was also given information about mental health symptoms and treatment options  and deep Breathing Strategies to practice when experiencing anxiety.  CD Recommendations: Practice Harm Reduction: " reduce # of drinks per day. Patient will continue the PHP for depression at [facility].   Larry Albright, TYRON, [facility]

## 2018-11-16 NOTE — MR AVS SNAPSHOT
After Visit Summary   11/16/2018    Govind Way    MRN: [id number]           Patient Information     Date Of Birth          1955        Visit Information        Provider Department      11/16/2018 10:00 AM Larry Albright, [facility]        Today's Diagnoses     Major depressive disorder, recurrent episode, mild (H)    -  1       Follow-ups after your visit        Who to contact     If you have questions or need follow up information about today's clinic visit or your schedule please contact [facility] directly at [phone].  Normal or non-critical lab and imaging results will be communicated to you by [employer], letter or phone within 4 business days after the clinic has received the results. If you do not hear from us within 7 days, please contact the clinic through [employer] or phone. If you have a critical or abnormal lab result, we will notify you by phone as soon as possible.  Submit refill requests through [employer] or call your pharmacy and they will forward the refill request to us. Please allow 3 business days for your refill to be completed.          Additional Information About Your Visit        MyChart Information     [employer] gives you secure access to your electronic health record. If you see a primary care provider, you can also send messages to your care team and make appointments. If you have questions, please call your primary care clinic.  If you do not have a primary care provider, please call [phone] and they will assist you.        Care EveryWhere ID     This is your Care EveryWhere ID. This could be used by other organizations to access your [facility] medical records  [id number]         Blood Pressure from Last 3 Encounters:   10/23/18 118/78   09/18/18 110/62   08/07/18 116/62    Weight from Last 3 Encounters:   10/23/18 74.8 kg (165 lb)   09/18/18 74.6 kg (164 lb 8 oz)   08/29/18 74.3 kg (163  lb 14.4 oz)              Today, you had the following     No orders found for display       Primary Care Provider Office Phone # Fax #    Ирина Bishop, JEWELS [phone]       [address] [facility] [address]  [facility] [address]        Equal Access to Services     SHIVA VELA : Hadii aad ku hadasho Soomaali, waaxda luqadaha, qaybta kaalmada adeegyada, waxay idiin hayaan adekatey khaquilinotravis lapierre . So [facility] [phone].    ATENCIÓN: Si habla español, tiene a becerra disposición servicios gratuitos de asistencia lingüística. Chu al [phone].    We comply with applicable federal civil rights laws and [place] laws. We do not discriminate on the basis of race, color, national origin, age, disability, sex, sexual orientation, or gender identity.            Thank you!     Thank you for choosing [facility]  for your care. Our goal is always to provide you with excellent care. Hearing back from our patients is one way we can continue to improve our services. Please take a few minutes to complete the written survey that you may receive in the mail after your visit with us. Thank you!             Your Updated Medication List - Protect others around you: Learn how to safely use, store and throw away your medicines at www.disposemymeds.org.          This list is accurate as of 11/16/18 11:59 PM.  Always use your most recent med list.                   Brand Name Dispense Instructions for use Diagnosis    acetaminophen 325 MG tablet    TYLENOL    100 tablet    Take 2 tablets (650 mg) by mouth every 4 hours as needed for other (mild pain)    Urinary retention       amphetamine-dextroamphetamine 15 MG per tablet    ADDERALL    30 tablet    Take 1 tablet (15 mg) by mouth daily    Attention deficit hyperactivity disorder (ADHD), other type       cholecalciferol 400 UNIT/ML Liqd liquid    vitamin D/D-VI-SOL     Take 5,000 Units by mouth daily        ciclopirox 8 % solution    PENLAC    6  mL    Apply to adjacent skin and affected nails daily.  Remove with alcohol every 7 days, then repeat.    Candida onychomycosis       DAILY HERBS PROSTATE PO      Take 1 tablet by mouth daily        diclofenac 1 % Gel topical gel    VOLTAREN    100 g    Apply 4 grams to shoulder or 2 grams to hands four times daily using enclosed dosing card.        Fish Oil 500 MG Caps      Take 1 capsule by mouth daily        fluticasone 50 MCG/ACT spray    FLONASE    1 Bottle    Spray 2 sprays into both nostrils daily    Upper respiratory tract infection, unspecified type       ibuprofen 600 MG tablet    ADVIL/MOTRIN    30 tablet    Take 1 tablet (600 mg) by mouth every 6 hours as needed for other (For mild pain and temperature greater than 102F)    Urinary retention       lisinopril 10 MG tablet    PRINIVIL/ZESTRIL    90 tablet    Take 1 tablet (10 mg) by mouth daily    Benign essential hypertension       magnesium 250 MG tablet      Take 1 tablet by mouth nightly as needed (sleep)        MULTIVITAMIN TABS   OR      1 TABLET EVETRY DAY        mupirocin 2 % ointment    BACTROBAN    2 g    Apply to anterior nares BID X 2 month supply    Nasal vestibulitis

## 2018-11-27 ENCOUNTER — TELEPHONE (OUTPATIENT)
Dept: FAMILY MEDICINE | Facility: CLINIC | Age: 63
End: 2018-11-27

## 2018-11-27 NOTE — TELEPHONE ENCOUNTER
Received forms from [employer] of [employer], Disability management services.   For provider to fill out all forms and sign and fax back .     Forms completed and will fax back to [phone].     Minerva Urbian CMA on 11/27/2018 at 1:14 PM

## 2018-12-10 ENCOUNTER — OFFICE VISIT (OUTPATIENT)
Dept: BEHAVIORAL HEALTH | Facility: CLINIC | Age: 63
End: 2018-12-10
Payer: COMMERCIAL

## 2018-12-10 DIAGNOSIS — F33.0 MAJOR DEPRESSIVE DISORDER, RECURRENT EPISODE, MILD (H): Primary | ICD-10-CM

## 2018-12-10 PROCEDURE — 90834 PSYTX W PT 45 MINUTES: CPT | Performed by: SOCIAL WORKER

## 2018-12-10 NOTE — PROGRESS NOTES
[facility] - Integrated Primary Care   December 10, 2018      Behavioral Health Clinician Progress Note    Patient Name: Govind Way           Service Type:  Individual      Service Location:   Face to Face in Clinic     Session Start Time: 2:35 pm  Session End Time: 3:25 pm      Session Length: 38 - 52      Attendees: Patient    Visit Activities (Refresh list every visit): [facility] Only    Diagnostic Assessment Date: 9/7/17 by MIKE Bolton, PAULINA  Treatment Plan Review Date: to be completed  See Flowsheets for today's PHQ-9 and WAGNER-7 results  Previous PHQ-9:   PHQ-9 SCORE 6/25/2018 7/23/2018 10/1/2018   PHQ-9 Total Score - - -   PHQ-9 Total Score 4 3 4     Previous WAGNER-7:   WAGNER-7 SCORE 6/25/2018 7/23/2018 10/1/2018   Total Score - - -   Total Score 5 4 2       SHASHANK LEVEL:  SHASHANK Score (Last Two) 6/14/2012   SHASHANK Raw Score 50   Activation Score 86.3   SHASHANK Level 4       DATA  Extended Session (60+ minutes): No  Interactive Complexity: No  Crisis: No  [facility] Patient: No    Treatment Objective(s) Addressed in This Session:  Target Behavior(s): alcohol use and depression    Depressed Mood: Increase interest, engagement, and pleasure in doing things  Identify negative self-talk and behaviors: challenge core beliefs, myths, and actions  Improve concentration, focus, and mindfulness in daily activities   Feel less fidgety, restless or slow in daily activities / interpersonal interactions  Alcohol / Substance Use: will make healthier choices in regard to substance use    Current Stressors / Issues:    [facility] visit with patient in the clinc. Patient reports he graduated from treatment and completing follow up visits to test for cognitive changes at [facility] [place]. He states the care team recommended this testing due to memory problems reported and observed while he was there. Patient states the program was helpful and he gained insight into his own process and needs. He is setting aside space and time for himself, and is  considering what will be helpful when he returns to work in the beginning of January. Patient is planning to have a conversation with the  about ensuring a productive and supported work environment; and acknowledged how work stress impacted overall depression and anxiety. Acknowledged the progress patient has made and encouraged him to remember he is deserving when feeling doubtful.   Patient reports his overall mood is stable and that he is feeling hopeful about the future.   Patient would like MTM added to the next primary care visit to discuss Adderall and possible implications/benefits of long-term use. He has started taking it daily, as recommended by a [facility] provider. He notes some potential benefit including staying on track, though is unsure about side effects.    Progress on Treatment Objective(s) / Homework:  New Objective established this session - ACTION (Actively working towards change); Intervened by reinforcing change plan / affirming steps taken    Motivational Interviewing    MI Intervention: Expressed Empathy/Understanding, Supported Autonomy, Collaboration, Evocation, Permission to raise concern or advise, Open-ended questions and Reflections: simple and complex     Change Talk Expressed by the Patient: Desire to change Ability to change Reasons to change Need to change    Provider Response to Change Talk: E - Evoked more info from patient about behavior change, A - Affirmed patient's thoughts, decisions, or attempts at behavior change, R - Reflected patient's change talk and S - Summarized patient's change talk statements    Also provided psychoeducation about behavioral health condition, symptoms, and treatment options    Care Plan review completed: No    Medication Review:  No changes to current psychiatric medication(s)    Medication Compliance:  Yes    Changes in Health Issues:   None reported - please see medical chart    Chemical Use Review:   Substance Use:  "Problem use continues with no change since last session, Contemplation  Provided encouragement towards sobriety         Tobacco Use: No current tobacco use.      Assessment: Current Emotional / Mental Status (status of significant symptoms):  Risk status (Self / Other harm or suicidal ideation)  Patient has had a history of suicidal ideation: psychiatric hospitalization several years ago - \"nervous breakdown\"  Patient denies current fears or concerns for personal safety.  Patient denies current or recent suicidal ideation or behaviors.  Patient denies current or recent homicidal ideation or behaviors.  Patient denies current or recent self injurious behavior or ideation.  Patient denies other safety concerns.  A safety and risk management plan has not been developed at this time, however patient was encouraged to call [place] [phone] should there be a change in any of these risk factors.    Appearance:   Appropriate   Eye Contact:   Good   Psychomotor Behavior: Restless   Attitude:   Cooperative   Orientation:   All  Speech   Rate / Production: Normal    Volume:  Normal   Mood:    Anxious   Affect:    Appropriate   Thought Content:  Clear   Thought Form:  Coherent  Goal Directed  Logical   Insight:    Fair     Diagnoses:  1. Major depressive disorder, recurrent episode, mild (H)        Collateral Reports Completed:  Not Applicable     Plan: (Homework, other):  Patient was given information about behavioral services and encouraged to schedule a follow up appointment with the clinic [facility] 2-4 weeks.  He was also given information about mental health symptoms and treatment options  and deep Breathing Strategies to practice when experiencing anxiety.  CD Recommendations: Practice Harm Reduction: reduce # of drinks per day. Patient will keep follow up at [facility].   DANGELO Porras, [facility]  "

## 2018-12-14 DIAGNOSIS — B37.2 CANDIDA ONYCHOMYCOSIS: ICD-10-CM

## 2018-12-14 NOTE — TELEPHONE ENCOUNTER
ciclopirox 8 % SOLN  Requested Prescriptions  Last Written Prescription Date:  9/18/18  Last Fill Quantity: 6mL,  # refills: 0   Last office visit: 10/23/2018 with prescribing provider:     Future Office Visit:   Next 5 appointments (look out 90 days)    Dec 27, 2018  3:30 PM CST  Return Visit with JEWELS Castano CNP  [facility] ([facility] [address]  [address]  [facility] [address]  [phone]   Dec 27, 2018  3:30 PM CST  Return Visit with DANGELO Mcclure  [facility] ([facility]) [address]  [address]  [facility] [address]  [phone]            Pending Prescriptions Disp Refills     ciclopirox (PENLAC) 8 % external solution 6 mL 0     Sig: Apply to adjacent skin and affected nails daily.  Remove with alcohol every 7 days, then repeat.    There is no refill protocol information for this order

## 2018-12-17 RX ORDER — CICLOPIROX 80 MG/ML
SOLUTION TOPICAL
Qty: 6 ML | Refills: 0 | Status: SHIPPED | OUTPATIENT
Start: 2018-12-17

## 2018-12-17 NOTE — TELEPHONE ENCOUNTER
"Medication Refill Request    Medication(s) requested:  ciclopirox 8 % SOLN    Last Office Visit: 10/23/18  Next Office Visit: 12/27/18  Labs: up to date     Per past office visit:  \"(B35.1) Onychomycosis  -using Cicloprox. Can consider Podiatry referral if no improvement.\"    Rx approved and refilled per [facility] refill protocol.    Liudmila oCsta RN  12/17/18  9:00 AM   "

## 2018-12-19 ENCOUNTER — OFFICE VISIT (OUTPATIENT)
Dept: PHARMACY | Facility: CLINIC | Age: 63
End: 2018-12-19
Payer: COMMERCIAL

## 2018-12-19 VITALS — SYSTOLIC BLOOD PRESSURE: 112 MMHG | DIASTOLIC BLOOD PRESSURE: 80 MMHG

## 2018-12-19 DIAGNOSIS — F41.9 ANXIETY: ICD-10-CM

## 2018-12-19 DIAGNOSIS — F90.8 ATTENTION DEFICIT HYPERACTIVITY DISORDER (ADHD), OTHER TYPE: Primary | ICD-10-CM

## 2018-12-19 DIAGNOSIS — I10 HYPERTENSION GOAL BP (BLOOD PRESSURE) < 140/90: ICD-10-CM

## 2018-12-19 PROCEDURE — 99605 MTMS BY PHARM NP 15 MIN: CPT | Performed by: PHARMACIST

## 2018-12-19 RX ORDER — IBUPROFEN 200 MG
400 TABLET ORAL EVERY 4 HOURS PRN
COMMUNITY

## 2018-12-19 RX ORDER — DEXTROAMPHETAMINE SACCHARATE, AMPHETAMINE ASPARTATE MONOHYDRATE, DEXTROAMPHETAMINE SULFATE AND AMPHETAMINE SULFATE 2.5; 2.5; 2.5; 2.5 MG/1; MG/1; MG/1; MG/1
10 CAPSULE, EXTENDED RELEASE ORAL DAILY
Qty: 30 CAPSULE | Refills: 0 | COMMUNITY
Start: 2019-02-19 | End: 2018-12-27

## 2018-12-19 RX ORDER — MULTIVIT-MIN/IRON/FOLIC ACID/K 18-600-40
500 CAPSULE ORAL DAILY
COMMUNITY

## 2018-12-19 RX ORDER — CHLORAL HYDRATE 500 MG
1 CAPSULE ORAL DAILY
COMMUNITY

## 2018-12-19 NOTE — PATIENT INSTRUCTIONS
Recommendations from today's MTM visit:                                                    MTM (medication therapy management) is a service provided by a clinical pharmacist designed to help you get the most of out of your medicines.   Today we reviewed what your medicines are for, how to know if they are working, that your medicines are safe and how to make your medicine regimen as easy as possible.     1. I think you are taking the right dose of Adderall. I will pass a message along to Ирина about giving you 3 prescriptions at a time if she is comfortable with that.     2. As far as supplements, I think you have the most benefit from vitamin D but the other supplements do not have good data to suggest taking them every day is beneficial. You could think aobut stopping the other supplements and continue to focus on eating healthy and exercising regularly.       Next MTM visit: as needed    To schedule another MTM appointment, please call the clinic directly or you may call the MTM scheduling line at [phone] or toll-free at [phone].     My Clinical Pharmacist's contact information:                                                      It was a pleasure talking with you today!  Please feel free to contact me with any questions or concerns you have.      Lavonne Keith, PharmD, [facility]   [phone]    You may receive a survey about the MTM services you received.  I would appreciate your feedback to help me serve you better in the future. Please fill it out and return it when you can. Your comments will be anonymous.

## 2018-12-19 NOTE — PROGRESS NOTES
"SUBJECTIVE/OBJECTIVE:                           Govind Way is a 63 year old male coming in for an initial visit for Medication Therapy Management.  He was referred to me from Larry [facility].    Chief Complaint: questions about Adderall.    Allergies/ADRs: Reviewed in Epic  Tobacco: History of tobacco dependence - quit    Alcohol: 4-6 beverages / week  Activity: biking  PMH: Reviewed in Epic    Medication Adherence/Access:  no issues reported    ADHD: currently taking Adderall XR 10mg QAM.  Has been taking old Rx from his previous primary care provider. He did try change in Rx per psychiatrist Dr Kang to 15mg but was immediate release tablet, didn't like feeling when medication peaked.  Side effects: \"feel a surge like I had caffeine\". Appetite stable. Blood pressure stable.   Returning to work in January, has been off since November and nervous about returning to work.    Symptoms: none that are bothersome at this point. Denies any bothersome issues at this point with concentration, focus, completing tasks.    Many questions about how to obtain refills on Adderall. Used to get 6 months of hard copies from a previous primary provider.    Anxiety: no current medication. States he has taken a number of SSRI In the past but didn't like he was feeling flat, little emotion. Hyperverbal throughout appt. Recently completed neuropsych testing and intensive outpatient program. Feels like he is doing well with numerous techniques to manage \"flooding thoughts\"    Hypertension: Current medications include lisinopril 10mg daily.  Patient does not self-monitor BP.  Patient reports no current medication side effects.     Today's Vitals: /80       ASSESSMENT:                             Current medications were reviewed today.     Medication Adherence: excellent, no issues identified    ADHD: stable on current dose. Extensive education provided on different formulations of stimulants, non-stimulant options.  " Encouraged him to closely monitor ability to remain on task starting his new job. Could consider dose increase vs change in formulation. No reservation in giving patient 3 months of hard copies to increase ease for him to refill Rx.     anxiety: needs improvement. Discussed SNRIs, pt will consider trial. Will continue working closely with [facility]     Hypertension: well controlled      PLAN:                            PCP consider giving 3 hard copy Rx for Adderall XR 10mg for refills    I spent 60 minutes with this patient today. All changes were made via collaborative practice agreement with Ирина Bishop. A copy of the visit note was provided to the patient's primary care provider.    Will follow up as needed.    The patient was given a summary of these recommendations as an after visit summary.     Lavonne Keith, PharmD, BCACP

## 2018-12-20 ENCOUNTER — DOCUMENTATION ONLY (OUTPATIENT)
Dept: ORTHOPEDICS | Facility: CLINIC | Age: 63
End: 2018-12-20

## 2018-12-20 NOTE — PROGRESS NOTES
Completed Health Care Provider Report and faxed to [facility] at [phone].  Submitted original to Medical Records for scanning.

## 2018-12-27 ENCOUNTER — OFFICE VISIT (OUTPATIENT)
Dept: BEHAVIORAL HEALTH | Facility: CLINIC | Age: 63
End: 2018-12-27
Payer: COMMERCIAL

## 2018-12-27 ENCOUNTER — OFFICE VISIT (OUTPATIENT)
Dept: FAMILY MEDICINE | Facility: CLINIC | Age: 63
End: 2018-12-27
Payer: COMMERCIAL

## 2018-12-27 VITALS
TEMPERATURE: 97.8 F | WEIGHT: 171 LBS | HEART RATE: 94 BPM | SYSTOLIC BLOOD PRESSURE: 126 MMHG | DIASTOLIC BLOOD PRESSURE: 84 MMHG | BODY MASS INDEX: 24.13 KG/M2 | RESPIRATION RATE: 14 BRPM | OXYGEN SATURATION: 97 %

## 2018-12-27 DIAGNOSIS — M25.512 LEFT SHOULDER PAIN, UNSPECIFIED CHRONICITY: ICD-10-CM

## 2018-12-27 DIAGNOSIS — F43.23 ADJUSTMENT DISORDER WITH MIXED ANXIETY AND DEPRESSED MOOD: ICD-10-CM

## 2018-12-27 DIAGNOSIS — F41.1 GAD (GENERALIZED ANXIETY DISORDER): ICD-10-CM

## 2018-12-27 DIAGNOSIS — F90.8 ATTENTION DEFICIT HYPERACTIVITY DISORDER (ADHD), OTHER TYPE: ICD-10-CM

## 2018-12-27 DIAGNOSIS — N40.1 BENIGN NON-NODULAR PROSTATIC HYPERPLASIA WITH LOWER URINARY TRACT SYMPTOMS: ICD-10-CM

## 2018-12-27 DIAGNOSIS — I10 HYPERTENSION GOAL BP (BLOOD PRESSURE) < 140/90: Primary | ICD-10-CM

## 2018-12-27 DIAGNOSIS — F33.0 MAJOR DEPRESSIVE DISORDER, RECURRENT EPISODE, MILD (H): Primary | ICD-10-CM

## 2018-12-27 PROCEDURE — 99214 OFFICE O/P EST MOD 30 MIN: CPT | Performed by: NURSE PRACTITIONER

## 2018-12-27 PROCEDURE — 90834 PSYTX W PT 45 MINUTES: CPT | Performed by: SOCIAL WORKER

## 2018-12-27 PROCEDURE — 84153 ASSAY OF PSA TOTAL: CPT | Performed by: NURSE PRACTITIONER

## 2018-12-27 PROCEDURE — 36415 COLL VENOUS BLD VENIPUNCTURE: CPT | Performed by: NURSE PRACTITIONER

## 2018-12-27 RX ORDER — DEXTROAMPHETAMINE SACCHARATE, AMPHETAMINE ASPARTATE MONOHYDRATE, DEXTROAMPHETAMINE SULFATE AND AMPHETAMINE SULFATE 2.5; 2.5; 2.5; 2.5 MG/1; MG/1; MG/1; MG/1
10 CAPSULE, EXTENDED RELEASE ORAL DAILY
Qty: 30 CAPSULE | Refills: 0 | Status: SHIPPED | OUTPATIENT
Start: 2019-02-27 | End: 2019-03-27

## 2018-12-27 RX ORDER — DEXTROAMPHETAMINE SACCHARATE, AMPHETAMINE ASPARTATE MONOHYDRATE, DEXTROAMPHETAMINE SULFATE AND AMPHETAMINE SULFATE 2.5; 2.5; 2.5; 2.5 MG/1; MG/1; MG/1; MG/1
10 CAPSULE, EXTENDED RELEASE ORAL DAILY
Qty: 30 CAPSULE | Refills: 0 | Status: SHIPPED | OUTPATIENT
Start: 2019-01-27 | End: 2018-12-27

## 2018-12-27 RX ORDER — DEXTROAMPHETAMINE SACCHARATE, AMPHETAMINE ASPARTATE MONOHYDRATE, DEXTROAMPHETAMINE SULFATE AND AMPHETAMINE SULFATE 2.5; 2.5; 2.5; 2.5 MG/1; MG/1; MG/1; MG/1
10 CAPSULE, EXTENDED RELEASE ORAL DAILY
Qty: 30 CAPSULE | Refills: 0 | Status: SHIPPED | OUTPATIENT
Start: 2018-12-27 | End: 2018-12-27

## 2018-12-27 NOTE — PROGRESS NOTES
SUBJECTIVE:                                                    Govind Way is a 63 year old male with a history of ADHD, anxiety and hypertension who presents to clinic today for the following health issues:  [facility]: Larry    Patient presents for primary care medication management. States he recently completed mental health Day Treatment with [facility]. States it went well overall. There were some other participants who were younger than him whom he had a hard time relating to, but he adjusted and was able to learn some good skills.    States that [employer] has been giving him a difficult time with his medical leave benefits. He has been working on getting all of his paperwork submitted. He needs a work release form signed today. Plans to go back to work Jan. 2nd. He feels apprehensive about starting back at work. He has considered looking for a new job.     [facility]. He [facility] benefits with [facility] from his time in [employer]. He will let us know what he decides to do.     Patient met with JASVIR Alas last week regarding ADHD medication. Currently taking Adderall XR 10 mg qam. He was taking Adderall immediate release 15 mg but noticed too many side effects and it did not last as long. He inquires about three months of hard copy refills.     Hypertension Follow-up      Outpatient blood pressure sporadically checked. Currently compliant with Lisinopril 10 mg tablet daily.    Low Salt Diet: low salt      Patient has hx of BPH with obstructive urinary symptoms. He has been followed by Dr. Manolo Cardenas. History of Holep procedure 10/19/17.   There is also a hx of a left testicular cysts that has been followed conservatively. Denies any changes in his overall symptoms. Reports some dribbling at night which is chronic. Inquires about Urology follow up and PSA surveillance.           Problems taking medications regularly: No    Medication side effects: none    Diet: regular  (no restrictions)    Social History     Tobacco Use     Smoking status: Former Smoker     Last attempt to quit: 1982     Years since quittin.0     Smokeless tobacco: Never Used     Tobacco comment: NO 2ND HAND SMOKE   Substance Use Topics     Alcohol use: Yes     Alcohol/week: 0.0 oz     Comment: 5/wk        Problem list and histories reviewed & adjusted, as indicated.  Additional history: as documented    Patient Active Problem List   Diagnosis     Abdominal hernia     Hypertension goal BP (blood pressure) < 140/90     CARDIOVASCULAR SCREENING; LDL GOAL LESS THAN 160     History of hyperthyroidism     Right inguinal hernia     Irritable bowel syndrome     Donor of kidney for transplant     Hernia     Testis disorder     Screening for prostate cancer     Other hydrocele     Hallux rigidus, right foot     Advance care planning     Tendinopathy of right gluteus medius     Attention deficit hyperactivity disorder (ADHD), other type     Benign non-nodular prostatic hyperplasia with lower urinary tract symptoms     Anxiety     SBO (small bowel obstruction) (H)     Major depressive disorder, recurrent episode, mild (H)     Alcohol use disorder     Shoulder pain, left     Past Surgical History:   Procedure Laterality Date     C ECG MONITOR/ 24 HRS, ORIG ECG, W VIS SUPERIMPOS SCAN, COMPLETE      um holter monitor     C REMV KIDNEY/URETER,SAME INCIS  05    Nephrouretectomy/LEFT KIDNEY DONATED TO DAUGHTER/[facility]     COLONOSCOPY N/A 5/15/2018    Procedure: COLONOSCOPY;  colonoscopy;  Surgeon: Lobo Pelaez MD;  Location:  GI     CYSTOSCOPY, TRANSURETHRAL RESECTION (TUR) PROSTATE, COMBINED       HC REMOVAL OF TONSILS,<11 Y/O  age 7     HC VASECTOMY UNILAT/BILAT W POSTOP SEMEN  age 41     HERNIORRHAPHY INGUINAL  2011    Procedure:HERNIORRHAPHY INGUINAL; Surgeon:JULIA SIERRA; Location:[facility] OR     LAPAROSCOPIC HERNIORRHAPHY INGUINAL Right 2015    Procedure: LAPAROSCOPIC HERNIORRHAPHY  INGUINAL;  Surgeon: García Ibarra MD;  Location: [facility]     LAPAROSCOPIC HERNIORRHAPHY VENTRAL  2011    Procedure:LAPAROSCOPIC HERNIORRHAPHY VENTRAL; Laparoscopic Ventral Hernia Repair with Mesh, Open Left Inguinal Hernia Repair with Mesh; Surgeon:JULIA SIERRA; Location:[facility] OR     LASER HOLMIUM ENUCLEATION PROSTATE N/A 10/19/2017    Procedure: LASER HOLMIUM ENUCLEATION PROSTATE;  Holmium Laser Enculeation of the Prostate;  Surgeon: Manolo Cardenas MD;  Location: [facility] OR     LASIK CUSTOMVUE BILATERAL  2011    Procedure:LASIK CUSTOMVUE BILATERAL; BILATERAL CUSTOMVUE LASIK WITH INTRALASE; Surgeon:POP SIMON; Location:[facility]       Social History     Tobacco Use     Smoking status: Former Smoker     Last attempt to quit: 1982     Years since quittin.0     Smokeless tobacco: Never Used     Tobacco comment: NO 2ND HAND SMOKE   Substance Use Topics     Alcohol use: Yes     Alcohol/week: 0.0 oz     Comment: 5/wk     Family History   Problem Relation Age of Onset     Diabetes Mother         ADULT ONSET     Hypertension Mother      Coronary Artery Disease Mother         Also father and grandparent.  Father had coronary bypass and aortic valve replacement surgery     Heart Surgery Mother         Aortic valve     Cancer Other         prostate cancer     Thyroid Disease Other      Hypertension Father      Skin Cancer Other         Mother and father, grandfather     Arthritis Other         Mom, daughter, grandparents     Other - See Comments Other         Grandfather with kidney stone     Prostate Cancer Other         Grandfather     Kidney Disease Daughter      Lupus Daughter         Causing end-stage renal disease     Eye Disorder No family hx of         NONE KNOWN     Hyperlipidemia No family hx of      Cerebrovascular Disease No family hx of      Breast Cancer No family hx of      Colon Cancer No family hx of      Other Cancer No family hx of      Depression No family  hx of      Anxiety Disorder No family hx of      Mental Illness No family hx of      Substance Abuse No family hx of      Anesthesia Reaction No family hx of      Asthma No family hx of      Osteoporosis No family hx of      Genetic Disorder No family hx of      Obesity No family hx of      Unknown/Adopted No family hx of            Current Outpatient Medications   Medication Sig Dispense Refill     [START ON 2/27/2019] amphetamine-dextroamphetamine (ADDERALL XR) 10 MG 24 hr capsule Take 1 capsule (10 mg) by mouth daily for 28 days 30 capsule 0     Ascorbic Acid (VITAMIN C) 500 MG CAPS Take 500 mg by mouth daily       cholecalciferol (VITAMIN D3) 5000 units (125 mcg) capsule Take 5,000 Units by mouth daily       ciclopirox (PENLAC) 8 % external solution Apply to adjacent skin and affected nails daily.  Remove with alcohol every 7 days, then repeat. 6 mL 0     diclofenac (VOLTAREN) 1 % GEL topical gel Apply 4 grams to shoulder or 2 grams to hands four times daily using enclosed dosing card. 100 g 1     fish oil-omega-3 fatty acids 1000 MG capsule Take 1 g by mouth daily       fluticasone (FLONASE) 50 MCG/ACT spray Spray 2 sprays into both nostrils daily 1 Bottle 11     ibuprofen (ADVIL/MOTRIN) 200 MG tablet Take 400 mg by mouth every 4 hours as needed for mild pain       lisinopril (PRINIVIL/ZESTRIL) 10 MG tablet Take 1 tablet (10 mg) by mouth daily 90 tablet 1     magnesium 250 MG tablet Take 1 tablet by mouth nightly as needed (sleep)       Misc Natural Products (DAILY HERBS PROSTATE PO) Take 1 tablet by mouth daily       MULTIVITAMIN TABS   OR 1 TABLET EVETRY DAY  0     VALERIAN PO Take by mouth nightly as needed       Allergies   Allergen Reactions     Ambien [Zolpidem Tartrate]      irritability     Codeine Sulfate Itching     Recent Labs   Lab Test 10/23/18  1622 05/18/18  0801 05/07/18  1002 11/06/17  1313  07/31/14  1746 06/18/13  0750  04/26/11  1012   LDL  --  90  --   --   --   --  95  --  137*   HDL  --   64  --   --   --   --  70  --  47   TRIG  --  98  --   --   --   --  45  --  79   ALT 31  --  28 24   < >  --  33   < > 28   CR 0.95  --  0.97 0.88   < > 1.13 1.10   < > 1.13   GFRESTIMATED 80  --  78 88   < > 67 69   < > 68   GFRESTBLACK >90  --  >90 >90   < > 81 84   < > 82   POTASSIUM 4.0  --  4.0 4.0   < > 3.8 4.0   < > 4.6   TSH  --   --   --   --   --  2.83 2.99  --   --     < > = values in this interval not displayed.      BP Readings from Last 3 Encounters:   12/27/18 126/84   12/19/18 112/80   10/23/18 118/78    Wt Readings from Last 3 Encounters:   12/27/18 77.6 kg (171 lb)   10/23/18 74.8 kg (165 lb)   09/18/18 74.6 kg (164 lb 8 oz)        Labs reviewed in EPIC  Problem list, Medication list, Allergies, and Medical/Social/Surgical histories reviewed in [facility] and updated as appropriate.     ROS: Constitutional, neuro, ENT, endocrine, pulmonary, cardiac, gastrointestinal, genitourinary, musculoskeletal, integument and psychiatric systems are negative, except as otherwise noted above in the HPI.       OBJECTIVE:                                                    /84   Pulse 94   Temp 97.8  F (36.6  C) (Oral)   Resp 14   Wt 77.6 kg (171 lb)   SpO2 97%   BMI 24.13 kg/m    Body mass index is 24.13 kg/m .  GENERAL: healthy, alert, well nourished, well hydrated, no distress    Mental Status Assessment:  Appearance:   Appropriate   Eye Contact:   Good   Psychomotor Behavior: Normal   Attitude:   Cooperative   Orientation:   All  Speech   Rate / Production: Normal    Volume:  Normal   Mood:    Anxious  Normal  Affect:    Appropriate   Thought Content:  Clear   Thought Form:  Coherent  Logical   Insight:    Good   Attention Span and Concentration:  limited  Recent and Remote Memory:  intact  Fund of Knowledge: appropriate  Muscle Strength and Tone: normal   Suicidal Ideation: reports no thoughts, no intention  Hallucination: No  Paranoid-No  Manic-No  Panic-No  Self harm-No      See [facility] notes      Diagnostic Test Results:  Results for orders placed or performed in visit on 10/23/18   Anti Nuclear Jennifer IgG by IFA with Reflex   Result Value Ref Range    DOROTHY interpretation Negative NEG^Negative   Rheumatoid factor   Result Value Ref Range    Rheumatoid Factor <20 <20 IU/mL   CRP, inflammation   Result Value Ref Range    CRP Inflammation <2.9 0.0 - 8.0 mg/L   ESR: Erythrocyte sedimentation rate   Result Value Ref Range    Sed Rate 8 0 - 20 mm/h   CBC with platelets   Result Value Ref Range    WBC 7.5 4.0 - 11.0 10e9/L    RBC Count 4.48 4.4 - 5.9 10e12/L    Hemoglobin 14.5 13.3 - 17.7 g/dL    Hematocrit 42.2 40.0 - 53.0 %    MCV 94 78 - 100 fl    MCH 32.4 26.5 - 33.0 pg    MCHC 34.4 31.5 - 36.5 g/dL    RDW 13.1 10.0 - 15.0 %    Platelet Count 263 150 - 450 10e9/L   Comprehensive metabolic panel (BMP + Alb, Alk Phos, ALT, AST, Total. Bili, TP)   Result Value Ref Range    Sodium 136 133 - 144 mmol/L    Potassium 4.0 3.4 - 5.3 mmol/L    Chloride 104 94 - 109 mmol/L    Carbon Dioxide 23 20 - 32 mmol/L    Anion Gap 9 3 - 14 mmol/L    Glucose 82 70 - 99 mg/dL    Urea Nitrogen 24 7 - 30 mg/dL    Creatinine 0.95 0.66 - 1.25 mg/dL    GFR Estimate 80 >60 mL/min/1.7m2    GFR Estimate If Black >90 >60 mL/min/1.7m2    Calcium 9.4 8.5 - 10.1 mg/dL    Bilirubin Total 0.7 0.2 - 1.3 mg/dL    Albumin 4.0 3.4 - 5.0 g/dL    Protein Total 7.3 6.8 - 8.8 g/dL    Alkaline Phosphatase 72 40 - 150 U/L    ALT 31 0 - 70 U/L    AST 25 0 - 45 U/L        ASSESSMENT/PLAN:                                                    (I10) Hypertension goal BP (blood pressure) < 140/90  (primary encounter diagnosis)  Comment: normotensive  Plan: continue Lisinopril, continue to monitor.     (F43.23) Adjustment disorder with mixed anxiety and depressed mood  Comment: patient with anxiety, not currently on medications  Plan: patient completed Day treatment, states that was helpful. He meets with Larry regularly for therapy. Plans to possibly [facility]  [facility]. He will let us know what he decides.  -return to work form done. Patient to meet with Larry to assist him in working on his disability paperwork.    (F90.8) Attention deficit hyperactivity disorder (ADHD), other type  Comment: notes from [facility] Pharmacist reviewed. Will do 3 months supply of hard copies for Adderall RX. Will continue to monitor and see back in 6 weeks unless he decides to [facility] to [facility]. Patient encouraged to let us know what he decides.  Plan: amphetamine-dextroamphetamine (ADDERALL XR) 10         MG 24 hr capsule, DISCONTINUED:         amphetamine-dextroamphetamine (ADDERALL XR) 10         MG 24 hr capsule, DISCONTINUED:         amphetamine-dextroamphetamine (ADDERALL XR) 10         MG 24 hr capsule            (M25.512) Left shoulder pain, unspecified chronicity  Comment: improvement noted  Plan: continue to monitor      (N40.1) Benign non-nodular prostatic hyperplasia with lower urinary tract symptoms  Comment: s/p Holep. Will update PSA and be in touch with Urology to see when they would like to see the patient back. Denies changes in sx.   Plan: PSA, tumor marker                Patient Instructions:  Patient Instructions   We can fax in the Workability form.  Larry will give you a call tomorrow.  We will check your PSA today, and I will be in touch with Dr. Delgado about next steps.  We refilled 3 months of the Adderall.                   JEWELS Hadley [facility] PRIMARY CARE

## 2018-12-27 NOTE — PROGRESS NOTES
[facility] - Integrated Primary Care   December 27, 2018      Behavioral Health Clinician Progress Note    Patient Name: Govind Way           Service Type:  Individual      Service Location:   Face to Face in Clinic     Session Start Time: 3:35 pm  Session End Time: 4:15 pm      Session Length: 38 - 52      Attendees: Patient and PCP    Visit Activities (Refresh list every visit): [facility] [employer]    Diagnostic Assessment Date: 9/7/17 by MIKE Bolton, PAULINA  Treatment Plan Review Date: to be completed  See Flowsheets for today's PHQ-9 and WAGNER-7 results  Previous PHQ-9:   PHQ-9 SCORE 6/25/2018 7/23/2018 10/1/2018   PHQ-9 Total Score - - -   PHQ-9 Total Score 4 3 4     Previous WAGNER-7:   WAGNER-7 SCORE 6/25/2018 7/23/2018 10/1/2018   Total Score - - -   Total Score 5 4 2       SHASHANK LEVEL:  SHASHANK Score (Last Two) 6/14/2012   SHASHANK Raw Score 50   Activation Score 86.3   SHASHANK Level 4       DATA  Extended Session (60+ minutes): No  Interactive Complexity: No  Crisis: No  [facility] Patient: No    Treatment Objective(s) Addressed in This Session:  Target Behavior(s): alcohol use and depression    Depressed Mood: Increase interest, engagement, and pleasure in doing things  Identify negative self-talk and behaviors: challenge core beliefs, myths, and actions  Improve concentration, focus, and mindfulness in daily activities   Feel less fidgety, restless or slow in daily activities / interpersonal interactions  Alcohol / Substance Use: will make healthier choices in regard to substance use    Current Stressors / Issues:    [facility] co-visit with patient and PCP in the clinic. Patient processed frustration and anxiety about the process to receive continued short-term disability. He expressed confusion about the paperwork and request for medical documentation, when the reason for leave is depression and anxiety. Patient will call the insurer tonight and follow up with the clinic in the morning to explain whether additional supporting  information is required. He is unaware of the results of neurocognitive testing, and will also follow up with Dr. Franklin at [facility].  Patient reports he is feeling stressed about the prospect of returning to work next week, as he will need to adjust to new demands and processes. He is being mindful of his needs, and will ask for reasonable accommodation at work.  Patient received hearing aids five days ago and has noticed a difference in how he hears. He is being curious and open to the experience, and is following advice and direction of the audiologist. [facility] as [facility] may be most beneficial [facility]. He will notify the clinic of his decision. In the meantime, patient will schedule a follow up therapy visit to check in about how the return to work is going.    Progress on Treatment Objective(s) / Homework:  New Objective established this session - ACTION (Actively working towards change); Intervened by reinforcing change plan / affirming steps taken    Motivational Interviewing    MI Intervention: Expressed Empathy/Understanding, Supported Autonomy, Collaboration, Evocation, Permission to raise concern or advise, Open-ended questions and Reflections: simple and complex     Change Talk Expressed by the Patient: Desire to change Ability to change Reasons to change Need to change    Provider Response to Change Talk: E - Evoked more info from patient about behavior change, A - Affirmed patient's thoughts, decisions, or attempts at behavior change, R - Reflected patient's change talk and S - Summarized patient's change talk statements    Also provided psychoeducation about behavioral health condition, symptoms, and treatment options    Care Plan review completed: No    Medication Review:  No changes to current psychiatric medication(s)    Medication Compliance:  Yes    Changes in Health Issues:   None reported - please see medical chart    Chemical Use  "Review:   Substance Use: Problem use continues with no change since last session, Contemplation  Provided encouragement towards sobriety         Tobacco Use: No current tobacco use.      Assessment: Current Emotional / Mental Status (status of significant symptoms):  Risk status (Self / Other harm or suicidal ideation)  Patient has had a history of suicidal ideation: psychiatric hospitalization several years ago - \"nervous breakdown\"  Patient denies current fears or concerns for personal safety.  Patient denies current or recent suicidal ideation or behaviors.  Patient denies current or recent homicidal ideation or behaviors.  Patient denies current or recent self injurious behavior or ideation.  Patient denies other safety concerns.  A safety and risk management plan has not been developed at this time, however patient was encouraged to call [place] [phone] should there be a change in any of these risk factors.    Appearance:   Appropriate   Eye Contact:   Good   Psychomotor Behavior: Normal   Attitude:   Cooperative   Orientation:   All  Speech   Rate / Production: Normal    Volume:  Normal   Mood:    Anxious   Affect:    Appropriate   Thought Content:  Clear   Thought Form:  Coherent  Goal Directed  Logical   Insight:    Fair     Diagnoses:  1. Major depressive disorder, recurrent episode, mild (H)        Collateral Reports Completed:  Not Applicable     Plan: (Homework, other):  Patient was given information about behavioral services and encouraged to schedule a follow up appointment with the clinic [facility] 1-2 weeks.  He was also given information about mental health symptoms and treatment options  and deep Breathing Strategies to practice when experiencing anxiety.  CD Recommendations: Practice Harm Reduction: reduce # of drinks per day.    DANGELO Porras, [facility]  "

## 2018-12-27 NOTE — PATIENT INSTRUCTIONS
We can fax in the Workability form.  Larry will give you a call tomorrow.  We will check your PSA today, and I will be in touch with Dr. Delgado about next steps.  We refilled 3 months of the Adderall.

## 2018-12-28 ENCOUNTER — TELEPHONE (OUTPATIENT)
Dept: FAMILY MEDICINE | Facility: CLINIC | Age: 63
End: 2018-12-28

## 2018-12-28 DIAGNOSIS — N44.2 TESTICULAR CYST: Primary | ICD-10-CM

## 2018-12-28 LAB — PSA SERPL-MCNC: 3.56 UG/L (ref 0–4)

## 2018-12-28 NOTE — TELEPHONE ENCOUNTER
Paper work completed and faxed to [facility] for pending claim case number [id number].  Mariela Garcia MA

## 2019-02-06 ENCOUNTER — TELEPHONE (OUTPATIENT)
Dept: FAMILY MEDICINE | Facility: CLINIC | Age: 64
End: 2019-02-06

## 2019-02-06 NOTE — TELEPHONE ENCOUNTER
Reason for Call:  Form, our goal is to have forms completed with 72 hours, however, some forms may require a visit or additional information.    Type of letter, form or note:  FMLA    Who is the form from?: Patient    Where did the form come from: Patient or family brought in       What clinic location was the form placed at?: [facility]    Where the form was placed: 's Box    What number is listed as a contact on the form?: Fax: [phone] Att. To Vicky Oreilly        Additional comments: Call patient when the form has been completed or if you have any questions or concerns and the patient needs to be seen prior to filling out the paper work.      Call taken on 2/6/2019 at 12:19 PM by Quin Arevalo

## 2019-02-20 NOTE — TELEPHONE ENCOUNTER
Called patient to notify him there is nothing attached to the document for the provider to complete, is there any pages missing?  Patient unavailable left voicemail.    Shanon Espinoza, CMA

## 2019-03-22 NOTE — PROGRESS NOTES
HoLEP 4 Week Follow-Up Note    Today I had the pleasure of seeing Mr. Way in follow-up for a history of BPH with obstructive voiding symptoms.     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    1.5 gms of tissue were removed in a median lobe only approach.  Pathology was benign.     His recovery was going well until the past few days.  He had increased his activity level mainly overexerting himself around his parents house which resulted in gross hematuria, passage of clot, and intermittent obstructive sensation. He presented to the ED yesterday with these symptoms. In the ED a catheter was placed and he was irrigated for approximately 15 cc of clot.  He went home voiding and overnight his urine cleared back up.  Today he reports that it is clear.      He has noted a definite improvement in strength of stream. He denies any significant urinary leakage, however he is still wearing a pad for protection just as a precaution.      AUA Symptom Score is 15/35 with a 2/6 for bother    Uroflow/Post Void Residual  PVR 0    Ucx yesterday no growth    Lab Results   Component Value Date    WBC 7.2 11/06/2017     Lab Results   Component Value Date    RBC 4.59 11/06/2017     Lab Results   Component Value Date    HGB 14.4 11/06/2017     Lab Results   Component Value Date    HCT 42.5 11/06/2017        Lab Results   Component Value Date    MCV 93 11/06/2017     Lab Results   Component Value Date    MCH 31.4 11/06/2017     Lab Results   Component Value Date    MCHC 33.9 11/06/2017     Lab Results   Component Value Date    RDW 12.9 11/06/2017     Lab Results   Component Value Date     11/06/2017       Assessment/Plan - 62 year old male 3 weeks status post HoLEP  - Suspect bleeding is reactive secondary to increased activity  - Encouraged rest and hydration  - PSA at next visit  - US for further evaluation of testicular cyst   - F/u in 6 weeks     >15 minutes were spent face  to face with patient over half of which was spent providing medical counseling regarding post HoLEP urination, hematuria    I, Manolo Cardenas saw and evaluated this patient and agree with the plan as stated above      Electrodesiccation Text: The wound bed was treated with electrodesiccation after the biopsy was performed. Render Post-Care Instructions In Note?: yes X Size Of Lesion In Cm: 0 Lab Facility: [id number] Depth Of Biopsy: dermis Wound Care: Petrolatum Electrodesiccation And Curettage Text: The wound bed was treated with electrodesiccation and curettage after the biopsy was performed. Billing Type: Third-Party Bill Bill [id number] For Specimen Handling/Conveyance To Laboratory?: no Biopsy Type: H and E Type Of Destruction Used: Curettage Detail Level: Detailed Biopsy Method: Dermablade Hemostasis: Drysol Post-Care Instructions: I reviewed with the patient in detail post-care instructions. Patient is to keep the biopsy site dry overnight, and then apply aquaphor or vaseline twice daily until healed. Patient may apply hydrogen peroxide soaks to remove any crusting. Dressing: bandage Silver Nitrate Text: The wound bed was treated with silver nitrate after the biopsy was performed. Consent: Verbal consent was obtained and risks were reviewed including but not limited to scarring, infection, bleeding, scabbing, incomplete removal, nerve damage and allergy to anesthesia. Anesthesia Type: 2% lidocaine with epinephrine and a 1:12 solution of 8.4% sodium bicarbonate Size Of Lesion In Cm: 1.1 Cryotherapy Text: The wound bed was treated with cryotherapy after the biopsy was performed. Lab: 929 Notification Instructions: Patient will be notified of biopsy results. However, patient instructed to call the office if not contacted within 2 weeks. Curettage Text: The wound bed was treated with curettage after the biopsy was performed.

## 2019-04-29 PROBLEM — M25.512 SHOULDER PAIN, LEFT: Status: RESOLVED | Noted: 2018-09-10 | Resolved: 2019-04-29

## 2019-10-02 ENCOUNTER — HEALTH MAINTENANCE LETTER (OUTPATIENT)
Age: 64
End: 2019-10-02

## 2019-12-18 NOTE — PATIENT INSTRUCTIONS
Continue current doses of medications for now.  Consider adjunctive therapy with Larry between visits with Nhia.  Come back and see me in about 4-5 weeks.         Topical Clindamycin Counseling: Patient counseled that this medication may cause skin irritation or allergic reactions.  In the event of skin irritation, the patient was advised to reduce the amount of the drug applied or use it less frequently.   The patient verbalized understanding of the proper use and possible adverse effects of clindamycin.  All of the patient's questions and concerns were addressed.

## 2021-01-15 ENCOUNTER — HEALTH MAINTENANCE LETTER (OUTPATIENT)
Age: 66
End: 2021-01-15

## 2021-09-05 ENCOUNTER — HEALTH MAINTENANCE LETTER (OUTPATIENT)
Age: 66
End: 2021-09-05

## 2022-02-19 ENCOUNTER — HEALTH MAINTENANCE LETTER (OUTPATIENT)
Age: 67
End: 2022-02-19

## 2022-05-05 ENCOUNTER — LAB REQUISITION (OUTPATIENT)
Dept: LAB | Facility: CLINIC | Age: 67
End: 2022-05-05
Payer: COMMERCIAL

## 2022-05-05 DIAGNOSIS — M79.9 SOFT TISSUE DISORDER, UNSPECIFIED: ICD-10-CM

## 2022-05-05 PROCEDURE — 88305 TISSUE EXAM BY PATHOLOGIST: CPT | Mod: TC,ORL | Performed by: ORTHOPAEDIC SURGERY

## 2022-05-06 LAB
PATH REPORT.COMMENTS IMP SPEC: NORMAL
PATH REPORT.COMMENTS IMP SPEC: NORMAL
PATH REPORT.FINAL DX SPEC: NORMAL
PATH REPORT.GROSS SPEC: NORMAL
PATH REPORT.MICROSCOPIC SPEC OTHER STN: NORMAL
PATH REPORT.RELEVANT HX SPEC: NORMAL
PHOTO IMAGE: NORMAL

## 2022-05-06 PROCEDURE — 88305 TISSUE EXAM BY PATHOLOGIST: CPT | Mod: 26 | Performed by: PATHOLOGY

## 2022-08-06 ENCOUNTER — HEALTH MAINTENANCE LETTER (OUTPATIENT)
Age: 67
End: 2022-08-06

## 2022-10-22 ENCOUNTER — HEALTH MAINTENANCE LETTER (OUTPATIENT)
Age: 67
End: 2022-10-22

## 2023-08-27 ENCOUNTER — HEALTH MAINTENANCE LETTER (OUTPATIENT)
Age: 68
End: 2023-08-27

## (undated) DEVICE — SUCTION WATERBUG FLOOR PUDDLE VAC 9321

## (undated) DEVICE — DRSG ABDOMINAL 07 1/2X8" 7197D

## (undated) DEVICE — SOL WATER IRRIG 1000ML BOTTLE 2F7114

## (undated) DEVICE — CATH LASER URETERAL 7.1FRX40CM G17797  022403-7.1-40

## (undated) DEVICE — PAD CHUX UNDERPAD 30X30"

## (undated) DEVICE — GLOVE PROTEXIS W/NEU-THERA 7.5  2D73TE75

## (undated) DEVICE — DRSG GAUZE 4X8"

## (undated) DEVICE — PACK CYSTO CUSTOM ASC

## (undated) DEVICE — SOL NACL 0.9% IRRIG 1000ML BOTTLE 2F7124

## (undated) DEVICE — SYR 50ML LL W/O NDL 309653

## (undated) DEVICE — SUCTION MANIFOLD NEPTUNE 2 SYS 4 PORT 0702-020-000

## (undated) DEVICE — BAG URINARY DRAIN LUBRISIL IC 4000ML LF 253509A

## (undated) DEVICE — TAPE CLOTH ADHESIVE 3" LATEX 3554C

## (undated) DEVICE — JELLY LUBRICATING SURGILUBE 2OZ TUBE

## (undated) DEVICE — TUBING SUCTION MEDI-VAC 1/4"X20' N620A

## (undated) DEVICE — LINEN TOWEL PACK X5 5464

## (undated) DEVICE — SYR 70ML TOOMEY 041170

## (undated) DEVICE — SOL NACL 0.9% IRRIG 3000ML BAG 2B7477

## (undated) DEVICE — CATH FOLEY 3WAY 22FR 30ML SIL 73022SI

## (undated) DEVICE — DRAPE MAYO STAND 23X54 8337

## (undated) DEVICE — TUBING IRRIG CYSTO/BLADDER SET 81" LF 2C4040

## (undated) RX ORDER — ONDANSETRON 2 MG/ML
INJECTION INTRAMUSCULAR; INTRAVENOUS
Status: DISPENSED
Start: 2018-05-15

## (undated) RX ORDER — PROPOFOL 10 MG/ML
INJECTION, EMULSION INTRAVENOUS
Status: DISPENSED
Start: 2017-10-19

## (undated) RX ORDER — CIPROFLOXACIN 500 MG/1
TABLET, FILM COATED ORAL
Status: DISPENSED
Start: 2017-10-20

## (undated) RX ORDER — CEFAZOLIN SODIUM 1 G/3ML
INJECTION, POWDER, FOR SOLUTION INTRAMUSCULAR; INTRAVENOUS
Status: DISPENSED
Start: 2017-10-19

## (undated) RX ORDER — DEXAMETHASONE SODIUM PHOSPHATE 4 MG/ML
INJECTION, SOLUTION INTRA-ARTICULAR; INTRALESIONAL; INTRAMUSCULAR; INTRAVENOUS; SOFT TISSUE
Status: DISPENSED
Start: 2017-10-19

## (undated) RX ORDER — ONDANSETRON 2 MG/ML
INJECTION INTRAMUSCULAR; INTRAVENOUS
Status: DISPENSED
Start: 2017-10-19

## (undated) RX ORDER — FENTANYL CITRATE 50 UG/ML
INJECTION, SOLUTION INTRAMUSCULAR; INTRAVENOUS
Status: DISPENSED
Start: 2017-10-19

## (undated) RX ORDER — LIDOCAINE HYDROCHLORIDE 20 MG/ML
INJECTION, SOLUTION EPIDURAL; INFILTRATION; INTRACAUDAL; PERINEURAL
Status: DISPENSED
Start: 2017-10-19

## (undated) RX ORDER — FENTANYL CITRATE 50 UG/ML
INJECTION, SOLUTION INTRAMUSCULAR; INTRAVENOUS
Status: DISPENSED
Start: 2018-05-15

## (undated) RX ORDER — ACETAMINOPHEN 325 MG/1
TABLET ORAL
Status: DISPENSED
Start: 2017-10-19

## (undated) RX ORDER — GABAPENTIN 300 MG/1
CAPSULE ORAL
Status: DISPENSED
Start: 2017-10-19